# Patient Record
Sex: MALE | Race: WHITE | NOT HISPANIC OR LATINO | Employment: FULL TIME | URBAN - NONMETROPOLITAN AREA
[De-identification: names, ages, dates, MRNs, and addresses within clinical notes are randomized per-mention and may not be internally consistent; named-entity substitution may affect disease eponyms.]

---

## 2017-01-04 ENCOUNTER — OFFICE VISIT (OUTPATIENT)
Dept: SURGERY | Facility: CLINIC | Age: 52
End: 2017-01-04

## 2017-01-04 VITALS
BODY MASS INDEX: 39.84 KG/M2 | DIASTOLIC BLOOD PRESSURE: 74 MMHG | SYSTOLIC BLOOD PRESSURE: 128 MMHG | HEIGHT: 69 IN | WEIGHT: 269 LBS

## 2017-01-04 DIAGNOSIS — Z87.19 HISTORY OF VENTRAL HERNIA REPAIR: Primary | ICD-10-CM

## 2017-01-04 DIAGNOSIS — T14.8XXA HEMATOMA: ICD-10-CM

## 2017-01-04 DIAGNOSIS — Z98.890 HISTORY OF VENTRAL HERNIA REPAIR: Primary | ICD-10-CM

## 2017-01-04 PROCEDURE — 99024 POSTOP FOLLOW-UP VISIT: CPT | Performed by: SURGERY

## 2017-01-04 RX ORDER — HYDROCODONE BITARTRATE AND ACETAMINOPHEN 7.5; 325 MG/1; MG/1
1 TABLET ORAL EVERY 8 HOURS PRN
COMMUNITY
End: 2019-10-06 | Stop reason: HOSPADM

## 2017-01-04 NOTE — LETTER
January 8, 2017     Halle Baron MD  [address] [facility] [address]    Patient: Jon Gill   YOB: 1965   Date of Visit: 1/4/2017       Dear Dr. Tod MD:    Thank you for referring Jon Gill to me for evaluation. Below are the relevant portions of my assessment and plan of care.    If you have questions, please do not hesitate to call me. I look forward to following Jon along with you.         Sincerely,        Ermias Montana MD        CC: No Recipients  Ermias Montana MD  1/8/2017  5:02 PM  Signed  Chief Complaint   Patient presents with   • Post-op Follow-up     Recheck laparoscopic ventral hernia repair 11/25/16     HPI    DATE OF PROCEDURE: 11/25/2016      DIAGNOSES:  1. Umbilical hernia.  2. Epigastric hernia.      PROCEDURE: Laparoscopic ventral hernia repair of both areas with Ventralight mesh.      FINDINGS: Two hernias, 1 at the umbilicus and 1 supraumbilically.      SPECIMENS REMOVED: None.      INDICATION: This young man is 51 years old and has a history of Crohn disease and abdominal wall incisions for bowel resection. He had 2 hernias, 1 located at the umbilicus and 1 supraumbilically. He is here for repair of same.     COURSE: Post operative hematoma has now significantly resolved.    Physical Exam   Abdominal: Soft. Bowel sounds are normal. He exhibits no distension and no mass. There is no tenderness. There is no rebound and no guarding.         ASSESSMENT    Jon was seen today for post-op follow-up.    Diagnoses and all orders for this visit:    History of ventral hernia repair    Hematoma  Comments:  Postoperative-now resolved      PLAN  1.  May resume all normal activity  2.  Recheck with me as needed    Signed by Ermias Montana MD

## 2017-01-04 NOTE — Clinical Note
January 8, 2017     Halle Baron MD  [address] [facility] [address]    Patient: Jon Gill   YOB: 1965   Date of Visit: 1/4/2017       Dear Dr. Tod MD:    Thank you for referring Jon Gill to me for evaluation. Below are the relevant portions of my assessment and plan of care.                   If you have questions, please do not hesitate to call me. I look forward to following Jon along with you.         Sincerely,        Ermias Montana MD        CC: No Recipients

## 2017-01-08 PROBLEM — Z87.19 HISTORY OF VENTRAL HERNIA REPAIR: Status: RESOLVED | Noted: 2017-01-08 | Resolved: 2017-01-08

## 2017-01-08 PROBLEM — Z98.890 HISTORY OF VENTRAL HERNIA REPAIR: Status: ACTIVE | Noted: 2017-01-08

## 2017-01-08 PROBLEM — Z98.890 HISTORY OF VENTRAL HERNIA REPAIR: Status: RESOLVED | Noted: 2017-01-08 | Resolved: 2017-01-08

## 2017-01-08 PROBLEM — Z87.19 HISTORY OF VENTRAL HERNIA REPAIR: Status: ACTIVE | Noted: 2017-01-08

## 2017-01-08 PROBLEM — T14.8XXA HEMATOMA: Status: RESOLVED | Noted: 2017-01-08 | Resolved: 2017-01-08

## 2017-01-08 PROBLEM — T14.8XXA HEMATOMA: Status: ACTIVE | Noted: 2017-01-08

## 2017-01-08 NOTE — PROGRESS NOTES
Chief Complaint   Patient presents with   • Post-op Follow-up     Recheck laparoscopic ventral hernia repair 11/25/16     HPI    DATE OF PROCEDURE: 11/25/2016      DIAGNOSES:  1. Umbilical hernia.  2. Epigastric hernia.      PROCEDURE: Laparoscopic ventral hernia repair of both areas with Ventralight mesh.      FINDINGS: Two hernias, 1 at the umbilicus and 1 supraumbilically.      SPECIMENS REMOVED: None.      INDICATION: This young man is 51 years old and has a history of Crohn disease and abdominal wall incisions for bowel resection. He had 2 hernias, 1 located at the umbilicus and 1 supraumbilically. He is here for repair of same.     COURSE: Post operative hematoma has now significantly resolved.    Physical Exam   Abdominal: Soft. Bowel sounds are normal. He exhibits no distension and no mass. There is no tenderness. There is no rebound and no guarding.         WILLY Webb was seen today for post-op follow-up.    Diagnoses and all orders for this visit:    History of ventral hernia repair    Hematoma  Comments:  Postoperative-now resolved      PLAN  1.  May resume all normal activity  2.  Recheck with me as needed    Signed by Ermias Montana MD

## 2019-10-04 ENCOUNTER — HOSPITAL ENCOUNTER (INPATIENT)
Facility: HOSPITAL | Age: 54
LOS: 2 days | Discharge: HOME | End: 2019-10-06
Attending: FAMILY MEDICINE | Admitting: FAMILY MEDICINE

## 2019-10-04 PROBLEM — F41.9 ANXIETY: Chronic | Status: ACTIVE | Noted: 2019-10-04

## 2019-10-04 PROBLEM — I10 HYPERTENSION: Chronic | Status: ACTIVE | Noted: 2019-10-04

## 2019-10-04 PROBLEM — K50.90 CROHN'S DISEASE: Chronic | Status: ACTIVE | Noted: 2019-10-04

## 2019-10-04 PROBLEM — N17.9 AKI (ACUTE KIDNEY INJURY): Status: ACTIVE | Noted: 2019-10-04

## 2019-10-04 PROBLEM — F32.A DEPRESSION: Chronic | Status: ACTIVE | Noted: 2019-10-04

## 2019-10-04 LAB
ANION GAP SERPL CALCULATED.3IONS-SCNC: 14 MMOL/L (ref 5–15)
BUN BLD-MCNC: 57 MG/DL (ref 6–20)
BUN/CREAT SERPL: 8 (ref 7–25)
CALCIUM SPEC-SCNC: 7.7 MG/DL (ref 8.6–10.5)
CHLORIDE SERPL-SCNC: 102 MMOL/L (ref 98–107)
CO2 SERPL-SCNC: 21 MMOL/L (ref 22–29)
CREAT BLD-MCNC: 7.11 MG/DL (ref 0.76–1.27)
GFR SERPL CREATININE-BSD FRML MDRD: 8 ML/MIN/1.73
GFR SERPL CREATININE-BSD FRML MDRD: ABNORMAL ML/MIN/{1.73_M2}
GLUCOSE BLD-MCNC: 103 MG/DL (ref 65–99)
POTASSIUM BLD-SCNC: 4 MMOL/L (ref 3.5–5.2)
SODIUM BLD-SCNC: 137 MMOL/L (ref 136–145)
WHOLE BLOOD HOLD SPECIMEN: NORMAL
WHOLE BLOOD HOLD SPECIMEN: NORMAL

## 2019-10-04 PROCEDURE — 80048 BASIC METABOLIC PNL TOTAL CA: CPT | Performed by: FAMILY MEDICINE

## 2019-10-04 PROCEDURE — 25010000002 HEPARIN (PORCINE) PER 1000 UNITS: Performed by: FAMILY MEDICINE

## 2019-10-04 PROCEDURE — 0097U HC BIOFIRE FILMARRAY GI PANEL: CPT | Performed by: FAMILY MEDICINE

## 2019-10-04 RX ORDER — ONDANSETRON 2 MG/ML
4 INJECTION INTRAMUSCULAR; INTRAVENOUS EVERY 6 HOURS PRN
Status: DISCONTINUED | OUTPATIENT
Start: 2019-10-04 | End: 2019-10-06 | Stop reason: HOSPADM

## 2019-10-04 RX ORDER — HEPARIN SODIUM 5000 [USP'U]/ML
5000 INJECTION, SOLUTION INTRAVENOUS; SUBCUTANEOUS EVERY 12 HOURS SCHEDULED
Status: DISCONTINUED | OUTPATIENT
Start: 2019-10-04 | End: 2019-10-06 | Stop reason: HOSPADM

## 2019-10-04 RX ORDER — FAMOTIDINE 40 MG/1
40 TABLET, FILM COATED ORAL DAILY
Status: DISCONTINUED | OUTPATIENT
Start: 2019-10-04 | End: 2019-10-06 | Stop reason: HOSPADM

## 2019-10-04 RX ORDER — LISINOPRIL 40 MG/1
40 TABLET ORAL NIGHTLY
COMMUNITY
End: 2019-10-06 | Stop reason: HOSPADM

## 2019-10-04 RX ORDER — DICYCLOMINE HYDROCHLORIDE 10 MG/1
10 CAPSULE ORAL
COMMUNITY

## 2019-10-04 RX ORDER — SODIUM CHLORIDE 0.9 % (FLUSH) 0.9 %
10 SYRINGE (ML) INJECTION EVERY 12 HOURS SCHEDULED
Status: DISCONTINUED | OUTPATIENT
Start: 2019-10-04 | End: 2019-10-06 | Stop reason: HOSPADM

## 2019-10-04 RX ORDER — METOPROLOL SUCCINATE 50 MG/1
50 TABLET, EXTENDED RELEASE ORAL
COMMUNITY

## 2019-10-04 RX ORDER — SODIUM CHLORIDE 9 MG/ML
100 INJECTION, SOLUTION INTRAVENOUS CONTINUOUS
Status: DISCONTINUED | OUTPATIENT
Start: 2019-10-04 | End: 2019-10-06

## 2019-10-04 RX ORDER — GABAPENTIN 300 MG/1
300 CAPSULE ORAL 2 TIMES DAILY
COMMUNITY

## 2019-10-04 RX ORDER — SODIUM CHLORIDE 0.9 % (FLUSH) 0.9 %
10 SYRINGE (ML) INJECTION AS NEEDED
Status: DISCONTINUED | OUTPATIENT
Start: 2019-10-04 | End: 2019-10-06 | Stop reason: HOSPADM

## 2019-10-04 RX ORDER — OXYCODONE AND ACETAMINOPHEN 7.5; 325 MG/1; MG/1
1 TABLET ORAL EVERY 6 HOURS PRN
COMMUNITY

## 2019-10-04 RX ORDER — TIZANIDINE 4 MG/1
4 TABLET ORAL NIGHTLY PRN
COMMUNITY

## 2019-10-04 RX ADMIN — SODIUM CHLORIDE 100 ML/HR: 9 INJECTION, SOLUTION INTRAVENOUS at 21:16

## 2019-10-04 RX ADMIN — SODIUM CHLORIDE, PRESERVATIVE FREE 10 ML: 5 INJECTION INTRAVENOUS at 21:17

## 2019-10-04 RX ADMIN — FAMOTIDINE 40 MG: 40 TABLET ORAL at 21:14

## 2019-10-04 RX ADMIN — HEPARIN SODIUM 5000 UNITS: 5000 INJECTION INTRAVENOUS; SUBCUTANEOUS at 21:14

## 2019-10-05 ENCOUNTER — APPOINTMENT (OUTPATIENT)
Dept: ULTRASOUND IMAGING | Facility: HOSPITAL | Age: 54
End: 2019-10-05

## 2019-10-05 LAB
ANION GAP SERPL CALCULATED.3IONS-SCNC: 12 MMOL/L (ref 5–15)
BACTERIA UR QL AUTO: ABNORMAL /HPF
BASOPHILS # BLD MANUAL: 0.06 10*3/MM3 (ref 0–0.2)
BASOPHILS NFR BLD AUTO: 1 % (ref 0–1.5)
BILIRUB UR QL STRIP: NEGATIVE
BUN BLD-MCNC: 48 MG/DL (ref 6–20)
BUN/CREAT SERPL: 13.5 (ref 7–25)
CALCIUM SPEC-SCNC: 8.3 MG/DL (ref 8.6–10.5)
CHLORIDE SERPL-SCNC: 106 MMOL/L (ref 98–107)
CLARITY UR: CLEAR
CO2 SERPL-SCNC: 21 MMOL/L (ref 22–29)
COLOR UR: YELLOW
CREAT BLD-MCNC: 3.56 MG/DL (ref 0.76–1.27)
CREAT UR-MCNC: 137.9 MG/DL
DEPRECATED RDW RBC AUTO: 41 FL (ref 37–54)
EOSINOPHIL # BLD MANUAL: 0.12 10*3/MM3 (ref 0–0.4)
EOSINOPHIL NFR BLD MANUAL: 2 % (ref 0.3–6.2)
ERYTHROCYTE [DISTWIDTH] IN BLOOD BY AUTOMATED COUNT: 13.1 % (ref 12.3–15.4)
GFR SERPL CREATININE-BSD FRML MDRD: 18 ML/MIN/1.73
GLUCOSE BLD-MCNC: 100 MG/DL (ref 65–99)
GLUCOSE UR STRIP-MCNC: NEGATIVE MG/DL
HCT VFR BLD AUTO: 39.9 % (ref 37.5–51)
HGB BLD-MCNC: 13.4 G/DL (ref 13–17.7)
HGB UR QL STRIP.AUTO: ABNORMAL
HYALINE CASTS UR QL AUTO: ABNORMAL /LPF
KETONES UR QL STRIP: NEGATIVE
LEUKOCYTE ESTERASE UR QL STRIP.AUTO: NEGATIVE
LYMPHOCYTES # BLD MANUAL: 2.55 10*3/MM3 (ref 0.7–3.1)
LYMPHOCYTES NFR BLD MANUAL: 43 % (ref 19.6–45.3)
LYMPHOCYTES NFR BLD MANUAL: 7 % (ref 5–12)
MCH RBC QN AUTO: 29.3 PG (ref 26.6–33)
MCHC RBC AUTO-ENTMCNC: 33.6 G/DL (ref 31.5–35.7)
MCV RBC AUTO: 87.1 FL (ref 79–97)
MONOCYTES # BLD AUTO: 0.42 10*3/MM3 (ref 0.1–0.9)
NEUTROPHILS # BLD AUTO: 2.55 10*3/MM3 (ref 1.7–7)
NEUTROPHILS NFR BLD MANUAL: 38 % (ref 42.7–76)
NEUTS BAND NFR BLD MANUAL: 5 % (ref 0–5)
NITRITE UR QL STRIP: NEGATIVE
PH UR STRIP.AUTO: 5.5 [PH] (ref 5–9)
PLATELET # BLD AUTO: 145 10*3/MM3 (ref 140–450)
PMV BLD AUTO: 11.1 FL (ref 6–12)
POTASSIUM BLD-SCNC: 3.7 MMOL/L (ref 3.5–5.2)
PROT UR QL STRIP: ABNORMAL
PROT UR-MCNC: 53.4 MG/DL
RBC # BLD AUTO: 4.58 10*6/MM3 (ref 4.14–5.8)
RBC # UR: ABNORMAL /HPF
RBC MORPH BLD: NORMAL
REF LAB TEST METHOD: ABNORMAL
SMALL PLATELETS BLD QL SMEAR: ABNORMAL
SODIUM BLD-SCNC: 139 MMOL/L (ref 136–145)
SODIUM UR-SCNC: 55 MMOL/L
SP GR UR STRIP: 1.02 (ref 1–1.03)
SQUAMOUS #/AREA URNS HPF: ABNORMAL /HPF
UROBILINOGEN UR QL STRIP: ABNORMAL
VARIANT LYMPHS NFR BLD MANUAL: 4 % (ref 0–5)
WBC MORPH BLD: NORMAL
WBC NRBC COR # BLD: 5.93 10*3/MM3 (ref 3.4–10.8)
WBC UR QL AUTO: ABNORMAL /HPF

## 2019-10-05 PROCEDURE — 85007 BL SMEAR W/DIFF WBC COUNT: CPT | Performed by: FAMILY MEDICINE

## 2019-10-05 PROCEDURE — 81001 URINALYSIS AUTO W/SCOPE: CPT | Performed by: NURSE PRACTITIONER

## 2019-10-05 PROCEDURE — 76775 US EXAM ABDO BACK WALL LIM: CPT

## 2019-10-05 PROCEDURE — 25010000002 ONDANSETRON PER 1 MG: Performed by: FAMILY MEDICINE

## 2019-10-05 PROCEDURE — 80048 BASIC METABOLIC PNL TOTAL CA: CPT | Performed by: FAMILY MEDICINE

## 2019-10-05 PROCEDURE — 84156 ASSAY OF PROTEIN URINE: CPT | Performed by: INTERNAL MEDICINE

## 2019-10-05 PROCEDURE — 25010000002 HEPARIN (PORCINE) PER 1000 UNITS: Performed by: FAMILY MEDICINE

## 2019-10-05 PROCEDURE — 84300 ASSAY OF URINE SODIUM: CPT | Performed by: NURSE PRACTITIONER

## 2019-10-05 PROCEDURE — 87205 SMEAR GRAM STAIN: CPT | Performed by: FAMILY MEDICINE

## 2019-10-05 PROCEDURE — 85025 COMPLETE CBC W/AUTO DIFF WBC: CPT | Performed by: FAMILY MEDICINE

## 2019-10-05 PROCEDURE — 25010000002 CEFTRIAXONE PER 250 MG: Performed by: FAMILY MEDICINE

## 2019-10-05 PROCEDURE — 82570 ASSAY OF URINE CREATININE: CPT | Performed by: NURSE PRACTITIONER

## 2019-10-05 RX ORDER — OXYCODONE AND ACETAMINOPHEN 7.5; 325 MG/1; MG/1
1 TABLET ORAL EVERY 6 HOURS PRN
Status: DISCONTINUED | OUTPATIENT
Start: 2019-10-05 | End: 2019-10-06 | Stop reason: HOSPADM

## 2019-10-05 RX ORDER — GABAPENTIN 300 MG/1
300 CAPSULE ORAL 2 TIMES DAILY
Status: DISCONTINUED | OUTPATIENT
Start: 2019-10-05 | End: 2019-10-06 | Stop reason: HOSPADM

## 2019-10-05 RX ORDER — ALPRAZOLAM 1 MG/1
1 TABLET ORAL 2 TIMES DAILY PRN
Status: DISCONTINUED | OUTPATIENT
Start: 2019-10-05 | End: 2019-10-06 | Stop reason: HOSPADM

## 2019-10-05 RX ORDER — METOPROLOL SUCCINATE 50 MG/1
50 TABLET, EXTENDED RELEASE ORAL
Status: DISCONTINUED | OUTPATIENT
Start: 2019-10-05 | End: 2019-10-06 | Stop reason: HOSPADM

## 2019-10-05 RX ADMIN — ALPRAZOLAM 1 MG: 1 TABLET ORAL at 09:49

## 2019-10-05 RX ADMIN — SODIUM CHLORIDE 100 ML/HR: 9 INJECTION, SOLUTION INTRAVENOUS at 07:46

## 2019-10-05 RX ADMIN — ONDANSETRON 4 MG: 2 INJECTION INTRAMUSCULAR; INTRAVENOUS at 18:09

## 2019-10-05 RX ADMIN — CEFTRIAXONE 2 G: 2 INJECTION, POWDER, FOR SOLUTION INTRAMUSCULAR; INTRAVENOUS at 09:45

## 2019-10-05 RX ADMIN — METOPROLOL SUCCINATE 50 MG: 50 TABLET, EXTENDED RELEASE ORAL at 09:42

## 2019-10-05 RX ADMIN — SODIUM CHLORIDE 100 ML/HR: 9 INJECTION, SOLUTION INTRAVENOUS at 20:11

## 2019-10-05 RX ADMIN — GABAPENTIN 300 MG: 300 CAPSULE ORAL at 20:12

## 2019-10-05 RX ADMIN — HEPARIN SODIUM 5000 UNITS: 5000 INJECTION INTRAVENOUS; SUBCUTANEOUS at 09:43

## 2019-10-05 RX ADMIN — OXYCODONE HYDROCHLORIDE AND ACETAMINOPHEN 1 TABLET: 7.5; 325 TABLET ORAL at 21:40

## 2019-10-05 RX ADMIN — ALPRAZOLAM 1 MG: 1 TABLET ORAL at 21:37

## 2019-10-05 RX ADMIN — SODIUM CHLORIDE, PRESERVATIVE FREE 10 ML: 5 INJECTION INTRAVENOUS at 09:51

## 2019-10-05 RX ADMIN — HEPARIN SODIUM 5000 UNITS: 5000 INJECTION INTRAVENOUS; SUBCUTANEOUS at 20:11

## 2019-10-05 RX ADMIN — GABAPENTIN 300 MG: 300 CAPSULE ORAL at 09:42

## 2019-10-05 RX ADMIN — FAMOTIDINE 40 MG: 40 TABLET ORAL at 09:41

## 2019-10-05 RX ADMIN — SODIUM CHLORIDE, PRESERVATIVE FREE 10 ML: 5 INJECTION INTRAVENOUS at 20:12

## 2019-10-05 NOTE — PLAN OF CARE
Problem: Patient Care Overview  Goal: Plan of Care Review  Outcome: Ongoing (interventions implemented as appropriate)   10/05/19 0256   Coping/Psychosocial   Plan of Care Reviewed With patient   Plan of Care Review   Progress improving   OTHER   Outcome Summary pt has been afebrile throughout night. pt vital signs stable. 2 loose stools noted. Urine output adequate. NS infusing as order. pt denies any pain. will continue to monitor.     Goal: Individualization and Mutuality  Outcome: Ongoing (interventions implemented as appropriate)    Goal: Discharge Needs Assessment  Outcome: Ongoing (interventions implemented as appropriate)    Goal: Interprofessional Rounds/Family Conf  Outcome: Ongoing (interventions implemented as appropriate)      Problem: Skin Injury Risk (Adult)  Goal: Identify Related Risk Factors and Signs and Symptoms  Outcome: Ongoing (interventions implemented as appropriate)    Goal: Skin Health and Integrity  Outcome: Ongoing (interventions implemented as appropriate)      Problem: Pain, Chronic (Adult)  Goal: Identify Related Risk Factors and Signs and Symptoms  Outcome: Ongoing (interventions implemented as appropriate)    Goal: Acceptable Pain/Comfort Level and Functional Ability  Outcome: Ongoing (interventions implemented as appropriate)      Problem: Renal Failure/Kidney Injury, Acute (Adult)  Goal: Signs and Symptoms of Listed Potential Problems Will be Absent, Minimized or Managed (Renal Failure/Kidney Injury, Acute)  Outcome: Ongoing (interventions implemented as appropriate)

## 2019-10-05 NOTE — PLAN OF CARE
Problem: Patient Care Overview  Goal: Plan of Care Review  Outcome: Ongoing (interventions implemented as appropriate)   10/05/19 4315   Coping/Psychosocial   Plan of Care Reviewed With patient   Plan of Care Review   Progress no change   OTHER   Outcome Summary Pt was transferred from CCU this shift, v/s stable, no complaints, will continue to monitor      Goal: Individualization and Mutuality  Outcome: Ongoing (interventions implemented as appropriate)    Goal: Discharge Needs Assessment  Outcome: Ongoing (interventions implemented as appropriate)    Goal: Interprofessional Rounds/Family Conf  Outcome: Ongoing (interventions implemented as appropriate)      Problem: Skin Injury Risk (Adult)  Goal: Skin Health and Integrity  Outcome: Ongoing (interventions implemented as appropriate)      Problem: Pain, Chronic (Adult)  Goal: Acceptable Pain/Comfort Level and Functional Ability  Outcome: Ongoing (interventions implemented as appropriate)      Problem: Renal Failure/Kidney Injury, Acute (Adult)  Goal: Signs and Symptoms of Listed Potential Problems Will be Absent, Minimized or Managed (Renal Failure/Kidney Injury, Acute)  Outcome: Ongoing (interventions implemented as appropriate)

## 2019-10-05 NOTE — PROGRESS NOTES
MEDICINE DAILY PROGRESS NOTE  NAME: Jon Gill  : 1965  MRN: [id number]     LOS: 1 day     PROVIDER OF SERVICE: Tulio Perry MD    Chief Complaint: LAWRENCE (acute kidney injury) (CMS/[facility])    Subjective:   HPI: Jon Gill is a 54 y.o. male with medical history significant for history of Crohn's status post partial colectomy, depression, hypertension, chronic pain, and anxiety presents with diarrhea from transferring facility.      Patient is a transfer from [facility].     Patient states that he has had worsening diarrhea and fever over the last 2 days with up to 10 episodes a day.  Patient states he has some chronic frequent loose stools secondary to his partial colectomy but this is been much worse.  Patient believes that he has had adequate p.o. intake but does have dry mucous membranes on exam.  Patient does endorse some nausea, diarrhea, fever, and chills. Patient also has decreased urine output. Patient denies any chest pain, diaphoresis, dyspnea, or vomiting.       At transferring facility patient was found to have a creatinine of 8.0.  Patient was also found to have hypotension.  At time of exam patient is resting in the ICU bed in no acute distress.  Patient's blood pressure has normalized.  Per nursing report patient has gotten approximately 3.5 to 4 L of fluid.  Patient still states that he does not have much urine output at this time.    Interval History:  History taken from: patient chart family RN  10/05. Improving. Increased UOP overnight. Cr improving. Diarrhea the same.    F - Regular. HH.  A - Percocet  S - Ativan  T - Heparin  H - Yes  U - Pepcid  G - N/A      Intake/Output       10/04/19 0701 - 10/05/19 0700 10/05/19 0701 - 10/06/19 0700      4597-7136 5552-4857 Total 2163-1005 3197-4507 Total       Intake    P.O.  --  360 360  --  -- --    I.V.  --  1221 1221  224  -- 224    Total Intake -- 1581 1581 224 -- 224       Output    Urine  --  1775 1775  --  -- --    Total  Output -- 1775 1775 -- -- --        Intake/Output       10/04/19 0701 - 10/05/19 0700 10/05/19 0701 - 10/06/19 0700      3267-6771 9381-4010 Total 5230-3222 4501-0712 Total       Intake    P.O.  --  360 360  --  -- --    I.V.  --  1221 1221  224  -- 224    Total Intake -- 1581 1581 224 -- 224       Output    Urine  --  1775 1775  --  -- --    Total Output -- 1775 1775 -- -- --          Intake/Output       10/04/19 0701 - 10/05/19 0700 10/05/19 0701 - 10/06/19 0700      4119-9045 3358-2157 Total 9134-0557 3054-4827 Total       Intake    P.O.  --  360 360  --  -- --    I.V.  --  1221 1221  224  -- 224    Total Intake -- 1581 1581 224 -- 224       Output    Urine  --  1775 1775  --  -- --    Total Output -- 1775 1775 -- -- --          Review of Systems:   Review of Systems   Constitutional: Positive for activity change and fatigue. Negative for appetite change and fever.   Respiratory: Negative for cough and shortness of breath.    Cardiovascular: Negative for chest pain and palpitations.   Gastrointestinal: Positive for diarrhea and nausea. Negative for abdominal pain and vomiting.   Genitourinary: Positive for decreased urine volume (Improving).   Musculoskeletal: Positive for arthralgias and back pain. Negative for gait problem.   Skin: Negative for color change and rash.   Neurological: Negative for dizziness, weakness and headaches.   Psychiatric/Behavioral: Negative for agitation, confusion and sleep disturbance.       Objective:     Vital Signs  Vitals:    10/05/19 0702 10/05/19 0723 10/05/19 0802 10/05/19 0812   BP: 136/66  141/77    Pulse:  78  76   Resp:       Temp:    98.5 °F (36.9 °C)   TempSrc:    Oral   SpO2:  94%  97%   Weight:       Height:           Physical Exam  Physical Exam   Constitutional: He is oriented to person, place, and time. He appears well-developed and well-nourished. No distress.   HENT:   Head: Normocephalic and atraumatic.   Right Ear: External ear normal.   Left Ear: External ear  normal.   Nose: Nose normal.   Eyes: Conjunctivae and EOM are normal. Pupils are equal, round, and reactive to light.   Neck: Neck supple. No thyromegaly present.   Cardiovascular: Normal rate, regular rhythm and normal heart sounds.   Pulmonary/Chest: Effort normal and breath sounds normal. No respiratory distress. He has no wheezes. He has no rales. He exhibits no tenderness.   Abdominal: Soft. Bowel sounds are normal. He exhibits no distension and no mass. There is no tenderness. There is no rebound and no guarding.   Musculoskeletal: Normal range of motion. He exhibits no edema.   Neurological: He is alert and oriented to person, place, and time.   Skin: Skin is warm and dry. No rash noted. He is not diaphoretic. No erythema. No pallor.   Psychiatric: He has a normal mood and affect. His behavior is normal.   Nursing note and vitals reviewed.      Medication Review    Current Facility-Administered Medications:   •  ALPRAZolam (XANAX) tablet 1 mg, 1 mg, Oral, BID PRN, Tulio Perry MD  •  famotidine (PEPCID) tablet 40 mg, 40 mg, Oral, Daily, Tulio Perry MD, 40 mg at 10/04/19 2114  •  gabapentin (NEURONTIN) capsule 300 mg, 300 mg, Oral, BID, Tulio Perry MD  •  heparin (porcine) 5000 UNIT/ML injection 5,000 Units, 5,000 Units, Subcutaneous, Q12H, Tulio Perry MD, 5,000 Units at 10/04/19 2114  •  metoprolol succinate XL (TOPROL-XL) 24 hr tablet 50 mg, 50 mg, Oral, Q24H, Tulio Perry MD  •  ondansetron (ZOFRAN) injection 4 mg, 4 mg, Intravenous, Q6H PRN, Tulio Perry MD  •  oxyCODONE-acetaminophen (PERCOCET) 7.5-325 MG per tablet 1 tablet, 1 tablet, Oral, Q6H PRN, Tulio Perry MD  •  sodium chloride 0.9 % flush 10 mL, 10 mL, Intravenous, Q12H, Tulio Perry MD, 10 mL at 10/04/19 2117  •  sodium chloride 0.9 % flush 10 mL, 10 mL, Intravenous, PRN, Tulio Perry MD  •  sodium chloride 0.9 % infusion, 100 mL/hr, Intravenous, Continuous, Tulio Perry MD, Last Rate: 100 mL/hr at 10/05/19 0746, 100 mL/hr  at 10/05/19 0746     Diagnostic Data    Lab Results (last 24 hours)     Procedure Component Value Units Date/Time    Gastrointestinal Panel, PCR - Stool, Per Rectum [[id number]]  (Abnormal) Collected:  10/04/19 2001    Specimen:  Stool from Per Rectum Updated:  10/05/19 0555     Campylobacter Not Detected     Clostridium difficile (toxin A/B) Not Detected     Plesiomonas shigelloides Not Detected     Salmonella Detected     Comment:   Routine susceptibility testing is not indicated for non-typhoidal Salmonella species isolated from intestinal sources.        Vibrio Not Detected     Vibrio cholerae Not Detected     Yersinia enterocolitica Not Detected     Enteroaggregative E. coli (EAEC) Not Detected     Enteropathogenic E. coli (EPEC) Not Detected     Enterotoxigenic E. coli (ETEC) lt/st Not Detected     Shiga-like toxin-producing E. coli (STEC) stx1/stx2 Not Detected     E. coli O157 Not Detected     Shigella/Enteroinvasive E. coli (EIEC) Not Detected     Cryptosporidium Not Detected     Cyclospora cayetanensis Not Detected     Entamoeba histolytica Not Detected     Giardia lamblia Not Detected     Adenovirus F40/41 Not Detected     Astrovirus Not Detected     Norovirus GI/GII Not Detected     Rotavirus A Not Detected     Sapovirus (I, II, IV or V) Not Detected    Narrative:       Testing performed by multiplex PCR system.    Manual Differential [[id number]]  (Abnormal) Collected:  10/05/19 0424    Specimen:  Blood Updated:  10/05/19 0549     Neutrophil % 38.0 %      Lymphocyte % 43.0 %      Monocyte % 7.0 %      Eosinophil % 2.0 %      Basophil % 1.0 %      Bands %  5.0 %      Atypical Lymphocyte % 4.0 %      Neutrophils Absolute 2.55 10*3/mm3      Lymphocytes Absolute 2.55 10*3/mm3      Monocytes Absolute 0.42 10*3/mm3      Eosinophils Absolute 0.12 10*3/mm3      Basophils Absolute 0.06 10*3/mm3      RBC Morphology Normal     WBC Morphology Normal     Platelet Estimate Decreased    CBC & Differential [[id number]]  Collected:  10/05/19 0424    Specimen:  Blood Updated:  10/05/19 0519    Narrative:       The following orders were created for panel order CBC & Differential.  Procedure                               Abnormality         Status                     ---------                               -----------         ------                     CBC Auto Differential[[id number]]        Normal              Final result                 Please view results for these tests on the individual orders.    CBC Auto Differential [[id number]]  (Normal) Collected:  10/05/19 0424    Specimen:  Blood Updated:  10/05/19 0519     WBC 5.93 10*3/mm3      RBC 4.58 10*6/mm3      Hemoglobin 13.4 g/dL      Hematocrit 39.9 %      MCV 87.1 fL      MCH 29.3 pg      MCHC 33.6 g/dL      RDW 13.1 %      RDW-SD 41.0 fl      MPV 11.1 fL      Platelets 145 10*3/mm3     Basic Metabolic Panel [[id number]]  (Abnormal) Collected:  10/05/19 0424    Specimen:  Blood Updated:  10/05/19 0509     Glucose 100 mg/dL      BUN 48 mg/dL      Creatinine 3.56 mg/dL      Sodium 139 mmol/L      Potassium 3.7 mmol/L      Chloride 106 mmol/L      CO2 21.0 mmol/L      Calcium 8.3 mg/dL      eGFR Non African Amer 18 mL/min/1.73      BUN/Creatinine Ratio 13.5     Anion Gap 12.0 mmol/L     Narrative:       GFR Normal >60  Chronic Kidney Disease <60  Kidney Failure <15    Extra Tubes [[id number]] Collected:  10/04/19 1934    Specimen:  Blood, Venous Line Updated:  10/04/19 2127    Narrative:       The following orders were created for panel order Extra Tubes.  Procedure                               Abnormality         Status                     ---------                               -----------         ------                     Light Blue Top[[id number]]                                   Final result               Lavender Top[[id number]]                                     Final result                 Please view results for these tests on the individual orders.    Light Blue Top  [[id number]] Collected:  10/04/19 1934    Specimen:  Blood Updated:  10/04/19 2127     Extra Tube hold for add-on     Comment: Auto resulted       Lavender Top [[id number]] Collected:  10/04/19 1934    Specimen:  Blood Updated:  10/04/19 2127     Extra Tube hold for add-on     Comment: Auto resulted       Basic Metabolic Panel [[id number]]  (Abnormal) Collected:  10/04/19 1932    Specimen:  Blood Updated:  10/04/19 1959     Glucose 103 mg/dL      BUN 57 mg/dL      Creatinine 7.11 mg/dL      Sodium 137 mmol/L      Potassium 4.0 mmol/L      Chloride 102 mmol/L      CO2 21.0 mmol/L      Calcium 7.7 mg/dL      eGFR   Amer --     Comment: <15 Indicative of kidney failure.        eGFR Non African Amer 8 mL/min/1.73      Comment: <15 Indicative of kidney failure.        BUN/Creatinine Ratio 8.0     Anion Gap 14.0 mmol/L     Narrative:       GFR Normal >60  Chronic Kidney Disease <60  Kidney Failure <15          I reviewed the patient's new clinical results.    Assessment/Plan:     Active Hospital Problems    Diagnosis POA   • **LAWRENCE (acute kidney injury) (CMS/[facility]) [N17.9] Yes   • Crohn's disease (CMS/[facility]) [K50.90] Yes   • Depression [F32.9] Yes   • Hypertension [I10] Yes   • Anxiety [F41.9] Yes       #.  LAWRENCE. IVF's.   10/05. Improving. Urine studies pending. Suspect pre renal with Salmonella on GI PCR and diarrhea.  10/04. Repeat BMP pending. Nephrology consulted. Renal US in am.  Results from last 7 days   Lab Units 10/05/19  0424 10/04/19  1932   CREATININE mg/dL 3.56* 7.11*   #.  Chronic pain. eKASPER reviewed. Percocet.  #.  Hypotension.  Transient.    10/05. Resolved.   10/04. Suspect secondary to hypovolemia.  Much improved at time of exam.  Monitor.  Continue intravenous fluids.  Monitor urine output.  Strict I's and O's.  #. HTN. Toprol XL restarted. Hold ACEi secondary to LAWRENCE.  #.  Diarrhea.   10/05. Salmonella on GI PCR. Rocephin 2 g IV.  10/04. GI PCR.  #.  Depression.  Home meds.  #.  History of Crohn's.   Monitor.  Consult GI as needed.  CT abdomen as needed.    Will monitor patient's hospital course and adjust treatment as hospital course dictates.    DVT prophylaxis: Heparin  Code status is   Code Status and Medical Interventions:   Ordered at: 10/04/19 1919     Code Status:    CPR     Medical Interventions (Level of Support Prior to Arrest):    Full       Plan for disposition:Transfer to med/surg      Time:           This document has been electronically signed by Tulio Perry MD on October 5, 2019 8:15 AM

## 2019-10-05 NOTE — H&P
HISTORY AND PHYSICAL  NAME: Jon Gill  : 1965  MRN: [id number]    DATE OF ADMISSION: 10/04/19    DATE & TIME SEEN: 10/04/19 7:19 PM    PCP: Halle Baron MD    CODE STATUS:   Code Status and Medical Interventions:   Ordered at: 10/04/19 1919     Code Status:    CPR     Medical Interventions (Level of Support Prior to Arrest):    Full       CHIEF COMPLAINT Diarrhea    HPI:  Jon Gill is a 54 y.o. male with medical history significant for history of Crohn's status post partial colectomy, depression, hypertension, chronic pain, and anxiety presents with diarrhea from transferring facility.     Patient is a transfer from [facility].    Patient states that he has had worsening diarrhea and fever over the last 2 days with up to 10 episodes a day.  Patient states he has some chronic frequent loose stools secondary to his partial colectomy but this is been much worse.  Patient believes that he has had adequate p.o. intake but does have dry mucous membranes on exam.  Patient does endorse some nausea, diarrhea, fever, and chills. Patient also has decreased urine output. Patient denies any chest pain, diaphoresis, dyspnea, or vomiting.      At transferring facility patient was found to have a creatinine of 8.0.  Patient was also found to have hypotension.  At time of exam patient is resting in the ICU bed in no acute distress.  Patient's blood pressure has normalized.  Per nursing report patient has gotten approximately 3.5 to 4 L of fluid.  Patient still states that he does not have much urine output at this time.    CONCURRENT MEDICAL HISTORY:  Past Medical History:   Diagnosis Date   • Anxiety    • Arthritis    • Crohn's disease (CMS/HCC)    • Depression    • Hypertension        PAST SURGICAL HISTORY:  Past Surgical History:   Procedure Laterality Date   • ABDOMINAL HERNIA REPAIR     • COLON RESECTION     • COLONOSCOPY  10/26/2015    internal hemorrhoids,otherwise normal  postoperative colonoscopy with the evaluation of his ti       FAMILY HISTORY:  History reviewed. No pertinent family history.     SOCIAL HISTORY:  Social History     Socioeconomic History   • Marital status:      Spouse name: Not on file   • Number of children: Not on file   • Years of education: Not on file   • Highest education level: Not on file   Tobacco Use   • Smoking status: Former Smoker   • Smokeless tobacco: Never Used   Substance and Sexual Activity   • Alcohol use: Yes     Comment: very rarely   • Drug use: No       HOME MEDICATIONS:  Prior to Admission medications    Medication Sig Start Date End Date Taking? Authorizing Provider   ALPRAZolam (XANAX) 1 MG tablet TK 1 T PO BID PRF ANXIETY 9/19/16   Waldo Benjamin MD   HYDROcodone-acetaminophen (NORCO) 7.5-325 MG per tablet Take 1 tablet by mouth Every 8 (Eight) Hours As Needed.    Waldo Benjamin MD   lansoprazole (PREVACID) 15 MG capsule Take 15 mg by mouth Daily.    Waldo Benjamin MD   lisinopril (PRINIVIL,ZESTRIL) 10 MG tablet Take 10 mg by mouth Daily.    Waldo Benjamin MD   loperamide (IMODIUM) 2 MG capsule Take 2 mg by mouth 4 (Four) Times a Day As Needed for diarrhea.    Waldo Benjamin MD   sertraline (ZOLOFT) 100 MG tablet TK 1 T PO BID 9/19/16   Waldo Benjamin MD       ALLERGIES:  Ketamine hcl    REVIEW OF SYSTEMS  Review of Systems   Constitutional: Positive for activity change, appetite change, chills, fatigue and fever.   Respiratory: Negative for cough and shortness of breath.    Cardiovascular: Negative for chest pain and palpitations.   Gastrointestinal: Positive for diarrhea and nausea. Negative for abdominal pain.   Genitourinary: Positive for decreased urine volume.   Musculoskeletal: Positive for arthralgias and back pain. Negative for gait problem.   Skin: Negative for color change and rash.   Neurological: Negative for dizziness, weakness and headaches.   Psychiatric/Behavioral:  Negative for agitation, confusion and sleep disturbance.       PHYSICAL EXAM:  Temp:  [98.8 °F (37.1 °C)] 98.8 °F (37.1 °C)  Heart Rate:  [79-81] 79  Resp:  [20] 20  BP: (106-139)/(58-69) 139/62  Body mass index is 40.11 kg/m².     Physical Exam   Constitutional: He is oriented to person, place, and time. He appears well-developed and well-nourished. No distress.   HENT:   Head: Normocephalic and atraumatic.   Right Ear: External ear normal.   Left Ear: External ear normal.   Nose: Nose normal.   Dry mucous membranes.   Eyes: Conjunctivae and EOM are normal. Pupils are equal, round, and reactive to light.   Neck: Neck supple. No thyromegaly present.   Cardiovascular: Normal rate, regular rhythm and normal heart sounds.   Pulmonary/Chest: Effort normal. He has no wheezes. He has no rales. He exhibits no tenderness.   Abdominal: Soft. Bowel sounds are normal. He exhibits no distension and no mass. There is no tenderness. There is no rebound and no guarding.   Musculoskeletal: Normal range of motion. He exhibits no edema.   Neurological: He is alert and oriented to person, place, and time.   Skin: Skin is warm and dry. No rash noted. He is not diaphoretic. No erythema. No pallor.   Psychiatric: He has a normal mood and affect. His behavior is normal.   Nursing note and vitals reviewed.      DIAGNOSTIC DATA:   Lab Results (last 24 hours)     Procedure Component Value Units Date/Time    Extra Tubes [[id number]] Collected:  10/04/19 1934    Specimen:  Blood, Venous Line Updated:  10/04/19 2127    Narrative:       The following orders were created for panel order Extra Tubes.  Procedure                               Abnormality         Status                     ---------                               -----------         ------                     Light Blue Top[[id number]]                                   Final result               Lavender Top[[id number]]                                     Final result                  Please view results for these tests on the individual orders.    Light Blue Top [[id number]] Collected:  10/04/19 1934    Specimen:  Blood Updated:  10/04/19 2127     Extra Tube hold for add-on     Comment: Auto resulted       Lavender Top [[id number]] Collected:  10/04/19 1934    Specimen:  Blood Updated:  10/04/19 2127     Extra Tube hold for add-on     Comment: Auto resulted       Gastrointestinal Panel, PCR - Stool, Per Rectum [[id number]] Collected:  10/04/19 2001    Specimen:  Stool from Per Rectum Updated:  10/04/19 2004    Basic Metabolic Panel [[id number]]  (Abnormal) Collected:  10/04/19 1932    Specimen:  Blood Updated:  10/04/19 1959     Glucose 103 mg/dL      BUN 57 mg/dL      Creatinine 7.11 mg/dL      Sodium 137 mmol/L      Potassium 4.0 mmol/L      Chloride 102 mmol/L      CO2 21.0 mmol/L      Calcium 7.7 mg/dL      eGFR   Amer --     Comment: <15 Indicative of kidney failure.        eGFR Non African Amer 8 mL/min/1.73      Comment: <15 Indicative of kidney failure.        BUN/Creatinine Ratio 8.0     Anion Gap 14.0 mmol/L     Narrative:       GFR Normal >60  Chronic Kidney Disease <60  Kidney Failure <15        CrCl cannot be calculated (Patient's most recent lab result is older than the maximum 30 days allowed.).     Imaging Results (last 24 hours)     ** No results found for the last 24 hours. **          I reviewed the patient's new clinical results.    ASSESSMENT AND PLAN: This is a 54 y.o. male with:    Active Hospital Problems    Diagnosis POA   • **LAWRENCE (acute kidney injury) (CMS/HCC) [N17.9] Yes   • Crohn's disease (CMS/HCC) [K50.90] Yes   • Depression [F32.9] Yes   • Hypertension [I10] Yes   • Anxiety [F41.9] Yes       #.  LAWRENCE. IVF's. Repeat BMP pending. Nephrology consulted. Renal US in am.  #.  Chronic pain. eKASPER reviewed.   #.  Hypotension.  Transient.  Suspect secondary to hypovolemia.  Much improved at time of exam.  Monitor.  Continue intravenous fluids.  Monitor urine output.   Strict I's and O's.  #.  Diarrhea.  GI PCR.  #.  Depression.  Home meds.  #.  History of Crohn's.  Monitor.  Consult GI as needed.  CT abdomen as needed.      Will monitor patient's hospital course and adjust treatment as hospital course dictates.    DVT prophylaxis: Heparin SQ  Code status is   Code Status and Medical Interventions:   Ordered at: 10/04/19 1919     Code Status:    CPR     Medical Interventions (Level of Support Prior to Arrest):    Full      [facility] # [id number], reviewed and consistent with patient reported medications.      I discussed the patients findings and my recommendations with patient, nursing staff and consulting provider.           This document has been electronically signed by Tulio Perry MD on October 4, 2019 7:19 PM

## 2019-10-05 NOTE — CONSULTS
[facility]  [address] [place] [address]. [address]   - [phone]  F  [phone]     Consultation         PATIENT  DEMOGRAPHICS   PATIENT NAME: Jon Gill                      PHYSICIAN: Sathish Hood MD  : 1965  MRN: [id number]    Subjective   SUBJECTIVE   Referring Provider: Dr.Bryce Perry  Reason for Consultation: Help with management of elevated creatinine of 7.11  History of present illness: This is a 52-year-old  man with a past medical history of Crohn's disease who had a colectomy done many years ago in [place].  He has a history of short bowel syndrome she sees a gastroenterologist in [place], he has been on multiple medications including loperamide 6 to 10 pills a day.  He has been having fevers of 103 occasionally on and off for couple days and that is followed by diarrhea about 10-12 episodes large bowel movements.  He presented with this to the ED and had labs done which showed a creatinine of 8 and hence the reason for consultation.      Patient was seen in the ICU denies any chest pain tells me he still has some diarrhea but denies any abdominal pain nausea or vomiting.  Denies any over-the-counter medication like Advil Motrin Aleve and ibuprofen denies any urinary complaints of urgency frequency hesitancy at this time.  He has been afebrile since he came to the ICU.    Past Medical History:   Diagnosis Date   • Anxiety    • Arthritis    • Crohn's disease (CMS/HCC)    • Depression    • Hypertension      Past Surgical History:   Procedure Laterality Date   • ABDOMINAL HERNIA REPAIR     • COLON RESECTION     • COLONOSCOPY  10/26/2015    internal hemorrhoids,otherwise normal postoperative colonoscopy with the evaluation of his ti     History reviewed. No pertinent family history.  Social History     Tobacco Use   • Smoking status: Former Smoker   • Smokeless tobacco: Never Used   Substance Use Topics   • Alcohol use: Yes      "Comment: very rarely   • Drug use: No     Allergies:  Ketamine hcl     REVIEW OF SYSTEMS    Review of Systems   Constitutional: Positive for fever.   HENT: Negative.    Eyes: Negative.    Respiratory: Negative.    Cardiovascular: Negative.    Gastrointestinal: Positive for diarrhea and nausea.   Endocrine: Negative.    Genitourinary: Negative.    Musculoskeletal: Negative.    Skin: Negative.    Allergic/Immunologic: Negative.    Neurological: Negative.    Hematological: Negative.    Psychiatric/Behavioral: Negative.        Objective   OBJECTIVE   Vital Signs  Temp:  [98.8 °F (37.1 °C)-98.9 °F (37.2 °C)] 98.8 °F (37.1 °C)  Heart Rate:  [78-83] 78  Resp:  [20] 20  BP: (106-142)/(56-74) 136/66    Flowsheet Rows      First Filed Value   Admission Height  175.3 cm (69\") Documented at 10/04/2019 1947   Admission Weight  122 kg (268 lb) Documented at 10/04/2019 1600           I/O last 3 completed shifts:  In: 1581 [P.O.:360; I.V.:1221]  Out: 1775 [Urine:1775]    PHYSICAL EXAM    Physical Exam   Constitutional: He is oriented to person, place, and time.   HENT:   Head: Normocephalic and atraumatic.   Eyes: EOM are normal. Pupils are equal, round, and reactive to light.   Neck: Normal range of motion. Neck supple.   Cardiovascular: Normal rate, regular rhythm and normal heart sounds.   Pulmonary/Chest: Effort normal and breath sounds normal.   Abdominal: Soft. Bowel sounds are normal.   Musculoskeletal: Normal range of motion.   Neurological: He is alert and oriented to person, place, and time.   Skin: Skin is dry.   Psychiatric: He has a normal mood and affect. His behavior is normal.       RESULTS   Results Review:    Results from last 7 days   Lab Units 10/05/19  0424 10/04/19  1932   SODIUM mmol/L 139 137   POTASSIUM mmol/L 3.7 4.0   CHLORIDE mmol/L 106 102   CO2 mmol/L 21.0* 21.0*   BUN mg/dL 48* 57*   CREATININE mg/dL 3.56* 7.11*   CALCIUM mg/dL 8.3* 7.7*   GLUCOSE mg/dL 100* 103*       Estimated Creatinine Clearance: " 31.6 mL/min (A) (by C-G formula based on SCr of 3.56 mg/dL (H)).                Results from last 7 days   Lab Units 10/05/19  0424   WBC 10*3/mm3 5.93   HEMOGLOBIN g/dL 13.4   PLATELETS 10*3/mm3 145              MEDICATIONS      famotidine 40 mg Oral Daily   heparin (porcine) 5,000 Units Subcutaneous Q12H   sodium chloride 10 mL Intravenous Q12H       sodium chloride 100 mL/hr Last Rate: 100 mL/hr (10/05/19 0746)     Medications Prior to Admission   Medication Sig Dispense Refill Last Dose   • Adalimumab (HUMIRA) 40 MG/0.4ML Prefilled Syringe Kit injection Inject 40 mg under the skin into the appropriate area as directed See Admin Instructions.      • ALPRAZolam (XANAX) 1 MG tablet TK 1 T PO BID PRF ANXIETY  2 Taking   • dicyclomine (BENTYL) 10 MG capsule Take 10 mg by mouth 4 (Four) Times a Day Before Meals & at Bedtime.      • gabapentin (NEURONTIN) 300 MG capsule Take 300 mg by mouth 2 (Two) Times a Day.      • lisinopril (PRINIVIL,ZESTRIL) 40 MG tablet Take 40 mg by mouth Every Night.      • metoprolol succinate XL (TOPROL-XL) 50 MG 24 hr tablet Take 50 mg by mouth every night at bedtime.      • oxyCODONE-acetaminophen (PERCOCET) 7.5-325 MG per tablet Take 1 tablet by mouth Every 6 (Six) Hours As Needed.      • sertraline (ZOLOFT) 100 MG tablet TK 1 T PO BID  5 Taking   • tiZANidine (ZANAFLEX) 4 MG tablet Take 4 mg by mouth At Night As Needed for Muscle Spasms.      • HYDROcodone-acetaminophen (NORCO) 7.5-325 MG per tablet Take 1 tablet by mouth Every 8 (Eight) Hours As Needed.   Taking   • lansoprazole (PREVACID) 15 MG capsule Take 15 mg by mouth Daily.   Taking   • lisinopril (PRINIVIL,ZESTRIL) 10 MG tablet Take 10 mg by mouth Daily.   Taking   • loperamide (IMODIUM) 2 MG capsule Take 2 mg by mouth 4 (Four) Times a Day As Needed for diarrhea.   Taking     Assessment/Plan   ASSESSMENT / PLAN      LAWRENCE (acute kidney injury) (CMS/HCC)    Crohn's disease (CMS/HCC)    Depression    Hypertension    Anxiety    1)  Acute kidney injury likely appears to be prerenal at this time.  Now improving doubt other causes at this time will need work-up  2) diarrhea history of Crohn's disease has been on multiple medications in the past remains on adalimumab  3) history of hypertension was hypotensive yesterday no normal  4) fever at home not before plan at this time  5) gastroesophageal post disease  6) history of depression  7) mild metabolic acidosis now improving    Plan: Continue current medications this time doing well from the renal standpoint, continue with IV fluids at this time.  No need for bicarb drip.  Renal function slowly improving.  Calcium controlled, lipids controlled.  Will get urine lites urine sodium creatinine and urea at this time.  We will check an ultrasound of the kidney for now.  Strict I's and O's daily weights.  Renal function to improve at this time.  Blood pressure not better controlled no fever at this time.  Okay to transfer to floor.  Labs tomorrow morning discussed with the patient and family at length will follow for now           I discussed the patients findings and my recommendations with patient and family        This document has been electronically signed by Sathish Hood MD on October 5, 2019 8:10 AM

## 2019-10-06 VITALS
SYSTOLIC BLOOD PRESSURE: 168 MMHG | TEMPERATURE: 96.9 F | DIASTOLIC BLOOD PRESSURE: 92 MMHG | OXYGEN SATURATION: 96 % | WEIGHT: 276.3 LBS | BODY MASS INDEX: 40.92 KG/M2 | RESPIRATION RATE: 18 BRPM | HEART RATE: 62 BPM | HEIGHT: 69 IN

## 2019-10-06 LAB
ANION GAP SERPL CALCULATED.3IONS-SCNC: 8 MMOL/L (ref 5–15)
BASOPHILS # BLD AUTO: 0.02 10*3/MM3 (ref 0–0.2)
BASOPHILS NFR BLD AUTO: 0.4 % (ref 0–1.5)
BUN BLD-MCNC: 21 MG/DL (ref 6–20)
BUN/CREAT SERPL: 20.6 (ref 7–25)
CALCIUM SPEC-SCNC: 8.8 MG/DL (ref 8.6–10.5)
CHLORIDE SERPL-SCNC: 106 MMOL/L (ref 98–107)
CO2 SERPL-SCNC: 25 MMOL/L (ref 22–29)
CREAT BLD-MCNC: 1.02 MG/DL (ref 0.76–1.27)
DEPRECATED RDW RBC AUTO: 39.7 FL (ref 37–54)
EOSINOPHIL # BLD AUTO: 0.12 10*3/MM3 (ref 0–0.4)
EOSINOPHIL NFR BLD AUTO: 2.3 % (ref 0.3–6.2)
ERYTHROCYTE [DISTWIDTH] IN BLOOD BY AUTOMATED COUNT: 12.7 % (ref 12.3–15.4)
GFR SERPL CREATININE-BSD FRML MDRD: 77 ML/MIN/1.73
GLUCOSE BLD-MCNC: 105 MG/DL (ref 65–99)
HCT VFR BLD AUTO: 38.4 % (ref 37.5–51)
HGB BLD-MCNC: 13.1 G/DL (ref 13–17.7)
IMM GRANULOCYTES # BLD AUTO: 0.03 10*3/MM3 (ref 0–0.05)
IMM GRANULOCYTES NFR BLD AUTO: 0.6 % (ref 0–0.5)
LYMPHOCYTES # BLD AUTO: 1.55 10*3/MM3 (ref 0.7–3.1)
LYMPHOCYTES NFR BLD AUTO: 29.3 % (ref 19.6–45.3)
MAGNESIUM SERPL-MCNC: 1.8 MG/DL (ref 1.6–2.6)
MCH RBC QN AUTO: 29.3 PG (ref 26.6–33)
MCHC RBC AUTO-ENTMCNC: 34.1 G/DL (ref 31.5–35.7)
MCV RBC AUTO: 85.9 FL (ref 79–97)
MONOCYTES # BLD AUTO: 0.87 10*3/MM3 (ref 0.1–0.9)
MONOCYTES NFR BLD AUTO: 16.4 % (ref 5–12)
NEUTROPHILS # BLD AUTO: 2.7 10*3/MM3 (ref 1.7–7)
NEUTROPHILS NFR BLD AUTO: 51 % (ref 42.7–76)
NRBC BLD AUTO-RTO: 0 /100 WBC (ref 0–0.2)
PHOSPHATE SERPL-MCNC: 2 MG/DL (ref 2.5–4.5)
PLATELET # BLD AUTO: 146 10*3/MM3 (ref 140–450)
PMV BLD AUTO: 10.6 FL (ref 6–12)
POTASSIUM BLD-SCNC: 3.8 MMOL/L (ref 3.5–5.2)
RBC # BLD AUTO: 4.47 10*6/MM3 (ref 4.14–5.8)
SODIUM BLD-SCNC: 139 MMOL/L (ref 136–145)
URATE SERPL-MCNC: 4.2 MG/DL (ref 3.4–7)
WBC NRBC COR # BLD: 5.29 10*3/MM3 (ref 3.4–10.8)

## 2019-10-06 PROCEDURE — 84550 ASSAY OF BLOOD/URIC ACID: CPT | Performed by: INTERNAL MEDICINE

## 2019-10-06 PROCEDURE — 85025 COMPLETE CBC W/AUTO DIFF WBC: CPT | Performed by: FAMILY MEDICINE

## 2019-10-06 PROCEDURE — 83735 ASSAY OF MAGNESIUM: CPT | Performed by: INTERNAL MEDICINE

## 2019-10-06 PROCEDURE — 84100 ASSAY OF PHOSPHORUS: CPT | Performed by: INTERNAL MEDICINE

## 2019-10-06 PROCEDURE — 80048 BASIC METABOLIC PNL TOTAL CA: CPT | Performed by: FAMILY MEDICINE

## 2019-10-06 PROCEDURE — 25010000002 ONDANSETRON PER 1 MG: Performed by: FAMILY MEDICINE

## 2019-10-06 PROCEDURE — 25010000002 HEPARIN (PORCINE) PER 1000 UNITS: Performed by: FAMILY MEDICINE

## 2019-10-06 RX ORDER — LEVOFLOXACIN 500 MG/1
500 TABLET, FILM COATED ORAL EVERY 24 HOURS
Qty: 12 TABLET | Refills: 0 | Status: SHIPPED | OUTPATIENT
Start: 2019-10-07 | End: 2019-10-19

## 2019-10-06 RX ORDER — PROMETHAZINE HYDROCHLORIDE 12.5 MG/1
12.5 TABLET ORAL EVERY 6 HOURS PRN
Status: DISCONTINUED | OUTPATIENT
Start: 2019-10-06 | End: 2019-10-06 | Stop reason: HOSPADM

## 2019-10-06 RX ORDER — LEVOFLOXACIN 500 MG/1
500 TABLET, FILM COATED ORAL EVERY 24 HOURS
Status: DISCONTINUED | OUTPATIENT
Start: 2019-10-06 | End: 2019-10-06 | Stop reason: HOSPADM

## 2019-10-06 RX ORDER — ONDANSETRON HYDROCHLORIDE 8 MG/1
8 TABLET, FILM COATED ORAL EVERY 6 HOURS PRN
Qty: 30 TABLET | Refills: 1 | Status: SHIPPED | OUTPATIENT
Start: 2019-10-06

## 2019-10-06 RX ORDER — PROMETHAZINE HYDROCHLORIDE 12.5 MG/1
12.5 TABLET ORAL EVERY 6 HOURS PRN
Qty: 30 TABLET | Refills: 1 | Status: SHIPPED | OUTPATIENT
Start: 2019-10-06

## 2019-10-06 RX ORDER — ONDANSETRON 4 MG/1
8 TABLET, FILM COATED ORAL EVERY 6 HOURS PRN
Status: DISCONTINUED | OUTPATIENT
Start: 2019-10-06 | End: 2019-10-06 | Stop reason: HOSPADM

## 2019-10-06 RX ORDER — LISINOPRIL 10 MG/1
10 TABLET ORAL
Status: DISCONTINUED | OUTPATIENT
Start: 2019-10-06 | End: 2019-10-06 | Stop reason: HOSPADM

## 2019-10-06 RX ADMIN — SODIUM CHLORIDE 100 ML/HR: 9 INJECTION, SOLUTION INTRAVENOUS at 03:14

## 2019-10-06 RX ADMIN — GABAPENTIN 300 MG: 300 CAPSULE ORAL at 08:29

## 2019-10-06 RX ADMIN — ONDANSETRON 4 MG: 2 INJECTION INTRAMUSCULAR; INTRAVENOUS at 03:13

## 2019-10-06 RX ADMIN — FAMOTIDINE 40 MG: 40 TABLET ORAL at 08:30

## 2019-10-06 RX ADMIN — OXYCODONE HYDROCHLORIDE AND ACETAMINOPHEN 1 TABLET: 7.5; 325 TABLET ORAL at 08:30

## 2019-10-06 RX ADMIN — HEPARIN SODIUM 5000 UNITS: 5000 INJECTION INTRAVENOUS; SUBCUTANEOUS at 08:29

## 2019-10-06 RX ADMIN — ONDANSETRON HYDROCHLORIDE 8 MG: 4 TABLET, FILM COATED ORAL at 10:43

## 2019-10-06 RX ADMIN — METOPROLOL SUCCINATE 50 MG: 50 TABLET, EXTENDED RELEASE ORAL at 08:30

## 2019-10-06 RX ADMIN — SODIUM CHLORIDE, PRESERVATIVE FREE 10 ML: 5 INJECTION INTRAVENOUS at 08:30

## 2019-10-06 RX ADMIN — LEVOFLOXACIN 500 MG: 500 TABLET, FILM COATED ORAL at 10:43

## 2019-10-06 NOTE — PLAN OF CARE
Problem: Patient Care Overview  Goal: Plan of Care Review  Outcome: Ongoing (interventions implemented as appropriate)   10/05/19 8248   Coping/Psychosocial   Plan of Care Reviewed With patient   Plan of Care Review   Progress no change   OTHER   Outcome Summary Pt resting. VSS. Received prn pain and anxiety medications as ordered. No new issues noted. Will continue to montior.      Goal: Individualization and Mutuality  Outcome: Ongoing (interventions implemented as appropriate)    Goal: Discharge Needs Assessment  Outcome: Ongoing (interventions implemented as appropriate)    Goal: Interprofessional Rounds/Family Conf  Outcome: Ongoing (interventions implemented as appropriate)      Problem: Skin Injury Risk (Adult)  Goal: Identify Related Risk Factors and Signs and Symptoms  Outcome: Outcome(s) achieved Date Met: 10/05/19    Goal: Skin Health and Integrity  Outcome: Ongoing (interventions implemented as appropriate)      Problem: Pain, Chronic (Adult)  Goal: Identify Related Risk Factors and Signs and Symptoms  Outcome: Outcome(s) achieved Date Met: 10/05/19    Goal: Acceptable Pain/Comfort Level and Functional Ability  Outcome: Ongoing (interventions implemented as appropriate)      Problem: Renal Failure/Kidney Injury, Acute (Adult)  Goal: Signs and Symptoms of Listed Potential Problems Will be Absent, Minimized or Managed (Renal Failure/Kidney Injury, Acute)  Outcome: Ongoing (interventions implemented as appropriate)

## 2019-10-06 NOTE — PROGRESS NOTES
[facility] Medicine Services  INPATIENT PROGRESS NOTE    Length of Stay: 2  Date of Admission: 10/4/2019  Primary Care Physician: Halle Baron MD    Subjective   Chief Complaint/HPI: This 54-year-old  male with a history of Crohn's disease status post partial colectomy as well as immunosuppressive therapy was transferred to our facility secondary to multiple episodes of diarrhea and fever that led to severe renal failure.  Patient was found to have salmonella.  He was aggressively resuscitated and his creatinine has now returned to normal.  He is tolerating p.o. intake, however still having multiple cases of diarrhea.  Patient has been on ceftriaxone at this time.  Today he will be given a trial of Levaquin.  Patient states that antibiotics generally give him an upset stomach.  Was explained to the patient that he requires 10 to 14 days of antibiotic therapy secondary to his immunosuppressive state and Salmonella.  At this time have scheduled Zofran prior to delivery of antibiotic and have also scheduled PRN for delivery after antibiotic.    Review of Systems   Constitutional: Negative for chills and fever.   Respiratory: Negative for shortness of breath.    Cardiovascular: Negative for chest pain, palpitations and leg swelling.   Gastrointestinal: Positive for diarrhea. Negative for abdominal pain, blood in stool, constipation, nausea and vomiting.   Genitourinary: Negative for dysuria.   Neurological: Negative for dizziness and light-headedness.   Psychiatric/Behavioral: Negative for confusion.      All pertinent negatives and positives are as above. All other systems have been reviewed and are negative unless otherwise stated.     Objective    Temp:  [96.8 °F (36 °C)-99.3 °F (37.4 °C)] 96.9 °F (36.1 °C)  Heart Rate:  [62-80] 70  Resp:  [18-20] 18  BP: (135-168)/(71-92) 168/92    Physical Exam   Constitutional: He is oriented to person, place, and time. He  appears well-developed and well-nourished.   HENT:   Head: Normocephalic and atraumatic.   Eyes: Conjunctivae are normal.   Cardiovascular: Normal rate.   Pulmonary/Chest: Effort normal and breath sounds normal. No stridor. No respiratory distress.   Abdominal: Soft. Bowel sounds are normal. He exhibits no distension. There is no tenderness.   Musculoskeletal: He exhibits no edema.   Neurological: He is alert and oriented to person, place, and time.   Skin: Skin is warm and dry. Capillary refill takes less than 2 seconds.   Psychiatric: He has a normal mood and affect. His behavior is normal.     Results Review:  I have reviewed the labs, radiology results, and diagnostic studies.    Laboratory Data:   Results from last 7 days   Lab Units 10/06/19  0557 10/05/19  0424 10/04/19  1932   SODIUM mmol/L 139 139 137   POTASSIUM mmol/L 3.8 3.7 4.0   CHLORIDE mmol/L 106 106 102   CO2 mmol/L 25.0 21.0* 21.0*   BUN mg/dL 21* 48* 57*   CREATININE mg/dL 1.02 3.56* 7.11*   GLUCOSE mg/dL 105* 100* 103*   CALCIUM mg/dL 8.8 8.3* 7.7*   ANION GAP mmol/L 8.0 12.0 14.0     Estimated Creatinine Clearance: 110.7 mL/min (by C-G formula based on SCr of 1.02 mg/dL).  Results from last 7 days   Lab Units 10/06/19  0557   MAGNESIUM mg/dL 1.8   PHOSPHORUS mg/dL 2.0*     Results from last 7 days   Lab Units 10/06/19  0557   URIC ACID mg/dL 4.2     Results from last 7 days   Lab Units 10/06/19  0557 10/05/19  0424   WBC 10*3/mm3 5.29 5.93   HEMOGLOBIN g/dL 13.1 13.4   HEMATOCRIT % 38.4 39.9   PLATELETS 10*3/mm3 146 145           Culture Data:   No results found for: BLOODCX  No results found for: URINECX  No results found for: RESPCX  No results found for: WOUNDCX  No results found for: STOOLCX  No components found for: BODYFLD    Radiology Data:   Imaging Results (last 24 hours)     ** No results found for the last 24 hours. **          I have reviewed the patient's current medications.     Assessment/Plan     Active Hospital Problems     Diagnosis   • **LAWRENCE (acute kidney injury) (CMS/HCC)   • Crohn's disease (CMS/HCC)   • Depression   • Hypertension   • Anxiety   Salmonella infection    Plan: Trial of p.o. Levaquin with antiemetics scheduled and as needed, consider discharge today if patient tolerates p.o.                This document has been electronically signed by KIRTI Mayer on October 6, 2019 11:21 AM

## 2019-10-06 NOTE — DISCHARGE SUMMARY
[facility] Medicine Services  DISCHARGE SUMMARY       Date of Admission: 10/4/2019  Date of Discharge:  10/6/2019  Primary Care Physician: Halle Baron MD    Presenting Problem/History of Present Illness:  LAWRENCE (acute kidney injury) (CMS/[facility]) [N17.9]     Final Discharge Diagnoses:  Active Hospital Problems    Diagnosis   • **LAWRENCE (acute kidney injury) (CMS/[facility])   • Crohn's disease (CMS/[facility])   • Depression   • Hypertension   • Anxiety       Consults:   Consults     Date and Time Order Name Status Description    10/4/2019 1915 Inpatient Nephrology Consult Completed         Pertinent Test Results:   Lab Results (most recent)     Procedure Component Value Units Date/Time    Basic Metabolic Panel [[id number]]  (Abnormal) Collected:  10/06/19 0557    Specimen:  Blood Updated:  10/06/19 0727     Glucose 105 mg/dL      BUN 21 mg/dL      Creatinine 1.02 mg/dL      Sodium 139 mmol/L      Potassium 3.8 mmol/L      Chloride 106 mmol/L      CO2 25.0 mmol/L      Calcium 8.8 mg/dL      eGFR Non African Amer 77 mL/min/1.73      BUN/Creatinine Ratio 20.6     Anion Gap 8.0 mmol/L     Narrative:       GFR Normal >60  Chronic Kidney Disease <60  Kidney Failure <15    Phosphorus [[id number]]  (Abnormal) Collected:  10/06/19 0557    Specimen:  Blood Updated:  10/06/19 0727     Phosphorus 2.0 mg/dL     Uric Acid [[id number]]  (Normal) Collected:  10/06/19 0557    Specimen:  Blood Updated:  10/06/19 0725     Uric Acid 4.2 mg/dL     Magnesium [[id number]]  (Normal) Collected:  10/06/19 0557    Specimen:  Blood Updated:  10/06/19 0725     Magnesium 1.8 mg/dL     CBC & Differential [[id number]] Collected:  10/06/19 0557    Specimen:  Blood Updated:  10/06/19 0705    Narrative:       The following orders were created for panel order CBC & Differential.  Procedure                               Abnormality         Status                     ---------                               -----------         ------                      CBC Auto Differential[[id number]]        Abnormal            Final result                 Please view results for these tests on the individual orders.    CBC Auto Differential [[id number]]  (Abnormal) Collected:  10/06/19 0557    Specimen:  Blood Updated:  10/06/19 0705     WBC 5.29 10*3/mm3      RBC 4.47 10*6/mm3      Hemoglobin 13.1 g/dL      Hematocrit 38.4 %      MCV 85.9 fL      MCH 29.3 pg      MCHC 34.1 g/dL      RDW 12.7 %      RDW-SD 39.7 fl      MPV 10.6 fL      Platelets 146 10*3/mm3      Neutrophil % 51.0 %      Lymphocyte % 29.3 %      Monocyte % 16.4 %      Eosinophil % 2.3 %      Basophil % 0.4 %      Immature Grans % 0.6 %      Neutrophils, Absolute 2.70 10*3/mm3      Lymphocytes, Absolute 1.55 10*3/mm3      Monocytes, Absolute 0.87 10*3/mm3      Eosinophils, Absolute 0.12 10*3/mm3      Basophils, Absolute 0.02 10*3/mm3      Immature Grans, Absolute 0.03 10*3/mm3      nRBC 0.0 /100 WBC     Creatinine, Urine, Random - Urine, Clean Catch [[id number]] Collected:  10/05/19 0851    Specimen:  Urine, Clean Catch Updated:  10/05/19 1213     Creatinine, Urine 137.9 mg/dL     Narrative:       Reference intervals for random urine have not been established.  Clinical usage is dependent upon physician's interpretation in combination with other laboratory tests.     Urinalysis, Microscopic Only - Urine, Clean Catch [[id number]]  (Abnormal) Collected:  10/05/19 0851    Specimen:  Urine, Clean Catch Updated:  10/05/19 0915     RBC, UA 6-12 /HPF      WBC, UA 3-5 /HPF      Bacteria, UA None Seen /HPF      Squamous Epithelial Cells, UA None Seen /HPF      Hyaline Casts, UA 3-6 /LPF      Methodology Automated Microscopy    Urinalysis With Microscopic If Indicated (No Culture) - Urine, Clean Catch [[id number]]  (Abnormal) Collected:  10/05/19 0851    Specimen:  Urine, Clean Catch Updated:  10/05/19 0915     Color, UA Yellow     Appearance, UA Clear     pH, UA 5.5     Specific Gravity, UA 1.016     Glucose,  UA Negative     Ketones, UA Negative     Bilirubin, UA Negative     Blood, UA Large (3+)     Protein,  mg/dL (2+)     Leuk Esterase, UA Negative     Nitrite, UA Negative     Urobilinogen, UA 0.2 E.U./dL    Sodium, Urine, Random - Urine, Clean Catch [[id number]] Collected:  10/05/19 0851    Specimen:  Urine, Clean Catch Updated:  10/05/19 0914     Sodium, Urine 55 mmol/L     Narrative:       Reference intervals for random urine have not been established.  Clinical usage is dependent upon physician's interpretation in combination with other laboratory tests.     Protein, Urine, Random - Urine, Clean Catch [[id number]] Collected:  10/05/19 0851    Specimen:  Urine, Clean Catch Updated:  10/05/19 0914     Total Protein, Urine 53.4 mg/dL     Narrative:       Reference intervals for random urine have not been established.  Clinical usage is dependent upon physician's interpretation in combination with other laboratory tests.     Urine Eosinophils, Jeremiah's Stain - Urine, Clean Catch [[id number]] Collected:  10/05/19 0859    Specimen:  Urine, Clean Catch Updated:  10/05/19 0859    Gastrointestinal Panel, PCR - Stool, Per Rectum [[id number]]  (Abnormal) Collected:  10/04/19 2001    Specimen:  Stool from Per Rectum Updated:  10/05/19 0555     Campylobacter Not Detected     Clostridium difficile (toxin A/B) Not Detected     Plesiomonas shigelloides Not Detected     Salmonella Detected     Comment:   Routine susceptibility testing is not indicated for non-typhoidal Salmonella species isolated from intestinal sources.        Vibrio Not Detected     Vibrio cholerae Not Detected     Yersinia enterocolitica Not Detected     Enteroaggregative E. coli (EAEC) Not Detected     Enteropathogenic E. coli (EPEC) Not Detected     Enterotoxigenic E. coli (ETEC) lt/st Not Detected     Shiga-like toxin-producing E. coli (STEC) stx1/stx2 Not Detected     E. coli O157 Not Detected     Shigella/Enteroinvasive E. coli (EIEC) Not Detected      Cryptosporidium Not Detected     Cyclospora cayetanensis Not Detected     Entamoeba histolytica Not Detected     Giardia lamblia Not Detected     Adenovirus F40/41 Not Detected     Astrovirus Not Detected     Norovirus GI/GII Not Detected     Rotavirus A Not Detected     Sapovirus (I, II, IV or V) Not Detected    Narrative:       Testing performed by multiplex PCR system.    Manual Differential [[id number]]  (Abnormal) Collected:  10/05/19 0424    Specimen:  Blood Updated:  10/05/19 0549     Neutrophil % 38.0 %      Lymphocyte % 43.0 %      Monocyte % 7.0 %      Eosinophil % 2.0 %      Basophil % 1.0 %      Bands %  5.0 %      Atypical Lymphocyte % 4.0 %      Neutrophils Absolute 2.55 10*3/mm3      Lymphocytes Absolute 2.55 10*3/mm3      Monocytes Absolute 0.42 10*3/mm3      Eosinophils Absolute 0.12 10*3/mm3      Basophils Absolute 0.06 10*3/mm3      RBC Morphology Normal     WBC Morphology Normal     Platelet Estimate Decreased    CBC & Differential [[id number]] Collected:  10/05/19 0424    Specimen:  Blood Updated:  10/05/19 0519    Narrative:       The following orders were created for panel order CBC & Differential.  Procedure                               Abnormality         Status                     ---------                               -----------         ------                     CBC Auto Differential[[id number]]        Normal              Final result                 Please view results for these tests on the individual orders.    CBC Auto Differential [[id number]]  (Normal) Collected:  10/05/19 0424    Specimen:  Blood Updated:  10/05/19 0519     WBC 5.93 10*3/mm3      RBC 4.58 10*6/mm3      Hemoglobin 13.4 g/dL      Hematocrit 39.9 %      MCV 87.1 fL      MCH 29.3 pg      MCHC 33.6 g/dL      RDW 13.1 %      RDW-SD 41.0 fl      MPV 11.1 fL      Platelets 145 10*3/mm3     Basic Metabolic Panel [[id number]]  (Abnormal) Collected:  10/05/19 0424    Specimen:  Blood Updated:  10/05/19 0509     Glucose 100  mg/dL      BUN 48 mg/dL      Creatinine 3.56 mg/dL      Sodium 139 mmol/L      Potassium 3.7 mmol/L      Chloride 106 mmol/L      CO2 21.0 mmol/L      Calcium 8.3 mg/dL      eGFR Non African Amer 18 mL/min/1.73      BUN/Creatinine Ratio 13.5     Anion Gap 12.0 mmol/L     Narrative:       GFR Normal >60  Chronic Kidney Disease <60  Kidney Failure <15    Extra Tubes [[id number]] Collected:  10/04/19 1934    Specimen:  Blood, Venous Line Updated:  10/04/19 2127    Narrative:       The following orders were created for panel order Extra Tubes.  Procedure                               Abnormality         Status                     ---------                               -----------         ------                     Light Blue Top[[id number]]                                   Final result               Lavender Top[[id number]]                                     Final result                 Please view results for these tests on the individual orders.    Light Blue Top [[id number]] Collected:  10/04/19 1934    Specimen:  Blood Updated:  10/04/19 2127     Extra Tube hold for add-on     Comment: Auto resulted       Lavender Top [[id number]] Collected:  10/04/19 1934    Specimen:  Blood Updated:  10/04/19 2127     Extra Tube hold for add-on     Comment: Auto resulted           Imaging Results (most recent)     Procedure Component Value Units Date/Time    US Renal Bilateral [[id number]] Collected:  10/05/19 0850     Updated:  10/05/19 0942    Narrative:       EXAMINATION:  ultrasound, renal     CLINICAL INDICATION / HISTORY:  LAWRENCE    COMPARISON:  none    TECHNIQUE:  ultrasound, renal    FULL RESULTS / FINDINGS:    Kidney, right:      size:  normal, measuring 11.5 x 6 x 5.4 cm    echotexture:  normal    no nephrolithiasis, solid mass, or collecting system dilation    Kidney, left:      size:  normal, measuring 13.2 x 5.7 x 5.67 m    echotexture:  normal    no nephrolithiasis, solid mass, or collecting system dilation,  left kidney  "mid zone oval anechoic structure with good through  acoustic transmission consistent with simple renal cortical cyst  which measures 2.2 cm in greatest diameter.    Urinary bladder:  Normal      Absent      Impression:       CONCLUSION:    1.  Left kidney mid zone 2.2 cm simple renal cortical cyst.  2.  Otherwise unremarkable renal ultrasound.    Electronically signed by:  Willy Kramer MD  10/5/2019 9:41 AM CDT  Workstation: [id number]        Hospital Course:  The patient is a 54 y.o. male who presented to [facility] with multiple episodes of diarrhea and fever that led to severe renal failure.  Patient was found to have salmonella.  He was aggressively resuscitated with IV fluids and his creatinine has now returned to normal.  Patient is tolerating p.o. intake and taking copious amounts of fluid as he is still having diarrhea.  Patient was treated with 2 days of antibiotics here.  On the day of discharge he was transitioned to p.o. Levaquin.  Patient reports upset stomach with antibiotics, however here he was able to tolerate it with Zofran.  He also tolerated his lunch.  Patient will be prescribed 12 additional days of antibiotics to make a total of 14 days.  He will then follow-up with his primary care provider to discuss whether or not further treatment or follow-up is necessary.  Patient will also follow-up with his primary care provider within 7 days.  Patient was prescribed Levaquin, Zofran, and in the case of Zofran failure he was prescribed Phenergan.  Patient was instructed to return with any concerning worsening symptoms.  He stated he felt safe and well to go home and verbalized understanding of the plan of care and the need to return with any concerning symptoms.    Condition on Discharge: Stable, improved    Physical Exam on Discharge:  /92 (BP Location: Right arm, Patient Position: Lying)   Pulse 70   Temp 96.9 °F (36.1 °C) (Oral)   Resp 18   Ht 175.3 cm (69\")   Wt 125 kg (276 " lb 4.8 oz)   SpO2 96%   BMI 40.80 kg/m²   Physical Exam   Constitutional: He is oriented to person, place, and time. He appears well-developed and well-nourished. No distress.   HENT:   Head: Normocephalic and atraumatic.   Eyes: Conjunctivae are normal.   Cardiovascular: Normal rate and regular rhythm.   Pulmonary/Chest: Effort normal and breath sounds normal. No stridor. No respiratory distress.   Abdominal: Soft. Bowel sounds are normal. He exhibits no distension. There is no tenderness.   Musculoskeletal: He exhibits no edema.   Neurological: He is alert and oriented to person, place, and time.   Skin: Skin is warm and dry. Capillary refill takes less than 2 seconds. He is not diaphoretic.   Psychiatric: He has a normal mood and affect. His behavior is normal.     Discharge Disposition:  Home or Self Care    Discharge Medications:     Discharge Medications      New Medications      Instructions Start Date   levoFLOXacin 500 MG tablet  Commonly known as:  LEVAQUIN   500 mg, Oral, Every 24 Hours   Start Date:  10/7/2019     ondansetron 8 MG tablet  Commonly known as:  ZOFRAN   8 mg, Oral, Every 6 Hours PRN      promethazine 12.5 MG tablet  Commonly known as:  PHENERGAN   12.5 mg, Oral, Every 6 Hours PRN         Changes to Medications      Instructions Start Date   lisinopril 10 MG tablet  Commonly known as:  PRINIVIL,ZESTRIL  What changed:  Another medication with the same name was removed. Continue taking this medication, and follow the directions you see here.   10 mg, Oral, Daily         Continue These Medications      Instructions Start Date   ALPRAZolam 1 MG tablet  Commonly known as:  XANAX   TK 1 T PO BID PRF ANXIETY      dicyclomine 10 MG capsule  Commonly known as:  BENTYL   10 mg, Oral, 4 Times Daily Before Meals & Nightly      gabapentin 300 MG capsule  Commonly known as:  NEURONTIN   300 mg, Oral, 2 Times Daily      HUMIRA 40 MG/0.4ML Prefilled Syringe Kit injection  Generic drug:  Adalimumab   40  mg, Subcutaneous, See Admin Instructions      lansoprazole 15 MG capsule  Commonly known as:  PREVACID   15 mg, Oral, Daily      metoprolol succinate XL 50 MG 24 hr tablet  Commonly known as:  TOPROL-XL   50 mg, Oral, Every Night at Bedtime      oxyCODONE-acetaminophen 7.5-325 MG per tablet  Commonly known as:  PERCOCET   1 tablet, Oral, Every 6 Hours PRN      sertraline 100 MG tablet  Commonly known as:  ZOLOFT   TK 1 T PO BID      tiZANidine 4 MG tablet  Commonly known as:  ZANAFLEX   4 mg, Oral, Nightly PRN         Stop These Medications    HYDROcodone-acetaminophen 7.5-325 MG per tablet  Commonly known as:  NORCO     loperamide 2 MG capsule  Commonly known as:  IMODIUM            Discharge Diet:   Diet Instructions     Advance Diet As Tolerated            Activity at Discharge:   Activity Instructions     Activity as Tolerated            Discharge Care Plan/Instructions: P.o. Levaquin for 12 additional days for a total of 14 days; follow-up with PCP; copious amounts of fluid; advance diet as tolerated; return with any concerning worsening symptoms.    Test Results Pending at Discharge:    Order Current Status    Urine Eosinophils, Jeremiah's Stain - Urine, Clean Catch In process    Gastrointestinal Panel, PCR - Stool, Per Rectum Preliminary result              This document has been electronically signed by KIRTI Mayer on October 6, 2019 12:26 PM      Time: Please note that greater than 30 minutes was spent on the discharge planning and summary this patient

## 2019-10-06 NOTE — PROGRESS NOTES
"[facility]  [address] [place] [address]. [id number]  T - [phone]  F - [phone]     Progress Note          PATIENT  DEMOGRAPHICS   PATIENT NAME: Jon Gill                      PHYSICIAN: NANI Gomez  : 1965  MRN: [id number]   LOS: 2 days    Patient Care Team:  Halle Baron MD as PCP - General  Subjective   SUBJECTIVE   Patient transferred to the floor and feeling much better today.  Diarrhea is improving.         Objective   OBJECTIVE   Vital Signs  Temp:  [96.8 °F (36 °C)-99.3 °F (37.4 °C)] 96.9 °F (36.1 °C)  Heart Rate:  [62-80] 70  Resp:  [18-20] 18  BP: (135-168)/(71-92) 168/92    Flowsheet Rows      First Filed Value   Admission Height  175.3 cm (69\") Documented at 10/04/2019 1947   Admission Weight  122 kg (268 lb) Documented at 10/04/2019 1600           I/O last 3 completed shifts:  In: 2825 [P.O.:840; I.V.:1885; IV Piggyback:100]  Out: 2925 [Urine:2925]    PHYSICAL EXAM    Physical Exam   Constitutional: He is oriented to person, place, and time. He appears well-developed and well-nourished.   HENT:   Head: Normocephalic and atraumatic.   Eyes: Conjunctivae and EOM are normal. Pupils are equal, round, and reactive to light.   Cardiovascular: Normal rate and regular rhythm.   Pulmonary/Chest: Effort normal and breath sounds normal.   Abdominal: Soft.   Neurological: He is alert and oriented to person, place, and time.   Skin: Skin is warm and dry.       RESULTS   Results Review:    Results from last 7 days   Lab Units 10/06/19  0557 10/05/19  0424 10/04/19  193   SODIUM mmol/L 139 139 137   POTASSIUM mmol/L 3.8 3.7 4.0   CHLORIDE mmol/L 106 106 102   CO2 mmol/L 25.0 21.0* 21.0*   BUN mg/dL 21* 48* 57*   CREATININE mg/dL 1.02 3.56* 7.11*   CALCIUM mg/dL 8.8 8.3* 7.7*   GLUCOSE mg/dL 105* 100* 103*       Estimated Creatinine Clearance: 110.7 mL/min (by C-G formula based on SCr of 1.02 mg/dL).    Results from last 7 days   Lab Units 10/06/19  0557 "   MAGNESIUM mg/dL 1.8   PHOSPHORUS mg/dL 2.0*       Results from last 7 days   Lab Units 10/06/19  0557   URIC ACID mg/dL 4.2       Results from last 7 days   Lab Units 10/06/19  0557 10/05/19  0424   WBC 10*3/mm3 5.29 5.93   HEMOGLOBIN g/dL 13.1 13.4   PLATELETS 10*3/mm3 146 145               Imaging Results (last 24 hours)     ** No results found for the last 24 hours. **           MEDICATIONS      ceftriaxone 2 g Intravenous Q24H   famotidine 40 mg Oral Daily   gabapentin 300 mg Oral BID   heparin (porcine) 5,000 Units Subcutaneous Q12H   levoFLOXacin 500 mg Oral Q24H   metoprolol succinate XL 50 mg Oral Q24H   sodium chloride 10 mL Intravenous Q12H       sodium chloride 100 mL/hr Last Rate: 100 mL/hr (10/06/19 0314)       Assessment/Plan   ASSESSMENT / PLAN      LAWRENCE (acute kidney injury) (CMS/HCC)    Crohn's disease (CMS/HCC)    Depression    Hypertension    Anxiety    1.  LAWRENCE- etiology of acute kidney injury, certainly prerenal due to volume depletion and diarrhea related to Salmonella.  At this time his creatinine is returned to 1.0.  Patient has lost his IV access and we will discontinue his IV fluids as he is now tolerating p.o. intake well.    2.  Diarrhea- history of Crohn's disease and also tested positive for Salmonella.  Diarrhea improving per the patient.    3.  Hypertension- blood pressure mildly elevated, was low yesterday and we will resume his home lisinopril    4.  GERD    5.  History of depression           This document has been electronically signed by NANI Gomez on October 6, 2019 10:45 AM

## 2019-10-07 LAB — HANSEL STAIN: NEGATIVE

## 2019-10-10 NOTE — PAYOR COMM NOTE
"Charlotte Mott  [facility]  (P)[phone]  (F)[phone]          Berenice Gill (54 y.o. Male)     Date of Birth Social Security Number Address Home Phone MRN    1965  [address] [phone] [id number]    Rastafarian Marital Status          Sikhism        Admission Date Admission Type Admitting Provider Attending Provider Department, Room/Bed    10/4/19 Urgent Tulio Perry MD  [address], 418/1    Discharge Date Discharge Disposition Discharge Destination        10/6/2019 Home or Self Care              Attending Provider:  (none)   Allergies:  Ketamine Hcl    Isolation:  None   Infection:  Salmonella  (10/04/19)   Code Status:  Prior    Ht:  175.3 cm (69\")   Wt:  125 kg (276 lb 4.8 oz)    Admission Cmt:  None   Principal Problem:  LAWRENCE (acute kidney injury) (CMS/[facility]) [N17.9]                 Active Insurance as of 10/4/2019     Primary Coverage     Payor Plan Insurance Group Employer/Plan Group    [id number] PPO [id number]     Payor Plan Address Payor Plan Phone Number Payor Plan Fax Number Effective Dates    [phone]  3/1/2013 - None Entered    [place] [id number]       Subscriber Name Subscriber Birth Date Member ID       BERENICE GILL 1965 [id number]                 Emergency Contacts      (Rel.) Home Phone Work Phone Mobile Phone    VIJAY GILL (Spouse) [phone] -- [phone]               History & Physical      Tulio Perry MD at 10/04/19 1919              HISTORY AND PHYSICAL  NAME: Berenice Gill  : 1965  MRN: [id number]    DATE OF ADMISSION: 10/04/19    DATE & TIME SEEN: 10/04/19 7:19 PM    PCP: Halle Baron MD    CODE STATUS:   Code Status and Medical Interventions:   Ordered at: 10/04/19 1919     Code Status:    CPR     Medical Interventions (Level of Support Prior to Arrest):    Full       CHIEF " COMPLAINT Diarrhea    HPI:  Jon Gill is a 54 y.o. male with medical history significant for history of Crohn's status post partial colectomy, depression, hypertension, chronic pain, and anxiety presents with diarrhea from transferring facility.     Patient is a transfer from [facility].    Patient states that he has had worsening diarrhea and fever over the last 2 days with up to 10 episodes a day.  Patient states he has some chronic frequent loose stools secondary to his partial colectomy but this is been much worse.  Patient believes that he has had adequate p.o. intake but does have dry mucous membranes on exam.  Patient does endorse some nausea, diarrhea, fever, and chills. Patient also has decreased urine output. Patient denies any chest pain, diaphoresis, dyspnea, or vomiting.      At transferring facility patient was found to have a creatinine of 8.0.  Patient was also found to have hypotension.  At time of exam patient is resting in the ICU bed in no acute distress.  Patient's blood pressure has normalized.  Per nursing report patient has gotten approximately 3.5 to 4 L of fluid.  Patient still states that he does not have much urine output at this time.    CONCURRENT MEDICAL HISTORY:  Past Medical History:   Diagnosis Date   • Anxiety    • Arthritis    • Crohn's disease (CMS/HCC)    • Depression    • Hypertension        PAST SURGICAL HISTORY:  Past Surgical History:   Procedure Laterality Date   • ABDOMINAL HERNIA REPAIR     • COLON RESECTION  1988   • COLONOSCOPY  10/26/2015    internal hemorrhoids,otherwise normal postoperative colonoscopy with the evaluation of his ti       FAMILY HISTORY:  History reviewed. No pertinent family history.     SOCIAL HISTORY:  Social History     Socioeconomic History   • Marital status:      Spouse name: Not on file   • Number of children: Not on file   • Years of education: Not on file   • Highest education level: Not on file   Tobacco Use   •  Smoking status: Former Smoker   • Smokeless tobacco: Never Used   Substance and Sexual Activity   • Alcohol use: Yes     Comment: very rarely   • Drug use: No       HOME MEDICATIONS:  Prior to Admission medications    Medication Sig Start Date End Date Taking? Authorizing Provider   ALPRAZolam (XANAX) 1 MG tablet TK 1 T PO BID PRF ANXIETY 9/19/16   Waldo Benjamin MD   HYDROcodone-acetaminophen (NORCO) 7.5-325 MG per tablet Take 1 tablet by mouth Every 8 (Eight) Hours As Needed.    Waldo Benjamin MD   lansoprazole (PREVACID) 15 MG capsule Take 15 mg by mouth Daily.    Waldo Benjamin MD   lisinopril (PRINIVIL,ZESTRIL) 10 MG tablet Take 10 mg by mouth Daily.    Waldo Benjamin MD   loperamide (IMODIUM) 2 MG capsule Take 2 mg by mouth 4 (Four) Times a Day As Needed for diarrhea.    Waldo Benjamin MD   sertraline (ZOLOFT) 100 MG tablet TK 1 T PO BID 9/19/16   Waldo Benjamin MD       ALLERGIES:  Ketamine hcl    REVIEW OF SYSTEMS  Review of Systems   Constitutional: Positive for activity change, appetite change, chills, fatigue and fever.   Respiratory: Negative for cough and shortness of breath.    Cardiovascular: Negative for chest pain and palpitations.   Gastrointestinal: Positive for diarrhea and nausea. Negative for abdominal pain.   Genitourinary: Positive for decreased urine volume.   Musculoskeletal: Positive for arthralgias and back pain. Negative for gait problem.   Skin: Negative for color change and rash.   Neurological: Negative for dizziness, weakness and headaches.   Psychiatric/Behavioral: Negative for agitation, confusion and sleep disturbance.       PHYSICAL EXAM:  Temp:  [98.8 °F (37.1 °C)] 98.8 °F (37.1 °C)  Heart Rate:  [79-81] 79  Resp:  [20] 20  BP: (106-139)/(58-69) 139/62  Body mass index is 40.11 kg/m².     Physical Exam   Constitutional: He is oriented to person, place, and time. He appears well-developed and well-nourished. No distress.   HENT:   Head:  Normocephalic and atraumatic.   Right Ear: External ear normal.   Left Ear: External ear normal.   Nose: Nose normal.   Dry mucous membranes.   Eyes: Conjunctivae and EOM are normal. Pupils are equal, round, and reactive to light.   Neck: Neck supple. No thyromegaly present.   Cardiovascular: Normal rate, regular rhythm and normal heart sounds.   Pulmonary/Chest: Effort normal. He has no wheezes. He has no rales. He exhibits no tenderness.   Abdominal: Soft. Bowel sounds are normal. He exhibits no distension and no mass. There is no tenderness. There is no rebound and no guarding.   Musculoskeletal: Normal range of motion. He exhibits no edema.   Neurological: He is alert and oriented to person, place, and time.   Skin: Skin is warm and dry. No rash noted. He is not diaphoretic. No erythema. No pallor.   Psychiatric: He has a normal mood and affect. His behavior is normal.   Nursing note and vitals reviewed.      DIAGNOSTIC DATA:   Lab Results (last 24 hours)     Procedure Component Value Units Date/Time    Extra Tubes [[id number]] Collected:  10/04/19 1934    Specimen:  Blood, Venous Line Updated:  10/04/19 2127    Narrative:       The following orders were created for panel order Extra Tubes.  Procedure                               Abnormality         Status                     ---------                               -----------         ------                     Light Blue Top[[id number]]                                   Final result               Lavender Top[[id number]]                                     Final result                 Please view results for these tests on the individual orders.    Light Blue Top [[id number]] Collected:  10/04/19 1934    Specimen:  Blood Updated:  10/04/19 2127     Extra Tube hold for add-on     Comment: Auto resulted       Lavender Top [[id number]] Collected:  10/04/19 1934    Specimen:  Blood Updated:  10/04/19 2127     Extra Tube hold for add-on     Comment: Auto resulted        Gastrointestinal Panel, PCR - Stool, Per Rectum [[id number]] Collected:  10/04/19 2001    Specimen:  Stool from Per Rectum Updated:  10/04/19 2004    Basic Metabolic Panel [[id number]]  (Abnormal) Collected:  10/04/19 1932    Specimen:  Blood Updated:  10/04/19 1959     Glucose 103 mg/dL      BUN 57 mg/dL      Creatinine 7.11 mg/dL      Sodium 137 mmol/L      Potassium 4.0 mmol/L      Chloride 102 mmol/L      CO2 21.0 mmol/L      Calcium 7.7 mg/dL      eGFR   Amer --     Comment: <15 Indicative of kidney failure.        eGFR Non African Amer 8 mL/min/1.73      Comment: <15 Indicative of kidney failure.        BUN/Creatinine Ratio 8.0     Anion Gap 14.0 mmol/L     Narrative:       GFR Normal >60  Chronic Kidney Disease <60  Kidney Failure <15        CrCl cannot be calculated (Patient's most recent lab result is older than the maximum 30 days allowed.).     Imaging Results (last 24 hours)     ** No results found for the last 24 hours. **          I reviewed the patient's new clinical results.    ASSESSMENT AND PLAN: This is a 54 y.o. male with:    Active Hospital Problems    Diagnosis POA   • **LAWRENCE (acute kidney injury) (CMS/[facility]) [N17.9] Yes   • Crohn's disease (CMS/[facility]) [K50.90] Yes   • Depression [F32.9] Yes   • Hypertension [I10] Yes   • Anxiety [F41.9] Yes       #.  LAWRENCE. IVF's. Repeat BMP pending. Nephrology consulted. Renal US in am.  #.  Chronic pain. eKASPER reviewed.   #.  Hypotension.  Transient.  Suspect secondary to hypovolemia.  Much improved at time of exam.  Monitor.  Continue intravenous fluids.  Monitor urine output.  Strict I's and O's.  #.  Diarrhea.  GI PCR.  #.  Depression.  Home meds.  #.  History of Crohn's.  Monitor.  Consult GI as needed.  CT abdomen as needed.      Will monitor patient's hospital course and adjust treatment as hospital course dictates.    DVT prophylaxis: Heparin SQ  Code status is   Code Status and Medical Interventions:   Ordered at: 10/04/19 1919     Code Status:    CPR      Medical Interventions (Level of Support Prior to Arrest):    Full      [facility] # [id number], reviewed and consistent with patient reported medications.      I discussed the patients findings and my recommendations with patient, nursing staff and consulting provider.           This document has been electronically signed by Tulio Perry MD on October 4, 2019 7:19 PM            Electronically signed by Tulio Perry MD at 10/04/19 2206       Emergency Department Notes     No notes of this type exist for this encounter.        Hospital Medications (all)       Dose Frequency Start End    ALPRAZolam (XANAX) tablet 1 mg (Discontinued) 1 mg 2 Times Daily PRN 10/5/2019 10/6/2019    Sig - Route: Take 1 tablet by mouth 2 (Two) Times a Day As Needed for Anxiety. - Oral    Reason for Discontinue: Patient Discharge    cefTRIAXone (ROCEPHIN) 2 g/100 mL 0.9% NS VTB (LATONYA) (Discontinued) 2 g Every 24 Hours 10/5/2019 10/6/2019    Sig - Route: Infuse 100 mL into a venous catheter Daily. - Intravenous    famotidine (PEPCID) tablet 40 mg (Discontinued) 40 mg Daily 10/4/2019 10/6/2019    Sig - Route: Take 1 tablet by mouth Daily. - Oral    Reason for Discontinue: Patient Discharge    gabapentin (NEURONTIN) capsule 300 mg (Discontinued) 300 mg 2 Times Daily 10/5/2019 10/6/2019    Sig - Route: Take 1 capsule by mouth 2 (Two) Times a Day. - Oral    Reason for Discontinue: Patient Discharge    heparin (porcine) 5000 UNIT/ML injection 5,000 Units (Discontinued) 5,000 Units Every 12 Hours Scheduled 10/4/2019 10/6/2019    Sig - Route: Inject 1 mL under the skin into the appropriate area as directed Every 12 (Twelve) Hours. - Subcutaneous    Reason for Discontinue: Patient Discharge    levoFLOXacin (LEVAQUIN) tablet 500 mg (Discontinued) 500 mg Every 24 Hours 10/6/2019 10/6/2019    Sig - Route: Take 1 tablet by mouth Daily. - Oral    Reason for Discontinue: Patient Discharge    Cosign for Ordering: Accepted by Soraya Harrington MD on  10/6/2019  3:20 PM    lisinopril (PRINIVIL,ZESTRIL) tablet 10 mg (Discontinued) 10 mg Every 24 Hours Scheduled 10/6/2019 10/6/2019    Sig - Route: Take 1 tablet by mouth Daily. - Oral    Reason for Discontinue: Patient Discharge    metoprolol succinate XL (TOPROL-XL) 24 hr tablet 50 mg (Discontinued) 50 mg Every 24 Hours Scheduled 10/5/2019 10/6/2019    Sig - Route: Take 1 tablet by mouth Daily. - Oral    Reason for Discontinue: Patient Discharge    ondansetron (ZOFRAN) injection 4 mg (Discontinued) 4 mg Every 6 Hours PRN 10/4/2019 10/6/2019    Sig - Route: Infuse 2 mL into a venous catheter Every 6 (Six) Hours As Needed for Nausea or Vomiting. - Intravenous    Reason for Discontinue: Patient Discharge    ondansetron (ZOFRAN) tablet 8 mg (Discontinued) 8 mg Every 6 Hours PRN 10/6/2019 10/6/2019    Sig - Route: Take 2 tablets by mouth Every 6 (Six) Hours As Needed for Nausea or Vomiting (give 30 minutes prior to antibiotic administration). - Oral    Reason for Discontinue: Patient Discharge    Cosign for Ordering: Accepted by Soraya Harrington MD on 10/6/2019  3:20 PM    oxyCODONE-acetaminophen (PERCOCET) 7.5-325 MG per tablet 1 tablet (Discontinued) 1 tablet Every 6 Hours PRN 10/5/2019 10/6/2019    Sig - Route: Take 1 tablet by mouth Every 6 (Six) Hours As Needed for Moderate Pain  or Severe Pain . - Oral    Reason for Discontinue: Patient Discharge    promethazine (PHENERGAN) tablet 12.5 mg (Discontinued) 12.5 mg Every 6 Hours PRN 10/6/2019 10/6/2019    Sig - Route: Take 1 tablet by mouth Every 6 (Six) Hours As Needed for Nausea or Vomiting (Give after levaquin administration if zofran ineffective before administration). - Oral    Reason for Discontinue: Patient Discharge    Cosign for Ordering: Accepted by Soraya Harrington MD on 10/6/2019  3:20 PM    sodium chloride 0.9 % flush 10 mL (Discontinued) 10 mL Every 12 Hours Scheduled 10/4/2019 10/6/2019    Sig - Route: Infuse 10 mL into a venous catheter Every 12  (Twelve) Hours. - Intravenous    Reason for Discontinue: Patient Discharge    sodium chloride 0.9 % flush 10 mL (Discontinued) 10 mL As Needed 10/4/2019 10/6/2019    Sig - Route: Infuse 10 mL into a venous catheter As Needed for Line Care. - Intravenous    Reason for Discontinue: Patient Discharge    sodium chloride 0.9 % infusion (Discontinued) 100 mL/hr Continuous 10/4/2019 10/6/2019    Sig - Route: Infuse 100 mL/hr into a venous catheter Continuous. - Intravenous          Lab Results (all)     Procedure Component Value Units Date/Time    Gastrointestinal Panel, PCR - Stool, Per Rectum [[id number]]  (Abnormal) Collected:  10/04/19 2001    Specimen:  Stool from Per Rectum Updated:  10/07/19 1120     Campylobacter Not Detected     Clostridium difficile (toxin A/B) Not Detected     Plesiomonas shigelloides Not Detected     Salmonella Detected     Comment:   Routine susceptibility testing is not indicated for non-typhoidal Salmonella species isolated from intestinal sources.        Vibrio Not Detected     Vibrio cholerae Not Detected     Yersinia enterocolitica Not Detected     Enteroaggregative E. coli (EAEC) Not Detected     Enteropathogenic E. coli (EPEC) Not Detected     Enterotoxigenic E. coli (ETEC) lt/st Not Detected     Shiga-like toxin-producing E. coli (STEC) stx1/stx2 Not Detected     E. coli O157 Not Detected     Shigella/Enteroinvasive E. coli (EIEC) Not Detected     Cryptosporidium Detected     Comment: Modified report. Previous result was Not Detected on 10/5/2019 at 0555 CDT.        Cyclospora cayetanensis Not Detected     Entamoeba histolytica Not Detected     Giardia lamblia Not Detected     Adenovirus F40/41 Not Detected     Astrovirus Not Detected     Norovirus GI/GII Not Detected     Rotavirus A Not Detected     Sapovirus (I, II, IV or V) Not Detected    Narrative:       Testing performed by multiplex PCR system.    Urine Eosinophils, Jeremiah's Stain - Urine, Clean Catch [[id number]]  (Normal)  Collected:  10/05/19 0859    Specimen:  Urine, Clean Catch Updated:  10/07/19 0646     Jeremiah Stain Negative    Basic Metabolic Panel [[id number]]  (Abnormal) Collected:  10/06/19 0557    Specimen:  Blood Updated:  10/06/19 0727     Glucose 105 mg/dL      BUN 21 mg/dL      Creatinine 1.02 mg/dL      Sodium 139 mmol/L      Potassium 3.8 mmol/L      Chloride 106 mmol/L      CO2 25.0 mmol/L      Calcium 8.8 mg/dL      eGFR Non African Amer 77 mL/min/1.73      BUN/Creatinine Ratio 20.6     Anion Gap 8.0 mmol/L     Narrative:       GFR Normal >60  Chronic Kidney Disease <60  Kidney Failure <15    Phosphorus [[id number]]  (Abnormal) Collected:  10/06/19 0557    Specimen:  Blood Updated:  10/06/19 0727     Phosphorus 2.0 mg/dL     Uric Acid [[id number]]  (Normal) Collected:  10/06/19 0557    Specimen:  Blood Updated:  10/06/19 0725     Uric Acid 4.2 mg/dL     Magnesium [[id number]]  (Normal) Collected:  10/06/19 0557    Specimen:  Blood Updated:  10/06/19 0725     Magnesium 1.8 mg/dL     CBC & Differential [[id number]] Collected:  10/06/19 0557    Specimen:  Blood Updated:  10/06/19 0705    Narrative:       The following orders were created for panel order CBC & Differential.  Procedure                               Abnormality         Status                     ---------                               -----------         ------                     CBC Auto Differential[[id number]]        Abnormal            Final result                 Please view results for these tests on the individual orders.    CBC Auto Differential [[id number]]  (Abnormal) Collected:  10/06/19 0557    Specimen:  Blood Updated:  10/06/19 0705     WBC 5.29 10*3/mm3      RBC 4.47 10*6/mm3      Hemoglobin 13.1 g/dL      Hematocrit 38.4 %      MCV 85.9 fL      MCH 29.3 pg      MCHC 34.1 g/dL      RDW 12.7 %      RDW-SD 39.7 fl      MPV 10.6 fL      Platelets 146 10*3/mm3      Neutrophil % 51.0 %      Lymphocyte % 29.3 %      Monocyte % 16.4 %      Eosinophil %  2.3 %      Basophil % 0.4 %      Immature Grans % 0.6 %      Neutrophils, Absolute 2.70 10*3/mm3      Lymphocytes, Absolute 1.55 10*3/mm3      Monocytes, Absolute 0.87 10*3/mm3      Eosinophils, Absolute 0.12 10*3/mm3      Basophils, Absolute 0.02 10*3/mm3      Immature Grans, Absolute 0.03 10*3/mm3      nRBC 0.0 /100 WBC     Creatinine, Urine, Random - Urine, Clean Catch [[id number]] Collected:  10/05/19 0851    Specimen:  Urine, Clean Catch Updated:  10/05/19 1213     Creatinine, Urine 137.9 mg/dL     Narrative:       Reference intervals for random urine have not been established.  Clinical usage is dependent upon physician's interpretation in combination with other laboratory tests.     Urinalysis, Microscopic Only - Urine, Clean Catch [[id number]]  (Abnormal) Collected:  10/05/19 0851    Specimen:  Urine, Clean Catch Updated:  10/05/19 0915     RBC, UA 6-12 /HPF      WBC, UA 3-5 /HPF      Bacteria, UA None Seen /HPF      Squamous Epithelial Cells, UA None Seen /HPF      Hyaline Casts, UA 3-6 /LPF      Methodology Automated Microscopy    Urinalysis With Microscopic If Indicated (No Culture) - Urine, Clean Catch [[id number]]  (Abnormal) Collected:  10/05/19 0851    Specimen:  Urine, Clean Catch Updated:  10/05/19 0915     Color, UA Yellow     Appearance, UA Clear     pH, UA 5.5     Specific Gravity, UA 1.016     Glucose, UA Negative     Ketones, UA Negative     Bilirubin, UA Negative     Blood, UA Large (3+)     Protein,  mg/dL (2+)     Leuk Esterase, UA Negative     Nitrite, UA Negative     Urobilinogen, UA 0.2 E.U./dL    Sodium, Urine, Random - Urine, Clean Catch [[id number]] Collected:  10/05/19 0851    Specimen:  Urine, Clean Catch Updated:  10/05/19 0914     Sodium, Urine 55 mmol/L     Narrative:       Reference intervals for random urine have not been established.  Clinical usage is dependent upon physician's interpretation in combination with other laboratory tests.     Protein, Urine, Random - Urine,  Clean Catch [[id number]] Collected:  10/05/19 0851    Specimen:  Urine, Clean Catch Updated:  10/05/19 0914     Total Protein, Urine 53.4 mg/dL     Narrative:       Reference intervals for random urine have not been established.  Clinical usage is dependent upon physician's interpretation in combination with other laboratory tests.     Manual Differential [[id number]]  (Abnormal) Collected:  10/05/19 0424    Specimen:  Blood Updated:  10/05/19 0549     Neutrophil % 38.0 %      Lymphocyte % 43.0 %      Monocyte % 7.0 %      Eosinophil % 2.0 %      Basophil % 1.0 %      Bands %  5.0 %      Atypical Lymphocyte % 4.0 %      Neutrophils Absolute 2.55 10*3/mm3      Lymphocytes Absolute 2.55 10*3/mm3      Monocytes Absolute 0.42 10*3/mm3      Eosinophils Absolute 0.12 10*3/mm3      Basophils Absolute 0.06 10*3/mm3      RBC Morphology Normal     WBC Morphology Normal     Platelet Estimate Decreased    CBC & Differential [[id number]] Collected:  10/05/19 0424    Specimen:  Blood Updated:  10/05/19 0519    Narrative:       The following orders were created for panel order CBC & Differential.  Procedure                               Abnormality         Status                     ---------                               -----------         ------                     CBC Auto Differential[[id number]]        Normal              Final result                 Please view results for these tests on the individual orders.    CBC Auto Differential [[id number]]  (Normal) Collected:  10/05/19 0424    Specimen:  Blood Updated:  10/05/19 0519     WBC 5.93 10*3/mm3      RBC 4.58 10*6/mm3      Hemoglobin 13.4 g/dL      Hematocrit 39.9 %      MCV 87.1 fL      MCH 29.3 pg      MCHC 33.6 g/dL      RDW 13.1 %      RDW-SD 41.0 fl      MPV 11.1 fL      Platelets 145 10*3/mm3     Basic Metabolic Panel [[id number]]  (Abnormal) Collected:  10/05/19 0424    Specimen:  Blood Updated:  10/05/19 0509     Glucose 100 mg/dL      BUN 48 mg/dL      Creatinine 3.56  mg/dL      Sodium 139 mmol/L      Potassium 3.7 mmol/L      Chloride 106 mmol/L      CO2 21.0 mmol/L      Calcium 8.3 mg/dL      eGFR Non African Amer 18 mL/min/1.73      BUN/Creatinine Ratio 13.5     Anion Gap 12.0 mmol/L     Narrative:       GFR Normal >60  Chronic Kidney Disease <60  Kidney Failure <15    Extra Tubes [[id number]] Collected:  10/04/19 1934    Specimen:  Blood, Venous Line Updated:  10/04/19 2127    Narrative:       The following orders were created for panel order Extra Tubes.  Procedure                               Abnormality         Status                     ---------                               -----------         ------                     Light Blue Top[[id number]]                                   Final result               Lavender Top[[id number]]                                     Final result                 Please view results for these tests on the individual orders.    Light Blue Top [[id number]] Collected:  10/04/19 1934    Specimen:  Blood Updated:  10/04/19 2127     Extra Tube hold for add-on     Comment: Auto resulted       Lavender Top [[id number]] Collected:  10/04/19 1934    Specimen:  Blood Updated:  10/04/19 2127     Extra Tube hold for add-on     Comment: Auto resulted       Basic Metabolic Panel [[id number]]  (Abnormal) Collected:  10/04/19 1932    Specimen:  Blood Updated:  10/04/19 1959     Glucose 103 mg/dL      BUN 57 mg/dL      Creatinine 7.11 mg/dL      Sodium 137 mmol/L      Potassium 4.0 mmol/L      Chloride 102 mmol/L      CO2 21.0 mmol/L      Calcium 7.7 mg/dL      eGFR   Amer --     Comment: <15 Indicative of kidney failure.        eGFR Non African Amer 8 mL/min/1.73      Comment: <15 Indicative of kidney failure.        BUN/Creatinine Ratio 8.0     Anion Gap 14.0 mmol/L     Narrative:       GFR Normal >60  Chronic Kidney Disease <60  Kidney Failure <15          Imaging Results (all)     Procedure Component Value Units Date/Time    US Renal Bilateral  [[id number]] Collected:  10/05/19 0850     Updated:  10/05/19 0942    Narrative:       EXAMINATION:  ultrasound, renal     CLINICAL INDICATION / HISTORY:  LAWRENCE    COMPARISON:  none    TECHNIQUE:  ultrasound, renal    FULL RESULTS / FINDINGS:    Kidney, right:      size:  normal, measuring 11.5 x 6 x 5.4 cm    echotexture:  normal    no nephrolithiasis, solid mass, or collecting system dilation    Kidney, left:      size:  normal, measuring 13.2 x 5.7 x 5.67 m    echotexture:  normal    no nephrolithiasis, solid mass, or collecting system dilation,  left kidney mid zone oval anechoic structure with good through  acoustic transmission consistent with simple renal cortical cyst  which measures 2.2 cm in greatest diameter.    Urinary bladder:  Normal      Absent      Impression:       CONCLUSION:    1.  Left kidney mid zone 2.2 cm simple renal cortical cyst.  2.  Otherwise unremarkable renal ultrasound.    Electronically signed by:  Willy Kramer MD  10/5/2019 9:41 AM CDT  Workstation: [id number]          ECG/EMG Results (all)     Procedure Component Value Units Date/Time    SCANNED EKG [[id number]] Resulted:  10/04/19      Updated:  10/08/19 1351    SCANNED EKG [[id number]] Resulted:  10/04/19      Updated:  10/08/19 1519             Physician Progress Notes (all)      Agustin Sandra PA at 10/06/19 1118     Attestation signed by Soraya Harrington MD at 10/06/19 1909    I have reviewed the documentation and agree with the plan as outlined.                      [facility] Medicine Services  INPATIENT PROGRESS NOTE    Length of Stay: 2  Date of Admission: 10/4/2019  Primary Care Physician: Halle Baron MD    Subjective   Chief Complaint/HPI: This 54-year-old  male with a history of Crohn's disease status post partial colectomy as well as immunosuppressive therapy was transferred to our facility secondary to multiple episodes of diarrhea and fever that led to severe renal  failure.  Patient was found to have salmonella.  He was aggressively resuscitated and his creatinine has now returned to normal.  He is tolerating p.o. intake, however still having multiple cases of diarrhea.  Patient has been on ceftriaxone at this time.  Today he will be given a trial of Levaquin.  Patient states that antibiotics generally give him an upset stomach.  Was explained to the patient that he requires 10 to 14 days of antibiotic therapy secondary to his immunosuppressive state and Salmonella.  At this time have scheduled Zofran prior to delivery of antibiotic and have also scheduled PRN for delivery after antibiotic.    Review of Systems   Constitutional: Negative for chills and fever.   Respiratory: Negative for shortness of breath.    Cardiovascular: Negative for chest pain, palpitations and leg swelling.   Gastrointestinal: Positive for diarrhea. Negative for abdominal pain, blood in stool, constipation, nausea and vomiting.   Genitourinary: Negative for dysuria.   Neurological: Negative for dizziness and light-headedness.   Psychiatric/Behavioral: Negative for confusion.      All pertinent negatives and positives are as above. All other systems have been reviewed and are negative unless otherwise stated.     Objective    Temp:  [96.8 °F (36 °C)-99.3 °F (37.4 °C)] 96.9 °F (36.1 °C)  Heart Rate:  [62-80] 70  Resp:  [18-20] 18  BP: (135-168)/(71-92) 168/92    Physical Exam   Constitutional: He is oriented to person, place, and time. He appears well-developed and well-nourished.   HENT:   Head: Normocephalic and atraumatic.   Eyes: Conjunctivae are normal.   Cardiovascular: Normal rate.   Pulmonary/Chest: Effort normal and breath sounds normal. No stridor. No respiratory distress.   Abdominal: Soft. Bowel sounds are normal. He exhibits no distension. There is no tenderness.   Musculoskeletal: He exhibits no edema.   Neurological: He is alert and oriented to person, place, and time.   Skin: Skin is warm  and dry. Capillary refill takes less than 2 seconds.   Psychiatric: He has a normal mood and affect. His behavior is normal.     Results Review:  I have reviewed the labs, radiology results, and diagnostic studies.    Laboratory Data:   Results from last 7 days   Lab Units 10/06/19  0557 10/05/19  0424 10/04/19  1932   SODIUM mmol/L 139 139 137   POTASSIUM mmol/L 3.8 3.7 4.0   CHLORIDE mmol/L 106 106 102   CO2 mmol/L 25.0 21.0* 21.0*   BUN mg/dL 21* 48* 57*   CREATININE mg/dL 1.02 3.56* 7.11*   GLUCOSE mg/dL 105* 100* 103*   CALCIUM mg/dL 8.8 8.3* 7.7*   ANION GAP mmol/L 8.0 12.0 14.0     Estimated Creatinine Clearance: 110.7 mL/min (by C-G formula based on SCr of 1.02 mg/dL).  Results from last 7 days   Lab Units 10/06/19  0557   MAGNESIUM mg/dL 1.8   PHOSPHORUS mg/dL 2.0*     Results from last 7 days   Lab Units 10/06/19  0557   URIC ACID mg/dL 4.2     Results from last 7 days   Lab Units 10/06/19  0557 10/05/19  0424   WBC 10*3/mm3 5.29 5.93   HEMOGLOBIN g/dL 13.1 13.4   HEMATOCRIT % 38.4 39.9   PLATELETS 10*3/mm3 146 145           Culture Data:   No results found for: BLOODCX  No results found for: URINECX  No results found for: RESPCX  No results found for: WOUNDCX  No results found for: STOOLCX  No components found for: BODYFLD    Radiology Data:   Imaging Results (last 24 hours)     ** No results found for the last 24 hours. **          I have reviewed the patient's current medications.     Assessment/Plan     Active Hospital Problems    Diagnosis   • **LAWRENCE (acute kidney injury) (CMS/HCC)   • Crohn's disease (CMS/HCC)   • Depression   • Hypertension   • Anxiety   Salmonella infection    Plan: Trial of p.o. Levaquin with antiemetics scheduled and as needed, consider discharge today if patient tolerates p.o.                This document has been electronically signed by KIRTI Mayer on October 6, 2019 11:21 AM        Electronically signed by Soraya Harrington MD at 10/06/19 1909     Subhash Lisa,  "APRN at 10/06/19 1045     Attestation signed by Sathish Hood MD at 10/06/19 1325    I have reviewed the documentation above and agree.  Patient seen and examined feeling better diarrhea is improving at this time he was diagnosed with Salmonella is going to go home on Levaquin at this time.  He denies any chest pain or shortness of breath he is going be discharged home today.  Vitals are noted pulse is 68/min blood pressure is 120/70 mmHg lungs are present no crackles or nerves insert    Lab data reviewed creatinine is improved to 1.  Electrolytes stable    Plan: Okay to discharge home follow back with Dr. Jones in about 3 weeks continue with Levaquin for now discussed with patient at length all questions were answered will follow                  [facility]  [address] [place] [address]. [address]  T - [phone]  F - [phone]     Progress Note          PATIENT  DEMOGRAPHICS   PATIENT NAME: Jon Gill                      PHYSICIAN: NANI Gomez  : 1965  MRN: [id number]   LOS: 2 days    Patient Care Team:  Halle Baron MD as PCP - General  Subjective   SUBJECTIVE   Patient transferred to the floor and feeling much better today.  Diarrhea is improving.         Objective   OBJECTIVE   Vital Signs  Temp:  [96.8 °F (36 °C)-99.3 °F (37.4 °C)] 96.9 °F (36.1 °C)  Heart Rate:  [62-80] 70  Resp:  [18-20] 18  BP: (135-168)/(71-92) 168/92    Flowsheet Rows      First Filed Value   Admission Height  175.3 cm (69\") Documented at 10/04/2019 1947   Admission Weight  122 kg (268 lb) Documented at 10/04/2019 1600           I/O last 3 completed shifts:  In: 2825 [P.O.:840; I.V.:1885; IV Piggyback:100]  Out: 2925 [Urine:2925]    PHYSICAL EXAM    Physical Exam   Constitutional: He is oriented to person, place, and time. He appears well-developed and well-nourished.   HENT:   Head: Normocephalic and atraumatic.   Eyes: Conjunctivae and EOM are normal. Pupils are equal, " round, and reactive to light.   Cardiovascular: Normal rate and regular rhythm.   Pulmonary/Chest: Effort normal and breath sounds normal.   Abdominal: Soft.   Neurological: He is alert and oriented to person, place, and time.   Skin: Skin is warm and dry.       RESULTS   Results Review:    Results from last 7 days   Lab Units 10/06/19  0557 10/05/19  0424 10/04/19  1932   SODIUM mmol/L 139 139 137   POTASSIUM mmol/L 3.8 3.7 4.0   CHLORIDE mmol/L 106 106 102   CO2 mmol/L 25.0 21.0* 21.0*   BUN mg/dL 21* 48* 57*   CREATININE mg/dL 1.02 3.56* 7.11*   CALCIUM mg/dL 8.8 8.3* 7.7*   GLUCOSE mg/dL 105* 100* 103*       Estimated Creatinine Clearance: 110.7 mL/min (by C-G formula based on SCr of 1.02 mg/dL).    Results from last 7 days   Lab Units 10/06/19  0557   MAGNESIUM mg/dL 1.8   PHOSPHORUS mg/dL 2.0*       Results from last 7 days   Lab Units 10/06/19  0557   URIC ACID mg/dL 4.2       Results from last 7 days   Lab Units 10/06/19  0557 10/05/19  0424   WBC 10*3/mm3 5.29 5.93   HEMOGLOBIN g/dL 13.1 13.4   PLATELETS 10*3/mm3 146 145               Imaging Results (last 24 hours)     ** No results found for the last 24 hours. **           MEDICATIONS      ceftriaxone 2 g Intravenous Q24H   famotidine 40 mg Oral Daily   gabapentin 300 mg Oral BID   heparin (porcine) 5,000 Units Subcutaneous Q12H   levoFLOXacin 500 mg Oral Q24H   metoprolol succinate XL 50 mg Oral Q24H   sodium chloride 10 mL Intravenous Q12H       sodium chloride 100 mL/hr Last Rate: 100 mL/hr (10/06/19 0314)       Assessment/Plan   ASSESSMENT / PLAN      LAWRENCE (acute kidney injury) (CMS/[facility])    Crohn's disease (CMS/[facility])    Depression    Hypertension    Anxiety    1.  LAWRENCE- etiology of acute kidney injury, certainly prerenal due to volume depletion and diarrhea related to Salmonella.  At this time his creatinine is returned to 1.0.  Patient has lost his IV access and we will discontinue his IV fluids as he is now tolerating p.o. intake well.    2.  Diarrhea-  history of Crohn's disease and also tested positive for Salmonella.  Diarrhea improving per the patient.    3.  Hypertension- blood pressure mildly elevated, was low yesterday and we will resume his home lisinopril    4.  GERD    5.  History of depression           This document has been electronically signed by NANI Gomez on 2019 10:45 AM           Electronically signed by Sathish Hood MD at 10/06/19 1328     Tulio Perry MD at 10/05/19 0815          MEDICINE DAILY PROGRESS NOTE  NAME: Jon Gill  : 1965  MRN: [id number]     LOS: 1 day     PROVIDER OF SERVICE: Tulio Perry MD    Chief Complaint: LAWRENCE (acute kidney injury) (CMS/[facility])    Subjective:   HPI: Jon Gill is a 54 y.o. male with medical history significant for history of Crohn's status post partial colectomy, depression, hypertension, chronic pain, and anxiety presents with diarrhea from transferring facility.      Patient is a transfer from [facility].     Patient states that he has had worsening diarrhea and fever over the last 2 days with up to 10 episodes a day.  Patient states he has some chronic frequent loose stools secondary to his partial colectomy but this is been much worse.  Patient believes that he has had adequate p.o. intake but does have dry mucous membranes on exam.  Patient does endorse some nausea, diarrhea, fever, and chills. Patient also has decreased urine output. Patient denies any chest pain, diaphoresis, dyspnea, or vomiting.       At transferring facility patient was found to have a creatinine of 8.0.  Patient was also found to have hypotension.  At time of exam patient is resting in the ICU bed in no acute distress.  Patient's blood pressure has normalized.  Per nursing report patient has gotten approximately 3.5 to 4 L of fluid.  Patient still states that he does not have much urine output at this time.    Interval History:  History taken from: patient chart family  RN  10/05. Improving. Increased UOP overnight. Cr improving. Diarrhea the same.    F - Regular. HH.  A - Percocet  S - Ativan  T - Heparin  H - Yes  U - Pepcid  G - N/A      Intake/Output       10/04/19 0701 - 10/05/19 0700 10/05/19 0701 - 10/06/19 0700      9647-8936 8155-4238 Total 2171-3450 6611-3589 Total       Intake    P.O.  --  360 360  --  -- --    I.V.  --  1221 1221  224  -- 224    Total Intake -- 1581 1581 224 -- 224       Output    Urine  --  1775 1775  --  -- --    Total Output -- 1775 1775 -- -- --        Intake/Output       10/04/19 0701 - 10/05/19 0700 10/05/19 0701 - 10/06/19 0700      4326-4121 2439-0152 Total 4128-9870 1461-9372 Total       Intake    P.O.  --  360 360  --  -- --    I.V.  --  1221 1221  224  -- 224    Total Intake -- 1581 1581 224 -- 224       Output    Urine  --  1775 1775  --  -- --    Total Output -- 1775 1775 -- -- --          Intake/Output       10/04/19 0701 - 10/05/19 0700 10/05/19 0701 - 10/06/19 0700      4042-3771 8627-8233 Total 8306-2854 8789-5559 Total       Intake    P.O.  --  360 360  --  -- --    I.V.  --  1221 1221  224  -- 224    Total Intake -- 1581 1581 224 -- 224       Output    Urine  --  1775 1775  --  -- --    Total Output -- 1775 1775 -- -- --          Review of Systems:   Review of Systems   Constitutional: Positive for activity change and fatigue. Negative for appetite change and fever.   Respiratory: Negative for cough and shortness of breath.    Cardiovascular: Negative for chest pain and palpitations.   Gastrointestinal: Positive for diarrhea and nausea. Negative for abdominal pain and vomiting.   Genitourinary: Positive for decreased urine volume (Improving).   Musculoskeletal: Positive for arthralgias and back pain. Negative for gait problem.   Skin: Negative for color change and rash.   Neurological: Negative for dizziness, weakness and headaches.   Psychiatric/Behavioral: Negative for agitation, confusion and sleep disturbance.       Objective:      Vital Signs  Vitals:    10/05/19 0702 10/05/19 0723 10/05/19 0802 10/05/19 0812   BP: 136/66  141/77    Pulse:  78  76   Resp:       Temp:    98.5 °F (36.9 °C)   TempSrc:    Oral   SpO2:  94%  97%   Weight:       Height:           Physical Exam  Physical Exam   Constitutional: He is oriented to person, place, and time. He appears well-developed and well-nourished. No distress.   HENT:   Head: Normocephalic and atraumatic.   Right Ear: External ear normal.   Left Ear: External ear normal.   Nose: Nose normal.   Eyes: Conjunctivae and EOM are normal. Pupils are equal, round, and reactive to light.   Neck: Neck supple. No thyromegaly present.   Cardiovascular: Normal rate, regular rhythm and normal heart sounds.   Pulmonary/Chest: Effort normal and breath sounds normal. No respiratory distress. He has no wheezes. He has no rales. He exhibits no tenderness.   Abdominal: Soft. Bowel sounds are normal. He exhibits no distension and no mass. There is no tenderness. There is no rebound and no guarding.   Musculoskeletal: Normal range of motion. He exhibits no edema.   Neurological: He is alert and oriented to person, place, and time.   Skin: Skin is warm and dry. No rash noted. He is not diaphoretic. No erythema. No pallor.   Psychiatric: He has a normal mood and affect. His behavior is normal.   Nursing note and vitals reviewed.      Medication Review    Current Facility-Administered Medications:   •  ALPRAZolam (XANAX) tablet 1 mg, 1 mg, Oral, BID PRN, Tulio Perry MD  •  famotidine (PEPCID) tablet 40 mg, 40 mg, Oral, Daily, Tulio Perry MD, 40 mg at 10/04/19 2114  •  gabapentin (NEURONTIN) capsule 300 mg, 300 mg, Oral, BID, Tulio Perry MD  •  heparin (porcine) 5000 UNIT/ML injection 5,000 Units, 5,000 Units, Subcutaneous, Q12H, Tulio Perry MD, 5,000 Units at 10/04/19 2114  •  metoprolol succinate XL (TOPROL-XL) 24 hr tablet 50 mg, 50 mg, Oral, Q24H, Tulio Perry MD  •  ondansetron (ZOFRAN) injection 4 mg,  4 mg, Intravenous, Q6H PRN, Tulio Perry MD  •  oxyCODONE-acetaminophen (PERCOCET) 7.5-325 MG per tablet 1 tablet, 1 tablet, Oral, Q6H PRN, Tulio Perry MD  •  sodium chloride 0.9 % flush 10 mL, 10 mL, Intravenous, Q12H, Tulio Perry MD, 10 mL at 10/04/19 2117  •  sodium chloride 0.9 % flush 10 mL, 10 mL, Intravenous, PRN, Tulio Perry MD  •  sodium chloride 0.9 % infusion, 100 mL/hr, Intravenous, Continuous, Tulio Perry MD, Last Rate: 100 mL/hr at 10/05/19 0746, 100 mL/hr at 10/05/19 0746     Diagnostic Data    Lab Results (last 24 hours)     Procedure Component Value Units Date/Time    Gastrointestinal Panel, PCR - Stool, Per Rectum [[id number]]  (Abnormal) Collected:  10/04/19 2001    Specimen:  Stool from Per Rectum Updated:  10/05/19 0555     Campylobacter Not Detected     Clostridium difficile (toxin A/B) Not Detected     Plesiomonas shigelloides Not Detected     Salmonella Detected     Comment:   Routine susceptibility testing is not indicated for non-typhoidal Salmonella species isolated from intestinal sources.        Vibrio Not Detected     Vibrio cholerae Not Detected     Yersinia enterocolitica Not Detected     Enteroaggregative E. coli (EAEC) Not Detected     Enteropathogenic E. coli (EPEC) Not Detected     Enterotoxigenic E. coli (ETEC) lt/st Not Detected     Shiga-like toxin-producing E. coli (STEC) stx1/stx2 Not Detected     E. coli O157 Not Detected     Shigella/Enteroinvasive E. coli (EIEC) Not Detected     Cryptosporidium Not Detected     Cyclospora cayetanensis Not Detected     Entamoeba histolytica Not Detected     Giardia lamblia Not Detected     Adenovirus F40/41 Not Detected     Astrovirus Not Detected     Norovirus GI/GII Not Detected     Rotavirus A Not Detected     Sapovirus (I, II, IV or V) Not Detected    Narrative:       Testing performed by multiplex PCR system.    Manual Differential [[id number]]  (Abnormal) Collected:  10/05/19 0424    Specimen:  Blood Updated:  10/05/19  0549     Neutrophil % 38.0 %      Lymphocyte % 43.0 %      Monocyte % 7.0 %      Eosinophil % 2.0 %      Basophil % 1.0 %      Bands %  5.0 %      Atypical Lymphocyte % 4.0 %      Neutrophils Absolute 2.55 10*3/mm3      Lymphocytes Absolute 2.55 10*3/mm3      Monocytes Absolute 0.42 10*3/mm3      Eosinophils Absolute 0.12 10*3/mm3      Basophils Absolute 0.06 10*3/mm3      RBC Morphology Normal     WBC Morphology Normal     Platelet Estimate Decreased    CBC & Differential [[id number]] Collected:  10/05/19 0424    Specimen:  Blood Updated:  10/05/19 0519    Narrative:       The following orders were created for panel order CBC & Differential.  Procedure                               Abnormality         Status                     ---------                               -----------         ------                     CBC Auto Differential[[id number]]        Normal              Final result                 Please view results for these tests on the individual orders.    CBC Auto Differential [[id number]]  (Normal) Collected:  10/05/19 0424    Specimen:  Blood Updated:  10/05/19 0519     WBC 5.93 10*3/mm3      RBC 4.58 10*6/mm3      Hemoglobin 13.4 g/dL      Hematocrit 39.9 %      MCV 87.1 fL      MCH 29.3 pg      MCHC 33.6 g/dL      RDW 13.1 %      RDW-SD 41.0 fl      MPV 11.1 fL      Platelets 145 10*3/mm3     Basic Metabolic Panel [[id number]]  (Abnormal) Collected:  10/05/19 0424    Specimen:  Blood Updated:  10/05/19 0509     Glucose 100 mg/dL      BUN 48 mg/dL      Creatinine 3.56 mg/dL      Sodium 139 mmol/L      Potassium 3.7 mmol/L      Chloride 106 mmol/L      CO2 21.0 mmol/L      Calcium 8.3 mg/dL      eGFR Non African Amer 18 mL/min/1.73      BUN/Creatinine Ratio 13.5     Anion Gap 12.0 mmol/L     Narrative:       GFR Normal >60  Chronic Kidney Disease <60  Kidney Failure <15    Extra Tubes [[id number]] Collected:  10/04/19 1934    Specimen:  Blood, Venous Line Updated:  10/04/19 2127    Narrative:       The  following orders were created for panel order Extra Tubes.  Procedure                               Abnormality         Status                     ---------                               -----------         ------                     Light Blue Top[[id number]]                                   Final result               Lavender Top[[id number]]                                     Final result                 Please view results for these tests on the individual orders.    Light Blue Top [[id number]] Collected:  10/04/19 1934    Specimen:  Blood Updated:  10/04/19 2127     Extra Tube hold for add-on     Comment: Auto resulted       Lavender Top [[id number]] Collected:  10/04/19 1934    Specimen:  Blood Updated:  10/04/19 2127     Extra Tube hold for add-on     Comment: Auto resulted       Basic Metabolic Panel [[id number]]  (Abnormal) Collected:  10/04/19 1932    Specimen:  Blood Updated:  10/04/19 1959     Glucose 103 mg/dL      BUN 57 mg/dL      Creatinine 7.11 mg/dL      Sodium 137 mmol/L      Potassium 4.0 mmol/L      Chloride 102 mmol/L      CO2 21.0 mmol/L      Calcium 7.7 mg/dL      eGFR   Amer --     Comment: <15 Indicative of kidney failure.        eGFR Non African Amer 8 mL/min/1.73      Comment: <15 Indicative of kidney failure.        BUN/Creatinine Ratio 8.0     Anion Gap 14.0 mmol/L     Narrative:       GFR Normal >60  Chronic Kidney Disease <60  Kidney Failure <15          I reviewed the patient's new clinical results.    Assessment/Plan:     Active Hospital Problems    Diagnosis POA   • **LAWRENCE (acute kidney injury) (CMS/[facility]) [N17.9] Yes   • Crohn's disease (CMS/[facility]) [K50.90] Yes   • Depression [F32.9] Yes   • Hypertension [I10] Yes   • Anxiety [F41.9] Yes       #.  LAWRENCE. IVF's.   10/05. Improving. Urine studies pending. Suspect pre renal with Salmonella on GI PCR and diarrhea.  10/04. Repeat BMP pending. Nephrology consulted. Renal US in am.  Results from last 7 days   Lab Units 10/05/19  6236  10/04/19  1932   CREATININE mg/dL 3.56* 7.11*   #.  Chronic pain. eKASPER reviewed.  Percocet.  #.  Hypotension.  Transient.     10/05. Resolved.   10/04. Suspect secondary to hypovolemia.  Much improved at time of exam.  Monitor.  Continue intravenous fluids.  Monitor urine output.  Strict I's and O's.  #. HTN. Toprol XL restarted. Hold ACEi secondary to LAWRENCE.  #.  Diarrhea.    10/05. Salmonella on GI PCR. Rocephin 2 g IV.  10/04. GI PCR.  #.  Depression.  Home meds.  #.  History of Crohn's.  Monitor.  Consult GI as needed.  CT abdomen as needed.    Will monitor patient's hospital course and adjust treatment as hospital course dictates.    DVT prophylaxis: Heparin  Code status is   Code Status and Medical Interventions:   Ordered at: 10/04/19 1919     Code Status:    CPR     Medical Interventions (Level of Support Prior to Arrest):    Full       Plan for disposition:Transfer to med/surg      Time:           This document has been electronically signed by Tulio Perry MD on 2019 8:15 AM      Electronically signed by Tulio Perry MD at 10/05/19 0851          Consult Notes (all)      Sathish Hood MD at 10/05/19 0809      Consult Orders    1. Inpatient Nephrology Consult [[id number]] ordered by Tulio Perry MD at 10/04/19 191                [facility]  [address] [place] [address]. [address]  T - [phone]  F - [phone]     Consultation         PATIENT  DEMOGRAPHICS   PATIENT NAME: Jon Moore Jairo                      PHYSICIAN: Sathish Hood MD  : 1965  MRN: [id number]    Subjective   SUBJECTIVE   Referring Provider: Dr.Bryce Perry  Reason for Consultation: Help with management of elevated creatinine of 7.11  History of present illness: This is a 52-year-old  man with a past medical history of Crohn's disease who had a colectomy done many years ago in May.  He has a history of short bowel syndrome she sees a gastroenterologist in  [place], he has been on multiple medications including loperamide 6 to 10 pills a day.  He has been having fevers of 103 occasionally on and off for couple days and that is followed by diarrhea about 10-12 episodes large bowel movements.  He presented with this to the ED and had labs done which showed a creatinine of 8 and hence the reason for consultation.      Patient was seen in the ICU denies any chest pain tells me he still has some diarrhea but denies any abdominal pain nausea or vomiting.  Denies any over-the-counter medication like Advil Motrin Aleve and ibuprofen denies any urinary complaints of urgency frequency hesitancy at this time.  He has been afebrile since he came to the ICU.    Past Medical History:   Diagnosis Date   • Anxiety    • Arthritis    • Crohn's disease (CMS/HCC)    • Depression    • Hypertension      Past Surgical History:   Procedure Laterality Date   • ABDOMINAL HERNIA REPAIR     • COLON RESECTION  1988   • COLONOSCOPY  10/26/2015    internal hemorrhoids,otherwise normal postoperative colonoscopy with the evaluation of his ti     History reviewed. No pertinent family history.  Social History     Tobacco Use   • Smoking status: Former Smoker   • Smokeless tobacco: Never Used   Substance Use Topics   • Alcohol use: Yes     Comment: very rarely   • Drug use: No     Allergies:  Ketamine hcl     REVIEW OF SYSTEMS    Review of Systems   Constitutional: Positive for fever.   HENT: Negative.    Eyes: Negative.    Respiratory: Negative.    Cardiovascular: Negative.    Gastrointestinal: Positive for diarrhea and nausea.   Endocrine: Negative.    Genitourinary: Negative.    Musculoskeletal: Negative.    Skin: Negative.    Allergic/Immunologic: Negative.    Neurological: Negative.    Hematological: Negative.    Psychiatric/Behavioral: Negative.        Objective   OBJECTIVE   Vital Signs  Temp:  [98.8 °F (37.1 °C)-98.9 °F (37.2 °C)] 98.8 °F (37.1 °C)  Heart Rate:  [78-83] 78  Resp:  [20]  "20  BP: (106-142)/(56-74) 136/66    Flowsheet Rows      First Filed Value   Admission Height  175.3 cm (69\") Documented at 10/04/2019 1947   Admission Weight  122 kg (268 lb) Documented at 10/04/2019 1600         I/O last 3 completed shifts:  In: 1581 [P.O.:360; I.V.:1221]  Out: 1775 [Urine:1775]    PHYSICAL EXAM    Physical Exam   Constitutional: He is oriented to person, place, and time.   HENT:   Head: Normocephalic and atraumatic.   Eyes: EOM are normal. Pupils are equal, round, and reactive to light.   Neck: Normal range of motion. Neck supple.   Cardiovascular: Normal rate, regular rhythm and normal heart sounds.   Pulmonary/Chest: Effort normal and breath sounds normal.   Abdominal: Soft. Bowel sounds are normal.   Musculoskeletal: Normal range of motion.   Neurological: He is alert and oriented to person, place, and time.   Skin: Skin is dry.   Psychiatric: He has a normal mood and affect. His behavior is normal.       RESULTS   Results Review:    Results from last 7 days   Lab Units 10/05/19  0424 10/04/19  1932   SODIUM mmol/L 139 137   POTASSIUM mmol/L 3.7 4.0   CHLORIDE mmol/L 106 102   CO2 mmol/L 21.0* 21.0*   BUN mg/dL 48* 57*   CREATININE mg/dL 3.56* 7.11*   CALCIUM mg/dL 8.3* 7.7*   GLUCOSE mg/dL 100* 103*       Estimated Creatinine Clearance: 31.6 mL/min (A) (by C-G formula based on SCr of 3.56 mg/dL (H)).                Results from last 7 days   Lab Units 10/05/19  0424   WBC 10*3/mm3 5.93   HEMOGLOBIN g/dL 13.4   PLATELETS 10*3/mm3 145              MEDICATIONS      famotidine 40 mg Oral Daily   heparin (porcine) 5,000 Units Subcutaneous Q12H   sodium chloride 10 mL Intravenous Q12H       sodium chloride 100 mL/hr Last Rate: 100 mL/hr (10/05/19 0746)     Medications Prior to Admission   Medication Sig Dispense Refill Last Dose   • Adalimumab (HUMIRA) 40 MG/0.4ML Prefilled Syringe Kit injection Inject 40 mg under the skin into the appropriate area as directed See Admin Instructions.      • " ALPRAZolam (XANAX) 1 MG tablet TK 1 T PO BID PRF ANXIETY  2 Taking   • dicyclomine (BENTYL) 10 MG capsule Take 10 mg by mouth 4 (Four) Times a Day Before Meals & at Bedtime.      • gabapentin (NEURONTIN) 300 MG capsule Take 300 mg by mouth 2 (Two) Times a Day.      • lisinopril (PRINIVIL,ZESTRIL) 40 MG tablet Take 40 mg by mouth Every Night.      • metoprolol succinate XL (TOPROL-XL) 50 MG 24 hr tablet Take 50 mg by mouth every night at bedtime.      • oxyCODONE-acetaminophen (PERCOCET) 7.5-325 MG per tablet Take 1 tablet by mouth Every 6 (Six) Hours As Needed.      • sertraline (ZOLOFT) 100 MG tablet TK 1 T PO BID  5 Taking   • tiZANidine (ZANAFLEX) 4 MG tablet Take 4 mg by mouth At Night As Needed for Muscle Spasms.      • HYDROcodone-acetaminophen (NORCO) 7.5-325 MG per tablet Take 1 tablet by mouth Every 8 (Eight) Hours As Needed.   Taking   • lansoprazole (PREVACID) 15 MG capsule Take 15 mg by mouth Daily.   Taking   • lisinopril (PRINIVIL,ZESTRIL) 10 MG tablet Take 10 mg by mouth Daily.   Taking   • loperamide (IMODIUM) 2 MG capsule Take 2 mg by mouth 4 (Four) Times a Day As Needed for diarrhea.   Taking     Assessment/Plan   ASSESSMENT / PLAN      LAWRENCE (acute kidney injury) (CMS/HCC)    Crohn's disease (CMS/HCC)    Depression    Hypertension    Anxiety    1) Acute kidney injury likely appears to be prerenal at this time.  Now improving doubt other causes at this time will need work-up  2) diarrhea history of Crohn's disease has been on multiple medications in the past remains on adalimumab  3) history of hypertension was hypotensive yesterday no normal  4) fever at home not before plan at this time  5) gastroesophageal post disease  6) history of depression  7) mild metabolic acidosis now improving    Plan: Continue current medications this time doing well from the renal standpoint, continue with IV fluids at this time.  No need for bicarb drip.  Renal function slowly improving.  Calcium controlled, lipids  controlled.  Will get urine lites urine sodium creatinine and urea at this time.  We will check an ultrasound of the kidney for now.  Strict I's and O's daily weights.  Renal function to improve at this time.  Blood pressure not better controlled no fever at this time.  Okay to transfer to floor.  Labs tomorrow morning discussed with the patient and family at length will follow for now           I discussed the patients findings and my recommendations with patient and family        This document has been electronically signed by Sathish Hood MD on October 5, 2019 8:10 AM             Electronically signed by Sathish Hood MD at 10/05/19 0818          Discharge Summary      Agustin Sandra PA at 10/06/19 1222     Attestation signed by Soraya Harrington MD at 10/06/19 1909    I have reviewed the documentation and agree with the plan as outlined.                      [facility] Medicine Services  DISCHARGE SUMMARY       Date of Admission: 10/4/2019  Date of Discharge:  10/6/2019  Primary Care Physician: Halle Baron MD    Presenting Problem/History of Present Illness:  LAWRENCE (acute kidney injury) (CMS/HCC) [N17.9]     Final Discharge Diagnoses:  Active Hospital Problems    Diagnosis   • **LAWRENCE (acute kidney injury) (CMS/HCC)   • Crohn's disease (CMS/[facility])   • Depression   • Hypertension   • Anxiety       Consults:   Consults     Date and Time Order Name Status Description    10/4/2019 1915 Inpatient Nephrology Consult Completed         Pertinent Test Results:   Lab Results (most recent)     Procedure Component Value Units Date/Time    Basic Metabolic Panel [[id number]]  (Abnormal) Collected:  10/06/19 0557    Specimen:  Blood Updated:  10/06/19 0727     Glucose 105 mg/dL      BUN 21 mg/dL      Creatinine 1.02 mg/dL      Sodium 139 mmol/L      Potassium 3.8 mmol/L      Chloride 106 mmol/L      CO2 25.0 mmol/L      Calcium 8.8 mg/dL      eGFR Non African Amer 77 mL/min/1.73       BUN/Creatinine Ratio 20.6     Anion Gap 8.0 mmol/L     Narrative:       GFR Normal >60  Chronic Kidney Disease <60  Kidney Failure <15    Phosphorus [[id number]]  (Abnormal) Collected:  10/06/19 0557    Specimen:  Blood Updated:  10/06/19 0727     Phosphorus 2.0 mg/dL     Uric Acid [[id number]]  (Normal) Collected:  10/06/19 0557    Specimen:  Blood Updated:  10/06/19 0725     Uric Acid 4.2 mg/dL     Magnesium [[id number]]  (Normal) Collected:  10/06/19 0557    Specimen:  Blood Updated:  10/06/19 0725     Magnesium 1.8 mg/dL     CBC & Differential [[id number]] Collected:  10/06/19 0557    Specimen:  Blood Updated:  10/06/19 0705    Narrative:       The following orders were created for panel order CBC & Differential.  Procedure                               Abnormality         Status                     ---------                               -----------         ------                     CBC Auto Differential[[id number]]        Abnormal            Final result                 Please view results for these tests on the individual orders.    CBC Auto Differential [[id number]]  (Abnormal) Collected:  10/06/19 0557    Specimen:  Blood Updated:  10/06/19 0705     WBC 5.29 10*3/mm3      RBC 4.47 10*6/mm3      Hemoglobin 13.1 g/dL      Hematocrit 38.4 %      MCV 85.9 fL      MCH 29.3 pg      MCHC 34.1 g/dL      RDW 12.7 %      RDW-SD 39.7 fl      MPV 10.6 fL      Platelets 146 10*3/mm3      Neutrophil % 51.0 %      Lymphocyte % 29.3 %      Monocyte % 16.4 %      Eosinophil % 2.3 %      Basophil % 0.4 %      Immature Grans % 0.6 %      Neutrophils, Absolute 2.70 10*3/mm3      Lymphocytes, Absolute 1.55 10*3/mm3      Monocytes, Absolute 0.87 10*3/mm3      Eosinophils, Absolute 0.12 10*3/mm3      Basophils, Absolute 0.02 10*3/mm3      Immature Grans, Absolute 0.03 10*3/mm3      nRBC 0.0 /100 WBC     Creatinine, Urine, Random - Urine, Clean Catch [[id number]] Collected:  10/05/19 0851    Specimen:  Urine, Clean Catch Updated:   10/05/19 1213     Creatinine, Urine 137.9 mg/dL     Narrative:       Reference intervals for random urine have not been established.  Clinical usage is dependent upon physician's interpretation in combination with other laboratory tests.     Urinalysis, Microscopic Only - Urine, Clean Catch [[id number]]  (Abnormal) Collected:  10/05/19 0851    Specimen:  Urine, Clean Catch Updated:  10/05/19 0915     RBC, UA 6-12 /HPF      WBC, UA 3-5 /HPF      Bacteria, UA None Seen /HPF      Squamous Epithelial Cells, UA None Seen /HPF      Hyaline Casts, UA 3-6 /LPF      Methodology Automated Microscopy    Urinalysis With Microscopic If Indicated (No Culture) - Urine, Clean Catch [[id number]]  (Abnormal) Collected:  10/05/19 0851    Specimen:  Urine, Clean Catch Updated:  10/05/19 0915     Color, UA Yellow     Appearance, UA Clear     pH, UA 5.5     Specific Gravity, UA 1.016     Glucose, UA Negative     Ketones, UA Negative     Bilirubin, UA Negative     Blood, UA Large (3+)     Protein,  mg/dL (2+)     Leuk Esterase, UA Negative     Nitrite, UA Negative     Urobilinogen, UA 0.2 E.U./dL    Sodium, Urine, Random - Urine, Clean Catch [[id number]] Collected:  10/05/19 0851    Specimen:  Urine, Clean Catch Updated:  10/05/19 0914     Sodium, Urine 55 mmol/L     Narrative:       Reference intervals for random urine have not been established.  Clinical usage is dependent upon physician's interpretation in combination with other laboratory tests.     Protein, Urine, Random - Urine, Clean Catch [[id number]] Collected:  10/05/19 0851    Specimen:  Urine, Clean Catch Updated:  10/05/19 0914     Total Protein, Urine 53.4 mg/dL     Narrative:       Reference intervals for random urine have not been established.  Clinical usage is dependent upon physician's interpretation in combination with other laboratory tests.     Urine Eosinophils, Jeremiah's Stain - Urine, Clean Catch [[id number]] Collected:  10/05/19 0859    Specimen:  Urine, Clean  Catch Updated:  10/05/19 0859    Gastrointestinal Panel, PCR - Stool, Per Rectum [[id number]]  (Abnormal) Collected:  10/04/19 2001    Specimen:  Stool from Per Rectum Updated:  10/05/19 0555     Campylobacter Not Detected     Clostridium difficile (toxin A/B) Not Detected     Plesiomonas shigelloides Not Detected     Salmonella Detected     Comment:   Routine susceptibility testing is not indicated for non-typhoidal Salmonella species isolated from intestinal sources.        Vibrio Not Detected     Vibrio cholerae Not Detected     Yersinia enterocolitica Not Detected     Enteroaggregative E. coli (EAEC) Not Detected     Enteropathogenic E. coli (EPEC) Not Detected     Enterotoxigenic E. coli (ETEC) lt/st Not Detected     Shiga-like toxin-producing E. coli (STEC) stx1/stx2 Not Detected     E. coli O157 Not Detected     Shigella/Enteroinvasive E. coli (EIEC) Not Detected     Cryptosporidium Not Detected     Cyclospora cayetanensis Not Detected     Entamoeba histolytica Not Detected     Giardia lamblia Not Detected     Adenovirus F40/41 Not Detected     Astrovirus Not Detected     Norovirus GI/GII Not Detected     Rotavirus A Not Detected     Sapovirus (I, II, IV or V) Not Detected    Narrative:       Testing performed by multiplex PCR system.    Manual Differential [[id number]]  (Abnormal) Collected:  10/05/19 0424    Specimen:  Blood Updated:  10/05/19 0549     Neutrophil % 38.0 %      Lymphocyte % 43.0 %      Monocyte % 7.0 %      Eosinophil % 2.0 %      Basophil % 1.0 %      Bands %  5.0 %      Atypical Lymphocyte % 4.0 %      Neutrophils Absolute 2.55 10*3/mm3      Lymphocytes Absolute 2.55 10*3/mm3      Monocytes Absolute 0.42 10*3/mm3      Eosinophils Absolute 0.12 10*3/mm3      Basophils Absolute 0.06 10*3/mm3      RBC Morphology Normal     WBC Morphology Normal     Platelet Estimate Decreased    CBC & Differential [[id number]] Collected:  10/05/19 0424    Specimen:  Blood Updated:  10/05/19 0519    Narrative:        The following orders were created for panel order CBC & Differential.  Procedure                               Abnormality         Status                     ---------                               -----------         ------                     CBC Auto Differential[[id number]]        Normal              Final result                 Please view results for these tests on the individual orders.    CBC Auto Differential [[id number]]  (Normal) Collected:  10/05/19 0424    Specimen:  Blood Updated:  10/05/19 0519     WBC 5.93 10*3/mm3      RBC 4.58 10*6/mm3      Hemoglobin 13.4 g/dL      Hematocrit 39.9 %      MCV 87.1 fL      MCH 29.3 pg      MCHC 33.6 g/dL      RDW 13.1 %      RDW-SD 41.0 fl      MPV 11.1 fL      Platelets 145 10*3/mm3     Basic Metabolic Panel [[id number]]  (Abnormal) Collected:  10/05/19 0424    Specimen:  Blood Updated:  10/05/19 0509     Glucose 100 mg/dL      BUN 48 mg/dL      Creatinine 3.56 mg/dL      Sodium 139 mmol/L      Potassium 3.7 mmol/L      Chloride 106 mmol/L      CO2 21.0 mmol/L      Calcium 8.3 mg/dL      eGFR Non African Amer 18 mL/min/1.73      BUN/Creatinine Ratio 13.5     Anion Gap 12.0 mmol/L     Narrative:       GFR Normal >60  Chronic Kidney Disease <60  Kidney Failure <15    Extra Tubes [[id number]] Collected:  10/04/19 1934    Specimen:  Blood, Venous Line Updated:  10/04/19 2127    Narrative:       The following orders were created for panel order Extra Tubes.  Procedure                               Abnormality         Status                     ---------                               -----------         ------                     Light Blue Top[[id number]]                                   Final result               Lavender Top[[id number]]                                     Final result                 Please view results for these tests on the individual orders.    Light Blue Top [[id number]] Collected:  10/04/19 1934    Specimen:  Blood Updated:  10/04/19 2127     Extra  Tube hold for add-on     Comment: Auto resulted       Lavender Top [[id number]] Collected:  10/04/19 1934    Specimen:  Blood Updated:  10/04/19 2127     Extra Tube hold for add-on     Comment: Auto resulted           Imaging Results (most recent)     Procedure Component Value Units Date/Time    US Renal Bilateral [[id number]] Collected:  10/05/19 0850     Updated:  10/05/19 0942    Narrative:       EXAMINATION:  ultrasound, renal     CLINICAL INDICATION / HISTORY:  LAWRENCE    COMPARISON:  none    TECHNIQUE:  ultrasound, renal    FULL RESULTS / FINDINGS:    Kidney, right:      size:  normal, measuring 11.5 x 6 x 5.4 cm    echotexture:  normal    no nephrolithiasis, solid mass, or collecting system dilation    Kidney, left:      size:  normal, measuring 13.2 x 5.7 x 5.67 m    echotexture:  normal    no nephrolithiasis, solid mass, or collecting system dilation,  left kidney mid zone oval anechoic structure with good through  acoustic transmission consistent with simple renal cortical cyst  which measures 2.2 cm in greatest diameter.    Urinary bladder:  Normal      Absent      Impression:       CONCLUSION:    1.  Left kidney mid zone 2.2 cm simple renal cortical cyst.  2.  Otherwise unremarkable renal ultrasound.    Electronically signed by:  Willy Kramer MD  10/5/2019 9:41 AM CDT  Workstation: [id number]        Hospital Course:  The patient is a 54 y.o. male who presented to [facility] with multiple episodes of diarrhea and fever that led to severe renal failure.  Patient was found to have salmonella.  He was aggressively resuscitated with IV fluids and his creatinine has now returned to normal.  Patient is tolerating p.o. intake and taking copious amounts of fluid as he is still having diarrhea.  Patient was treated with 2 days of antibiotics here.  On the day of discharge he was transitioned to p.o. Levaquin.  Patient reports upset stomach with antibiotics, however here he was able to tolerate it with  "Zofran.  He also tolerated his lunch.  Patient will be prescribed 12 additional days of antibiotics to make a total of 14 days.  He will then follow-up with his primary care provider to discuss whether or not further treatment or follow-up is necessary.  Patient will also follow-up with his primary care provider within 7 days.  Patient was prescribed Levaquin, Zofran, and in the case of Zofran failure he was prescribed Phenergan.  Patient was instructed to return with any concerning worsening symptoms.  He stated he felt safe and well to go home and verbalized understanding of the plan of care and the need to return with any concerning symptoms.    Condition on Discharge: Stable, improved    Physical Exam on Discharge:  /92 (BP Location: Right arm, Patient Position: Lying)   Pulse 70   Temp 96.9 °F (36.1 °C) (Oral)   Resp 18   Ht 175.3 cm (69\")   Wt 125 kg (276 lb 4.8 oz)   SpO2 96%   BMI 40.80 kg/m²    Physical Exam   Constitutional: He is oriented to person, place, and time. He appears well-developed and well-nourished. No distress.   HENT:   Head: Normocephalic and atraumatic.   Eyes: Conjunctivae are normal.   Cardiovascular: Normal rate and regular rhythm.   Pulmonary/Chest: Effort normal and breath sounds normal. No stridor. No respiratory distress.   Abdominal: Soft. Bowel sounds are normal. He exhibits no distension. There is no tenderness.   Musculoskeletal: He exhibits no edema.   Neurological: He is alert and oriented to person, place, and time.   Skin: Skin is warm and dry. Capillary refill takes less than 2 seconds. He is not diaphoretic.   Psychiatric: He has a normal mood and affect. His behavior is normal.     Discharge Disposition:  Home or Self Care    Discharge Medications:     Discharge Medications      New Medications      Instructions Start Date   levoFLOXacin 500 MG tablet  Commonly known as:  LEVAQUIN   500 mg, Oral, Every 24 Hours   Start Date:  10/7/2019     ondansetron 8 MG " tablet  Commonly known as:  ZOFRAN   8 mg, Oral, Every 6 Hours PRN      promethazine 12.5 MG tablet  Commonly known as:  PHENERGAN   12.5 mg, Oral, Every 6 Hours PRN         Changes to Medications      Instructions Start Date   lisinopril 10 MG tablet  Commonly known as:  PRINIVILZESTRIL  What changed:  Another medication with the same name was removed. Continue taking this medication, and follow the directions you see here.   10 mg, Oral, Daily         Continue These Medications      Instructions Start Date   ALPRAZolam 1 MG tablet  Commonly known as:  XANAX   TK 1 T PO BID PRF ANXIETY      dicyclomine 10 MG capsule  Commonly known as:  BENTYL   10 mg, Oral, 4 Times Daily Before Meals & Nightly      gabapentin 300 MG capsule  Commonly known as:  NEURONTIN   300 mg, Oral, 2 Times Daily      HUMIRA 40 MG/0.4ML Prefilled Syringe Kit injection  Generic drug:  Adalimumab   40 mg, Subcutaneous, See Admin Instructions      lansoprazole 15 MG capsule  Commonly known as:  PREVACID   15 mg, Oral, Daily      metoprolol succinate XL 50 MG 24 hr tablet  Commonly known as:  TOPROL-XL   50 mg, Oral, Every Night at Bedtime      oxyCODONE-acetaminophen 7.5-325 MG per tablet  Commonly known as:  PERCOCET   1 tablet, Oral, Every 6 Hours PRN      sertraline 100 MG tablet  Commonly known as:  ZOLOFT   TK 1 T PO BID      tiZANidine 4 MG tablet  Commonly known as:  ZANAFLEX   4 mg, Oral, Nightly PRN         Stop These Medications    HYDROcodone-acetaminophen 7.5-325 MG per tablet  Commonly known as:  NORCO     loperamide 2 MG capsule  Commonly known as:  IMODIUM            Discharge Diet:   Diet Instructions     Advance Diet As Tolerated            Activity at Discharge:   Activity Instructions     Activity as Tolerated            Discharge Care Plan/Instructions: P.o. Levaquin for 12 additional days for a total of 14 days; follow-up with PCP; copious amounts of fluid; advance diet as tolerated; return with any concerning worsening  symptoms.    Test Results Pending at Discharge:    Order Current Status    Urine Eosinophils, Jeremiah's Stain - Urine, Clean Catch In process    Gastrointestinal Panel, PCR - Stool, Per Rectum Preliminary result              This document has been electronically signed by KIRTI Mayer on October 6, 2019 12:26 PM      Time: Please note that greater than 30 minutes was spent on the discharge planning and summary this patient              Electronically signed by Soraya Harrington MD at 10/06/19 0550

## 2019-10-14 LAB
ADV 40+41 DNA STL QL NAA+NON-PROBE: NOT DETECTED
ASTRO TYP 1-8 RNA STL QL NAA+NON-PROBE: NOT DETECTED
C CAYETANENSIS DNA STL QL NAA+NON-PROBE: NOT DETECTED
C DIFF TOX GENS STL QL NAA+PROBE: NOT DETECTED
CAMPY SP DNA.DIARRHEA STL QL NAA+PROBE: NOT DETECTED
CRYPTOSP STL CULT: DETECTED
E COLI DNA SPEC QL NAA+PROBE: NOT DETECTED
E HISTOLYT AG STL-ACNC: NOT DETECTED
EAEC PAA PLAS AGGR+AATA ST NAA+NON-PRB: NOT DETECTED
EC STX1 + STX2 GENES STL NAA+PROBE: NOT DETECTED
EPEC EAE GENE STL QL NAA+NON-PROBE: NOT DETECTED
ETEC LTA+ST1A+ST1B TOX ST NAA+NON-PROBE: NOT DETECTED
G LAMBLIA DNA SPEC QL NAA+PROBE: NOT DETECTED
NOROVIRUS GI+II RNA STL QL NAA+NON-PROBE: NOT DETECTED
P SHIGELLOIDES DNA STL QL NAA+PROBE: NOT DETECTED
RV RNA STL NAA+PROBE: NOT DETECTED
SALMONELLA DNA SPEC QL NAA+PROBE: DETECTED
SAPO I+II+IV+V RNA STL QL NAA+NON-PROBE: NOT DETECTED
SHIGELLA SP+EIEC IPAH STL QL NAA+PROBE: NOT DETECTED
V CHOLERAE DNA SPEC QL NAA+PROBE: NOT DETECTED
VIBRIO DNA SPEC NAA+PROBE: NOT DETECTED
YERSINIA STL CULT: NOT DETECTED

## 2021-10-27 ENCOUNTER — HOSPITAL ENCOUNTER (INPATIENT)
Facility: HOSPITAL | Age: 56
LOS: 10 days | Discharge: HOME | End: 2021-11-06
Attending: EMERGENCY MEDICINE | Admitting: FAMILY MEDICINE

## 2021-10-27 ENCOUNTER — APPOINTMENT (OUTPATIENT)
Dept: GENERAL RADIOLOGY | Facility: HOSPITAL | Age: 56
End: 2021-10-27

## 2021-10-27 DIAGNOSIS — R13.19 ESOPHAGEAL DYSPHAGIA: ICD-10-CM

## 2021-10-27 DIAGNOSIS — F41.9 ANXIETY: Chronic | ICD-10-CM

## 2021-10-27 DIAGNOSIS — R79.89 INCREASED LACTIC ACID LEVEL: ICD-10-CM

## 2021-10-27 DIAGNOSIS — J18.9 PNEUMONIA OF BOTH LUNGS DUE TO INFECTIOUS ORGANISM, UNSPECIFIED PART OF LUNG: ICD-10-CM

## 2021-10-27 DIAGNOSIS — N17.9 AKI (ACUTE KIDNEY INJURY) (HCC): ICD-10-CM

## 2021-10-27 DIAGNOSIS — J96.01 ACUTE RESPIRATORY FAILURE WITH HYPOXIA AND HYPERCAPNIA (HCC): ICD-10-CM

## 2021-10-27 DIAGNOSIS — I50.9 ACUTE CONGESTIVE HEART FAILURE, UNSPECIFIED HEART FAILURE TYPE (HCC): Primary | ICD-10-CM

## 2021-10-27 DIAGNOSIS — J96.02 ACUTE RESPIRATORY FAILURE WITH HYPOXIA AND HYPERCAPNIA (HCC): ICD-10-CM

## 2021-10-27 LAB
ALBUMIN SERPL-MCNC: 4.1 G/DL (ref 3.5–5.2)
ALBUMIN/GLOB SERPL: 1.3 G/DL
ALP SERPL-CCNC: 72 U/L (ref 39–117)
ALT SERPL W P-5'-P-CCNC: 27 U/L (ref 1–41)
ANION GAP SERPL CALCULATED.3IONS-SCNC: 8 MMOL/L (ref 5–15)
ARTERIAL PATENCY WRIST A: ABNORMAL
ARTERIAL PATENCY WRIST A: ABNORMAL
ARTERIAL PATENCY WRIST A: POSITIVE
ARTERIAL PATENCY WRIST A: POSITIVE
AST SERPL-CCNC: 38 U/L (ref 1–40)
ATMOSPHERIC PRESS: 741 MMHG
ATMOSPHERIC PRESS: 742 MMHG
ATMOSPHERIC PRESS: 744 MMHG
ATMOSPHERIC PRESS: 746 MMHG
BASE EXCESS BLDA CALC-SCNC: 2.6 MMOL/L (ref 0–2)
BASE EXCESS BLDA CALC-SCNC: 3 MMOL/L (ref 0–2)
BASE EXCESS BLDA CALC-SCNC: 3.2 MMOL/L (ref 0–2)
BASE EXCESS BLDA CALC-SCNC: 4.4 MMOL/L (ref 0–2)
BASOPHILS # BLD AUTO: 0.08 10*3/MM3 (ref 0–0.2)
BASOPHILS NFR BLD AUTO: 0.5 % (ref 0–1.5)
BDY SITE: ABNORMAL
BILIRUB SERPL-MCNC: 2.4 MG/DL (ref 0–1.2)
BILIRUB UR QL STRIP: ABNORMAL
BUN SERPL-MCNC: 36 MG/DL (ref 6–20)
BUN/CREAT SERPL: 15.9 (ref 7–25)
CALCIUM SPEC-SCNC: 9.1 MG/DL (ref 8.6–10.5)
CHLORIDE SERPL-SCNC: 95 MMOL/L (ref 98–107)
CLARITY UR: CLEAR
CO2 SERPL-SCNC: 30 MMOL/L (ref 22–29)
COLOR UR: ABNORMAL
CREAT SERPL-MCNC: 2.27 MG/DL (ref 0.76–1.27)
D-DIMER, QUANTITATIVE (MAD,POW, STR): 480 NG/ML (FEU) (ref 0–470)
D-LACTATE SERPL-SCNC: 1.2 MMOL/L (ref 0.5–2)
D-LACTATE SERPL-SCNC: 2.6 MMOL/L (ref 0.5–2)
DEPRECATED RDW RBC AUTO: 45 FL (ref 37–54)
EOSINOPHIL # BLD AUTO: 0.16 10*3/MM3 (ref 0–0.4)
EOSINOPHIL NFR BLD AUTO: 1.1 % (ref 0.3–6.2)
EPAP: 6
EPAP: 8
ERYTHROCYTE [DISTWIDTH] IN BLOOD BY AUTOMATED COUNT: 13.2 % (ref 12.3–15.4)
FLUAV SUBTYP SPEC NAA+PROBE: NOT DETECTED
FLUBV RNA ISLT QL NAA+PROBE: NOT DETECTED
GAS FLOW AIRWAY: 15 LPM
GFR SERPL CREATININE-BSD FRML MDRD: 30 ML/MIN/1.73
GLOBULIN UR ELPH-MCNC: 3.2 GM/DL
GLUCOSE SERPL-MCNC: 126 MG/DL (ref 65–99)
GLUCOSE UR STRIP-MCNC: NEGATIVE MG/DL
HCO3 BLDA-SCNC: 30.4 MMOL/L (ref 20–26)
HCO3 BLDA-SCNC: 30.7 MMOL/L (ref 20–26)
HCO3 BLDA-SCNC: 30.9 MMOL/L (ref 20–26)
HCO3 BLDA-SCNC: 31.6 MMOL/L (ref 20–26)
HCT VFR BLD AUTO: 39.5 % (ref 37.5–51)
HGB BLD-MCNC: 13 G/DL (ref 13–17.7)
HGB UR QL STRIP.AUTO: NEGATIVE
HOLD SPECIMEN: NORMAL
HOLD SPECIMEN: NORMAL
IMM GRANULOCYTES # BLD AUTO: 0.1 10*3/MM3 (ref 0–0.05)
IMM GRANULOCYTES NFR BLD AUTO: 0.7 % (ref 0–0.5)
INHALED O2 CONCENTRATION: 100 %
INHALED O2 CONCENTRATION: 100 %
INHALED O2 CONCENTRATION: 45 %
INHALED O2 CONCENTRATION: 50 %
KETONES UR QL STRIP: ABNORMAL
L PNEUMO1 AG UR QL IA: NEGATIVE
LEUKOCYTE ESTERASE UR QL STRIP.AUTO: NEGATIVE
LYMPHOCYTES # BLD AUTO: 1.38 10*3/MM3 (ref 0.7–3.1)
LYMPHOCYTES NFR BLD AUTO: 9.2 % (ref 19.6–45.3)
Lab: ABNORMAL
MCH RBC QN AUTO: 30.2 PG (ref 26.6–33)
MCHC RBC AUTO-ENTMCNC: 32.9 G/DL (ref 31.5–35.7)
MCV RBC AUTO: 91.9 FL (ref 79–97)
MODALITY: ABNORMAL
MONOCYTES # BLD AUTO: 0.88 10*3/MM3 (ref 0.1–0.9)
MONOCYTES NFR BLD AUTO: 5.8 % (ref 5–12)
NEUTROPHILS NFR BLD AUTO: 12.46 10*3/MM3 (ref 1.7–7)
NEUTROPHILS NFR BLD AUTO: 82.7 % (ref 42.7–76)
NITRITE UR QL STRIP: NEGATIVE
NRBC BLD AUTO-RTO: 0 /100 WBC (ref 0–0.2)
NT-PROBNP SERPL-MCNC: 1300 PG/ML (ref 0–900)
PCO2 BLDA: 59.3 MM HG (ref 35–45)
PCO2 BLDA: 59.6 MM HG (ref 35–45)
PCO2 BLDA: 61.5 MM HG (ref 35–45)
PCO2 BLDA: 62.5 MM HG (ref 35–45)
PEEP RESPIRATORY: 6 CM[H2O]
PH BLDA: 7.3 PH UNITS (ref 7.35–7.45)
PH BLDA: 7.3 PH UNITS (ref 7.35–7.45)
PH BLDA: 7.32 PH UNITS (ref 7.35–7.45)
PH BLDA: 7.33 PH UNITS (ref 7.35–7.45)
PH UR STRIP.AUTO: <=5 [PH] (ref 5–9)
PLATELET # BLD AUTO: 177 10*3/MM3 (ref 140–450)
PMV BLD AUTO: 10.9 FL (ref 6–12)
PO2 BLDA: 161 MM HG (ref 83–108)
PO2 BLDA: 359 MM HG (ref 83–108)
PO2 BLDA: 88.2 MM HG (ref 83–108)
PO2 BLDA: 91.6 MM HG (ref 83–108)
POTASSIUM SERPL-SCNC: 4.3 MMOL/L (ref 3.5–5.2)
PROCALCITONIN SERPL-MCNC: 5.06 NG/ML (ref 0–0.25)
PROT SERPL-MCNC: 7.3 G/DL (ref 6–8.5)
PROT UR QL STRIP: ABNORMAL
QT INTERVAL: 432 MS
QTC INTERVAL: 466 MS
RBC # BLD AUTO: 4.3 10*6/MM3 (ref 4.14–5.8)
S PNEUM AG SPEC QL LA: NEGATIVE
SAO2 % BLDCOA: 95.1 % (ref 94–99)
SAO2 % BLDCOA: 95.4 % (ref 94–99)
SAO2 % BLDCOA: 97.4 % (ref 94–99)
SAO2 % BLDCOA: 98.2 % (ref 94–99)
SARS-COV-2 RNA PNL SPEC NAA+PROBE: NOT DETECTED
SET MECH RESP RATE: 22
SODIUM SERPL-SCNC: 133 MMOL/L (ref 136–145)
SP GR UR STRIP: 1.02 (ref 1–1.03)
TROPONIN T SERPL-MCNC: <0.01 NG/ML (ref 0–0.03)
UROBILINOGEN UR QL STRIP: ABNORMAL
VENTILATOR MODE: ABNORMAL
VT ON VENT VENT: 450 ML
VT ON VENT VENT: 500 ML
WBC # BLD AUTO: 15.06 10*3/MM3 (ref 3.4–10.8)
WHOLE BLOOD HOLD SPECIMEN: NORMAL

## 2021-10-27 PROCEDURE — 85025 COMPLETE CBC W/AUTO DIFF WBC: CPT | Performed by: EMERGENCY MEDICINE

## 2021-10-27 PROCEDURE — 87636 SARSCOV2 & INF A&B AMP PRB: CPT | Performed by: EMERGENCY MEDICINE

## 2021-10-27 PROCEDURE — 82803 BLOOD GASES ANY COMBINATION: CPT

## 2021-10-27 PROCEDURE — 99284 EMERGENCY DEPT VISIT MOD MDM: CPT

## 2021-10-27 PROCEDURE — 83605 ASSAY OF LACTIC ACID: CPT | Performed by: EMERGENCY MEDICINE

## 2021-10-27 PROCEDURE — 25010000002 CEFTRIAXONE PER 250 MG: Performed by: EMERGENCY MEDICINE

## 2021-10-27 PROCEDURE — 25010000002 ALBUMIN HUMAN 25% PER 50 ML: Performed by: FAMILY MEDICINE

## 2021-10-27 PROCEDURE — 93005 ELECTROCARDIOGRAM TRACING: CPT | Performed by: EMERGENCY MEDICINE

## 2021-10-27 PROCEDURE — 25010000002 FUROSEMIDE PER 20 MG: Performed by: EMERGENCY MEDICINE

## 2021-10-27 PROCEDURE — 71045 X-RAY EXAM CHEST 1 VIEW: CPT

## 2021-10-27 PROCEDURE — 94799 UNLISTED PULMONARY SVC/PX: CPT

## 2021-10-27 PROCEDURE — 25010000002 HEPARIN (PORCINE) PER 1000 UNITS: Performed by: FAMILY MEDICINE

## 2021-10-27 PROCEDURE — 36600 WITHDRAWAL OF ARTERIAL BLOOD: CPT

## 2021-10-27 PROCEDURE — 36415 COLL VENOUS BLD VENIPUNCTURE: CPT

## 2021-10-27 PROCEDURE — 80053 COMPREHEN METABOLIC PANEL: CPT | Performed by: EMERGENCY MEDICINE

## 2021-10-27 PROCEDURE — 84145 PROCALCITONIN (PCT): CPT | Performed by: FAMILY MEDICINE

## 2021-10-27 PROCEDURE — 93010 ELECTROCARDIOGRAM REPORT: CPT | Performed by: INTERNAL MEDICINE

## 2021-10-27 PROCEDURE — 94660 CPAP INITIATION&MGMT: CPT

## 2021-10-27 PROCEDURE — 87899 AGENT NOS ASSAY W/OPTIC: CPT | Performed by: FAMILY MEDICINE

## 2021-10-27 PROCEDURE — 81003 URINALYSIS AUTO W/O SCOPE: CPT | Performed by: EMERGENCY MEDICINE

## 2021-10-27 PROCEDURE — 94760 N-INVAS EAR/PLS OXIMETRY 1: CPT

## 2021-10-27 PROCEDURE — 83880 ASSAY OF NATRIURETIC PEPTIDE: CPT | Performed by: EMERGENCY MEDICINE

## 2021-10-27 PROCEDURE — 84484 ASSAY OF TROPONIN QUANT: CPT | Performed by: EMERGENCY MEDICINE

## 2021-10-27 PROCEDURE — 85379 FIBRIN DEGRADATION QUANT: CPT | Performed by: EMERGENCY MEDICINE

## 2021-10-27 PROCEDURE — 25010000002 AZITHROMYCIN PER 500 MG: Performed by: EMERGENCY MEDICINE

## 2021-10-27 PROCEDURE — 87040 BLOOD CULTURE FOR BACTERIA: CPT | Performed by: EMERGENCY MEDICINE

## 2021-10-27 PROCEDURE — 36415 COLL VENOUS BLD VENIPUNCTURE: CPT | Performed by: EMERGENCY MEDICINE

## 2021-10-27 PROCEDURE — P9047 ALBUMIN (HUMAN), 25%, 50ML: HCPCS | Performed by: FAMILY MEDICINE

## 2021-10-27 RX ORDER — CALCIUM CARBONATE 200(500)MG
2 TABLET,CHEWABLE ORAL 2 TIMES DAILY PRN
Status: DISCONTINUED | OUTPATIENT
Start: 2021-10-27 | End: 2021-11-06 | Stop reason: HOSPADM

## 2021-10-27 RX ORDER — SODIUM CHLORIDE 0.9 % (FLUSH) 0.9 %
10 SYRINGE (ML) INJECTION AS NEEDED
Status: DISCONTINUED | OUTPATIENT
Start: 2021-10-27 | End: 2021-11-06 | Stop reason: HOSPADM

## 2021-10-27 RX ORDER — ALPRAZOLAM 1 MG/1
1 TABLET ORAL 3 TIMES DAILY PRN
Status: DISCONTINUED | OUTPATIENT
Start: 2021-10-27 | End: 2021-11-06 | Stop reason: HOSPADM

## 2021-10-27 RX ORDER — ACETAMINOPHEN 160 MG/5ML
650 SOLUTION ORAL EVERY 4 HOURS PRN
Status: DISCONTINUED | OUTPATIENT
Start: 2021-10-27 | End: 2021-11-06 | Stop reason: HOSPADM

## 2021-10-27 RX ORDER — OXYCODONE AND ACETAMINOPHEN 7.5; 325 MG/1; MG/1
1 TABLET ORAL EVERY 6 HOURS PRN
Status: DISCONTINUED | OUTPATIENT
Start: 2021-10-27 | End: 2021-11-06 | Stop reason: HOSPADM

## 2021-10-27 RX ORDER — ALBUMIN (HUMAN) 12.5 G/50ML
25 SOLUTION INTRAVENOUS 2 TIMES DAILY
Status: DISCONTINUED | OUTPATIENT
Start: 2021-10-27 | End: 2021-10-28

## 2021-10-27 RX ORDER — IPRATROPIUM BROMIDE AND ALBUTEROL SULFATE 2.5; .5 MG/3ML; MG/3ML
3 SOLUTION RESPIRATORY (INHALATION) EVERY 6 HOURS PRN
Status: DISCONTINUED | OUTPATIENT
Start: 2021-10-27 | End: 2021-10-30

## 2021-10-27 RX ORDER — MORPHINE SULFATE 2 MG/ML
1 INJECTION, SOLUTION INTRAMUSCULAR; INTRAVENOUS EVERY 4 HOURS PRN
Status: ACTIVE | OUTPATIENT
Start: 2021-10-27 | End: 2021-11-03

## 2021-10-27 RX ORDER — ALBUMIN (HUMAN) 12.5 G/50ML
25 SOLUTION INTRAVENOUS ONCE
Status: COMPLETED | OUTPATIENT
Start: 2021-10-27 | End: 2021-10-27

## 2021-10-27 RX ORDER — SODIUM CHLORIDE 0.9 % (FLUSH) 0.9 %
10 SYRINGE (ML) INJECTION EVERY 12 HOURS SCHEDULED
Status: DISCONTINUED | OUTPATIENT
Start: 2021-10-27 | End: 2021-11-06 | Stop reason: HOSPADM

## 2021-10-27 RX ORDER — METOPROLOL SUCCINATE 50 MG/1
50 TABLET, EXTENDED RELEASE ORAL
Status: DISCONTINUED | OUTPATIENT
Start: 2021-10-27 | End: 2021-11-06 | Stop reason: HOSPADM

## 2021-10-27 RX ORDER — NALOXONE HCL 0.4 MG/ML
0.4 VIAL (ML) INJECTION
Status: DISCONTINUED | OUTPATIENT
Start: 2021-10-27 | End: 2021-11-06 | Stop reason: HOSPADM

## 2021-10-27 RX ORDER — HEPARIN SODIUM 5000 [USP'U]/ML
5000 INJECTION, SOLUTION INTRAVENOUS; SUBCUTANEOUS EVERY 8 HOURS SCHEDULED
Status: DISCONTINUED | OUTPATIENT
Start: 2021-10-27 | End: 2021-11-06 | Stop reason: HOSPADM

## 2021-10-27 RX ORDER — TIZANIDINE 4 MG/1
4 TABLET ORAL NIGHTLY PRN
Status: DISCONTINUED | OUTPATIENT
Start: 2021-10-27 | End: 2021-11-06 | Stop reason: HOSPADM

## 2021-10-27 RX ORDER — LANOLIN ALCOHOL/MO/W.PET/CERES
6 CREAM (GRAM) TOPICAL NIGHTLY PRN
Status: DISCONTINUED | OUTPATIENT
Start: 2021-10-27 | End: 2021-11-04

## 2021-10-27 RX ORDER — ACETAMINOPHEN 325 MG/1
650 TABLET ORAL EVERY 4 HOURS PRN
Status: DISCONTINUED | OUTPATIENT
Start: 2021-10-27 | End: 2021-11-06 | Stop reason: HOSPADM

## 2021-10-27 RX ORDER — ONDANSETRON 4 MG/1
4 TABLET, FILM COATED ORAL EVERY 6 HOURS PRN
Status: DISCONTINUED | OUTPATIENT
Start: 2021-10-27 | End: 2021-11-06 | Stop reason: HOSPADM

## 2021-10-27 RX ORDER — FUROSEMIDE 10 MG/ML
40 INJECTION INTRAMUSCULAR; INTRAVENOUS EVERY 12 HOURS
Status: DISCONTINUED | OUTPATIENT
Start: 2021-10-27 | End: 2021-10-29

## 2021-10-27 RX ORDER — DICYCLOMINE HYDROCHLORIDE 10 MG/1
10 CAPSULE ORAL
Status: DISCONTINUED | OUTPATIENT
Start: 2021-10-27 | End: 2021-11-06 | Stop reason: HOSPADM

## 2021-10-27 RX ORDER — PANTOPRAZOLE SODIUM 40 MG/1
40 TABLET, DELAYED RELEASE ORAL EVERY MORNING
Status: DISCONTINUED | OUTPATIENT
Start: 2021-10-28 | End: 2021-11-06 | Stop reason: HOSPADM

## 2021-10-27 RX ORDER — ACETAMINOPHEN 650 MG/1
650 SUPPOSITORY RECTAL EVERY 4 HOURS PRN
Status: DISCONTINUED | OUTPATIENT
Start: 2021-10-27 | End: 2021-11-06 | Stop reason: HOSPADM

## 2021-10-27 RX ORDER — ONDANSETRON 2 MG/ML
4 INJECTION INTRAMUSCULAR; INTRAVENOUS EVERY 6 HOURS PRN
Status: DISCONTINUED | OUTPATIENT
Start: 2021-10-27 | End: 2021-11-06 | Stop reason: HOSPADM

## 2021-10-27 RX ORDER — GABAPENTIN 300 MG/1
300 CAPSULE ORAL 2 TIMES DAILY
Status: DISCONTINUED | OUTPATIENT
Start: 2021-10-27 | End: 2021-11-06 | Stop reason: HOSPADM

## 2021-10-27 RX ORDER — LISINOPRIL 10 MG/1
10 TABLET ORAL DAILY
Status: DISCONTINUED | OUTPATIENT
Start: 2021-10-27 | End: 2021-10-28

## 2021-10-27 RX ORDER — FUROSEMIDE 10 MG/ML
40 INJECTION INTRAMUSCULAR; INTRAVENOUS ONCE
Status: COMPLETED | OUTPATIENT
Start: 2021-10-27 | End: 2021-10-27

## 2021-10-27 RX ADMIN — ACETAMINOPHEN 650 MG: 325 TABLET, FILM COATED ORAL at 23:52

## 2021-10-27 RX ADMIN — FUROSEMIDE 40 MG: 10 INJECTION INTRAMUSCULAR; INTRAVENOUS at 11:42

## 2021-10-27 RX ADMIN — GABAPENTIN 300 MG: 300 CAPSULE ORAL at 21:15

## 2021-10-27 RX ADMIN — CEFTRIAXONE SODIUM 1 G: 1 INJECTION, POWDER, FOR SOLUTION INTRAMUSCULAR; INTRAVENOUS at 11:51

## 2021-10-27 RX ADMIN — HEPARIN SODIUM 5000 UNITS: 5000 INJECTION INTRAVENOUS; SUBCUTANEOUS at 16:50

## 2021-10-27 RX ADMIN — HEPARIN SODIUM 5000 UNITS: 5000 INJECTION INTRAVENOUS; SUBCUTANEOUS at 21:52

## 2021-10-27 RX ADMIN — ALPRAZOLAM 1 MG: 1 TABLET ORAL at 21:19

## 2021-10-27 RX ADMIN — SODIUM CHLORIDE, PRESERVATIVE FREE 10 ML: 5 INJECTION INTRAVENOUS at 21:15

## 2021-10-27 RX ADMIN — ALBUMIN HUMAN 25 G: 0.25 SOLUTION INTRAVENOUS at 16:50

## 2021-10-27 RX ADMIN — ALBUMIN HUMAN 25 G: 0.25 SOLUTION INTRAVENOUS at 21:15

## 2021-10-27 RX ADMIN — NITROGLYCERIN 1 INCH: 20 OINTMENT TOPICAL at 11:43

## 2021-10-27 RX ADMIN — AZITHROMYCIN 500 MG: 500 INJECTION, POWDER, LYOPHILIZED, FOR SOLUTION INTRAVENOUS at 12:28

## 2021-10-27 RX ADMIN — DICYCLOMINE HYDROCHLORIDE 10 MG: 10 CAPSULE ORAL at 21:15

## 2021-10-28 ENCOUNTER — APPOINTMENT (OUTPATIENT)
Dept: CARDIOLOGY | Facility: HOSPITAL | Age: 56
End: 2021-10-28

## 2021-10-28 LAB
ALBUMIN SERPL-MCNC: 4.6 G/DL (ref 3.5–5.2)
ALBUMIN/GLOB SERPL: 1.6 G/DL
ALP SERPL-CCNC: 71 U/L (ref 39–117)
ALT SERPL W P-5'-P-CCNC: 25 U/L (ref 1–41)
ANION GAP SERPL CALCULATED.3IONS-SCNC: 13 MMOL/L (ref 5–15)
ARTERIAL PATENCY WRIST A: ABNORMAL
AST SERPL-CCNC: 44 U/L (ref 1–40)
ATMOSPHERIC PRESS: 734 MMHG
BASE EXCESS BLDA CALC-SCNC: 4.6 MMOL/L (ref 0–2)
BASOPHILS # BLD AUTO: 0.05 10*3/MM3 (ref 0–0.2)
BASOPHILS NFR BLD AUTO: 0.5 % (ref 0–1.5)
BDY SITE: ABNORMAL
BH CV ECHO MEAS - AO MAX PG (FULL): 7 MMHG
BH CV ECHO MEAS - AO MAX PG: 15.8 MMHG
BH CV ECHO MEAS - AO MEAN PG (FULL): 4 MMHG
BH CV ECHO MEAS - AO MEAN PG: 9 MMHG
BH CV ECHO MEAS - AO V2 MAX: 199 CM/SEC
BH CV ECHO MEAS - AO V2 MEAN: 142 CM/SEC
BH CV ECHO MEAS - AO V2 VTI: 41.9 CM
BH CV ECHO MEAS - ASC AORTA: 4.1 CM
BH CV ECHO MEAS - AVA(I,A): 3.5 CM^2
BH CV ECHO MEAS - AVA(I,D): 3.5 CM^2
BH CV ECHO MEAS - AVA(V,A): 4 CM^2
BH CV ECHO MEAS - AVA(V,D): 4 CM^2
BH CV ECHO MEAS - BSA(HAYCOCK): 2.7 M^2
BH CV ECHO MEAS - BSA: 2.5 M^2
BH CV ECHO MEAS - BZI_BMI: 41.4 KILOGRAMS/M^2
BH CV ECHO MEAS - BZI_METRIC_HEIGHT: 182.9 CM
BH CV ECHO MEAS - BZI_METRIC_WEIGHT: 138.3 KG
BH CV ECHO MEAS - EDV(CUBED): 163.7 ML
BH CV ECHO MEAS - EDV(MOD-SP2): 80 ML
BH CV ECHO MEAS - EDV(MOD-SP4): 119 ML
BH CV ECHO MEAS - EDV(TEICH): 145.6 ML
BH CV ECHO MEAS - EF(CUBED): 78 %
BH CV ECHO MEAS - EF(MOD-SP2): 55.1 %
BH CV ECHO MEAS - EF(MOD-SP4): 64.1 %
BH CV ECHO MEAS - EF(TEICH): 69.7 %
BH CV ECHO MEAS - ESV(CUBED): 35.9 ML
BH CV ECHO MEAS - ESV(MOD-SP2): 35.9 ML
BH CV ECHO MEAS - ESV(MOD-SP4): 42.7 ML
BH CV ECHO MEAS - ESV(TEICH): 44.1 ML
BH CV ECHO MEAS - FS: 39.7 %
BH CV ECHO MEAS - IVS/LVPW: 0.95
BH CV ECHO MEAS - IVSD: 1.2 CM
BH CV ECHO MEAS - LA DIMENSION: 3.7 CM
BH CV ECHO MEAS - LV DIASTOLIC VOL/BSA (35-75): 46.7 ML/M^2
BH CV ECHO MEAS - LV MASS(C)D: 273.1 GRAMS
BH CV ECHO MEAS - LV MASS(C)DI: 107.1 GRAMS/M^2
BH CV ECHO MEAS - LV MAX PG: 8.9 MMHG
BH CV ECHO MEAS - LV MEAN PG: 5 MMHG
BH CV ECHO MEAS - LV SYSTOLIC VOL/BSA (12-30): 16.8 ML/M^2
BH CV ECHO MEAS - LV V1 MAX: 149 CM/SEC
BH CV ECHO MEAS - LV V1 MEAN: 97.8 CM/SEC
BH CV ECHO MEAS - LV V1 VTI: 27.5 CM
BH CV ECHO MEAS - LVIDD: 5.5 CM
BH CV ECHO MEAS - LVIDS: 3.3 CM
BH CV ECHO MEAS - LVLD AP2: 7.6 CM
BH CV ECHO MEAS - LVLD AP4: 8.5 CM
BH CV ECHO MEAS - LVLS AP2: 6.3 CM
BH CV ECHO MEAS - LVLS AP4: 7 CM
BH CV ECHO MEAS - LVOT AREA (M): 5.3 CM^2
BH CV ECHO MEAS - LVOT AREA: 5.3 CM^2
BH CV ECHO MEAS - LVOT DIAM: 2.6 CM
BH CV ECHO MEAS - LVPWD: 1.2 CM
BH CV ECHO MEAS - MV A MAX VEL: 98.1 CM/SEC
BH CV ECHO MEAS - MV DEC SLOPE: 427 CM/SEC^2
BH CV ECHO MEAS - MV E MAX VEL: 111 CM/SEC
BH CV ECHO MEAS - MV E/A: 1.1
BH CV ECHO MEAS - MV P1/2T MAX VEL: 135 CM/SEC
BH CV ECHO MEAS - MV P1/2T: 92.6 MSEC
BH CV ECHO MEAS - MVA P1/2T LCG: 1.6 CM^2
BH CV ECHO MEAS - MVA(P1/2T): 2.4 CM^2
BH CV ECHO MEAS - PA MAX PG (FULL): 3 MMHG
BH CV ECHO MEAS - PA MAX PG: 5.6 MMHG
BH CV ECHO MEAS - PA V2 MAX: 118 CM/SEC
BH CV ECHO MEAS - RAP SYSTOLE: 5 MMHG
BH CV ECHO MEAS - RV MAX PG: 2.5 MMHG
BH CV ECHO MEAS - RV MEAN PG: 1 MMHG
BH CV ECHO MEAS - RV V1 MAX: 79.6 CM/SEC
BH CV ECHO MEAS - RV V1 MEAN: 48.1 CM/SEC
BH CV ECHO MEAS - RV V1 VTI: 17 CM
BH CV ECHO MEAS - RVDD: 3.1 CM
BH CV ECHO MEAS - RVSP: 19.6 MMHG
BH CV ECHO MEAS - SI(CUBED): 50.1 ML/M^2
BH CV ECHO MEAS - SI(LVOT): 57.3 ML/M^2
BH CV ECHO MEAS - SI(MOD-SP2): 17.3 ML/M^2
BH CV ECHO MEAS - SI(MOD-SP4): 29.9 ML/M^2
BH CV ECHO MEAS - SI(TEICH): 39.8 ML/M^2
BH CV ECHO MEAS - SV(CUBED): 127.7 ML
BH CV ECHO MEAS - SV(LVOT): 146 ML
BH CV ECHO MEAS - SV(MOD-SP2): 44.1 ML
BH CV ECHO MEAS - SV(MOD-SP4): 76.3 ML
BH CV ECHO MEAS - SV(TEICH): 101.4 ML
BH CV ECHO MEAS - TR MAX VEL: 191 CM/SEC
BILIRUB SERPL-MCNC: 2.2 MG/DL (ref 0–1.2)
BUN SERPL-MCNC: 31 MG/DL (ref 6–20)
BUN/CREAT SERPL: 25 (ref 7–25)
CALCIUM SPEC-SCNC: 9.7 MG/DL (ref 8.6–10.5)
CHLORIDE SERPL-SCNC: 96 MMOL/L (ref 98–107)
CO2 SERPL-SCNC: 30 MMOL/L (ref 22–29)
CREAT SERPL-MCNC: 1.24 MG/DL (ref 0.76–1.27)
DEPRECATED RDW RBC AUTO: 44.2 FL (ref 37–54)
EOSINOPHIL # BLD AUTO: 0.27 10*3/MM3 (ref 0–0.4)
EOSINOPHIL NFR BLD AUTO: 2.8 % (ref 0.3–6.2)
ERYTHROCYTE [DISTWIDTH] IN BLOOD BY AUTOMATED COUNT: 13 % (ref 12.3–15.4)
GAS FLOW AIRWAY: 4 LPM
GFR SERPL CREATININE-BSD FRML MDRD: 61 ML/MIN/1.73
GLOBULIN UR ELPH-MCNC: 2.8 GM/DL
GLUCOSE SERPL-MCNC: 101 MG/DL (ref 65–99)
HCO3 BLDA-SCNC: 31.2 MMOL/L (ref 20–26)
HCT VFR BLD AUTO: 37.4 % (ref 37.5–51)
HGB BLD-MCNC: 12.3 G/DL (ref 13–17.7)
IMM GRANULOCYTES # BLD AUTO: 0.1 10*3/MM3 (ref 0–0.05)
IMM GRANULOCYTES NFR BLD AUTO: 1 % (ref 0–0.5)
LV EF 2D ECHO EST: 56 %
LYMPHOCYTES # BLD AUTO: 1.91 10*3/MM3 (ref 0.7–3.1)
LYMPHOCYTES NFR BLD AUTO: 20 % (ref 19.6–45.3)
Lab: ABNORMAL
MAXIMAL PREDICTED HEART RATE: 166 BPM
MCH RBC QN AUTO: 30.7 PG (ref 26.6–33)
MCHC RBC AUTO-ENTMCNC: 32.9 G/DL (ref 31.5–35.7)
MCV RBC AUTO: 93.3 FL (ref 79–97)
MODALITY: ABNORMAL
MONOCYTES # BLD AUTO: 0.72 10*3/MM3 (ref 0.1–0.9)
MONOCYTES NFR BLD AUTO: 7.5 % (ref 5–12)
NEUTROPHILS NFR BLD AUTO: 6.52 10*3/MM3 (ref 1.7–7)
NEUTROPHILS NFR BLD AUTO: 68.2 % (ref 42.7–76)
NRBC BLD AUTO-RTO: 0 /100 WBC (ref 0–0.2)
PCO2 BLDA: 54.7 MM HG (ref 35–45)
PH BLDA: 7.37 PH UNITS (ref 7.35–7.45)
PLATELET # BLD AUTO: 128 10*3/MM3 (ref 140–450)
PMV BLD AUTO: 11 FL (ref 6–12)
PO2 BLDA: 74.3 MM HG (ref 83–108)
POTASSIUM SERPL-SCNC: 3.9 MMOL/L (ref 3.5–5.2)
PROT SERPL-MCNC: 7.4 G/DL (ref 6–8.5)
RBC # BLD AUTO: 4.01 10*6/MM3 (ref 4.14–5.8)
SAO2 % BLDCOA: 93.5 % (ref 94–99)
SODIUM SERPL-SCNC: 139 MMOL/L (ref 136–145)
STRESS TARGET HR: 141 BPM
VENTILATOR MODE: ABNORMAL
WBC # BLD AUTO: 9.57 10*3/MM3 (ref 3.4–10.8)

## 2021-10-28 PROCEDURE — 80053 COMPREHEN METABOLIC PANEL: CPT | Performed by: FAMILY MEDICINE

## 2021-10-28 PROCEDURE — 36600 WITHDRAWAL OF ARTERIAL BLOOD: CPT

## 2021-10-28 PROCEDURE — 82803 BLOOD GASES ANY COMBINATION: CPT

## 2021-10-28 PROCEDURE — 25010000002 AZITHROMYCIN PER 500 MG: Performed by: FAMILY MEDICINE

## 2021-10-28 PROCEDURE — 25010000002 HEPARIN (PORCINE) PER 1000 UNITS: Performed by: FAMILY MEDICINE

## 2021-10-28 PROCEDURE — 94799 UNLISTED PULMONARY SVC/PX: CPT

## 2021-10-28 PROCEDURE — P9047 ALBUMIN (HUMAN), 25%, 50ML: HCPCS | Performed by: FAMILY MEDICINE

## 2021-10-28 PROCEDURE — 93306 TTE W/DOPPLER COMPLETE: CPT | Performed by: INTERNAL MEDICINE

## 2021-10-28 PROCEDURE — 93306 TTE W/DOPPLER COMPLETE: CPT

## 2021-10-28 PROCEDURE — 63710000001 ONDANSETRON PER 8 MG: Performed by: FAMILY MEDICINE

## 2021-10-28 PROCEDURE — 25010000002 HYDRALAZINE PER 20 MG: Performed by: FAMILY MEDICINE

## 2021-10-28 PROCEDURE — 25010000002 FUROSEMIDE PER 20 MG: Performed by: FAMILY MEDICINE

## 2021-10-28 PROCEDURE — 25010000002 ALBUMIN HUMAN 25% PER 50 ML: Performed by: FAMILY MEDICINE

## 2021-10-28 PROCEDURE — 85025 COMPLETE CBC W/AUTO DIFF WBC: CPT | Performed by: FAMILY MEDICINE

## 2021-10-28 PROCEDURE — 25010000002 CEFTRIAXONE PER 250 MG: Performed by: FAMILY MEDICINE

## 2021-10-28 RX ORDER — HYDRALAZINE HYDROCHLORIDE 20 MG/ML
20 INJECTION INTRAMUSCULAR; INTRAVENOUS EVERY 6 HOURS PRN
Status: DISCONTINUED | OUTPATIENT
Start: 2021-10-28 | End: 2021-11-06 | Stop reason: HOSPADM

## 2021-10-28 RX ORDER — LISINOPRIL 20 MG/1
20 TABLET ORAL DAILY
Status: DISCONTINUED | OUTPATIENT
Start: 2021-10-29 | End: 2021-11-06 | Stop reason: HOSPADM

## 2021-10-28 RX ADMIN — HYDRALAZINE HYDROCHLORIDE 20 MG: 20 INJECTION INTRAMUSCULAR; INTRAVENOUS at 12:35

## 2021-10-28 RX ADMIN — HEPARIN SODIUM 5000 UNITS: 5000 INJECTION INTRAVENOUS; SUBCUTANEOUS at 14:29

## 2021-10-28 RX ADMIN — AZITHROMYCIN MONOHYDRATE 500 MG: 500 INJECTION, POWDER, LYOPHILIZED, FOR SOLUTION INTRAVENOUS at 12:12

## 2021-10-28 RX ADMIN — HYDRALAZINE HYDROCHLORIDE 20 MG: 20 INJECTION INTRAMUSCULAR; INTRAVENOUS at 21:11

## 2021-10-28 RX ADMIN — OXYCODONE HYDROCHLORIDE AND ACETAMINOPHEN 1 TABLET: 7.5; 325 TABLET ORAL at 21:12

## 2021-10-28 RX ADMIN — DICYCLOMINE HYDROCHLORIDE 10 MG: 10 CAPSULE ORAL at 06:40

## 2021-10-28 RX ADMIN — OXYCODONE HYDROCHLORIDE AND ACETAMINOPHEN 1 TABLET: 7.5; 325 TABLET ORAL at 14:06

## 2021-10-28 RX ADMIN — HEPARIN SODIUM 5000 UNITS: 5000 INJECTION INTRAVENOUS; SUBCUTANEOUS at 05:00

## 2021-10-28 RX ADMIN — CEFTRIAXONE SODIUM 1 G: 1 INJECTION, POWDER, FOR SOLUTION INTRAMUSCULAR; INTRAVENOUS at 11:39

## 2021-10-28 RX ADMIN — LISINOPRIL 10 MG: 10 TABLET ORAL at 08:03

## 2021-10-28 RX ADMIN — ONDANSETRON HYDROCHLORIDE 4 MG: 4 TABLET, FILM COATED ORAL at 16:26

## 2021-10-28 RX ADMIN — HEPARIN SODIUM 5000 UNITS: 5000 INJECTION INTRAVENOUS; SUBCUTANEOUS at 21:11

## 2021-10-28 RX ADMIN — SODIUM CHLORIDE, PRESERVATIVE FREE 10 ML: 5 INJECTION INTRAVENOUS at 08:05

## 2021-10-28 RX ADMIN — ALBUMIN HUMAN 25 G: 0.25 SOLUTION INTRAVENOUS at 08:00

## 2021-10-28 RX ADMIN — FUROSEMIDE 40 MG: 10 INJECTION INTRAMUSCULAR; INTRAVENOUS at 16:26

## 2021-10-28 RX ADMIN — SERTRALINE 100 MG: 50 TABLET, FILM COATED ORAL at 21:12

## 2021-10-28 RX ADMIN — DICYCLOMINE HYDROCHLORIDE 10 MG: 10 CAPSULE ORAL at 16:31

## 2021-10-28 RX ADMIN — DICYCLOMINE HYDROCHLORIDE 10 MG: 10 CAPSULE ORAL at 11:23

## 2021-10-28 RX ADMIN — FUROSEMIDE 40 MG: 10 INJECTION INTRAMUSCULAR; INTRAVENOUS at 04:58

## 2021-10-28 RX ADMIN — ALPRAZOLAM 1 MG: 1 TABLET ORAL at 08:03

## 2021-10-28 RX ADMIN — GABAPENTIN 300 MG: 300 CAPSULE ORAL at 21:12

## 2021-10-28 RX ADMIN — ALPRAZOLAM 1 MG: 1 TABLET ORAL at 21:12

## 2021-10-28 RX ADMIN — SODIUM CHLORIDE, PRESERVATIVE FREE 10 ML: 5 INJECTION INTRAVENOUS at 21:12

## 2021-10-28 RX ADMIN — METOPROLOL SUCCINATE 50 MG: 50 TABLET, EXTENDED RELEASE ORAL at 08:03

## 2021-10-28 RX ADMIN — ACETAMINOPHEN 650 MG: 325 TABLET, FILM COATED ORAL at 08:08

## 2021-10-28 RX ADMIN — PANTOPRAZOLE SODIUM 40 MG: 40 TABLET, DELAYED RELEASE ORAL at 06:40

## 2021-10-28 RX ADMIN — DICYCLOMINE HYDROCHLORIDE 10 MG: 10 CAPSULE ORAL at 21:12

## 2021-10-28 RX ADMIN — TIZANIDINE 4 MG: 4 TABLET ORAL at 21:12

## 2021-10-28 RX ADMIN — GABAPENTIN 300 MG: 300 CAPSULE ORAL at 08:08

## 2021-10-28 RX ADMIN — SERTRALINE 100 MG: 50 TABLET, FILM COATED ORAL at 11:23

## 2021-10-29 PROBLEM — R13.19 ESOPHAGEAL DYSPHAGIA: Status: ACTIVE | Noted: 2021-10-27

## 2021-10-29 LAB
ALBUMIN SERPL-MCNC: 4.3 G/DL (ref 3.5–5.2)
ALBUMIN/GLOB SERPL: 1.4 G/DL
ALP SERPL-CCNC: 63 U/L (ref 39–117)
ALT SERPL W P-5'-P-CCNC: 20 U/L (ref 1–41)
ANION GAP SERPL CALCULATED.3IONS-SCNC: 10 MMOL/L (ref 5–15)
AST SERPL-CCNC: 28 U/L (ref 1–40)
BASOPHILS # BLD AUTO: 0.04 10*3/MM3 (ref 0–0.2)
BASOPHILS NFR BLD AUTO: 0.5 % (ref 0–1.5)
BILIRUB SERPL-MCNC: 1.1 MG/DL (ref 0–1.2)
BUN SERPL-MCNC: 31 MG/DL (ref 6–20)
BUN/CREAT SERPL: 22 (ref 7–25)
CALCIUM SPEC-SCNC: 9.7 MG/DL (ref 8.6–10.5)
CHLORIDE SERPL-SCNC: 98 MMOL/L (ref 98–107)
CO2 SERPL-SCNC: 32 MMOL/L (ref 22–29)
CREAT SERPL-MCNC: 1.41 MG/DL (ref 0.76–1.27)
DEPRECATED RDW RBC AUTO: 42.6 FL (ref 37–54)
EOSINOPHIL # BLD AUTO: 0.3 10*3/MM3 (ref 0–0.4)
EOSINOPHIL NFR BLD AUTO: 3.8 % (ref 0.3–6.2)
ERYTHROCYTE [DISTWIDTH] IN BLOOD BY AUTOMATED COUNT: 13 % (ref 12.3–15.4)
GFR SERPL CREATININE-BSD FRML MDRD: 52 ML/MIN/1.73
GLOBULIN UR ELPH-MCNC: 3 GM/DL
GLUCOSE SERPL-MCNC: 118 MG/DL (ref 65–99)
HCT VFR BLD AUTO: 38.1 % (ref 37.5–51)
HGB BLD-MCNC: 12.8 G/DL (ref 13–17.7)
IMM GRANULOCYTES # BLD AUTO: 0.08 10*3/MM3 (ref 0–0.05)
IMM GRANULOCYTES NFR BLD AUTO: 1 % (ref 0–0.5)
LYMPHOCYTES # BLD AUTO: 1.54 10*3/MM3 (ref 0.7–3.1)
LYMPHOCYTES NFR BLD AUTO: 19.3 % (ref 19.6–45.3)
MCH RBC QN AUTO: 30.3 PG (ref 26.6–33)
MCHC RBC AUTO-ENTMCNC: 33.6 G/DL (ref 31.5–35.7)
MCV RBC AUTO: 90.3 FL (ref 79–97)
MONOCYTES # BLD AUTO: 0.9 10*3/MM3 (ref 0.1–0.9)
MONOCYTES NFR BLD AUTO: 11.3 % (ref 5–12)
NEUTROPHILS NFR BLD AUTO: 5.14 10*3/MM3 (ref 1.7–7)
NEUTROPHILS NFR BLD AUTO: 64.1 % (ref 42.7–76)
NRBC BLD AUTO-RTO: 0 /100 WBC (ref 0–0.2)
PLATELET # BLD AUTO: 179 10*3/MM3 (ref 140–450)
PMV BLD AUTO: 10.1 FL (ref 6–12)
POTASSIUM SERPL-SCNC: 3.8 MMOL/L (ref 3.5–5.2)
PROT SERPL-MCNC: 7.3 G/DL (ref 6–8.5)
RBC # BLD AUTO: 4.22 10*6/MM3 (ref 4.14–5.8)
SODIUM SERPL-SCNC: 140 MMOL/L (ref 136–145)
WBC # BLD AUTO: 8 10*3/MM3 (ref 3.4–10.8)

## 2021-10-29 PROCEDURE — 25010000002 FUROSEMIDE PER 20 MG: Performed by: FAMILY MEDICINE

## 2021-10-29 PROCEDURE — 94799 UNLISTED PULMONARY SVC/PX: CPT

## 2021-10-29 PROCEDURE — 25010000002 CEFTRIAXONE PER 250 MG: Performed by: FAMILY MEDICINE

## 2021-10-29 PROCEDURE — 63710000001 ONDANSETRON PER 8 MG: Performed by: FAMILY MEDICINE

## 2021-10-29 PROCEDURE — 25010000002 METHYLPREDNISOLONE PER 40 MG: Performed by: FAMILY MEDICINE

## 2021-10-29 PROCEDURE — 94760 N-INVAS EAR/PLS OXIMETRY 1: CPT

## 2021-10-29 PROCEDURE — 25010000002 AZITHROMYCIN PER 500 MG: Performed by: FAMILY MEDICINE

## 2021-10-29 PROCEDURE — 94660 CPAP INITIATION&MGMT: CPT

## 2021-10-29 PROCEDURE — 99222 1ST HOSP IP/OBS MODERATE 55: CPT | Performed by: INTERNAL MEDICINE

## 2021-10-29 PROCEDURE — 85025 COMPLETE CBC W/AUTO DIFF WBC: CPT | Performed by: FAMILY MEDICINE

## 2021-10-29 PROCEDURE — 25010000002 HEPARIN (PORCINE) PER 1000 UNITS: Performed by: FAMILY MEDICINE

## 2021-10-29 PROCEDURE — 80053 COMPREHEN METABOLIC PANEL: CPT | Performed by: FAMILY MEDICINE

## 2021-10-29 RX ORDER — METHYLPREDNISOLONE SODIUM SUCCINATE 40 MG/ML
40 INJECTION, POWDER, LYOPHILIZED, FOR SOLUTION INTRAMUSCULAR; INTRAVENOUS EVERY 12 HOURS
Status: DISCONTINUED | OUTPATIENT
Start: 2021-10-29 | End: 2021-10-29

## 2021-10-29 RX ORDER — DEXTROSE AND SODIUM CHLORIDE 5; .45 G/100ML; G/100ML
30 INJECTION, SOLUTION INTRAVENOUS CONTINUOUS PRN
Status: CANCELLED | OUTPATIENT
Start: 2021-11-01

## 2021-10-29 RX ORDER — METHYLPREDNISOLONE SODIUM SUCCINATE 40 MG/ML
40 INJECTION, POWDER, LYOPHILIZED, FOR SOLUTION INTRAMUSCULAR; INTRAVENOUS EVERY 12 HOURS
Status: DISCONTINUED | OUTPATIENT
Start: 2021-10-29 | End: 2021-11-03

## 2021-10-29 RX ORDER — FUROSEMIDE 10 MG/ML
40 INJECTION INTRAMUSCULAR; INTRAVENOUS DAILY
Status: DISCONTINUED | OUTPATIENT
Start: 2021-10-30 | End: 2021-11-06 | Stop reason: HOSPADM

## 2021-10-29 RX ADMIN — FUROSEMIDE 40 MG: 10 INJECTION INTRAMUSCULAR; INTRAVENOUS at 05:17

## 2021-10-29 RX ADMIN — GABAPENTIN 300 MG: 300 CAPSULE ORAL at 22:52

## 2021-10-29 RX ADMIN — DICYCLOMINE HYDROCHLORIDE 10 MG: 10 CAPSULE ORAL at 18:15

## 2021-10-29 RX ADMIN — METHYLPREDNISOLONE SODIUM SUCCINATE 40 MG: 40 INJECTION, POWDER, FOR SOLUTION INTRAMUSCULAR; INTRAVENOUS at 11:18

## 2021-10-29 RX ADMIN — DICYCLOMINE HYDROCHLORIDE 10 MG: 10 CAPSULE ORAL at 11:19

## 2021-10-29 RX ADMIN — AZITHROMYCIN MONOHYDRATE 500 MG: 500 INJECTION, POWDER, LYOPHILIZED, FOR SOLUTION INTRAVENOUS at 12:45

## 2021-10-29 RX ADMIN — OXYCODONE HYDROCHLORIDE AND ACETAMINOPHEN 1 TABLET: 7.5; 325 TABLET ORAL at 23:07

## 2021-10-29 RX ADMIN — ALPRAZOLAM 1 MG: 1 TABLET ORAL at 21:46

## 2021-10-29 RX ADMIN — METHYLPREDNISOLONE SODIUM SUCCINATE 40 MG: 40 INJECTION, POWDER, FOR SOLUTION INTRAMUSCULAR; INTRAVENOUS at 22:53

## 2021-10-29 RX ADMIN — METOPROLOL SUCCINATE 50 MG: 50 TABLET, EXTENDED RELEASE ORAL at 08:09

## 2021-10-29 RX ADMIN — ONDANSETRON HYDROCHLORIDE 4 MG: 4 TABLET, FILM COATED ORAL at 21:46

## 2021-10-29 RX ADMIN — CEFTRIAXONE SODIUM 1 G: 1 INJECTION, POWDER, FOR SOLUTION INTRAMUSCULAR; INTRAVENOUS at 14:31

## 2021-10-29 RX ADMIN — DICYCLOMINE HYDROCHLORIDE 10 MG: 10 CAPSULE ORAL at 23:08

## 2021-10-29 RX ADMIN — HEPARIN SODIUM 5000 UNITS: 5000 INJECTION INTRAVENOUS; SUBCUTANEOUS at 05:17

## 2021-10-29 RX ADMIN — SODIUM CHLORIDE, PRESERVATIVE FREE 10 ML: 5 INJECTION INTRAVENOUS at 08:09

## 2021-10-29 RX ADMIN — SODIUM CHLORIDE, PRESERVATIVE FREE 10 ML: 5 INJECTION INTRAVENOUS at 22:54

## 2021-10-29 RX ADMIN — HEPARIN SODIUM 5000 UNITS: 5000 INJECTION INTRAVENOUS; SUBCUTANEOUS at 22:53

## 2021-10-29 RX ADMIN — PANTOPRAZOLE SODIUM 40 MG: 40 TABLET, DELAYED RELEASE ORAL at 06:54

## 2021-10-29 RX ADMIN — HEPARIN SODIUM 5000 UNITS: 5000 INJECTION INTRAVENOUS; SUBCUTANEOUS at 14:31

## 2021-10-29 RX ADMIN — SERTRALINE 100 MG: 50 TABLET, FILM COATED ORAL at 22:53

## 2021-10-29 RX ADMIN — LISINOPRIL 20 MG: 20 TABLET ORAL at 08:09

## 2021-10-29 RX ADMIN — DICYCLOMINE HYDROCHLORIDE 10 MG: 10 CAPSULE ORAL at 06:54

## 2021-10-29 RX ADMIN — SERTRALINE 100 MG: 50 TABLET, FILM COATED ORAL at 08:11

## 2021-10-29 RX ADMIN — GABAPENTIN 300 MG: 300 CAPSULE ORAL at 08:09

## 2021-10-30 PROCEDURE — 94799 UNLISTED PULMONARY SVC/PX: CPT

## 2021-10-30 PROCEDURE — 25010000002 HEPARIN (PORCINE) PER 1000 UNITS: Performed by: FAMILY MEDICINE

## 2021-10-30 PROCEDURE — 25010000002 METHYLPREDNISOLONE PER 40 MG: Performed by: FAMILY MEDICINE

## 2021-10-30 PROCEDURE — 25010000002 AZITHROMYCIN PER 500 MG: Performed by: FAMILY MEDICINE

## 2021-10-30 PROCEDURE — 25010000002 FUROSEMIDE PER 20 MG: Performed by: FAMILY MEDICINE

## 2021-10-30 PROCEDURE — 94640 AIRWAY INHALATION TREATMENT: CPT

## 2021-10-30 PROCEDURE — 25010000002 CEFTRIAXONE PER 250 MG: Performed by: FAMILY MEDICINE

## 2021-10-30 PROCEDURE — 94760 N-INVAS EAR/PLS OXIMETRY 1: CPT

## 2021-10-30 PROCEDURE — 94660 CPAP INITIATION&MGMT: CPT

## 2021-10-30 RX ORDER — IPRATROPIUM BROMIDE AND ALBUTEROL SULFATE 2.5; .5 MG/3ML; MG/3ML
3 SOLUTION RESPIRATORY (INHALATION)
Status: DISCONTINUED | OUTPATIENT
Start: 2021-10-30 | End: 2021-10-31

## 2021-10-30 RX ORDER — GUAIFENESIN 600 MG/1
1200 TABLET, EXTENDED RELEASE ORAL EVERY 12 HOURS SCHEDULED
Status: DISCONTINUED | OUTPATIENT
Start: 2021-10-30 | End: 2021-11-06 | Stop reason: HOSPADM

## 2021-10-30 RX ADMIN — DICYCLOMINE HYDROCHLORIDE 10 MG: 10 CAPSULE ORAL at 17:25

## 2021-10-30 RX ADMIN — GUAIFENESIN 1200 MG: 600 TABLET, EXTENDED RELEASE ORAL at 11:47

## 2021-10-30 RX ADMIN — SODIUM CHLORIDE, PRESERVATIVE FREE 10 ML: 5 INJECTION INTRAVENOUS at 08:36

## 2021-10-30 RX ADMIN — OXYCODONE HYDROCHLORIDE AND ACETAMINOPHEN 1 TABLET: 7.5; 325 TABLET ORAL at 20:46

## 2021-10-30 RX ADMIN — SERTRALINE 100 MG: 50 TABLET, FILM COATED ORAL at 08:36

## 2021-10-30 RX ADMIN — HEPARIN SODIUM 5000 UNITS: 5000 INJECTION INTRAVENOUS; SUBCUTANEOUS at 06:21

## 2021-10-30 RX ADMIN — PANTOPRAZOLE SODIUM 40 MG: 40 TABLET, DELAYED RELEASE ORAL at 06:21

## 2021-10-30 RX ADMIN — AZITHROMYCIN MONOHYDRATE 500 MG: 500 INJECTION, POWDER, LYOPHILIZED, FOR SOLUTION INTRAVENOUS at 11:48

## 2021-10-30 RX ADMIN — GABAPENTIN 300 MG: 300 CAPSULE ORAL at 08:36

## 2021-10-30 RX ADMIN — GUAIFENESIN 1200 MG: 600 TABLET, EXTENDED RELEASE ORAL at 20:47

## 2021-10-30 RX ADMIN — SODIUM CHLORIDE, PRESERVATIVE FREE 10 ML: 5 INJECTION INTRAVENOUS at 20:47

## 2021-10-30 RX ADMIN — METHYLPREDNISOLONE SODIUM SUCCINATE 40 MG: 40 INJECTION, POWDER, FOR SOLUTION INTRAMUSCULAR; INTRAVENOUS at 21:46

## 2021-10-30 RX ADMIN — GABAPENTIN 300 MG: 300 CAPSULE ORAL at 20:47

## 2021-10-30 RX ADMIN — ALPRAZOLAM 1 MG: 1 TABLET ORAL at 08:41

## 2021-10-30 RX ADMIN — HEPARIN SODIUM 5000 UNITS: 5000 INJECTION INTRAVENOUS; SUBCUTANEOUS at 21:47

## 2021-10-30 RX ADMIN — DICYCLOMINE HYDROCHLORIDE 10 MG: 10 CAPSULE ORAL at 20:47

## 2021-10-30 RX ADMIN — CEFTRIAXONE SODIUM 1 G: 1 INJECTION, POWDER, FOR SOLUTION INTRAMUSCULAR; INTRAVENOUS at 11:08

## 2021-10-30 RX ADMIN — SERTRALINE 100 MG: 50 TABLET, FILM COATED ORAL at 20:46

## 2021-10-30 RX ADMIN — METHYLPREDNISOLONE SODIUM SUCCINATE 40 MG: 40 INJECTION, POWDER, FOR SOLUTION INTRAMUSCULAR; INTRAVENOUS at 10:39

## 2021-10-30 RX ADMIN — HEPARIN SODIUM 5000 UNITS: 5000 INJECTION INTRAVENOUS; SUBCUTANEOUS at 14:27

## 2021-10-30 RX ADMIN — LISINOPRIL 20 MG: 20 TABLET ORAL at 08:36

## 2021-10-30 RX ADMIN — IPRATROPIUM BROMIDE AND ALBUTEROL SULFATE 3 ML: 2.5; .5 SOLUTION RESPIRATORY (INHALATION) at 16:36

## 2021-10-30 RX ADMIN — DICYCLOMINE HYDROCHLORIDE 10 MG: 10 CAPSULE ORAL at 08:36

## 2021-10-30 RX ADMIN — DICYCLOMINE HYDROCHLORIDE 10 MG: 10 CAPSULE ORAL at 10:39

## 2021-10-30 RX ADMIN — ALPRAZOLAM 1 MG: 1 TABLET ORAL at 17:26

## 2021-10-30 RX ADMIN — FUROSEMIDE 40 MG: 10 INJECTION INTRAMUSCULAR; INTRAVENOUS at 08:36

## 2021-10-30 RX ADMIN — IPRATROPIUM BROMIDE AND ALBUTEROL SULFATE 3 ML: 2.5; .5 SOLUTION RESPIRATORY (INHALATION) at 12:59

## 2021-10-30 RX ADMIN — IPRATROPIUM BROMIDE AND ALBUTEROL SULFATE 3 ML: 2.5; .5 SOLUTION RESPIRATORY (INHALATION) at 19:37

## 2021-10-30 RX ADMIN — METOPROLOL SUCCINATE 50 MG: 50 TABLET, EXTENDED RELEASE ORAL at 08:36

## 2021-10-31 LAB
ALBUMIN SERPL-MCNC: 4.3 G/DL (ref 3.5–5.2)
ALBUMIN/GLOB SERPL: 1.5 G/DL
ALP SERPL-CCNC: 66 U/L (ref 39–117)
ALT SERPL W P-5'-P-CCNC: 24 U/L (ref 1–41)
ANION GAP SERPL CALCULATED.3IONS-SCNC: 10 MMOL/L (ref 5–15)
AST SERPL-CCNC: 28 U/L (ref 1–40)
BASOPHILS # BLD AUTO: 0.04 10*3/MM3 (ref 0–0.2)
BASOPHILS NFR BLD AUTO: 0.3 % (ref 0–1.5)
BILIRUB SERPL-MCNC: 0.6 MG/DL (ref 0–1.2)
BUN SERPL-MCNC: 32 MG/DL (ref 6–20)
BUN/CREAT SERPL: 39.5 (ref 7–25)
CALCIUM SPEC-SCNC: 9.8 MG/DL (ref 8.6–10.5)
CHLORIDE SERPL-SCNC: 97 MMOL/L (ref 98–107)
CO2 SERPL-SCNC: 29 MMOL/L (ref 22–29)
CREAT SERPL-MCNC: 0.81 MG/DL (ref 0.76–1.27)
DEPRECATED RDW RBC AUTO: 42.9 FL (ref 37–54)
EOSINOPHIL # BLD AUTO: 0.01 10*3/MM3 (ref 0–0.4)
EOSINOPHIL NFR BLD AUTO: 0.1 % (ref 0.3–6.2)
ERYTHROCYTE [DISTWIDTH] IN BLOOD BY AUTOMATED COUNT: 13.3 % (ref 12.3–15.4)
GFR SERPL CREATININE-BSD FRML MDRD: 99 ML/MIN/1.73
GLOBULIN UR ELPH-MCNC: 2.9 GM/DL
GLUCOSE SERPL-MCNC: 145 MG/DL (ref 65–99)
HCT VFR BLD AUTO: 41.1 % (ref 37.5–51)
HGB BLD-MCNC: 13.7 G/DL (ref 13–17.7)
IMM GRANULOCYTES # BLD AUTO: 0.25 10*3/MM3 (ref 0–0.05)
IMM GRANULOCYTES NFR BLD AUTO: 1.6 % (ref 0–0.5)
LYMPHOCYTES # BLD AUTO: 1.38 10*3/MM3 (ref 0.7–3.1)
LYMPHOCYTES NFR BLD AUTO: 8.8 % (ref 19.6–45.3)
MCH RBC QN AUTO: 30 PG (ref 26.6–33)
MCHC RBC AUTO-ENTMCNC: 33.3 G/DL (ref 31.5–35.7)
MCV RBC AUTO: 89.9 FL (ref 79–97)
MONOCYTES # BLD AUTO: 0.83 10*3/MM3 (ref 0.1–0.9)
MONOCYTES NFR BLD AUTO: 5.3 % (ref 5–12)
NEUTROPHILS NFR BLD AUTO: 13.2 10*3/MM3 (ref 1.7–7)
NEUTROPHILS NFR BLD AUTO: 83.9 % (ref 42.7–76)
NRBC BLD AUTO-RTO: 0 /100 WBC (ref 0–0.2)
PLATELET # BLD AUTO: 242 10*3/MM3 (ref 140–450)
PMV BLD AUTO: 9.5 FL (ref 6–12)
POTASSIUM SERPL-SCNC: 4.3 MMOL/L (ref 3.5–5.2)
PROCALCITONIN SERPL-MCNC: 0.54 NG/ML (ref 0–0.25)
PROT SERPL-MCNC: 7.2 G/DL (ref 6–8.5)
RBC # BLD AUTO: 4.57 10*6/MM3 (ref 4.14–5.8)
SODIUM SERPL-SCNC: 136 MMOL/L (ref 136–145)
WBC # BLD AUTO: 15.71 10*3/MM3 (ref 3.4–10.8)

## 2021-10-31 PROCEDURE — 94660 CPAP INITIATION&MGMT: CPT

## 2021-10-31 PROCEDURE — 94799 UNLISTED PULMONARY SVC/PX: CPT

## 2021-10-31 PROCEDURE — 25010000002 HEPARIN (PORCINE) PER 1000 UNITS: Performed by: FAMILY MEDICINE

## 2021-10-31 PROCEDURE — 94664 DEMO&/EVAL PT USE INHALER: CPT

## 2021-10-31 PROCEDURE — 80053 COMPREHEN METABOLIC PANEL: CPT | Performed by: FAMILY MEDICINE

## 2021-10-31 PROCEDURE — 85025 COMPLETE CBC W/AUTO DIFF WBC: CPT | Performed by: FAMILY MEDICINE

## 2021-10-31 PROCEDURE — 25010000002 CEFTRIAXONE PER 250 MG: Performed by: FAMILY MEDICINE

## 2021-10-31 PROCEDURE — 25010000002 AZITHROMYCIN PER 500 MG: Performed by: FAMILY MEDICINE

## 2021-10-31 PROCEDURE — 84145 PROCALCITONIN (PCT): CPT | Performed by: FAMILY MEDICINE

## 2021-10-31 PROCEDURE — 25010000002 FUROSEMIDE PER 20 MG: Performed by: FAMILY MEDICINE

## 2021-10-31 PROCEDURE — 94760 N-INVAS EAR/PLS OXIMETRY 1: CPT

## 2021-10-31 PROCEDURE — 25010000002 METHYLPREDNISOLONE PER 40 MG: Performed by: FAMILY MEDICINE

## 2021-10-31 RX ADMIN — HEPARIN SODIUM 5000 UNITS: 5000 INJECTION INTRAVENOUS; SUBCUTANEOUS at 13:54

## 2021-10-31 RX ADMIN — GUAIFENESIN 1200 MG: 600 TABLET, EXTENDED RELEASE ORAL at 20:34

## 2021-10-31 RX ADMIN — METHYLPREDNISOLONE SODIUM SUCCINATE 40 MG: 40 INJECTION, POWDER, FOR SOLUTION INTRAMUSCULAR; INTRAVENOUS at 09:11

## 2021-10-31 RX ADMIN — METHYLPREDNISOLONE SODIUM SUCCINATE 40 MG: 40 INJECTION, POWDER, FOR SOLUTION INTRAMUSCULAR; INTRAVENOUS at 22:31

## 2021-10-31 RX ADMIN — HEPARIN SODIUM 5000 UNITS: 5000 INJECTION INTRAVENOUS; SUBCUTANEOUS at 22:32

## 2021-10-31 RX ADMIN — IPRATROPIUM BROMIDE 0.5 MG: 0.5 SOLUTION RESPIRATORY (INHALATION) at 19:56

## 2021-10-31 RX ADMIN — DICYCLOMINE HYDROCHLORIDE 10 MG: 10 CAPSULE ORAL at 11:42

## 2021-10-31 RX ADMIN — SERTRALINE 100 MG: 50 TABLET, FILM COATED ORAL at 20:35

## 2021-10-31 RX ADMIN — ALPRAZOLAM 1 MG: 1 TABLET ORAL at 17:15

## 2021-10-31 RX ADMIN — HEPARIN SODIUM 5000 UNITS: 5000 INJECTION INTRAVENOUS; SUBCUTANEOUS at 06:07

## 2021-10-31 RX ADMIN — IPRATROPIUM BROMIDE AND ALBUTEROL SULFATE 3 ML: 2.5; .5 SOLUTION RESPIRATORY (INHALATION) at 11:33

## 2021-10-31 RX ADMIN — METOPROLOL SUCCINATE 50 MG: 50 TABLET, EXTENDED RELEASE ORAL at 09:10

## 2021-10-31 RX ADMIN — DICYCLOMINE HYDROCHLORIDE 10 MG: 10 CAPSULE ORAL at 17:13

## 2021-10-31 RX ADMIN — SERTRALINE 100 MG: 50 TABLET, FILM COATED ORAL at 09:10

## 2021-10-31 RX ADMIN — DICYCLOMINE HYDROCHLORIDE 10 MG: 10 CAPSULE ORAL at 22:31

## 2021-10-31 RX ADMIN — AZITHROMYCIN MONOHYDRATE 500 MG: 500 INJECTION, POWDER, LYOPHILIZED, FOR SOLUTION INTRAVENOUS at 11:42

## 2021-10-31 RX ADMIN — IPRATROPIUM BROMIDE 0.5 MG: 0.5 SOLUTION RESPIRATORY (INHALATION) at 15:54

## 2021-10-31 RX ADMIN — CEFTRIAXONE SODIUM 1 G: 1 INJECTION, POWDER, FOR SOLUTION INTRAMUSCULAR; INTRAVENOUS at 11:00

## 2021-10-31 RX ADMIN — SODIUM CHLORIDE, PRESERVATIVE FREE 10 ML: 5 INJECTION INTRAVENOUS at 20:35

## 2021-10-31 RX ADMIN — PANTOPRAZOLE SODIUM 40 MG: 40 TABLET, DELAYED RELEASE ORAL at 06:07

## 2021-10-31 RX ADMIN — ALPRAZOLAM 1 MG: 1 TABLET ORAL at 09:15

## 2021-10-31 RX ADMIN — LISINOPRIL 20 MG: 20 TABLET ORAL at 09:10

## 2021-10-31 RX ADMIN — SODIUM CHLORIDE, PRESERVATIVE FREE 10 ML: 5 INJECTION INTRAVENOUS at 09:10

## 2021-10-31 RX ADMIN — FUROSEMIDE 40 MG: 10 INJECTION INTRAMUSCULAR; INTRAVENOUS at 09:11

## 2021-10-31 RX ADMIN — GABAPENTIN 300 MG: 300 CAPSULE ORAL at 20:34

## 2021-10-31 RX ADMIN — DICYCLOMINE HYDROCHLORIDE 10 MG: 10 CAPSULE ORAL at 09:10

## 2021-10-31 RX ADMIN — GUAIFENESIN 1200 MG: 600 TABLET, EXTENDED RELEASE ORAL at 09:10

## 2021-10-31 RX ADMIN — GABAPENTIN 300 MG: 300 CAPSULE ORAL at 09:10

## 2021-10-31 RX ADMIN — OXYCODONE HYDROCHLORIDE AND ACETAMINOPHEN 1 TABLET: 7.5; 325 TABLET ORAL at 19:12

## 2021-10-31 RX ADMIN — IPRATROPIUM BROMIDE AND ALBUTEROL SULFATE 3 ML: 2.5; .5 SOLUTION RESPIRATORY (INHALATION) at 07:49

## 2021-11-01 ENCOUNTER — ANESTHESIA EVENT (OUTPATIENT)
Dept: GASTROENTEROLOGY | Facility: HOSPITAL | Age: 56
End: 2021-11-01

## 2021-11-01 ENCOUNTER — ANESTHESIA (OUTPATIENT)
Dept: GASTROENTEROLOGY | Facility: HOSPITAL | Age: 56
End: 2021-11-01

## 2021-11-01 LAB
ALBUMIN SERPL-MCNC: 4.2 G/DL (ref 3.5–5.2)
ALBUMIN/GLOB SERPL: 1.4 G/DL
ALP SERPL-CCNC: 68 U/L (ref 39–117)
ALT SERPL W P-5'-P-CCNC: 48 U/L (ref 1–41)
ANION GAP SERPL CALCULATED.3IONS-SCNC: 12 MMOL/L (ref 5–15)
AST SERPL-CCNC: 49 U/L (ref 1–40)
BACTERIA SPEC AEROBE CULT: NORMAL
BACTERIA SPEC AEROBE CULT: NORMAL
BASOPHILS # BLD AUTO: 0.06 10*3/MM3 (ref 0–0.2)
BASOPHILS NFR BLD AUTO: 0.5 % (ref 0–1.5)
BILIRUB SERPL-MCNC: 0.7 MG/DL (ref 0–1.2)
BUN SERPL-MCNC: 31 MG/DL (ref 6–20)
BUN/CREAT SERPL: 31.3 (ref 7–25)
CALCIUM SPEC-SCNC: 9.6 MG/DL (ref 8.6–10.5)
CHLORIDE SERPL-SCNC: 99 MMOL/L (ref 98–107)
CO2 SERPL-SCNC: 26 MMOL/L (ref 22–29)
CREAT SERPL-MCNC: 0.99 MG/DL (ref 0.76–1.27)
DEPRECATED RDW RBC AUTO: 42.8 FL (ref 37–54)
EOSINOPHIL # BLD AUTO: 0.01 10*3/MM3 (ref 0–0.4)
EOSINOPHIL NFR BLD AUTO: 0.1 % (ref 0.3–6.2)
ERYTHROCYTE [DISTWIDTH] IN BLOOD BY AUTOMATED COUNT: 13.3 % (ref 12.3–15.4)
GFR SERPL CREATININE-BSD FRML MDRD: 79 ML/MIN/1.73
GLOBULIN UR ELPH-MCNC: 3.1 GM/DL
GLUCOSE SERPL-MCNC: 152 MG/DL (ref 65–99)
HCT VFR BLD AUTO: 41.8 % (ref 37.5–51)
HGB BLD-MCNC: 14.4 G/DL (ref 13–17.7)
IMM GRANULOCYTES # BLD AUTO: 0.31 10*3/MM3 (ref 0–0.05)
IMM GRANULOCYTES NFR BLD AUTO: 2.7 % (ref 0–0.5)
LYMPHOCYTES # BLD AUTO: 1.34 10*3/MM3 (ref 0.7–3.1)
LYMPHOCYTES NFR BLD AUTO: 11.6 % (ref 19.6–45.3)
MCH RBC QN AUTO: 30.6 PG (ref 26.6–33)
MCHC RBC AUTO-ENTMCNC: 34.4 G/DL (ref 31.5–35.7)
MCV RBC AUTO: 88.7 FL (ref 79–97)
MONOCYTES # BLD AUTO: 0.47 10*3/MM3 (ref 0.1–0.9)
MONOCYTES NFR BLD AUTO: 4.1 % (ref 5–12)
NEUTROPHILS NFR BLD AUTO: 81 % (ref 42.7–76)
NEUTROPHILS NFR BLD AUTO: 9.4 10*3/MM3 (ref 1.7–7)
NRBC BLD AUTO-RTO: 0 /100 WBC (ref 0–0.2)
PLATELET # BLD AUTO: 222 10*3/MM3 (ref 140–450)
PMV BLD AUTO: 10.4 FL (ref 6–12)
POTASSIUM SERPL-SCNC: 4.3 MMOL/L (ref 3.5–5.2)
PROT SERPL-MCNC: 7.3 G/DL (ref 6–8.5)
RBC # BLD AUTO: 4.71 10*6/MM3 (ref 4.14–5.8)
SARS-COV-2 N GENE RESP QL NAA+PROBE: NOT DETECTED
SODIUM SERPL-SCNC: 137 MMOL/L (ref 136–145)
WBC # BLD AUTO: 11.59 10*3/MM3 (ref 3.4–10.8)

## 2021-11-01 PROCEDURE — 80053 COMPREHEN METABOLIC PANEL: CPT | Performed by: FAMILY MEDICINE

## 2021-11-01 PROCEDURE — 43239 EGD BIOPSY SINGLE/MULTIPLE: CPT | Performed by: INTERNAL MEDICINE

## 2021-11-01 PROCEDURE — 94799 UNLISTED PULMONARY SVC/PX: CPT

## 2021-11-01 PROCEDURE — 25010000002 HEPARIN (PORCINE) PER 1000 UNITS: Performed by: FAMILY MEDICINE

## 2021-11-01 PROCEDURE — 85025 COMPLETE CBC W/AUTO DIFF WBC: CPT | Performed by: FAMILY MEDICINE

## 2021-11-01 PROCEDURE — 87635 SARS-COV-2 COVID-19 AMP PRB: CPT | Performed by: INTERNAL MEDICINE

## 2021-11-01 PROCEDURE — 25010000002 METHYLPREDNISOLONE PER 40 MG: Performed by: FAMILY MEDICINE

## 2021-11-01 PROCEDURE — 25010000002 METHYLPREDNISOLONE PER 40 MG: Performed by: INTERNAL MEDICINE

## 2021-11-01 PROCEDURE — 0DB48ZX EXCISION OF ESOPHAGOGASTRIC JUNCTION, VIA NATURAL OR ARTIFICIAL OPENING ENDOSCOPIC, DIAGNOSTIC: ICD-10-PCS | Performed by: INTERNAL MEDICINE

## 2021-11-01 PROCEDURE — 25010000002 HEPARIN (PORCINE) PER 1000 UNITS: Performed by: INTERNAL MEDICINE

## 2021-11-01 PROCEDURE — 94660 CPAP INITIATION&MGMT: CPT

## 2021-11-01 PROCEDURE — 88305 TISSUE EXAM BY PATHOLOGIST: CPT

## 2021-11-01 PROCEDURE — 0DB68ZX EXCISION OF STOMACH, VIA NATURAL OR ARTIFICIAL OPENING ENDOSCOPIC, DIAGNOSTIC: ICD-10-PCS | Performed by: INTERNAL MEDICINE

## 2021-11-01 PROCEDURE — 25010000002 PROPOFOL 10 MG/ML EMULSION: Performed by: NURSE ANESTHETIST, CERTIFIED REGISTERED

## 2021-11-01 PROCEDURE — 25010000002 CEFTRIAXONE PER 250 MG: Performed by: FAMILY MEDICINE

## 2021-11-01 PROCEDURE — 25010000002 HYDRALAZINE PER 20 MG: Performed by: INTERNAL MEDICINE

## 2021-11-01 PROCEDURE — 25010000002 HYDRALAZINE PER 20 MG: Performed by: FAMILY MEDICINE

## 2021-11-01 PROCEDURE — 25010000002 FUROSEMIDE PER 20 MG: Performed by: FAMILY MEDICINE

## 2021-11-01 RX ORDER — MEPERIDINE HYDROCHLORIDE 25 MG/ML
12.5 INJECTION INTRAMUSCULAR; INTRAVENOUS; SUBCUTANEOUS
Status: DISCONTINUED | OUTPATIENT
Start: 2021-11-01 | End: 2021-11-01 | Stop reason: HOSPADM

## 2021-11-01 RX ORDER — LIDOCAINE HYDROCHLORIDE 20 MG/ML
INJECTION, SOLUTION INTRAVENOUS AS NEEDED
Status: DISCONTINUED | OUTPATIENT
Start: 2021-11-01 | End: 2021-11-01 | Stop reason: SURG

## 2021-11-01 RX ORDER — PROMETHAZINE HYDROCHLORIDE 25 MG/1
25 SUPPOSITORY RECTAL ONCE AS NEEDED
Status: DISCONTINUED | OUTPATIENT
Start: 2021-11-01 | End: 2021-11-01 | Stop reason: HOSPADM

## 2021-11-01 RX ORDER — ONDANSETRON 2 MG/ML
4 INJECTION INTRAMUSCULAR; INTRAVENOUS ONCE AS NEEDED
Status: DISCONTINUED | OUTPATIENT
Start: 2021-11-01 | End: 2021-11-01 | Stop reason: HOSPADM

## 2021-11-01 RX ORDER — PROMETHAZINE HYDROCHLORIDE 25 MG/1
25 TABLET ORAL ONCE AS NEEDED
Status: DISCONTINUED | OUTPATIENT
Start: 2021-11-01 | End: 2021-11-01 | Stop reason: HOSPADM

## 2021-11-01 RX ORDER — SODIUM CHLORIDE 9 MG/ML
30 INJECTION, SOLUTION INTRAVENOUS CONTINUOUS
Status: DISCONTINUED | OUTPATIENT
Start: 2021-11-01 | End: 2021-11-01

## 2021-11-01 RX ORDER — PROPOFOL 10 MG/ML
VIAL (ML) INTRAVENOUS AS NEEDED
Status: DISCONTINUED | OUTPATIENT
Start: 2021-11-01 | End: 2021-11-01 | Stop reason: SURG

## 2021-11-01 RX ADMIN — IPRATROPIUM BROMIDE 0.5 MG: 0.5 SOLUTION RESPIRATORY (INHALATION) at 10:21

## 2021-11-01 RX ADMIN — HEPARIN SODIUM 5000 UNITS: 5000 INJECTION INTRAVENOUS; SUBCUTANEOUS at 21:53

## 2021-11-01 RX ADMIN — DICYCLOMINE HYDROCHLORIDE 10 MG: 10 CAPSULE ORAL at 12:42

## 2021-11-01 RX ADMIN — SERTRALINE 100 MG: 50 TABLET, FILM COATED ORAL at 20:21

## 2021-11-01 RX ADMIN — IPRATROPIUM BROMIDE 0.5 MG: 0.5 SOLUTION RESPIRATORY (INHALATION) at 07:04

## 2021-11-01 RX ADMIN — HYDRALAZINE HYDROCHLORIDE 20 MG: 20 INJECTION INTRAMUSCULAR; INTRAVENOUS at 20:20

## 2021-11-01 RX ADMIN — HEPARIN SODIUM 5000 UNITS: 5000 INJECTION INTRAVENOUS; SUBCUTANEOUS at 13:32

## 2021-11-01 RX ADMIN — DICYCLOMINE HYDROCHLORIDE 10 MG: 10 CAPSULE ORAL at 17:43

## 2021-11-01 RX ADMIN — HEPARIN SODIUM 5000 UNITS: 5000 INJECTION INTRAVENOUS; SUBCUTANEOUS at 06:16

## 2021-11-01 RX ADMIN — GUAIFENESIN 1200 MG: 600 TABLET, EXTENDED RELEASE ORAL at 20:21

## 2021-11-01 RX ADMIN — DICYCLOMINE HYDROCHLORIDE 10 MG: 10 CAPSULE ORAL at 21:53

## 2021-11-01 RX ADMIN — IPRATROPIUM BROMIDE 0.5 MG: 0.5 SOLUTION RESPIRATORY (INHALATION) at 20:09

## 2021-11-01 RX ADMIN — PROPOFOL 100 MG: 10 INJECTION, EMULSION INTRAVENOUS at 11:28

## 2021-11-01 RX ADMIN — LIDOCAINE HYDROCHLORIDE 100 MG: 20 INJECTION, SOLUTION INTRAVENOUS at 11:28

## 2021-11-01 RX ADMIN — PROPOFOL 50 MG: 10 INJECTION, EMULSION INTRAVENOUS at 11:29

## 2021-11-01 RX ADMIN — ALPRAZOLAM 1 MG: 1 TABLET ORAL at 20:21

## 2021-11-01 RX ADMIN — HYDRALAZINE HYDROCHLORIDE 20 MG: 20 INJECTION INTRAMUSCULAR; INTRAVENOUS at 12:42

## 2021-11-01 RX ADMIN — ACETAMINOPHEN 650 MG: 325 TABLET, FILM COATED ORAL at 13:35

## 2021-11-01 RX ADMIN — METHYLPREDNISOLONE SODIUM SUCCINATE 40 MG: 40 INJECTION, POWDER, FOR SOLUTION INTRAMUSCULAR; INTRAVENOUS at 08:53

## 2021-11-01 RX ADMIN — IPRATROPIUM BROMIDE 0.5 MG: 0.5 SOLUTION RESPIRATORY (INHALATION) at 14:52

## 2021-11-01 RX ADMIN — ALPRAZOLAM 1 MG: 1 TABLET ORAL at 12:51

## 2021-11-01 RX ADMIN — OXYCODONE HYDROCHLORIDE AND ACETAMINOPHEN 1 TABLET: 7.5; 325 TABLET ORAL at 19:23

## 2021-11-01 RX ADMIN — METHYLPREDNISOLONE SODIUM SUCCINATE 40 MG: 40 INJECTION, POWDER, FOR SOLUTION INTRAMUSCULAR; INTRAVENOUS at 21:53

## 2021-11-01 RX ADMIN — FUROSEMIDE 40 MG: 10 INJECTION INTRAMUSCULAR; INTRAVENOUS at 08:56

## 2021-11-01 RX ADMIN — SODIUM CHLORIDE, PRESERVATIVE FREE 10 ML: 5 INJECTION INTRAVENOUS at 20:21

## 2021-11-01 RX ADMIN — SODIUM CHLORIDE 30 ML/HR: 9 INJECTION, SOLUTION INTRAVENOUS at 10:57

## 2021-11-01 RX ADMIN — GABAPENTIN 300 MG: 300 CAPSULE ORAL at 20:21

## 2021-11-01 RX ADMIN — CEFTRIAXONE SODIUM 1 G: 1 INJECTION, POWDER, FOR SOLUTION INTRAMUSCULAR; INTRAVENOUS at 12:42

## 2021-11-01 NOTE — PLAN OF CARE
Problem: Noninvasive Ventilation Acute  Goal: Effective Unassisted Ventilation and Oxygenation  Outcome: Ongoing, Progressing   Goal Outcome Evaluation:     Pt placed on 4L NC this am; pt has tolerated being off nppv throughout day; will continue to monitor need for nppv during daytime.

## 2021-11-01 NOTE — PROGRESS NOTES
"    [facility] Medicine Services  INPATIENT PROGRESS NOTE    Length of Stay: 5  Date of Admission: 10/27/2021  Primary Care Physician: Halle Baron MD    Subjective   Chief Complaint: Shortness of breath  HPI: Patient status post EGD, returned to room approx 2 hours ago. Remains very tired, drifting off to sleep mid-sentence. Denies new complaints. Says he just had a breathing treatment and feels a little \"jittery\" otherwise breathing is unchanged today.       Review of Systems   Constitutional: Negative for chills and fever.   HENT: Negative for congestion.    Respiratory: Positive for shortness of breath. Negative for cough and wheezing.    Cardiovascular: Negative for chest pain and palpitations.   Gastrointestinal: Negative for abdominal pain, constipation, diarrhea, nausea and vomiting.   Genitourinary: Negative.    Musculoskeletal: Negative.    Skin: Negative.    Neurological: Negative.    Psychiatric/Behavioral: Negative.       All pertinent negatives and positives are as above. All other systems have been reviewed and are negative unless otherwise stated.     Objective    Temp:  [97 °F (36.1 °C)-98.6 °F (37 °C)] 97.3 °F (36.3 °C)  Heart Rate:  [] 100  Resp:  [16-20] 16  BP: (148-174)/() 162/91    Physical Exam  Constitutional:       General: He is not in acute distress.     Appearance: He is not toxic-appearing.      Comments: Lethargic but arousable post procedure   HENT:      Head: Normocephalic and atraumatic.      Nose: Nose normal.      Mouth/Throat:      Mouth: Mucous membranes are moist.   Eyes:      Conjunctiva/sclera: Conjunctivae normal.   Cardiovascular:      Rate and Rhythm: Normal rate and regular rhythm.      Pulses: Normal pulses.      Heart sounds: Normal heart sounds.   Pulmonary:      Comments: Diminished breath sounds bilaterally  With shallow inspiratory effort  Abdominal:      General: Bowel sounds are normal.      Palpations: Abdomen " is soft.      Tenderness: There is no abdominal tenderness.   Musculoskeletal:         General: No swelling.      Cervical back: Neck supple.      Right lower leg: No edema.      Left lower leg: No edema.   Skin:     General: Skin is warm and dry.      Capillary Refill: Capillary refill takes less than 2 seconds.   Neurological:      General: No focal deficit present.      Mental Status: He is oriented to person, place, and time.      Coordination: Coordination normal.   Psychiatric:         Mood and Affect: Mood normal.         Behavior: Behavior normal.           Results Review:  I have reviewed the labs, radiology results, and diagnostic studies.    Laboratory Data:   Results from last 7 days   Lab Units 11/01/21  0537 10/31/21  0556 10/29/21  0439   SODIUM mmol/L 137 136 140   POTASSIUM mmol/L 4.3 4.3 3.8   CHLORIDE mmol/L 99 97* 98   CO2 mmol/L 26.0 29.0 32.0*   BUN mg/dL 31* 32* 31*   CREATININE mg/dL 0.99 0.81 1.41*   GLUCOSE mg/dL 152* 145* 118*   CALCIUM mg/dL 9.6 9.8 9.7   BILIRUBIN mg/dL 0.7 0.6 1.1   ALK PHOS U/L 68 66 63   ALT (SGPT) U/L 48* 24 20   AST (SGOT) U/L 49* 28 28   ANION GAP mmol/L 12.0 10.0 10.0     Estimated Creatinine Clearance: 119.4 mL/min (by C-G formula based on SCr of 0.99 mg/dL).          Results from last 7 days   Lab Units 11/01/21  0537 10/31/21  0556 10/29/21  0439 10/28/21  0425 10/27/21  1026   WBC 10*3/mm3 11.59* 15.71* 8.00 9.57 15.06*   HEMOGLOBIN g/dL 14.4 13.7 12.8* 12.3* 13.0   HEMATOCRIT % 41.8 41.1 38.1 37.4* 39.5   PLATELETS 10*3/mm3 222 242 179 128* 177           Culture Data:   No results found for: BLOODCX  No results found for: URINECX  No results found for: RESPCX  No results found for: WOUNDCX  No results found for: STOOLCX  No components found for: BODYFLD    Radiology Data:   Imaging Results (Last 24 Hours)     ** No results found for the last 24 hours. **          I have reviewed the patient's current medications.     Assessment/Plan     Principal Problem:     Esophageal dysphagia  Active Problems:    LAWRENCE (acute kidney injury) ([facility]ly [facility])    Acute congestive heart failure (HCC)    Acute respiratory failure with hypoxia and hypercapnia (HCC)    Pneumonia    Plan:  -Suspect that his respiratory failure is likely secondary to combination of pneumonia and heart failure as well as some untreated sleep apnea.  -Continue with antibiotics with Rocephin and azithromycin for now  -Continue with diuresis with Lasix 40 mg daily IV  -Echocardiogram showing preserved EF so likely some of his vascular congestion would be related to HFpEF. Repeat CXR in AM.   -Continue home medicines as appropriate  -LAWRENCE remains resolved.   -Continue bronchodilator therapy, mucinex, o2 support as needed.   -Appreciate GI assistance, s/p EGD today-esophagitis. Biopsies pending.   -DVT prophylaxis with heparin  -CODE STATUS: Full    I confirmed that the patient's Advance Care Plan is present, code status is documented, or surrogate decision maker is listed in the patient's medical record.     Discharge Planning: In process    Gemma Hernandez MD

## 2021-11-01 NOTE — PAYOR COMM NOTE
"  Paris Webb RN [facility]  [phone]       Phone  [phone]       Fax  Cont stay review      Berenice Gill (56 y.o. Male)             Date of Birth Social Security Number Address Home Phone MRN    1965  [address] [place] [address] [phone] [id number]    Spiritism Marital Status             Rastafari        Admission Date Admission Type Admitting Provider Attending Provider Department, Room/Bed    10/27/21 Emergency Andres Bell MD Craig, David A, MD [address], 321/1    Discharge Date Discharge Disposition Discharge Destination                         Attending Provider: Andres Bell MD    Allergies: Ketamine Hcl    Isolation: None   Infection: COVID Screen (preop/placement) (11/01/21)   Code Status: CPR   Advance Care Planning Activity    Ht: 182.9 cm (72.01\")   Wt: 131 kg (288 lb 6.4 oz)    Admission Cmt: None   Principal Problem: Esophageal dysphagia [R13.19] More...                 Active Insurance as of 10/27/2021     Primary Coverage     Payor Plan Insurance Group Employer/Plan Group    [employer] BLUE CROSS [employer]UE [employer]OSS BLUE [employer]IELD PPO [id number]     Payor Plan Address Payor Plan Phone Number Payor Plan Fax Number Effective Dates    [address] [phone]  3/1/2013 - None Entered    [place] [address]       Subscriber Name Subscriber Birth Date Member ID       BERENICE GILL 1965 [id number]                 Emergency Contacts      (Rel.) Home Phone Work Phone Mobile Phone    VIJAY GILL (Spouse) [phone] -- [phone]            Vital Signs (last day)     Date/Time Temp Temp src Pulse Resp BP Patient Position SpO2    11/01/21 0750 97.9 (36.6) Oral 84 20 148/72 Lying 90    11/01/21 0713 -- -- 67 -- -- -- --    11/01/21 0704 -- -- 69 20 -- -- 96    11/01/21 0700 -- -- 61 -- -- -- --    11/01/21 0251 97.9 (36.6) Temporal 73 20 174/90 Lying 97    " 11/01/21 0039 -- -- 69 -- -- -- --    10/31/21 2305 98.4 (36.9) Temporal 68 20 151/76 Lying 92    10/31/21 1959 -- -- 85 20 -- -- 92    10/31/21 1922 98.6 (37) Temporal 87 18 169/77 Sitting 93    10/31/21 1602 -- -- 88 -- -- -- --    10/31/21 1554 -- -- 88 18 -- -- 92    10/31/21 1552 -- -- 95 -- -- -- --    10/31/21 1509 98 (36.7) Oral 91 18 144/85 Lying 92    10/31/21 1143 98.5 (36.9) Oral 95 18 169/81 Lying 90    10/31/21 1140 -- -- 78 -- -- -- --    10/31/21 1133 -- -- 79 18 -- -- 92    10/31/21 0756 -- -- 72 -- -- -- --    10/31/21 0749 -- -- 74 20 -- -- 91    10/31/21 0747 -- -- 67 -- -- -- --    10/31/21 0723 97.6 (36.4) Temporal 67 21 160/84 Lying 95    10/31/21 0249 97.5 (36.4) Temporal 76 23 166/82 Lying 92    10/31/21 0118 -- -- 79 -- -- -- --          Oxygen Therapy (last day)     Date/Time SpO2 Device (Oxygen Therapy) Flow (L/min) Oxygen Concentration (%) ETCO2 (mmHg)    11/01/21 0750 90 nasal cannula; humidified -- -- --    11/01/21 0713 -- nasal cannula 4 -- --    11/01/21 0704 96 NPPV/NIV -- 45 --    11/01/21 0702 -- -- -- 45 --    11/01/21 0420 -- NPPV/NIV -- 45 --    11/01/21 0251 97 -- -- -- --    11/01/21 0200 -- NPPV/NIV -- 45 --    10/31/21 2305 92 -- -- -- --    10/31/21 2300 -- -- -- 45 --    10/31/21 1959 92 nasal cannula 4 -- --    10/31/21 1922 93 -- -- -- --    10/31/21 1602 -- nasal cannula 4 -- --    10/31/21 1554 92 nasal cannula 4 -- --    10/31/21 1509 92 nasal cannula 4 -- --    10/31/21 1143 90 nasal cannula 4 -- --    10/31/21 1140 -- nasal cannula 4 -- --    10/31/21 1133 92 nasal cannula 4 -- --    10/31/21 0756 -- nasal cannula 4 -- --    10/31/21 0749 91 nasal cannula 4 -- --    10/31/21 0723 95 NPPV/NIV -- 45 --    10/31/21 0554 -- -- -- 45 --    10/31/21 0249 92 -- -- 45 --    10/31/21 0200 -- NPPV/NIV -- 45 --          Current Facility-Administered Medications   Medication Dose Route Frequency Provider Last Rate Last Admin   • acetaminophen (TYLENOL) tablet 650 mg  650 mg  Oral Q4H PRN Andres Bell MD   650 mg at 10/28/21 0808    Or   • acetaminophen (TYLENOL) 160 MG/5ML solution 650 mg  650 mg Oral Q4H PRN Andres Bell MD        Or   • acetaminophen (TYLENOL) suppository 650 mg  650 mg Rectal Q4H PRN Andres Bell MD       • ALPRAZolam (XANAX) tablet 1 mg  1 mg Oral TID PRN Andres Bell MD   1 mg at 10/31/21 1715   • calcium carbonate (TUMS) chewable tablet 500 mg (200 mg elemental)  2 tablet Oral BID PRN Andres Bell MD       • cefTRIAXone (ROCEPHIN) 1 g/100 mL 0.9% NS (MBP)  1 g Intravenous Q24H Andres Bell MD   1 g at 10/31/21 1100   • dicyclomine (BENTYL) capsule 10 mg  10 mg Oral 4x Daily AC & at Bedtime Andres Bell MD   10 mg at 10/31/21 2231   • furosemide (LASIX) injection 40 mg  40 mg Intravenous Daily Andres Bell MD   40 mg at 10/31/21 0911   • gabapentin (NEURONTIN) capsule 300 mg  300 mg Oral BID Andres Bell MD   300 mg at 10/31/21 2034   • guaiFENesin (MUCINEX) 12 hr tablet 1,200 mg  1,200 mg Oral Q12H Andres Bell MD   1,200 mg at 10/31/21 2034   • heparin (porcine) 5000 UNIT/ML injection 5,000 Units  5,000 Units Subcutaneous Q8H Andres Bell MD   5,000 Units at 11/01/21 0616   • hydrALAZINE (APRESOLINE) injection 20 mg  20 mg Intravenous Q6H PRN Andres Bell MD   20 mg at 10/28/21 2111   • ipratropium (ATROVENT) nebulizer solution 0.5 mg  0.5 mg Nebulization 4x Daily - RT Andres Bell MD   0.5 mg at 11/01/21 0704   • lisinopril (PRINIVIL,ZESTRIL) tablet 20 mg  20 mg Oral Daily Andres Bell MD   20 mg at 10/31/21 0910   • melatonin tablet 6 mg  6 mg Oral Nightly PRN Andres Bell MD       • methylPREDNISolone sodium succinate (SOLU-Medrol) injection 40 mg  40 mg Intravenous Q12H Andres Bell MD   40 mg at 10/31/21 2231   • metoprolol succinate XL (TOPROL-XL) 24 hr tablet 50 mg  50 mg Oral Q24H Andres Bell MD   50 mg at 10/31/21 0910   • morphine injection 1 mg  1 mg Intravenous Q4H PRN Andres Bell MD        And    • naloxone (NARCAN) injection 0.4 mg  0.4 mg Intravenous Q5 Min PRN Andres Bell MD       • ondansetron (ZOFRAN) tablet 4 mg  4 mg Oral Q6H PRN Andres Bell MD   4 mg at 10/29/21 2146    Or   • ondansetron (ZOFRAN) injection 4 mg  4 mg Intravenous Q6H PRN Andres Bell MD       • oxyCODONE-acetaminophen (PERCOCET) 7.5-325 MG per tablet 1 tablet  1 tablet Oral Q6H PRN Andres Bell MD   1 tablet at 10/31/21 1912   • pantoprazole (PROTONIX) EC tablet 40 mg  40 mg Oral QAM Andres Bell MD   40 mg at 10/31/21 0607   • sertraline (ZOLOFT) tablet 100 mg  100 mg Oral BID Andres Bell MD   100 mg at 10/31/21 2035   • sodium chloride 0.9 % flush 10 mL  10 mL Intravenous PRN Jaylen Grissom MD       • sodium chloride 0.9 % flush 10 mL  10 mL Intravenous PRN Jaylen Grissom MD       • sodium chloride 0.9 % flush 10 mL  10 mL Intravenous Q12H Andres Bell MD   10 mL at 10/31/21 2035   • sodium chloride 0.9 % flush 10 mL  10 mL Intravenous PRN Andres Bell MD       • tiZANidine (ZANAFLEX) tablet 4 mg  4 mg Oral Nightly PRN Andres Bell MD   4 mg at 10/28/21 2112        Physician Progress Notes (last 48 hours)      Andres Bell MD at 10/31/21 1137              [facility] Medicine Services  INPATIENT PROGRESS NOTE    Length of Stay: 4  Date of Admission: 10/27/2021  Primary Care Physician: Halle Baron MD    Subjective   Chief Complaint: Shortness of breath  HPI: Having some improvement in his breathing today but feels like the duo nebs make him jittery.  No other current concerns.    Review of Systems   Constitutional: Negative for chills and fever.   HENT: Negative for congestion.    Respiratory: Positive for shortness of breath. Negative for cough and wheezing.    Cardiovascular: Negative for chest pain and palpitations.   Gastrointestinal: Negative for abdominal pain, constipation, diarrhea, nausea and vomiting.   Genitourinary: Negative.     Musculoskeletal: Negative.    Skin: Negative.    Neurological: Negative.    Psychiatric/Behavioral: Negative.       All pertinent negatives and positives are as above. All other systems have been reviewed and are negative unless otherwise stated.     Objective    Temp:  [97.1 °F (36.2 °C)-97.6 °F (36.4 °C)] 97.6 °F (36.4 °C)  Heart Rate:  [67-99] 79  Resp:  [18-23] 18  BP: (146-166)/(71-84) 160/84    Physical Exam  Constitutional:       General: He is not in acute distress.     Appearance: He is not toxic-appearing.   HENT:      Head: Normocephalic and atraumatic.      Right Ear: External ear normal.      Left Ear: External ear normal.      Nose: Nose normal.      Mouth/Throat:      Mouth: Mucous membranes are moist.      Pharynx: Oropharynx is clear.   Eyes:      Conjunctiva/sclera: Conjunctivae normal.   Cardiovascular:      Rate and Rhythm: Normal rate and regular rhythm.      Pulses: Normal pulses.      Heart sounds: Normal heart sounds.   Pulmonary:      Comments: Diminished breath sounds bilaterally but some improved aeration from prior exam noted.  Abdominal:      General: Bowel sounds are normal.      Palpations: Abdomen is soft.      Tenderness: There is no abdominal tenderness.   Musculoskeletal:         General: No swelling.      Cervical back: Neck supple.   Skin:     General: Skin is warm and dry.      Capillary Refill: Capillary refill takes less than 2 seconds.   Neurological:      General: No focal deficit present.      Mental Status: He is alert and oriented to person, place, and time. Mental status is at baseline.      Coordination: Coordination normal.   Psychiatric:         Mood and Affect: Mood normal.         Behavior: Behavior normal.           Results Review:  I have reviewed the labs, radiology results, and diagnostic studies.    Laboratory Data:   Results from last 7 days   Lab Units 10/31/21  0556 10/29/21  0439 10/28/21  0655   SODIUM mmol/L 136 140 139   POTASSIUM mmol/L 4.3 3.8 3.9    CHLORIDE mmol/L 97* 98 96*   CO2 mmol/L 29.0 32.0* 30.0*   BUN mg/dL 32* 31* 31*   CREATININE mg/dL 0.81 1.41* 1.24   GLUCOSE mg/dL 145* 118* 101*   CALCIUM mg/dL 9.8 9.7 9.7   BILIRUBIN mg/dL 0.6 1.1 2.2*   ALK PHOS U/L 66 63 71   ALT (SGPT) U/L 24 20 25   AST (SGOT) U/L 28 28 44*   ANION GAP mmol/L 10.0 10.0 13.0     Estimated Creatinine Clearance: 146 mL/min (by C-G formula based on SCr of 0.81 mg/dL).          Results from last 7 days   Lab Units 10/31/21  0556 10/29/21  0439 10/28/21  0425 10/27/21  1026   WBC 10*3/mm3 15.71* 8.00 9.57 15.06*   HEMOGLOBIN g/dL 13.7 12.8* 12.3* 13.0   HEMATOCRIT % 41.1 38.1 37.4* 39.5   PLATELETS 10*3/mm3 242 179 128* 177           Culture Data:   No results found for: BLOODCX  No results found for: URINECX  No results found for: RESPCX  No results found for: WOUNDCX  No results found for: STOOLCX  No components found for: BODYFLD    Radiology Data:   Imaging Results (Last 24 Hours)     ** No results found for the last 24 hours. **          I have reviewed the patient's current medications.     Assessment/Plan     Principal Problem:    Esophageal dysphagia  Active Problems:    LAWRENCE (acute kidney injury) (HCC)    Acute congestive heart failure (HCC)    Acute respiratory failure with hypoxia and hypercapnia (HCC)    Pneumonia    Plan:  -Suspect that his respiratory failure is likely secondary to combination of pneumonia and heart failure as well as some untreated sleep apnea.  -Continue with antibiotics with Rocephin and azithromycin for now  -Continue with diuresis with Lasix 40 mg daily IV  -Echocardiogram showing preserved EF so likely some of his vascular congestion would be related to HFpEF.   -Continue home medicines as appropriate  -Acute kidney injury appears to have resolved.  -Scheduled bronchodilators and start incentive spirometry as well as secretion clearance.  Change duo nebs to Atrovent nebs only.  -Mucinex twice daily  -Appreciate GI assistance, plan for EGD on  Monday.  We will make him n.p.o. at midnight pending EGD tomorrow.  -DVT prophylaxis with heparin  -CODE STATUS: Full    I confirmed that the patient's Advance Care Plan is present, code status is documented, or surrogate decision maker is listed in the patient's medical record.     Discharge Planning: In process    Andres Bell MD      Electronically signed by Andres Bell MD at 10/31/21 5275     Andres Bell MD at 10/30/21 1331              [facility] Medicine Services  INPATIENT PROGRESS NOTE    Length of Stay: 3  Date of Admission: 10/27/2021  Primary Care Physician: Halle Baron MD    Subjective   Chief Complaint: Shortness of breath  HPI: Breathing about the same.  Ongoing cough and shortness of breath.  No other new concerns.    Review of Systems   Constitutional: Negative for chills and fever.   HENT: Negative for congestion.    Respiratory: Positive for shortness of breath. Negative for cough and wheezing.    Cardiovascular: Negative for chest pain and palpitations.   Gastrointestinal: Negative for abdominal pain, constipation, diarrhea, nausea and vomiting.   Genitourinary: Negative.    Musculoskeletal: Negative.    Skin: Negative.    Neurological: Negative.    Psychiatric/Behavioral: Negative.       All pertinent negatives and positives are as above. All other systems have been reviewed and are negative unless otherwise stated.     Objective    Temp:  [96.9 °F (36.1 °C)-98.5 °F (36.9 °C)] 97.4 °F (36.3 °C)  Heart Rate:  [73-97] 88  Resp:  [18-23] 18  BP: (132-161)/(77-89) 161/77    Physical Exam  Constitutional:       General: He is not in acute distress.     Appearance: He is not toxic-appearing.   HENT:      Head: Normocephalic and atraumatic.      Right Ear: External ear normal.      Left Ear: External ear normal.      Nose: Nose normal.      Mouth/Throat:      Mouth: Mucous membranes are moist.      Pharynx: Oropharynx is clear.   Eyes:       Conjunctiva/sclera: Conjunctivae normal.   Cardiovascular:      Rate and Rhythm: Normal rate and regular rhythm.      Pulses: Normal pulses.      Heart sounds: Normal heart sounds.   Pulmonary:      Comments: Diminished breath sounds bilaterally but some improved aeration from prior exam noted.  Abdominal:      General: Bowel sounds are normal.      Palpations: Abdomen is soft.      Tenderness: There is no abdominal tenderness.   Musculoskeletal:         General: No swelling.      Cervical back: Neck supple.   Skin:     General: Skin is warm and dry.      Capillary Refill: Capillary refill takes less than 2 seconds.   Neurological:      General: No focal deficit present.      Mental Status: He is alert and oriented to person, place, and time. Mental status is at baseline.      Coordination: Coordination normal.   Psychiatric:         Mood and Affect: Mood normal.         Behavior: Behavior normal.           Results Review:  I have reviewed the labs, radiology results, and diagnostic studies.    Laboratory Data:   Results from last 7 days   Lab Units 10/29/21  0439 10/28/21  0655 10/27/21  1026   SODIUM mmol/L 140 139 133*   POTASSIUM mmol/L 3.8 3.9 4.3   CHLORIDE mmol/L 98 96* 95*   CO2 mmol/L 32.0* 30.0* 30.0*   BUN mg/dL 31* 31* 36*   CREATININE mg/dL 1.41* 1.24 2.27*   GLUCOSE mg/dL 118* 101* 126*   CALCIUM mg/dL 9.7 9.7 9.1   BILIRUBIN mg/dL 1.1 2.2* 2.4*   ALK PHOS U/L 63 71 72   ALT (SGPT) U/L 20 25 27   AST (SGOT) U/L 28 44* 38   ANION GAP mmol/L 10.0 13.0 8.0     Estimated Creatinine Clearance: 83.9 mL/min (A) (by C-G formula based on SCr of 1.41 mg/dL (H)).          Results from last 7 days   Lab Units 10/29/21  0439 10/28/21  0425 10/27/21  1026   WBC 10*3/mm3 8.00 9.57 15.06*   HEMOGLOBIN g/dL 12.8* 12.3* 13.0   HEMATOCRIT % 38.1 37.4* 39.5   PLATELETS 10*3/mm3 179 128* 177           Culture Data:   No results found for: BLOODCX  No results found for: URINECX  No results found for: RESPCX  No results  found for: WOUNDCX  No results found for: STOOLCX  No components found for: BODYFLD    Radiology Data:   Imaging Results (Last 24 Hours)     ** No results found for the last 24 hours. **          I have reviewed the patient's current medications.     Assessment/Plan     Principal Problem:    Esophageal dysphagia  Active Problems:    LAWRENCE (acute kidney injury) (HCC)    Acute congestive heart failure (HCC)    Acute respiratory failure with hypoxia and hypercapnia (HCC)    Pneumonia    Plan:  -Suspect that his respiratory failure is likely secondary to combination of pneumonia and heart failure as well as some untreated sleep apnea.  -Continue with antibiotics with Rocephin and azithromycin for now  -Continue with diuresis with Lasix 40 mg daily IV  -Echocardiogram showing preserved EF so likely some of his vascular congestion would be related to HFpEF.   -Continue home medicines as appropriate  -Acute kidney injury appears to have resolved.  -Scheduled bronchodilators and start incentive spirometry as well as secretion clearance.  -Mucinex twice daily  -Appreciate GI assistance, plan for EGD on Monday.  -DVT prophylaxis with heparin  -CODE STATUS: Full    I confirmed that the patient's Advance Care Plan is present, code status is documented, or surrogate decision maker is listed in the patient's medical record.     Discharge Planning: In process    Andres Bell MD      Electronically signed by Andres Bell MD at 10/30/21 1339       Medical Student Notes (last 24 hours)  Notes from 10/31/21 0755 through 11/01/21 0755   No notes of this type exist for this encounter.         Consult Notes (last 24 hours)  Notes from 10/31/21 0755 through 11/01/21 0755   No notes of this type exist for this encounter.

## 2021-11-01 NOTE — ANESTHESIA PREPROCEDURE EVALUATION
Anesthesia Evaluation     Patient summary reviewed and Nursing notes reviewed   NPO Solid Status: > 8 hours  NPO Liquid Status: > 8 hours           Airway   Mallampati: II  TM distance: >3 FB  Neck ROM: full  Possible difficult intubation  Dental    (+) poor dentition    Pulmonary - normal exam   Cardiovascular - normal exam    (+) hypertension, CHF ,       Neuro/Psych  (+) psychiatric history Anxiety and Depression,     GI/Hepatic/Renal/Endo    (+)   renal disease,     Musculoskeletal     Abdominal    Substance History      OB/GYN          Other   arthritis,                      Anesthesia Plan    ASA 3     MAC     intravenous induction     Anesthetic plan, all risks, benefits, and alternatives have been provided, discussed and informed consent has been obtained with: patient.

## 2021-11-01 NOTE — PLAN OF CARE
Goal Outcome Evaluation:              Outcome Summary: Pt scheduled for EGD this AM, NPO for procedure; B/P elevated, PRN for >180 SBP; monitoring pt

## 2021-11-02 ENCOUNTER — APPOINTMENT (OUTPATIENT)
Dept: GENERAL RADIOLOGY | Facility: HOSPITAL | Age: 56
End: 2021-11-02

## 2021-11-02 LAB
ALBUMIN SERPL-MCNC: 4.4 G/DL (ref 3.5–5.2)
ALBUMIN/GLOB SERPL: 1.4 G/DL
ALP SERPL-CCNC: 75 U/L (ref 39–117)
ALT SERPL W P-5'-P-CCNC: 55 U/L (ref 1–41)
ANION GAP SERPL CALCULATED.3IONS-SCNC: 16 MMOL/L (ref 5–15)
AST SERPL-CCNC: 41 U/L (ref 1–40)
BACTERIA UR QL AUTO: ABNORMAL /HPF
BASOPHILS # BLD AUTO: 0.07 10*3/MM3 (ref 0–0.2)
BASOPHILS NFR BLD AUTO: 0.4 % (ref 0–1.5)
BILIRUB SERPL-MCNC: 0.8 MG/DL (ref 0–1.2)
BILIRUB UR QL STRIP: NEGATIVE
BUN SERPL-MCNC: 31 MG/DL (ref 6–20)
BUN/CREAT SERPL: 27.9 (ref 7–25)
CALCIUM SPEC-SCNC: 9.5 MG/DL (ref 8.6–10.5)
CHLORIDE SERPL-SCNC: 96 MMOL/L (ref 98–107)
CLARITY UR: CLEAR
CO2 SERPL-SCNC: 23 MMOL/L (ref 22–29)
COLOR UR: YELLOW
CREAT SERPL-MCNC: 1.11 MG/DL (ref 0.76–1.27)
DEPRECATED RDW RBC AUTO: 43.3 FL (ref 37–54)
EOSINOPHIL # BLD AUTO: 0.01 10*3/MM3 (ref 0–0.4)
EOSINOPHIL NFR BLD AUTO: 0.1 % (ref 0.3–6.2)
ERYTHROCYTE [DISTWIDTH] IN BLOOD BY AUTOMATED COUNT: 13.6 % (ref 12.3–15.4)
GFR SERPL CREATININE-BSD FRML MDRD: 69 ML/MIN/1.73
GLOBULIN UR ELPH-MCNC: 3.1 GM/DL
GLUCOSE SERPL-MCNC: 152 MG/DL (ref 65–99)
GLUCOSE UR STRIP-MCNC: NEGATIVE MG/DL
HCT VFR BLD AUTO: 44.1 % (ref 37.5–51)
HGB BLD-MCNC: 15.1 G/DL (ref 13–17.7)
HGB UR QL STRIP.AUTO: NEGATIVE
HYALINE CASTS UR QL AUTO: ABNORMAL /LPF
IMM GRANULOCYTES # BLD AUTO: 0.46 10*3/MM3 (ref 0–0.05)
IMM GRANULOCYTES NFR BLD AUTO: 2.8 % (ref 0–0.5)
KETONES UR QL STRIP: NEGATIVE
LEUKOCYTE ESTERASE UR QL STRIP.AUTO: NEGATIVE
LYMPHOCYTES # BLD AUTO: 2.11 10*3/MM3 (ref 0.7–3.1)
LYMPHOCYTES NFR BLD AUTO: 12.9 % (ref 19.6–45.3)
MCH RBC QN AUTO: 30.4 PG (ref 26.6–33)
MCHC RBC AUTO-ENTMCNC: 34.2 G/DL (ref 31.5–35.7)
MCV RBC AUTO: 88.9 FL (ref 79–97)
MONOCYTES # BLD AUTO: 1.13 10*3/MM3 (ref 0.1–0.9)
MONOCYTES NFR BLD AUTO: 6.9 % (ref 5–12)
NEUTROPHILS NFR BLD AUTO: 12.54 10*3/MM3 (ref 1.7–7)
NEUTROPHILS NFR BLD AUTO: 76.9 % (ref 42.7–76)
NITRITE UR QL STRIP: NEGATIVE
NRBC BLD AUTO-RTO: 0 /100 WBC (ref 0–0.2)
NT-PROBNP SERPL-MCNC: 163.4 PG/ML (ref 0–900)
PH UR STRIP.AUTO: 6 [PH] (ref 5–9)
PLATELET # BLD AUTO: 326 10*3/MM3 (ref 140–450)
PMV BLD AUTO: 9.7 FL (ref 6–12)
POTASSIUM SERPL-SCNC: 3.4 MMOL/L (ref 3.5–5.2)
PROT SERPL-MCNC: 7.5 G/DL (ref 6–8.5)
PROT UR QL STRIP: NEGATIVE
RBC # BLD AUTO: 4.96 10*6/MM3 (ref 4.14–5.8)
RBC # UR: ABNORMAL /HPF
REF LAB TEST METHOD: ABNORMAL
SODIUM SERPL-SCNC: 135 MMOL/L (ref 136–145)
SP GR UR STRIP: 1.02 (ref 1–1.03)
SQUAMOUS #/AREA URNS HPF: ABNORMAL /HPF
UROBILINOGEN UR QL STRIP: NORMAL
WBC # BLD AUTO: 16.32 10*3/MM3 (ref 3.4–10.8)
WBC UR QL AUTO: ABNORMAL /HPF

## 2021-11-02 PROCEDURE — 99232 SBSQ HOSP IP/OBS MODERATE 35: CPT | Performed by: INTERNAL MEDICINE

## 2021-11-02 PROCEDURE — 71045 X-RAY EXAM CHEST 1 VIEW: CPT

## 2021-11-02 PROCEDURE — 85025 COMPLETE CBC W/AUTO DIFF WBC: CPT | Performed by: INTERNAL MEDICINE

## 2021-11-02 PROCEDURE — 94660 CPAP INITIATION&MGMT: CPT

## 2021-11-02 PROCEDURE — 25010000002 ONDANSETRON PER 1 MG: Performed by: INTERNAL MEDICINE

## 2021-11-02 PROCEDURE — 94760 N-INVAS EAR/PLS OXIMETRY 1: CPT

## 2021-11-02 PROCEDURE — 94799 UNLISTED PULMONARY SVC/PX: CPT

## 2021-11-02 PROCEDURE — 81001 URINALYSIS AUTO W/SCOPE: CPT | Performed by: FAMILY MEDICINE

## 2021-11-02 PROCEDURE — 25010000002 CEFTRIAXONE PER 250 MG: Performed by: INTERNAL MEDICINE

## 2021-11-02 PROCEDURE — 25010000002 METHYLPREDNISOLONE PER 40 MG: Performed by: INTERNAL MEDICINE

## 2021-11-02 PROCEDURE — 25010000002 HEPARIN (PORCINE) PER 1000 UNITS: Performed by: INTERNAL MEDICINE

## 2021-11-02 PROCEDURE — 83880 ASSAY OF NATRIURETIC PEPTIDE: CPT | Performed by: FAMILY MEDICINE

## 2021-11-02 PROCEDURE — 80053 COMPREHEN METABOLIC PANEL: CPT | Performed by: INTERNAL MEDICINE

## 2021-11-02 PROCEDURE — 25010000002 FUROSEMIDE PER 20 MG: Performed by: INTERNAL MEDICINE

## 2021-11-02 RX ADMIN — FUROSEMIDE 40 MG: 10 INJECTION INTRAMUSCULAR; INTRAVENOUS at 09:05

## 2021-11-02 RX ADMIN — OXYCODONE HYDROCHLORIDE AND ACETAMINOPHEN 1 TABLET: 7.5; 325 TABLET ORAL at 22:13

## 2021-11-02 RX ADMIN — DICYCLOMINE HYDROCHLORIDE 10 MG: 10 CAPSULE ORAL at 18:29

## 2021-11-02 RX ADMIN — HEPARIN SODIUM 5000 UNITS: 5000 INJECTION INTRAVENOUS; SUBCUTANEOUS at 14:07

## 2021-11-02 RX ADMIN — IPRATROPIUM BROMIDE 0.5 MG: 0.5 SOLUTION RESPIRATORY (INHALATION) at 10:59

## 2021-11-02 RX ADMIN — ONDANSETRON 4 MG: 2 INJECTION INTRAMUSCULAR; INTRAVENOUS at 05:40

## 2021-11-02 RX ADMIN — IPRATROPIUM BROMIDE 0.5 MG: 0.5 SOLUTION RESPIRATORY (INHALATION) at 07:18

## 2021-11-02 RX ADMIN — DICYCLOMINE HYDROCHLORIDE 10 MG: 10 CAPSULE ORAL at 21:46

## 2021-11-02 RX ADMIN — SODIUM CHLORIDE, PRESERVATIVE FREE 10 ML: 5 INJECTION INTRAVENOUS at 21:46

## 2021-11-02 RX ADMIN — CEFTRIAXONE SODIUM 1 G: 1 INJECTION, POWDER, FOR SOLUTION INTRAMUSCULAR; INTRAVENOUS at 14:07

## 2021-11-02 RX ADMIN — IPRATROPIUM BROMIDE 0.5 MG: 0.5 SOLUTION RESPIRATORY (INHALATION) at 19:50

## 2021-11-02 RX ADMIN — GUAIFENESIN 1200 MG: 600 TABLET, EXTENDED RELEASE ORAL at 09:05

## 2021-11-02 RX ADMIN — ALPRAZOLAM 1 MG: 1 TABLET ORAL at 09:11

## 2021-11-02 RX ADMIN — METHYLPREDNISOLONE SODIUM SUCCINATE 40 MG: 40 INJECTION, POWDER, FOR SOLUTION INTRAMUSCULAR; INTRAVENOUS at 21:46

## 2021-11-02 RX ADMIN — GABAPENTIN 300 MG: 300 CAPSULE ORAL at 09:05

## 2021-11-02 RX ADMIN — PANTOPRAZOLE SODIUM 40 MG: 40 TABLET, DELAYED RELEASE ORAL at 06:33

## 2021-11-02 RX ADMIN — DICYCLOMINE HYDROCHLORIDE 10 MG: 10 CAPSULE ORAL at 09:05

## 2021-11-02 RX ADMIN — GABAPENTIN 300 MG: 300 CAPSULE ORAL at 21:46

## 2021-11-02 RX ADMIN — SERTRALINE 100 MG: 50 TABLET, FILM COATED ORAL at 09:11

## 2021-11-02 RX ADMIN — ALPRAZOLAM 1 MG: 1 TABLET ORAL at 19:22

## 2021-11-02 RX ADMIN — LISINOPRIL 20 MG: 20 TABLET ORAL at 09:11

## 2021-11-02 RX ADMIN — IPRATROPIUM BROMIDE 0.5 MG: 0.5 SOLUTION RESPIRATORY (INHALATION) at 15:59

## 2021-11-02 RX ADMIN — METHYLPREDNISOLONE SODIUM SUCCINATE 40 MG: 40 INJECTION, POWDER, FOR SOLUTION INTRAMUSCULAR; INTRAVENOUS at 09:14

## 2021-11-02 RX ADMIN — GUAIFENESIN 1200 MG: 600 TABLET, EXTENDED RELEASE ORAL at 21:46

## 2021-11-02 RX ADMIN — HEPARIN SODIUM 5000 UNITS: 5000 INJECTION INTRAVENOUS; SUBCUTANEOUS at 21:46

## 2021-11-02 RX ADMIN — PHENOL 2 SPRAY: 1.5 LIQUID ORAL at 22:13

## 2021-11-02 RX ADMIN — HEPARIN SODIUM 5000 UNITS: 5000 INJECTION INTRAVENOUS; SUBCUTANEOUS at 06:33

## 2021-11-02 RX ADMIN — SODIUM CHLORIDE, PRESERVATIVE FREE 10 ML: 5 INJECTION INTRAVENOUS at 09:12

## 2021-11-02 RX ADMIN — SERTRALINE 100 MG: 50 TABLET, FILM COATED ORAL at 21:46

## 2021-11-02 RX ADMIN — DICYCLOMINE HYDROCHLORIDE 10 MG: 10 CAPSULE ORAL at 14:08

## 2021-11-02 RX ADMIN — METOPROLOL SUCCINATE 50 MG: 50 TABLET, EXTENDED RELEASE ORAL at 09:05

## 2021-11-02 NOTE — PLAN OF CARE
Goal Outcome Evaluation:           Progress: no change   Alert and oriented, denies pain, room air, no soa noted, vital signs wnl, ua neg. Will continue to monitor.

## 2021-11-02 NOTE — PROGRESS NOTES
[facility] Medicine Services  INPATIENT PROGRESS NOTE    Length of Stay: 6  Date of Admission: 10/27/2021  Primary Care Physician: Halle Baron MD    Subjective   Chief Complaint: Shortness of breath  HPI: Still very tired today. Not sleeping well at night. Continues with shortness of breath above his baseline. No previous home oxygen requirement but still requiring 4lt by NC today.   No chest pain.   No fever, chills, cough, congestion.       Review of Systems   Constitutional: Negative for chills and fever.   HENT: Negative for congestion.    Respiratory: Positive for shortness of breath. Negative for cough and wheezing.    Cardiovascular: Negative for chest pain and palpitations.   Gastrointestinal: Negative for abdominal pain, constipation, diarrhea, nausea and vomiting.   Genitourinary: Negative.    Musculoskeletal: Negative.    Skin: Negative.    Neurological: Negative.    Psychiatric/Behavioral: Negative.       All pertinent negatives and positives are as above. All other systems have been reviewed and are negative unless otherwise stated.     Objective    Temp:  [97.3 °F (36.3 °C)-98.4 °F (36.9 °C)] 98.1 °F (36.7 °C)  Heart Rate:  [] 81  Resp:  [16-23] 18  BP: (148-187)/() 169/85    Physical Exam  Constitutional:       General: He is not in acute distress.     Appearance: He is not toxic-appearing.   HENT:      Head: Normocephalic and atraumatic.      Nose: Nose normal.      Mouth/Throat:      Mouth: Mucous membranes are moist.   Eyes:      Conjunctiva/sclera: Conjunctivae normal.   Cardiovascular:      Rate and Rhythm: Normal rate and regular rhythm.      Pulses: Normal pulses.      Heart sounds: Normal heart sounds.   Pulmonary:      Effort: Pulmonary effort is normal.      Breath sounds: Normal breath sounds.      Comments: Diminished breath sounds bilaterally  With shallow inspiratory effort  Abdominal:      General: Bowel sounds are normal.       Palpations: Abdomen is soft.      Tenderness: There is no abdominal tenderness.   Musculoskeletal:         General: No swelling.      Cervical back: Neck supple.      Right lower leg: No edema.      Left lower leg: No edema.   Skin:     General: Skin is warm and dry.      Capillary Refill: Capillary refill takes less than 2 seconds.   Neurological:      General: No focal deficit present.      Mental Status: He is alert and oriented to person, place, and time.      Coordination: Coordination normal.   Psychiatric:         Mood and Affect: Mood normal.         Behavior: Behavior normal.           Results Review:  I have reviewed the labs, radiology results, and diagnostic studies.    Laboratory Data:   Results from last 7 days   Lab Units 11/02/21  0604 11/01/21  0537 10/31/21  0556   SODIUM mmol/L 135* 137 136   POTASSIUM mmol/L 3.4* 4.3 4.3   CHLORIDE mmol/L 96* 99 97*   CO2 mmol/L 23.0 26.0 29.0   BUN mg/dL 31* 31* 32*   CREATININE mg/dL 1.11 0.99 0.81   GLUCOSE mg/dL 152* 152* 145*   CALCIUM mg/dL 9.5 9.6 9.8   BILIRUBIN mg/dL 0.8 0.7 0.6   ALK PHOS U/L 75 68 66   ALT (SGPT) U/L 55* 48* 24   AST (SGOT) U/L 41* 49* 28   ANION GAP mmol/L 16.0* 12.0 10.0     Estimated Creatinine Clearance: 105.2 mL/min (by C-G formula based on SCr of 1.11 mg/dL).          Results from last 7 days   Lab Units 11/02/21  0604 11/01/21  0537 10/31/21  0556 10/29/21  0439 10/28/21  0425   WBC 10*3/mm3 16.32* 11.59* 15.71* 8.00 9.57   HEMOGLOBIN g/dL 15.1 14.4 13.7 12.8* 12.3*   HEMATOCRIT % 44.1 41.8 41.1 38.1 37.4*   PLATELETS 10*3/mm3 326 222 242 179 128*           Culture Data:   No results found for: BLOODCX  No results found for: URINECX  No results found for: RESPCX  No results found for: WOUNDCX  No results found for: STOOLCX  No components found for: BODYFLD    Radiology Data:   Imaging Results (Last 24 Hours)     Procedure Component Value Units Date/Time    XR Chest 1 View [[id number]] Collected: 11/02/21 0501     Updated:  11/02/21 0624    Narrative:      EXAMINATION:  XR CHEST 1 VW    CLINICAL HISTORY:  56 years Male,respiratory failure, I50.9 Heart  failure, unspecified J18.9 Pneumonia, unspecified organism J96.01  Acute respiratory failure with hypoxia J96.02 Acute respiratory  failure with hypercapnia R79.89 Other specified abnormal findings  of blood chemistry N17.9 Acute kidney failure, unspecified R13.19  Other dysphagia    COMPARISON:  Chest x-ray dated 10/27/2021    FINDINGS:  Mild cardiomegaly. Pulmonary vascular congestion has  improved relative to 10/27/2021. No pleural effusion or  pneumothorax.      Impression:      Cardiomegaly with improved pulmonary vascular  congestion relative to 10/27/2021.    Electronically signed by:  Reinaldo Guzman MD  11/2/2021 6:23 AM CDT  Workstation: [id number]          I have reviewed the patient's current medications.     Assessment/Plan     Principal Problem:    Esophageal dysphagia  Active Problems:    LAWRENCE (acute kidney injury) (HCC)    Acute congestive heart failure (HCC)    Acute respiratory failure with hypoxia and hypercapnia (HCC)    Pneumonia    Electrolyte Imbalance    Leukocytosis    Plan:  -Suspect that his respiratory failure is likely secondary to combination of pneumonia and heart failure as well as some untreated sleep apnea.  -Continue with antibiotics with Rocephin and azithromycin for now. Repeat XR shows improvement. Will add PCT level.   -Continue with diuresis with Lasix 40 mg daily IV  -Echocardiogram showing preserved EF so likely some of his vascular congestion would be related to HFpEF.   -LAWRENCE remains improved. Slight upward trend in creatinine today likely related to ongoing diuresis. Follow closely.   -Noted uptrend in WBC. No associated fever. CXR improved. Check PCT and UA.   -Continue bronchodilator therapy, mucinex, o2 support as needed.   -Appreciate GI assistance, s/p EGD today-esophagitis. Biopsies pending.   -DVT prophylaxis with heparin  -CODE STATUS:  Full    I confirmed that the patient's Advance Care Plan is present, code status is documented, or surrogate decision maker is listed in the patient's medical record.     Discharge Planning: In process    Gemma Hernandez MD

## 2021-11-02 NOTE — PROGRESS NOTES
SUBJECTIVE:   11/2/2021  Chief Complaint:     Subjective      Patient feels better today.  Dyspnea is improving.  Tolerating diet.  Dysphagia is improving as well.  EGD yesterday was consistent with esophagitis and gastritis.  Path is pending.    History:  Past Medical History:   Diagnosis Date   • Anxiety    • Arthritis    • Crohn's disease (HCC)    • Depression    • Hypertension      Past Surgical History:   Procedure Laterality Date   • ABDOMINAL HERNIA REPAIR     • COLON RESECTION  1988   • COLONOSCOPY  10/26/2015    internal hemorrhoids,otherwise normal postoperative colonoscopy with the evaluation of his ti   • ENDOSCOPY N/A 11/1/2021    Procedure: ESOPHAGOGASTRODUODENOSCOPY;  Surgeon: Kain Delgadillo MD;  Location: [facility] ENDOSCOPY;  Service: Gastroenterology;  Laterality: N/A;     History reviewed. No pertinent family history.  Social History     Tobacco Use   • Smoking status: Former Smoker   • Smokeless tobacco: Never Used   Substance Use Topics   • Alcohol use: Yes     Comment: very rarely   • Drug use: No     Medications Prior to Admission   Medication Sig Dispense Refill Last Dose   • Adalimumab (HUMIRA) 40 MG/0.4ML Prefilled Syringe Kit injection Inject 40 mg under the skin into the appropriate area as directed See Admin Instructions.   10/27/2021 at Unknown time   • ALPRAZolam (XANAX) 1 MG tablet TK 1 T PO BID PRF ANXIETY  2 10/27/2021 at Unknown time   • dicyclomine (BENTYL) 10 MG capsule Take 10 mg by mouth 4 (Four) Times a Day Before Meals & at Bedtime.   10/27/2021 at Unknown time   • gabapentin (NEURONTIN) 300 MG capsule Take 300 mg by mouth 2 (Two) Times a Day.   10/27/2021 at Unknown time   • lansoprazole (PREVACID) 15 MG capsule Take 15 mg by mouth Daily.   10/27/2021 at Unknown time   • metoprolol succinate XL (TOPROL-XL) 50 MG 24 hr tablet Take 50 mg by mouth every night at bedtime.   10/26/2021 at Unknown time   • oxyCODONE-acetaminophen (PERCOCET) 7.5-325 MG per tablet Take 1  tablet by mouth Every 6 (Six) Hours As Needed.   10/27/2021 at Unknown time   • promethazine (PHENERGAN) 12.5 MG tablet Take 1 tablet by mouth Every 6 (Six) Hours As Needed for Nausea or Vomiting (If still nauseated after zofran). 30 tablet 1 10/27/2021 at Unknown time   • sertraline (ZOLOFT) 100 MG tablet TK 1 T PO BID  5 10/27/2021 at Unknown time   • tiZANidine (ZANAFLEX) 4 MG tablet Take 4 mg by mouth At Night As Needed for Muscle Spasms.   10/26/2021 at Unknown time   • ondansetron (ZOFRAN) 8 MG tablet Take 1 tablet by mouth Every 6 (Six) Hours As Needed for Nausea or Vomiting (give 30 minutes prior to antibiotic administration). 30 tablet 1      Allergies:  Ketamine hcl     CURRENT MEDICATIONS/OBJECTIVE/VS/PE:     Current Medications:     Current Facility-Administered Medications   Medication Dose Route Frequency Provider Last Rate Last Admin   • acetaminophen (TYLENOL) tablet 650 mg  650 mg Oral Q4H PRN Kain Delgadillo MD   650 mg at 11/01/21 1335    Or   • acetaminophen (TYLENOL) 160 MG/5ML solution 650 mg  650 mg Oral Q4H PRN Kain Delgadillo MD        Or   • acetaminophen (TYLENOL) suppository 650 mg  650 mg Rectal Q4H PRN Kain Delgadillo MD       • ALPRAZolam (XANAX) tablet 1 mg  1 mg Oral TID PRN Kain Delgadillo MD   1 mg at 11/02/21 0911   • calcium carbonate (TUMS) chewable tablet 500 mg (200 mg elemental)  2 tablet Oral BID PRN Kain Delgadillo MD       • dicyclomine (BENTYL) capsule 10 mg  10 mg Oral 4x Daily AC & at Bedtime Kain Delgadillo MD   10 mg at 11/02/21 1408   • furosemide (LASIX) injection 40 mg  40 mg Intravenous Daily Kain Delgadillo MD   40 mg at 11/02/21 0905   • gabapentin (NEURONTIN) capsule 300 mg  300 mg Oral BID Kain Delgadillo MD   300 mg at 11/02/21 0905   • guaiFENesin (MUCINEX) 12 hr tablet 1,200 mg  1,200 mg Oral Q12H Satoor, Sumalatha, MD   1,200 mg at 11/02/21 0905   • heparin (porcine) 5000 UNIT/ML injection 5,000 Units  5,000 Units Subcutaneous Q8H  Kain Delgadillo MD   5,000 Units at 11/02/21 1407   • hydrALAZINE (APRESOLINE) injection 20 mg  20 mg Intravenous Q6H PRN Kain Delgadillo MD   20 mg at 11/01/21 2020   • ipratropium (ATROVENT) nebulizer solution 0.5 mg  0.5 mg Nebulization 4x Daily - RT Kain Delgadillo MD   0.5 mg at 11/02/21 1559   • lisinopril (PRINIVIL,ZESTRIL) tablet 20 mg  20 mg Oral Daily Kain Delgadillo MD   20 mg at 11/02/21 0911   • melatonin tablet 6 mg  6 mg Oral Nightly PRN Kain Delgadillo MD       • methylPREDNISolone sodium succinate (SOLU-Medrol) injection 40 mg  40 mg Intravenous Q12H Kain Delgadillo MD   40 mg at 11/02/21 0914   • metoprolol succinate XL (TOPROL-XL) 24 hr tablet 50 mg  50 mg Oral Q24H Kain Delgadillo MD   50 mg at 11/02/21 0905   • morphine injection 1 mg  1 mg Intravenous Q4H PRN Kain Delgadillo MD        And   • naloxone (NARCAN) injection 0.4 mg  0.4 mg Intravenous Q5 Min PRN Kain Delgadillo MD       • ondansetron (ZOFRAN) tablet 4 mg  4 mg Oral Q6H PRN Kain Delgadillo MD   4 mg at 10/29/21 2146    Or   • ondansetron (ZOFRAN) injection 4 mg  4 mg Intravenous Q6H PRN Kain Delgadillo MD   4 mg at 11/02/21 0540   • oxyCODONE-acetaminophen (PERCOCET) 7.5-325 MG per tablet 1 tablet  1 tablet Oral Q6H PRN Kain Delgadillo MD   1 tablet at 11/01/21 1923   • pantoprazole (PROTONIX) EC tablet 40 mg  40 mg Oral QAM Kain Delgadillo MD   40 mg at 11/02/21 0633   • sertraline (ZOLOFT) tablet 100 mg  100 mg Oral BID Kain Delgadillo MD   100 mg at 11/02/21 0911   • sodium chloride 0.9 % flush 10 mL  10 mL Intravenous PRN Kain Delgadillo MD       • sodium chloride 0.9 % flush 10 mL  10 mL Intravenous PRN Kain Delgadillo MD       • sodium chloride 0.9 % flush 10 mL  10 mL Intravenous Q12H Kain Delgadillo MD   10 mL at 11/02/21 0912   • sodium chloride 0.9 % flush 10 mL  10 mL Intravenous PRN Kain Delgadillo MD       • tiZANidine (ZANAFLEX) tablet 4 mg  4 mg Oral Nightly PRN  Kain Delgadillo MD   4 mg at 10/28/21 2112       Objective     Review of Systems:   Review of Systems   Constitutional: Negative for chills, fatigue, fever and unexpected weight change.   HENT: Negative for congestion, ear discharge, hearing loss, nosebleeds and sore throat.    Eyes: Negative for pain, discharge and redness.   Respiratory: Positive for shortness of breath. Negative for cough, chest tightness and wheezing.    Cardiovascular: Negative for chest pain and palpitations.   Gastrointestinal: Negative for abdominal distention, abdominal pain, blood in stool, constipation, diarrhea, nausea and vomiting.   Endocrine: Negative for cold intolerance, polydipsia, polyphagia and polyuria.   Genitourinary: Negative for dysuria, flank pain, frequency, hematuria and urgency.   Musculoskeletal: Negative for arthralgias, back pain, joint swelling and myalgias.   Skin: Negative for color change, pallor and rash.   Neurological: Negative for tremors, seizures, syncope, weakness and headaches.   Hematological: Negative for adenopathy. Does not bruise/bleed easily.   Psychiatric/Behavioral: Negative for behavioral problems, confusion, dysphoric mood, hallucinations and suicidal ideas. The patient is not nervous/anxious.        Physical Exam:   Temp:  [97.3 °F (36.3 °C)-98.9 °F (37.2 °C)] 98.9 °F (37.2 °C)  Heart Rate:  [] 89  Resp:  [16-23] 17  BP: (125-187)/(64-93) 125/64     Physical Exam:  General Appearance:    Alert, cooperative, in no acute distress   Head:    Normocephalic, without obvious abnormality, atraumatic   Eyes:            Lids and lashes normal, conjunctivae and sclerae normal, no   icterus, no pallor, corneas clear, PERRLA   Ears:    Ears appear intact with no abnormalities noted   Throat:   No oral lesions, no thrush, oral mucosa moist   Neck:   No adenopathy, supple, trachea midline, no thyromegaly, no     carotid bruit, no JVD   Back:     No kyphosis present, no scoliosis present, no skin  lesions,       erythema or scars, no tenderness to percussion or                   palpation,   range of motion normal   Lungs:     Clear to auscultation,respirations regular, even and                   unlabored    Heart:    Regular rhythm and normal rate, normal S1 and S2, no            murmur, no gallop, no rub, no click   Breast Exam:    Deferred   Abdomen:     Normal bowel sounds, no masses, no organomegaly, soft        nontender, nondistended, no guarding, no rebound                 tenderness   Genitalia:    Deferred   Extremities:   Moves all extremities well, no edema, no cyanosis, no              redness   Pulses:   Pulses palpable and equal bilaterally   Skin:   No bleeding, bruising or rash   Lymph nodes:   No palpable adenopathy   Neurologic:   Cranial nerves 2 - 12 grossly intact, sensation intact, DTR        present and equal bilaterally      Results Review:     Lab Results (last 24 hours)     Procedure Component Value Units Date/Time    Urinalysis With Microscopic - Urine, Clean Catch [[id number]]  (Abnormal) Collected: 11/02/21 1449    Specimen: Urine, Clean Catch Updated: 11/02/21 1506    Narrative:      The following orders were created for panel order Urinalysis With Microscopic - Urine, Clean Catch.  Procedure                               Abnormality         Status                     ---------                               -----------         ------                     Urinalysis without micro...[[id number]]  Normal              Final result               Urinalysis, Microscopic ...[[id number]]  Abnormal            Final result                 Please view results for these tests on the individual orders.    Urinalysis, Microscopic Only - Urine, Clean Catch [[id number]]  (Abnormal) Collected: 11/02/21 1449    Specimen: Urine, Clean Catch Updated: 11/02/21 1506     RBC, UA 0-2 /HPF      WBC, UA None Seen /HPF      Bacteria, UA None Seen /HPF      Squamous Epithelial Cells, UA None Seen /HPF       Hyaline Casts, UA None Seen /LPF      Methodology Automated Microscopy    Urinalysis without microscopic (no culture) - Urine, Clean Catch [[id number]]  (Normal) Collected: 11/02/21 1449    Specimen: Urine, Clean Catch Updated: 11/02/21 1506     Color, UA Yellow     Appearance, UA Clear     pH, UA 6.0     Specific Gravity, UA 1.020     Glucose, UA Negative     Ketones, UA Negative     Bilirubin, UA Negative     Blood, UA Negative     Protein, UA Negative     Leuk Esterase, UA Negative     Nitrite, UA Negative     Urobilinogen, UA 0.2 E.U./dL    Comprehensive Metabolic Panel [[id number]]  (Abnormal) Collected: 11/02/21 0604    Specimen: Blood Updated: 11/02/21 0743     Glucose 152 mg/dL      BUN 31 mg/dL      Creatinine 1.11 mg/dL      Sodium 135 mmol/L      Potassium 3.4 mmol/L      Chloride 96 mmol/L      CO2 23.0 mmol/L      Calcium 9.5 mg/dL      Total Protein 7.5 g/dL      Albumin 4.40 g/dL      ALT (SGPT) 55 U/L      AST (SGOT) 41 U/L      Alkaline Phosphatase 75 U/L      Total Bilirubin 0.8 mg/dL      eGFR Non African Amer 69 mL/min/1.73      Globulin 3.1 gm/dL      A/G Ratio 1.4 g/dL      BUN/Creatinine Ratio 27.9     Anion Gap 16.0 mmol/L     Narrative:      GFR Normal >60  Chronic Kidney Disease <60  Kidney Failure <15      BNP [[id number]]  (Normal) Collected: 11/02/21 0604    Specimen: Blood Updated: 11/02/21 0706     proBNP 163.4 pg/mL     Narrative:      Among patients with dyspnea, NT-proBNP is highly sensitive for the detection of acute congestive heart failure. In addition NT-proBNP of <300 pg/ml effectively rules out acute congestive heart failure with 99% negative predictive value.    Results may be falsely decreased if patient taking Biotin.      Tissue Pathology Exam [[id number]] Collected: 11/01/21 1131    Specimen: Tissue from Gastric, Antrum; Tissue from Esophagus, Distal Updated: 11/02/21 0655    CBC Auto Differential [[id number]]  (Abnormal) Collected: 11/02/21 0604    Specimen: Blood Updated:  11/02/21 0653     WBC 16.32 10*3/mm3      RBC 4.96 10*6/mm3      Hemoglobin 15.1 g/dL      Hematocrit 44.1 %      MCV 88.9 fL      MCH 30.4 pg      MCHC 34.2 g/dL      RDW 13.6 %      RDW-SD 43.3 fl      MPV 9.7 fL      Platelets 326 10*3/mm3      Neutrophil % 76.9 %      Lymphocyte % 12.9 %      Monocyte % 6.9 %      Eosinophil % 0.1 %      Basophil % 0.4 %      Immature Grans % 2.8 %      Neutrophils, Absolute 12.54 10*3/mm3      Lymphocytes, Absolute 2.11 10*3/mm3      Monocytes, Absolute 1.13 10*3/mm3      Eosinophils, Absolute 0.01 10*3/mm3      Basophils, Absolute 0.07 10*3/mm3      Immature Grans, Absolute 0.46 10*3/mm3      nRBC 0.0 /100 WBC            I reviewed the patient's new clinical results.  I reviewed the patient's new imaging results and agree with the interpretation.     ASSESSMENT/PLAN:   ASSESSMENT: 1.  Dysphagia, likely due to dysmotility.  2.  GERD, well controlled with PPI.  3.  Gastritis  4.  Congestive heart failure    PLAN: 1.  Continue PPI and Bentyl.  2.  Diet as tolerated.  3.  Okay to DC from GI standpoint.  The risks, benefits, and alternatives of this procedure have been discussed with the patient or the responsible party- the patient understands and agrees to proceed.         Kain Delgadillo MD  11/02/21  16:07 CDT

## 2021-11-03 ENCOUNTER — APPOINTMENT (OUTPATIENT)
Dept: GENERAL RADIOLOGY | Facility: HOSPITAL | Age: 56
End: 2021-11-03

## 2021-11-03 LAB
ALBUMIN SERPL-MCNC: 4 G/DL (ref 3.5–5.2)
ALBUMIN/GLOB SERPL: 1.3 G/DL
ALP SERPL-CCNC: 74 U/L (ref 39–117)
ALT SERPL W P-5'-P-CCNC: 56 U/L (ref 1–41)
ANION GAP SERPL CALCULATED.3IONS-SCNC: 9 MMOL/L (ref 5–15)
AST SERPL-CCNC: 42 U/L (ref 1–40)
BASOPHILS # BLD AUTO: 0.07 10*3/MM3 (ref 0–0.2)
BASOPHILS NFR BLD AUTO: 0.4 % (ref 0–1.5)
BILIRUB SERPL-MCNC: 0.8 MG/DL (ref 0–1.2)
BUN SERPL-MCNC: 31 MG/DL (ref 6–20)
BUN/CREAT SERPL: 35.2 (ref 7–25)
CALCIUM SPEC-SCNC: 9.5 MG/DL (ref 8.6–10.5)
CHLORIDE SERPL-SCNC: 97 MMOL/L (ref 98–107)
CO2 SERPL-SCNC: 26 MMOL/L (ref 22–29)
CREAT SERPL-MCNC: 0.88 MG/DL (ref 0.76–1.27)
DEPRECATED RDW RBC AUTO: 44.8 FL (ref 37–54)
EOSINOPHIL # BLD AUTO: 0.02 10*3/MM3 (ref 0–0.4)
EOSINOPHIL NFR BLD AUTO: 0.1 % (ref 0.3–6.2)
ERYTHROCYTE [DISTWIDTH] IN BLOOD BY AUTOMATED COUNT: 13.6 % (ref 12.3–15.4)
GFR SERPL CREATININE-BSD FRML MDRD: 90 ML/MIN/1.73
GLOBULIN UR ELPH-MCNC: 3.2 GM/DL
GLUCOSE SERPL-MCNC: 134 MG/DL (ref 65–99)
HCT VFR BLD AUTO: 44.8 % (ref 37.5–51)
HGB BLD-MCNC: 15.1 G/DL (ref 13–17.7)
IMM GRANULOCYTES # BLD AUTO: 0.5 10*3/MM3 (ref 0–0.05)
IMM GRANULOCYTES NFR BLD AUTO: 3 % (ref 0–0.5)
LAB AP CASE REPORT: NORMAL
LYMPHOCYTES # BLD AUTO: 2.05 10*3/MM3 (ref 0.7–3.1)
LYMPHOCYTES NFR BLD AUTO: 12.2 % (ref 19.6–45.3)
MCH RBC QN AUTO: 30.3 PG (ref 26.6–33)
MCHC RBC AUTO-ENTMCNC: 33.7 G/DL (ref 31.5–35.7)
MCV RBC AUTO: 90 FL (ref 79–97)
MONOCYTES # BLD AUTO: 0.86 10*3/MM3 (ref 0.1–0.9)
MONOCYTES NFR BLD AUTO: 5.1 % (ref 5–12)
NEUTROPHILS NFR BLD AUTO: 13.3 10*3/MM3 (ref 1.7–7)
NEUTROPHILS NFR BLD AUTO: 79.2 % (ref 42.7–76)
NRBC BLD AUTO-RTO: 0 /100 WBC (ref 0–0.2)
PATH REPORT.FINAL DX SPEC: NORMAL
PLATELET # BLD AUTO: 283 10*3/MM3 (ref 140–450)
PMV BLD AUTO: 9.5 FL (ref 6–12)
POTASSIUM SERPL-SCNC: 4.3 MMOL/L (ref 3.5–5.2)
PROCALCITONIN SERPL-MCNC: 0.15 NG/ML (ref 0–0.25)
PROT SERPL-MCNC: 7.2 G/DL (ref 6–8.5)
RBC # BLD AUTO: 4.98 10*6/MM3 (ref 4.14–5.8)
SODIUM SERPL-SCNC: 132 MMOL/L (ref 136–145)
WBC # BLD AUTO: 16.8 10*3/MM3 (ref 3.4–10.8)

## 2021-11-03 PROCEDURE — 25010000002 METHYLPREDNISOLONE PER 40 MG: Performed by: INTERNAL MEDICINE

## 2021-11-03 PROCEDURE — 94760 N-INVAS EAR/PLS OXIMETRY 1: CPT

## 2021-11-03 PROCEDURE — 25010000002 FUROSEMIDE PER 20 MG: Performed by: FAMILY MEDICINE

## 2021-11-03 PROCEDURE — 25010000002 FUROSEMIDE PER 20 MG: Performed by: INTERNAL MEDICINE

## 2021-11-03 PROCEDURE — 80053 COMPREHEN METABOLIC PANEL: CPT | Performed by: INTERNAL MEDICINE

## 2021-11-03 PROCEDURE — 99231 SBSQ HOSP IP/OBS SF/LOW 25: CPT | Performed by: INTERNAL MEDICINE

## 2021-11-03 PROCEDURE — 84145 PROCALCITONIN (PCT): CPT | Performed by: FAMILY MEDICINE

## 2021-11-03 PROCEDURE — 71045 X-RAY EXAM CHEST 1 VIEW: CPT

## 2021-11-03 PROCEDURE — 25010000002 HEPARIN (PORCINE) PER 1000 UNITS: Performed by: INTERNAL MEDICINE

## 2021-11-03 PROCEDURE — 85025 COMPLETE CBC W/AUTO DIFF WBC: CPT | Performed by: INTERNAL MEDICINE

## 2021-11-03 PROCEDURE — 94799 UNLISTED PULMONARY SVC/PX: CPT

## 2021-11-03 PROCEDURE — 94664 DEMO&/EVAL PT USE INHALER: CPT

## 2021-11-03 PROCEDURE — 94660 CPAP INITIATION&MGMT: CPT

## 2021-11-03 RX ORDER — NICOTINE 21 MG/24HR
1 PATCH, TRANSDERMAL 24 HOURS TRANSDERMAL
Status: DISCONTINUED | OUTPATIENT
Start: 2021-11-03 | End: 2021-11-06 | Stop reason: HOSPADM

## 2021-11-03 RX ORDER — FUROSEMIDE 10 MG/ML
40 INJECTION INTRAMUSCULAR; INTRAVENOUS ONCE
Status: COMPLETED | OUTPATIENT
Start: 2021-11-03 | End: 2021-11-03

## 2021-11-03 RX ORDER — PREDNISONE 20 MG/1
40 TABLET ORAL
Status: DISCONTINUED | OUTPATIENT
Start: 2021-11-04 | End: 2021-11-06 | Stop reason: HOSPADM

## 2021-11-03 RX ADMIN — IPRATROPIUM BROMIDE 0.5 MG: 0.5 SOLUTION RESPIRATORY (INHALATION) at 10:31

## 2021-11-03 RX ADMIN — HEPARIN SODIUM 5000 UNITS: 5000 INJECTION INTRAVENOUS; SUBCUTANEOUS at 13:53

## 2021-11-03 RX ADMIN — ALPRAZOLAM 1 MG: 1 TABLET ORAL at 20:49

## 2021-11-03 RX ADMIN — SERTRALINE 100 MG: 50 TABLET, FILM COATED ORAL at 08:16

## 2021-11-03 RX ADMIN — METOPROLOL SUCCINATE 50 MG: 50 TABLET, EXTENDED RELEASE ORAL at 08:16

## 2021-11-03 RX ADMIN — TIZANIDINE 4 MG: 4 TABLET ORAL at 22:58

## 2021-11-03 RX ADMIN — SERTRALINE 100 MG: 50 TABLET, FILM COATED ORAL at 20:29

## 2021-11-03 RX ADMIN — DICYCLOMINE HYDROCHLORIDE 10 MG: 10 CAPSULE ORAL at 17:02

## 2021-11-03 RX ADMIN — IPRATROPIUM BROMIDE 0.5 MG: 0.5 SOLUTION RESPIRATORY (INHALATION) at 07:11

## 2021-11-03 RX ADMIN — HEPARIN SODIUM 5000 UNITS: 5000 INJECTION INTRAVENOUS; SUBCUTANEOUS at 05:37

## 2021-11-03 RX ADMIN — ALPRAZOLAM 1 MG: 1 TABLET ORAL at 10:58

## 2021-11-03 RX ADMIN — HEPARIN SODIUM 5000 UNITS: 5000 INJECTION INTRAVENOUS; SUBCUTANEOUS at 22:58

## 2021-11-03 RX ADMIN — DICYCLOMINE HYDROCHLORIDE 10 MG: 10 CAPSULE ORAL at 08:16

## 2021-11-03 RX ADMIN — DICYCLOMINE HYDROCHLORIDE 10 MG: 10 CAPSULE ORAL at 10:58

## 2021-11-03 RX ADMIN — LISINOPRIL 20 MG: 20 TABLET ORAL at 08:16

## 2021-11-03 RX ADMIN — GABAPENTIN 300 MG: 300 CAPSULE ORAL at 08:16

## 2021-11-03 RX ADMIN — SODIUM CHLORIDE, PRESERVATIVE FREE 10 ML: 5 INJECTION INTRAVENOUS at 08:16

## 2021-11-03 RX ADMIN — PANTOPRAZOLE SODIUM 40 MG: 40 TABLET, DELAYED RELEASE ORAL at 05:37

## 2021-11-03 RX ADMIN — GABAPENTIN 300 MG: 300 CAPSULE ORAL at 20:29

## 2021-11-03 RX ADMIN — NICOTINE 1 PATCH: 21 PATCH, EXTENDED RELEASE TRANSDERMAL at 12:20

## 2021-11-03 RX ADMIN — GUAIFENESIN 1200 MG: 600 TABLET, EXTENDED RELEASE ORAL at 08:16

## 2021-11-03 RX ADMIN — FUROSEMIDE 40 MG: 10 INJECTION INTRAMUSCULAR; INTRAVENOUS at 12:20

## 2021-11-03 RX ADMIN — DICYCLOMINE HYDROCHLORIDE 10 MG: 10 CAPSULE ORAL at 20:29

## 2021-11-03 RX ADMIN — FUROSEMIDE 40 MG: 10 INJECTION INTRAMUSCULAR; INTRAVENOUS at 08:16

## 2021-11-03 RX ADMIN — PHENOL 2 SPRAY: 1.5 LIQUID ORAL at 05:37

## 2021-11-03 RX ADMIN — SODIUM CHLORIDE, PRESERVATIVE FREE 10 ML: 5 INJECTION INTRAVENOUS at 20:29

## 2021-11-03 RX ADMIN — METHYLPREDNISOLONE SODIUM SUCCINATE 40 MG: 40 INJECTION, POWDER, FOR SOLUTION INTRAMUSCULAR; INTRAVENOUS at 10:58

## 2021-11-03 RX ADMIN — IPRATROPIUM BROMIDE 0.5 MG: 0.5 SOLUTION RESPIRATORY (INHALATION) at 14:35

## 2021-11-03 RX ADMIN — GUAIFENESIN 1200 MG: 600 TABLET, EXTENDED RELEASE ORAL at 20:29

## 2021-11-03 RX ADMIN — IPRATROPIUM BROMIDE 0.5 MG: 0.5 SOLUTION RESPIRATORY (INHALATION) at 19:33

## 2021-11-03 NOTE — PROGRESS NOTES
[facility] Medicine Services  INPATIENT PROGRESS NOTE    Length of Stay: 7  Date of Admission: 10/27/2021  Primary Care Physician: Halle Baron MD    Subjective   Chief Complaint: Shortness of breath  HPI: Still very tired today. Not sleeping well at night. Continues with shortness of breath above his baseline but does feel this has improved some. O2 weaned this am from 4 ot 3lt NC.  No previous home oxygen requirement.  Tolerating diet. Still has some difficulty swallowing but no pain or choking.   No chest pain.   No fever, chills, cough, congestion.       Review of Systems   Constitutional: Negative for chills and fever.   HENT: Negative for congestion.    Respiratory: Positive for shortness of breath. Negative for cough and wheezing.    Cardiovascular: Negative for chest pain and palpitations.   Gastrointestinal: Negative for abdominal pain, constipation, diarrhea, nausea and vomiting.   Genitourinary: Negative.    Musculoskeletal: Negative.    Skin: Negative.    Neurological: Negative.    Psychiatric/Behavioral: Negative.       All pertinent negatives and positives are as above. All other systems have been reviewed and are negative unless otherwise stated.     Objective    Temp:  [96.8 °F (36 °C)-98.9 °F (37.2 °C)] 98.4 °F (36.9 °C)  Heart Rate:  [72-95] 83  Resp:  [16-23] 18  BP: (125-144)/(63-76) 132/76    Physical Exam  Constitutional:       General: He is not in acute distress.     Appearance: He is not toxic-appearing.   HENT:      Head: Normocephalic and atraumatic.      Nose: Nose normal.      Mouth/Throat:      Mouth: Mucous membranes are moist.   Eyes:      Conjunctiva/sclera: Conjunctivae normal.   Cardiovascular:      Rate and Rhythm: Normal rate and regular rhythm.      Pulses: Normal pulses.      Heart sounds: Normal heart sounds.   Pulmonary:      Effort: Pulmonary effort is normal.      Breath sounds: Normal breath sounds.      Comments: Diminished  breath sounds bilaterally  With shallow inspiratory effort  Abdominal:      General: Bowel sounds are normal.      Palpations: Abdomen is soft.      Tenderness: There is no abdominal tenderness.   Musculoskeletal:      Cervical back: Neck supple.      Right lower leg: No edema.      Left lower leg: No edema.   Skin:     General: Skin is warm and dry.      Capillary Refill: Capillary refill takes less than 2 seconds.   Neurological:      General: No focal deficit present.      Mental Status: He is alert and oriented to person, place, and time.   Psychiatric:         Mood and Affect: Mood normal.         Behavior: Behavior normal.           Results Review:  I have reviewed the labs, radiology results, and diagnostic studies.    Laboratory Data:   Results from last 7 days   Lab Units 11/03/21  0509 11/02/21  0604 11/01/21  0537   SODIUM mmol/L 132* 135* 137   POTASSIUM mmol/L 4.3 3.4* 4.3   CHLORIDE mmol/L 97* 96* 99   CO2 mmol/L 26.0 23.0 26.0   BUN mg/dL 31* 31* 31*   CREATININE mg/dL 0.88 1.11 0.99   GLUCOSE mg/dL 134* 152* 152*   CALCIUM mg/dL 9.5 9.5 9.6   BILIRUBIN mg/dL 0.8 0.8 0.7   ALK PHOS U/L 74 75 68   ALT (SGPT) U/L 56* 55* 48*   AST (SGOT) U/L 42* 41* 49*   ANION GAP mmol/L 9.0 16.0* 12.0     Estimated Creatinine Clearance: 132.7 mL/min (by C-G formula based on SCr of 0.88 mg/dL).          Results from last 7 days   Lab Units 11/03/21  0509 11/02/21  0604 11/01/21  0537 10/31/21  0556 10/29/21  0439   WBC 10*3/mm3 16.80* 16.32* 11.59* 15.71* 8.00   HEMOGLOBIN g/dL 15.1 15.1 14.4 13.7 12.8*   HEMATOCRIT % 44.8 44.1 41.8 41.1 38.1   PLATELETS 10*3/mm3 283 326 222 242 179           Culture Data:   No results found for: BLOODCX  No results found for: URINECX  No results found for: RESPCX  No results found for: WOUNDCX  No results found for: STOOLCX  No components found for: BODYFLD    Radiology Data:   Imaging Results (Last 24 Hours)     Procedure Component Value Units Date/Time    XR Chest 1 View [[id number]]  Collected: 11/03/21 0533     Updated: 11/03/21 0820    Narrative:      Chest x-ray single view.     CLINICAL INDICATION: Shortness of breath. Respiratory failure,  pneumonia.    COMPARISON: Chest November 2, 2021.     FINDINGS: Cardiac silhouette is enlarged in size. Pulmonary  vascularity is unremarkable.     Interval development of slight increased markings, patchy  infiltrative changes left lung base. This may represent early  changes of pneumonitis.      Impression:      As above.    Electronically signed by:  Star Ibrahim MD  11/3/2021 8:19 AM CDT  Workstation: [id number]          I have reviewed the patient's current medications.     Assessment/Plan     Principal Problem:    Esophageal dysphagia  Active Problems:    LAWRENCE (acute kidney injury) (HCC)    Acute congestive heart failure (HCC)    Acute respiratory failure with hypoxia and hypercapnia (HCC)    Pneumonia    Electrolyte Imbalance    Leukocytosis    Plan:  -Suspect that his respiratory failure is likely secondary to combination of pneumonia and heart failure as well as some untreated sleep apnea.  -Continue with antibiotics with Rocephin and azithromycin for now. Repeat XR with minimal change, PCT negative  -Continue with diuresis with Lasix 40 mg daily IV. One additional dose 40mg IV given now.   -Echocardiogram showing preserved EF so likely some of his vascular congestion would be related to HFpEF.   -LAWRENCE remains improved.  -Noted uptrend in WBC. No associated fever. CXR improved. PCT and UA negative. Suspect this is likely related to steroid dosing-tapered to PO prednisone today. Follow clinical course.   -Continue bronchodilator therapy, mucinex, o2 support as needed.   -Appreciate GI assistance, s/p EGD today-esophagitis, gastritis. Biopsies pending. Continue PPI.   -DVT prophylaxis with heparin  -CODE STATUS: Full    I confirmed that the patient's Advance Care Plan is present, code status is documented, or surrogate decision maker is listed in  the patient's medical record.     Discharge Planning: Anticipate DC in next 2-3 days as able to wean supplemental o2.     Gemma Hernandez MD

## 2021-11-03 NOTE — CONSULTS
Adult Nutrition  Assessment    Patient Name:  Jon Gill  YOB: 1965  MRN: [id number]  Admit Date:  10/27/2021    Assessment Date:  11/3/2021    Comments:  Pt seen 11/2/21.  Pt indicated his appetite was improving.  Intake 100% - 2x, 75% - 1x, 50% - 1x.  Pt  Reported having some nausea, having dry heaves, and having diarrhea.  Protonix, PRN Zofran prescribed.  Pt had no questions regarding diet ed  Provided previously.  Will maintain pt on prescribed diet and encourage intake.      Reason for Assessment     Row Name 11/03/21 1552 11/03/21 1248       Reason for Assessment    Reason For Assessment follow-up protocol follow-up protocol               Nutrition/Diet History     Row Name 11/03/21 1248          Nutrition/Diet History    Typical Food/Fluid Intake Pt reports that he is eating well.  NO difficutly swallowing at this time. It came on very fast and seems to have cleared.  No questions regarding Low sodium diet                  Labs/Tests/Procedures/Meds     Row Name 11/03/21 1553 11/03/21 1249       Labs/Procedures/Meds    Lab Results Reviewed reviewed, pertinent --    Lab Results Comments -- Na 132; Gluc 134; Bun 31       Diagnostic Tests/Procedures    Diagnostic Test/Procedures Comments -- Lasix; EGD       Medications    Pertinent Medications Reviewed reviewed, pertinent --    Pertinent Medications Comments -- Lasix; Bentyl; Prononis; Prednisone               Physical Findings     Row Name 11/03/21 1555 11/03/21 1252       Physical Findings    Overall Physical Appearance on oxygen therapy; overweight on oxygen therapy; overweight                 Nutrition Prescription Ordered     Row Name 11/03/21 1555 11/03/21 1252       Nutrition Prescription PO    Current PO Diet Regular Regular    Common Modifiers Cardiac; Consistent Carbohydrate Cardiac; Consistent Carbohydrate               Evaluation of Received Nutrient/Fluid Intake     Row Name 11/03/21 1556 11/03/21 1253       PO Evaluation     Number of Meals 4 8    % PO Intake 100% - 2x, 75% - 1x, 50% - 2x 84% average                    Electronically signed by:  Linn Leiva RD  11/03/21 15:57 CDT

## 2021-11-03 NOTE — PLAN OF CARE
Goal Outcome Evaluation:  Plan of Care Reviewed With: patient        Progress: improving    Patient improving. WBC still elevated. IV Solu medrol changed to PO prednisone today. Nicotine patch added at patient's request. Extra dose IV lasix given today with adequate output. O2 decreased to 3L NC with sats 92-93%. Patient does not wear home oxygen. No shortness of air noted this shift. Patient wears bipap at HS. Vitals stable. Will continue to monitor.

## 2021-11-03 NOTE — PAYOR COMM NOTE
"  Paris Siddiqui RN [facility]  [phone]      Phone  [phone]        Fax  Cont stay review      Berenice Gill (56 y.o. Male)             Date of Birth Social Security Number Address Home Phone MRN    1965  [address] [place] [address] [phone] [id number]    [employer] Status             Jainism        Admission Date Admission Type Admitting Provider Attending Provider Department, Room/Bed    10/27/21 Emergency Andres Bell MD Craig, David A, MD [address], 321/1    Discharge Date Discharge Disposition Discharge Destination                         Attending Provider: Andres Bell MD    Allergies: Ketamine Hcl    Isolation: None   Infection: None   Code Status: CPR   Advance Care Planning Activity    Ht: 182.9 cm (72.01\")   Wt: 128 kg (281 lb 12.8 oz)    Admission Cmt: None   Principal Problem: Esophageal dysphagia [R13.19] More...                 Active Insurance as of 10/27/2021     Primary Coverage     Payor Plan Insurance Group Employer/Plan Group    [employer]HE[employer] BLUE CROSS AN[employer] BLUE CROSS BLUE [employer] PPO [id number]     Payor Plan Address Payor Plan Phone Number Payor Plan Fax Number Effective Dates    [address] [phone]  3/1/2013 - None Entered    [facility] [id number]       Subscriber Name Subscriber Birth Date Member ID       BERENICE GILL 1965 [id number]                 Emergency Contacts      (Rel.) Home Phone Work Phone Mobile Phone    VIJAY GILL (Spouse) [phone] -- [phone]            Vital Signs (last day)     Date/Time Temp Temp src Pulse Resp BP Patient Position SpO2    11/03/21 1103 98.4 (36.9) Oral 83 18 132/76 Lying 92    11/03/21 1040 -- -- 80 16 -- -- --    11/03/21 1031 -- -- 79 18 -- -- 93    11/03/21 0739 97.4 (36.3) Oral 84 18 127/72 Lying 94    11/03/21 0733 -- -- 80 -- -- -- --    11/03/21 0720 -- -- 86 18 -- -- --    " 11/03/21 0711 -- -- 82 18 -- -- 91    11/03/21 0530 -- -- -- 18 -- -- --    11/03/21 0358 -- -- 87 22 -- -- 95    11/03/21 0301 96.8 (36) Infrared 84 23 141/73 Lying 95    11/03/21 0126 -- -- 72 -- -- -- --    11/03/21 0107 -- -- 74 23 -- -- 97    11/02/21 2254 97.3 (36.3) Infrared 85 18 139/65 Lying 95    11/02/21 1956 -- -- 95 -- -- -- --    11/02/21 1950 -- -- 90 18 -- -- 94    11/02/21 1941 97.9 (36.6) Infrared 92 17 144/63 Lying 94    11/02/21 1604 -- -- 89 17 -- -- --    11/02/21 1559 -- -- 93 18 -- -- 96    11/02/21 1535 -- -- 87 -- -- -- --    11/02/21 1441 98.9 (37.2) Temporal 88 18 125/64 Lying 93    11/02/21 1106 -- -- 81 -- -- -- --    11/02/21 1059 -- -- 108 18 -- -- 97    11/02/21 0741 98.1 (36.7) Oral 90 18 169/85 Sitting 92    11/02/21 0730 -- -- 90 -- -- -- --    11/02/21 0726 -- -- 95 -- -- -- --    11/02/21 0718 -- -- 96 18 -- -- 96    11/02/21 0623 -- -- 88 -- -- -- --    11/02/21 0555 -- -- 109 -- -- -- --    11/02/21 0546 -- -- 128 -- -- -- --    11/02/21 0538 -- -- 142 -- -- -- --    11/02/21 0530 -- -- 123 -- -- -- --    11/02/21 0523 -- -- 147 -- -- -- --    11/02/21 0417 98 (36.7) Oral 95 23 168/93 Lying 98    11/02/21 0344 -- -- 95 23 -- -- 98    11/02/21 0133 -- -- 102 22 -- -- 98    11/02/21 0044 -- -- 102 -- -- -- --          Oxygen Therapy (last day)     Date/Time SpO2 Device (Oxygen Therapy) Flow (L/min) Oxygen Concentration (%) ETCO2 (mmHg)    11/03/21 1103 92 humidified; nasal cannula 3 -- --    11/03/21 1031 93 humidified; nasal cannula 3 -- --    11/03/21 0913 -- humidified; nasal cannula 3 -- --    11/03/21 0821 -- -- 3 -- --    11/03/21 0739 94 humidified; nasal cannula 4 -- --    11/03/21 0711 91 humidified; nasal cannula 4 -- --    11/03/21 0530 -- humidified; nasal cannula 4 -- --    11/03/21 0358 95 NPPV/NIV -- 45 --    11/03/21 0301 95 -- -- -- --    11/03/21 0107 97 NPPV/NIV -- 45 --    11/02/21 2254 95 -- -- -- --    11/02/21 1956 -- humidified; nasal cannula 4 -- --     11/02/21 1950 94 humidified; nasal cannula 4 -- --    11/02/21 1941 94 -- -- -- --    11/02/21 1604 -- humidified; nasal cannula 4 -- --    11/02/21 1559 96 humidified; nasal cannula 4 -- --    11/02/21 1441 93 nasal cannula -- -- --    11/02/21 1106 -- nasal cannula 4 -- --    11/02/21 1059 97 nasal cannula 4 -- --    11/02/21 0741 92 nasal cannula -- -- --    11/02/21 0726 -- nasal cannula 4 -- --    11/02/21 0718 96 nasal cannula 4 -- --    11/02/21 0417 98 NPPV/NIV -- -- --    11/02/21 0344 98 NPPV/NIV -- 45 --    11/02/21 0133 98 NPPV/NIV -- 45 --          Current Facility-Administered Medications   Medication Dose Route Frequency Provider Last Rate Last Admin   • acetaminophen (TYLENOL) tablet 650 mg  650 mg Oral Q4H PRN Kain Delgadillo MD   650 mg at 11/01/21 1335    Or   • acetaminophen (TYLENOL) 160 MG/5ML solution 650 mg  650 mg Oral Q4H PRN Kain Delgadillo MD        Or   • acetaminophen (TYLENOL) suppository 650 mg  650 mg Rectal Q4H PRN Kain Delgadillo MD       • ALPRAZolam (XANAX) tablet 1 mg  1 mg Oral TID PRN Kain Delgadillo MD   1 mg at 11/03/21 1058   • calcium carbonate (TUMS) chewable tablet 500 mg (200 mg elemental)  2 tablet Oral BID PRN Kain Delgadillo MD       • dicyclomine (BENTYL) capsule 10 mg  10 mg Oral 4x Daily AC & at Bedtime Kain Delgadillo MD   10 mg at 11/03/21 1058   • furosemide (LASIX) injection 40 mg  40 mg Intravenous Daily Kain Delgadillo MD   40 mg at 11/03/21 0816   • gabapentin (NEURONTIN) capsule 300 mg  300 mg Oral BID Kain Delgadillo MD   300 mg at 11/03/21 0816   • guaiFENesin (MUCINEX) 12 hr tablet 1,200 mg  1,200 mg Oral Q12H Kain Delgadillo MD   1,200 mg at 11/03/21 0816   • heparin (porcine) 5000 UNIT/ML injection 5,000 Units  5,000 Units Subcutaneous Q8H Kain Delgadillo MD   5,000 Units at 11/03/21 0537   • hydrALAZINE (APRESOLINE) injection 20 mg  20 mg Intravenous Q6H PRN Kain Delgadillo MD   20 mg at 11/01/21 2020   • ipratropium  (ATROVENT) nebulizer solution 0.5 mg  0.5 mg Nebulization 4x Daily - RT Kain Delgadillo MD   0.5 mg at 11/03/21 1031   • lisinopril (PRINIVIL,ZESTRIL) tablet 20 mg  20 mg Oral Daily Kain Delgadillo MD   20 mg at 11/03/21 0816   • melatonin tablet 6 mg  6 mg Oral Nightly PRN Kain Delgadillo MD       • metoprolol succinate XL (TOPROL-XL) 24 hr tablet 50 mg  50 mg Oral Q24H Kain Delgadillo MD   50 mg at 11/03/21 0816   • morphine injection 1 mg  1 mg Intravenous Q4H PRN Kain Delgadillo MD        And   • naloxone (NARCAN) injection 0.4 mg  0.4 mg Intravenous Q5 Min PRN Kain Delgadillo MD       • nicotine (NICODERM CQ) 21 MG/24HR patch 1 patch  1 patch Transdermal Q24H Gemma Hernandez MD   1 patch at 11/03/21 1220   • ondansetron (ZOFRAN) tablet 4 mg  4 mg Oral Q6H PRN Kain Delgadillo MD   4 mg at 10/29/21 2146    Or   • ondansetron (ZOFRAN) injection 4 mg  4 mg Intravenous Q6H PRN Kain Delgadillo MD   4 mg at 11/02/21 0540   • oxyCODONE-acetaminophen (PERCOCET) 7.5-325 MG per tablet 1 tablet  1 tablet Oral Q6H PRN Kain Delgadillo MD   1 tablet at 11/02/21 2213   • pantoprazole (PROTONIX) EC tablet 40 mg  40 mg Oral QAM Kain Delgadillo MD   40 mg at 11/03/21 0537   • phenol (CHLORASEPTIC) 1.4 % liquid 2 spray  2 spray Mouth/Throat Q2H PRN Marilyn Gutiérrez MD   2 spray at 11/03/21 0537   • [START ON 11/4/2021] predniSONE (DELTASONE) tablet 40 mg  40 mg Oral Daily With Breakfast Gemma Hernandez MD       • sertraline (ZOLOFT) tablet 100 mg  100 mg Oral BID Kain Delgadillo MD   100 mg at 11/03/21 0816   • sodium chloride 0.9 % flush 10 mL  10 mL Intravenous PRN Kain Delgadillo MD       • sodium chloride 0.9 % flush 10 mL  10 mL Intravenous PRN Kain Delgadillo MD       • sodium chloride 0.9 % flush 10 mL  10 mL Intravenous Q12H Kain Delgadillo MD   10 mL at 11/03/21 0816   • sodium chloride 0.9 % flush 10 mL  10 mL Intravenous PRN Kain Delgadillo MD       • tiZANidine  (ZANAFLEX) tablet 4 mg  4 mg Oral Nightly PRN Kain Delgadillo MD   4 mg at 10/28/21 2112        Physician Progress Notes (last 48 hours)      Gemma Hernandez MD at 11/03/21 1136              [facility] Medicine Services  INPATIENT PROGRESS NOTE    Length of Stay: 7  Date of Admission: 10/27/2021  Primary Care Physician: Halle Baron MD    Subjective   Chief Complaint: Shortness of breath  HPI: Still very tired today. Not sleeping well at night. Continues with shortness of breath above his baseline but does feel this has improved some. O2 weaned this am from 4 ot 3lt NC.  No previous home oxygen requirement.  Tolerating diet. Still has some difficulty swallowing but no pain or choking.   No chest pain.   No fever, chills, cough, congestion.       Review of Systems   Constitutional: Negative for chills and fever.   HENT: Negative for congestion.    Respiratory: Positive for shortness of breath. Negative for cough and wheezing.    Cardiovascular: Negative for chest pain and palpitations.   Gastrointestinal: Negative for abdominal pain, constipation, diarrhea, nausea and vomiting.   Genitourinary: Negative.    Musculoskeletal: Negative.    Skin: Negative.    Neurological: Negative.    Psychiatric/Behavioral: Negative.       All pertinent negatives and positives are as above. All other systems have been reviewed and are negative unless otherwise stated.     Objective    Temp:  [96.8 °F (36 °C)-98.9 °F (37.2 °C)] 98.4 °F (36.9 °C)  Heart Rate:  [72-95] 83  Resp:  [16-23] 18  BP: (125-144)/(63-76) 132/76    Physical Exam  Constitutional:       General: He is not in acute distress.     Appearance: He is not toxic-appearing.   HENT:      Head: Normocephalic and atraumatic.      Nose: Nose normal.      Mouth/Throat:      Mouth: Mucous membranes are moist.   Eyes:      Conjunctiva/sclera: Conjunctivae normal.   Cardiovascular:      Rate and Rhythm: Normal rate and regular rhythm.       Pulses: Normal pulses.      Heart sounds: Normal heart sounds.   Pulmonary:      Effort: Pulmonary effort is normal.      Breath sounds: Normal breath sounds.      Comments: Diminished breath sounds bilaterally  With shallow inspiratory effort  Abdominal:      General: Bowel sounds are normal.      Palpations: Abdomen is soft.      Tenderness: There is no abdominal tenderness.   Musculoskeletal:      Cervical back: Neck supple.      Right lower leg: No edema.      Left lower leg: No edema.   Skin:     General: Skin is warm and dry.      Capillary Refill: Capillary refill takes less than 2 seconds.   Neurological:      General: No focal deficit present.      Mental Status: He is alert and oriented to person, place, and time.   Psychiatric:         Mood and Affect: Mood normal.         Behavior: Behavior normal.           Results Review:  I have reviewed the labs, radiology results, and diagnostic studies.    Laboratory Data:   Results from last 7 days   Lab Units 11/03/21  0509 11/02/21  0604 11/01/21  0537   SODIUM mmol/L 132* 135* 137   POTASSIUM mmol/L 4.3 3.4* 4.3   CHLORIDE mmol/L 97* 96* 99   CO2 mmol/L 26.0 23.0 26.0   BUN mg/dL 31* 31* 31*   CREATININE mg/dL 0.88 1.11 0.99   GLUCOSE mg/dL 134* 152* 152*   CALCIUM mg/dL 9.5 9.5 9.6   BILIRUBIN mg/dL 0.8 0.8 0.7   ALK PHOS U/L 74 75 68   ALT (SGPT) U/L 56* 55* 48*   AST (SGOT) U/L 42* 41* 49*   ANION GAP mmol/L 9.0 16.0* 12.0     Estimated Creatinine Clearance: 132.7 mL/min (by C-G formula based on SCr of 0.88 mg/dL).          Results from last 7 days   Lab Units 11/03/21  0509 11/02/21  0604 11/01/21  0537 10/31/21  0556 10/29/21  0439   WBC 10*3/mm3 16.80* 16.32* 11.59* 15.71* 8.00   HEMOGLOBIN g/dL 15.1 15.1 14.4 13.7 12.8*   HEMATOCRIT % 44.8 44.1 41.8 41.1 38.1   PLATELETS 10*3/mm3 283 326 222 242 179           Culture Data:   No results found for: BLOODCX  No results found for: URINECX  No results found for: RESPCX  No results found for: WOUNDCX  No  results found for: STOOLCX  No components found for: BODYFLD    Radiology Data:   Imaging Results (Last 24 Hours)     Procedure Component Value Units Date/Time    XR Chest 1 View [[id number]] Collected: 11/03/21 0533     Updated: 11/03/21 0820    Narrative:      Chest x-ray single view.     CLINICAL INDICATION: Shortness of breath. Respiratory failure,  pneumonia.    COMPARISON: Chest November 2, 2021.     FINDINGS: Cardiac silhouette is enlarged in size. Pulmonary  vascularity is unremarkable.     Interval development of slight increased markings, patchy  infiltrative changes left lung base. This may represent early  changes of pneumonitis.      Impression:      As above.    Electronically signed by:  Star Ibrahim MD  11/3/2021 8:19 AM CDT  Workstation: [id number]          I have reviewed the patient's current medications.     Assessment/Plan     Principal Problem:    Esophageal dysphagia  Active Problems:    LAWRENCE (acute kidney injury) (HCC)    Acute congestive heart failure (HCC)    Acute respiratory failure with hypoxia and hypercapnia (HCC)    Pneumonia    Electrolyte Imbalance    Leukocytosis    Plan:  -Suspect that his respiratory failure is likely secondary to combination of pneumonia and heart failure as well as some untreated sleep apnea.  -Continue with antibiotics with Rocephin and azithromycin for now. Repeat XR with minimal change, PCT negative  -Continue with diuresis with Lasix 40 mg daily IV. One additional dose 40mg IV given now.   -Echocardiogram showing preserved EF so likely some of his vascular congestion would be related to HFpEF.   -LAWRENCE remains improved.  -Noted uptrend in WBC. No associated fever. CXR improved. PCT and UA negative. Suspect this is likely related to steroid dosing-tapered to PO prednisone today. Follow clinical course.   -Continue bronchodilator therapy, mucinex, o2 support as needed.   -Appreciate GI assistance, s/p EGD today-esophagitis, gastritis. Biopsies pending. Continue  PPI.   -DVT prophylaxis with heparin  -CODE STATUS: Full    I confirmed that the patient's Advance Care Plan is present, code status is documented, or surrogate decision maker is listed in the patient's medical record.     Discharge Planning: Anticipate DC in next 2-3 days as able to wean supplemental o2.     Gemma Hernandez MD      Electronically signed by Gemma Hernandez MD at 11/03/21 1142     Kain Delgadillo MD at 11/02/21 1606                  SUBJECTIVE:   11/2/2021  Chief Complaint:     Subjective      Patient feels better today.  Dyspnea is improving.  Tolerating diet.  Dysphagia is improving as well.  EGD yesterday was consistent with esophagitis and gastritis.  Path is pending.    History:  Past Medical History:   Diagnosis Date   • Anxiety    • Arthritis    • Crohn's disease (HCC)    • Depression    • Hypertension      Past Surgical History:   Procedure Laterality Date   • ABDOMINAL HERNIA REPAIR     • COLON RESECTION  1988   • COLONOSCOPY  10/26/2015    internal hemorrhoids,otherwise normal postoperative colonoscopy with the evaluation of his ti   • ENDOSCOPY N/A 11/1/2021    Procedure: ESOPHAGOGASTRODUODENOSCOPY;  Surgeon: Kain Delgadillo MD;  Location: [facility] ENDOSCOPY;  Service: Gastroenterology;  Laterality: N/A;     History reviewed. No pertinent family history.  Social History     Tobacco Use   • Smoking status: Former Smoker   • Smokeless tobacco: Never Used   Substance Use Topics   • Alcohol use: Yes     Comment: very rarely   • Drug use: No     Medications Prior to Admission   Medication Sig Dispense Refill Last Dose   • Adalimumab (HUMIRA) 40 MG/0.4ML Prefilled Syringe Kit injection Inject 40 mg under the skin into the appropriate area as directed See Admin Instructions.   10/27/2021 at Unknown time   • ALPRAZolam (XANAX) 1 MG tablet TK 1 T PO BID PRF ANXIETY  2 10/27/2021 at Unknown time   • dicyclomine (BENTYL) 10 MG capsule Take 10 mg by mouth 4 (Four) Times a Day Before Meals & at  Bedtime.   10/27/2021 at Unknown time   • gabapentin (NEURONTIN) 300 MG capsule Take 300 mg by mouth 2 (Two) Times a Day.   10/27/2021 at Unknown time   • lansoprazole (PREVACID) 15 MG capsule Take 15 mg by mouth Daily.   10/27/2021 at Unknown time   • metoprolol succinate XL (TOPROL-XL) 50 MG 24 hr tablet Take 50 mg by mouth every night at bedtime.   10/26/2021 at Unknown time   • oxyCODONE-acetaminophen (PERCOCET) 7.5-325 MG per tablet Take 1 tablet by mouth Every 6 (Six) Hours As Needed.   10/27/2021 at Unknown time   • promethazine (PHENERGAN) 12.5 MG tablet Take 1 tablet by mouth Every 6 (Six) Hours As Needed for Nausea or Vomiting (If still nauseated after zofran). 30 tablet 1 10/27/2021 at Unknown time   • sertraline (ZOLOFT) 100 MG tablet TK 1 T PO BID  5 10/27/2021 at Unknown time   • tiZANidine (ZANAFLEX) 4 MG tablet Take 4 mg by mouth At Night As Needed for Muscle Spasms.   10/26/2021 at Unknown time   • ondansetron (ZOFRAN) 8 MG tablet Take 1 tablet by mouth Every 6 (Six) Hours As Needed for Nausea or Vomiting (give 30 minutes prior to antibiotic administration). 30 tablet 1      Allergies:  Ketamine hcl     CURRENT MEDICATIONS/OBJECTIVE/VS/PE:     Current Medications:     Current Facility-Administered Medications   Medication Dose Route Frequency Provider Last Rate Last Admin   • acetaminophen (TYLENOL) tablet 650 mg  650 mg Oral Q4H PRN Kain Delgadillo MD   650 mg at 11/01/21 1335    Or   • acetaminophen (TYLENOL) 160 MG/5ML solution 650 mg  650 mg Oral Q4H PRN Kain Delgadillo MD        Or   • acetaminophen (TYLENOL) suppository 650 mg  650 mg Rectal Q4H PRN Kain Delgadillo MD       • ALPRAZolam (XANAX) tablet 1 mg  1 mg Oral TID PRN Kain Delgadillo MD   1 mg at 11/02/21 0911   • calcium carbonate (TUMS) chewable tablet 500 mg (200 mg elemental)  2 tablet Oral BID PRN Kain Delgadillo MD       • dicyclomine (BENTYL) capsule 10 mg  10 mg Oral 4x Daily AC & at Bedtime Kain Delgadillo MD    10 mg at 11/02/21 1408   • furosemide (LASIX) injection 40 mg  40 mg Intravenous Daily Kain Delgadillo MD   40 mg at 11/02/21 0905   • gabapentin (NEURONTIN) capsule 300 mg  300 mg Oral BID Kain Delgadillo MD   300 mg at 11/02/21 0905   • guaiFENesin (MUCINEX) 12 hr tablet 1,200 mg  1,200 mg Oral Q12H Kain Delgadillo MD   1,200 mg at 11/02/21 0905   • heparin (porcine) 5000 UNIT/ML injection 5,000 Units  5,000 Units Subcutaneous Q8H Kain Delgadillo MD   5,000 Units at 11/02/21 1407   • hydrALAZINE (APRESOLINE) injection 20 mg  20 mg Intravenous Q6H PRN Kain Delgadillo MD   20 mg at 11/01/21 2020   • ipratropium (ATROVENT) nebulizer solution 0.5 mg  0.5 mg Nebulization 4x Daily - RT Kain Delgadillo MD   0.5 mg at 11/02/21 1559   • lisinopril (PRINIVIL,ZESTRIL) tablet 20 mg  20 mg Oral Daily Kain Delgadillo MD   20 mg at 11/02/21 0911   • melatonin tablet 6 mg  6 mg Oral Nightly PRN Kain Delgadillo MD       • methylPREDNISolone sodium succinate (SOLU-Medrol) injection 40 mg  40 mg Intravenous Q12H Kain Delgadillo MD   40 mg at 11/02/21 0914   • metoprolol succinate XL (TOPROL-XL) 24 hr tablet 50 mg  50 mg Oral Q24H Kain Delgadillo MD   50 mg at 11/02/21 0905   • morphine injection 1 mg  1 mg Intravenous Q4H PRN Kain Delgdaillo MD        And   • naloxone (NARCAN) injection 0.4 mg  0.4 mg Intravenous Q5 Min PRN Kain Delgadillo MD       • ondansetron (ZOFRAN) tablet 4 mg  4 mg Oral Q6H PRN Kain Delgadillo MD   4 mg at 10/29/21 2146    Or   • ondansetron (ZOFRAN) injection 4 mg  4 mg Intravenous Q6H PRN Kain Delgadillo MD   4 mg at 11/02/21 0540   • oxyCODONE-acetaminophen (PERCOCET) 7.5-325 MG per tablet 1 tablet  1 tablet Oral Q6H PRN Kain Delgadillo MD   1 tablet at 11/01/21 1923   • pantoprazole (PROTONIX) EC tablet 40 mg  40 mg Oral QAM Kain Delgadillo MD   40 mg at 11/02/21 0633   • sertraline (ZOLOFT) tablet 100 mg  100 mg Oral BID Kain Delgadillo MD   100 mg at  11/02/21 0911   • sodium chloride 0.9 % flush 10 mL  10 mL Intravenous PRN Kain Delgadillo MD       • sodium chloride 0.9 % flush 10 mL  10 mL Intravenous PRN Kain Delgadillo MD       • sodium chloride 0.9 % flush 10 mL  10 mL Intravenous Q12H Kain Delgadillo MD   10 mL at 11/02/21 0912   • sodium chloride 0.9 % flush 10 mL  10 mL Intravenous PRN Kain Delgadillo MD       • tiZANidine (ZANAFLEX) tablet 4 mg  4 mg Oral Nightly PRN Kain Delgadillo MD   4 mg at 10/28/21 2112       Objective     Review of Systems:   Review of Systems   Constitutional: Negative for chills, fatigue, fever and unexpected weight change.   HENT: Negative for congestion, ear discharge, hearing loss, nosebleeds and sore throat.    Eyes: Negative for pain, discharge and redness.   Respiratory: Positive for shortness of breath. Negative for cough, chest tightness and wheezing.    Cardiovascular: Negative for chest pain and palpitations.   Gastrointestinal: Negative for abdominal distention, abdominal pain, blood in stool, constipation, diarrhea, nausea and vomiting.   Endocrine: Negative for cold intolerance, polydipsia, polyphagia and polyuria.   Genitourinary: Negative for dysuria, flank pain, frequency, hematuria and urgency.   Musculoskeletal: Negative for arthralgias, back pain, joint swelling and myalgias.   Skin: Negative for color change, pallor and rash.   Neurological: Negative for tremors, seizures, syncope, weakness and headaches.   Hematological: Negative for adenopathy. Does not bruise/bleed easily.   Psychiatric/Behavioral: Negative for behavioral problems, confusion, dysphoric mood, hallucinations and suicidal ideas. The patient is not nervous/anxious.        Physical Exam:   Temp:  [97.3 °F (36.3 °C)-98.9 °F (37.2 °C)] 98.9 °F (37.2 °C)  Heart Rate:  [] 89  Resp:  [16-23] 17  BP: (125-187)/(64-93) 125/64     Physical Exam:  General Appearance:    Alert, cooperative, in no acute distress   Head:     Normocephalic, without obvious abnormality, atraumatic   Eyes:            Lids and lashes normal, conjunctivae and sclerae normal, no   icterus, no pallor, corneas clear, PERRLA   Ears:    Ears appear intact with no abnormalities noted   Throat:   No oral lesions, no thrush, oral mucosa moist   Neck:   No adenopathy, supple, trachea midline, no thyromegaly, no     carotid bruit, no JVD   Back:     No kyphosis present, no scoliosis present, no skin lesions,       erythema or scars, no tenderness to percussion or                   palpation,   range of motion normal   Lungs:     Clear to auscultation,respirations regular, even and                   unlabored    Heart:    Regular rhythm and normal rate, normal S1 and S2, no            murmur, no gallop, no rub, no click   Breast Exam:    Deferred   Abdomen:     Normal bowel sounds, no masses, no organomegaly, soft        nontender, nondistended, no guarding, no rebound                 tenderness   Genitalia:    Deferred   Extremities:   Moves all extremities well, no edema, no cyanosis, no              redness   Pulses:   Pulses palpable and equal bilaterally   Skin:   No bleeding, bruising or rash   Lymph nodes:   No palpable adenopathy   Neurologic:   Cranial nerves 2 - 12 grossly intact, sensation intact, DTR        present and equal bilaterally      Results Review:     Lab Results (last 24 hours)     Procedure Component Value Units Date/Time    Urinalysis With Microscopic - Urine, Clean Catch [[id number]]  (Abnormal) Collected: 11/02/21 1449    Specimen: Urine, Clean Catch Updated: 11/02/21 1506    Narrative:      The following orders were created for panel order Urinalysis With Microscopic - Urine, Clean Catch.  Procedure                               Abnormality         Status                     ---------                               -----------         ------                     Urinalysis without micro...[[id number]]  Normal              Final result                Urinalysis, Microscopic ...[[id number]]  Abnormal            Final result                 Please view results for these tests on the individual orders.    Urinalysis, Microscopic Only - Urine, Clean Catch [[id number]]  (Abnormal) Collected: 11/02/21 1449    Specimen: Urine, Clean Catch Updated: 11/02/21 1506     RBC, UA 0-2 /HPF      WBC, UA None Seen /HPF      Bacteria, UA None Seen /HPF      Squamous Epithelial Cells, UA None Seen /HPF      Hyaline Casts, UA None Seen /LPF      Methodology Automated Microscopy    Urinalysis without microscopic (no culture) - Urine, Clean Catch [[id number]]  (Normal) Collected: 11/02/21 1449    Specimen: Urine, Clean Catch Updated: 11/02/21 1506     Color, UA Yellow     Appearance, UA Clear     pH, UA 6.0     Specific Gravity, UA 1.020     Glucose, UA Negative     Ketones, UA Negative     Bilirubin, UA Negative     Blood, UA Negative     Protein, UA Negative     Leuk Esterase, UA Negative     Nitrite, UA Negative     Urobilinogen, UA 0.2 E.U./dL    Comprehensive Metabolic Panel [[id number]]  (Abnormal) Collected: 11/02/21 0604    Specimen: Blood Updated: 11/02/21 0743     Glucose 152 mg/dL      BUN 31 mg/dL      Creatinine 1.11 mg/dL      Sodium 135 mmol/L      Potassium 3.4 mmol/L      Chloride 96 mmol/L      CO2 23.0 mmol/L      Calcium 9.5 mg/dL      Total Protein 7.5 g/dL      Albumin 4.40 g/dL      ALT (SGPT) 55 U/L      AST (SGOT) 41 U/L      Alkaline Phosphatase 75 U/L      Total Bilirubin 0.8 mg/dL      eGFR Non African Amer 69 mL/min/1.73      Globulin 3.1 gm/dL      A/G Ratio 1.4 g/dL      BUN/Creatinine Ratio 27.9     Anion Gap 16.0 mmol/L     Narrative:      GFR Normal >60  Chronic Kidney Disease <60  Kidney Failure <15      BNP [[id number]]  (Normal) Collected: 11/02/21 0604    Specimen: Blood Updated: 11/02/21 0706     proBNP 163.4 pg/mL     Narrative:      Among patients with dyspnea, NT-proBNP is highly sensitive for the detection of acute congestive heart failure. In  addition NT-proBNP of <300 pg/ml effectively rules out acute congestive heart failure with 99% negative predictive value.    Results may be falsely decreased if patient taking Biotin.      Tissue Pathology Exam [[id number]] Collected: 11/01/21 1131    Specimen: Tissue from Gastric, Antrum; Tissue from Esophagus, Distal Updated: 11/02/21 0655    CBC Auto Differential [[id number]]  (Abnormal) Collected: 11/02/21 0604    Specimen: Blood Updated: 11/02/21 0653     WBC 16.32 10*3/mm3      RBC 4.96 10*6/mm3      Hemoglobin 15.1 g/dL      Hematocrit 44.1 %      MCV 88.9 fL      MCH 30.4 pg      MCHC 34.2 g/dL      RDW 13.6 %      RDW-SD 43.3 fl      MPV 9.7 fL      Platelets 326 10*3/mm3      Neutrophil % 76.9 %      Lymphocyte % 12.9 %      Monocyte % 6.9 %      Eosinophil % 0.1 %      Basophil % 0.4 %      Immature Grans % 2.8 %      Neutrophils, Absolute 12.54 10*3/mm3      Lymphocytes, Absolute 2.11 10*3/mm3      Monocytes, Absolute 1.13 10*3/mm3      Eosinophils, Absolute 0.01 10*3/mm3      Basophils, Absolute 0.07 10*3/mm3      Immature Grans, Absolute 0.46 10*3/mm3      nRBC 0.0 /100 WBC            I reviewed the patient's new clinical results.  I reviewed the patient's new imaging results and agree with the interpretation.     ASSESSMENT/PLAN:   ASSESSMENT: 1.  Dysphagia, likely due to dysmotility.  2.  GERD, well controlled with PPI.  3.  Gastritis  4.  Congestive heart failure    PLAN: 1.  Continue PPI and Bentyl.  2.  Diet as tolerated.  3.  Okay to DC from GI standpoint.  The risks, benefits, and alternatives of this procedure have been discussed with the patient or the responsible party- the patient understands and agrees to proceed.         Kain Delgadillo MD  11/02/21  16:07 CDT          Electronically signed by Kain Delgadillo MD at 11/03/21 0702     Gemma Hernandez MD at 11/02/21 1142              [facility] Medicine Services  INPATIENT PROGRESS NOTE    Length of  Stay: 6  Date of Admission: 10/27/2021  Primary Care Physician: Halle Baron MD    Subjective   Chief Complaint: Shortness of breath  HPI: Still very tired today. Not sleeping well at night. Continues with shortness of breath above his baseline. No previous home oxygen requirement but still requiring 4lt by NC today.   No chest pain.   No fever, chills, cough, congestion.       Review of Systems   Constitutional: Negative for chills and fever.   HENT: Negative for congestion.    Respiratory: Positive for shortness of breath. Negative for cough and wheezing.    Cardiovascular: Negative for chest pain and palpitations.   Gastrointestinal: Negative for abdominal pain, constipation, diarrhea, nausea and vomiting.   Genitourinary: Negative.    Musculoskeletal: Negative.    Skin: Negative.    Neurological: Negative.    Psychiatric/Behavioral: Negative.       All pertinent negatives and positives are as above. All other systems have been reviewed and are negative unless otherwise stated.     Objective    Temp:  [97.3 °F (36.3 °C)-98.4 °F (36.9 °C)] 98.1 °F (36.7 °C)  Heart Rate:  [] 81  Resp:  [16-23] 18  BP: (148-187)/() 169/85    Physical Exam  Constitutional:       General: He is not in acute distress.     Appearance: He is not toxic-appearing.   HENT:      Head: Normocephalic and atraumatic.      Nose: Nose normal.      Mouth/Throat:      Mouth: Mucous membranes are moist.   Eyes:      Conjunctiva/sclera: Conjunctivae normal.   Cardiovascular:      Rate and Rhythm: Normal rate and regular rhythm.      Pulses: Normal pulses.      Heart sounds: Normal heart sounds.   Pulmonary:      Effort: Pulmonary effort is normal.      Breath sounds: Normal breath sounds.      Comments: Diminished breath sounds bilaterally  With shallow inspiratory effort  Abdominal:      General: Bowel sounds are normal.      Palpations: Abdomen is soft.      Tenderness: There is no abdominal tenderness.   Musculoskeletal:          General: No swelling.      Cervical back: Neck supple.      Right lower leg: No edema.      Left lower leg: No edema.   Skin:     General: Skin is warm and dry.      Capillary Refill: Capillary refill takes less than 2 seconds.   Neurological:      General: No focal deficit present.      Mental Status: He is alert and oriented to person, place, and time.      Coordination: Coordination normal.   Psychiatric:         Mood and Affect: Mood normal.         Behavior: Behavior normal.           Results Review:  I have reviewed the labs, radiology results, and diagnostic studies.    Laboratory Data:   Results from last 7 days   Lab Units 11/02/21  0604 11/01/21  0537 10/31/21  0556   SODIUM mmol/L 135* 137 136   POTASSIUM mmol/L 3.4* 4.3 4.3   CHLORIDE mmol/L 96* 99 97*   CO2 mmol/L 23.0 26.0 29.0   BUN mg/dL 31* 31* 32*   CREATININE mg/dL 1.11 0.99 0.81   GLUCOSE mg/dL 152* 152* 145*   CALCIUM mg/dL 9.5 9.6 9.8   BILIRUBIN mg/dL 0.8 0.7 0.6   ALK PHOS U/L 75 68 66   ALT (SGPT) U/L 55* 48* 24   AST (SGOT) U/L 41* 49* 28   ANION GAP mmol/L 16.0* 12.0 10.0     Estimated Creatinine Clearance: 105.2 mL/min (by C-G formula based on SCr of 1.11 mg/dL).          Results from last 7 days   Lab Units 11/02/21  0604 11/01/21  0537 10/31/21  0556 10/29/21  0439 10/28/21  0425   WBC 10*3/mm3 16.32* 11.59* 15.71* 8.00 9.57   HEMOGLOBIN g/dL 15.1 14.4 13.7 12.8* 12.3*   HEMATOCRIT % 44.1 41.8 41.1 38.1 37.4*   PLATELETS 10*3/mm3 326 222 242 179 128*           Culture Data:   No results found for: BLOODCX  No results found for: URINECX  No results found for: RESPCX  No results found for: WOUNDCX  No results found for: STOOLCX  No components found for: BODYFLD    Radiology Data:   Imaging Results (Last 24 Hours)     Procedure Component Value Units Date/Time    XR Chest 1 View [[id number]] Collected: 11/02/21 0505     Updated: 11/02/21 0624    Narrative:      EXAMINATION:  XR CHEST 1 VW    CLINICAL HISTORY:  56 years Male,respiratory  failure, I50.9 Heart  failure, unspecified J18.9 Pneumonia, unspecified organism J96.01  Acute respiratory failure with hypoxia J96.02 Acute respiratory  failure with hypercapnia R79.89 Other specified abnormal findings  of blood chemistry N17.9 Acute kidney failure, unspecified R13.19  Other dysphagia    COMPARISON:  Chest x-ray dated 10/27/2021    FINDINGS:  Mild cardiomegaly. Pulmonary vascular congestion has  improved relative to 10/27/2021. No pleural effusion or  pneumothorax.      Impression:      Cardiomegaly with improved pulmonary vascular  congestion relative to 10/27/2021.    Electronically signed by:  Reinaldo Guzman MD  11/2/2021 6:23 AM CDT  Workstation: [id number]          I have reviewed the patient's current medications.     Assessment/Plan     Principal Problem:    Esophageal dysphagia  Active Problems:    LAWRENCE (acute kidney injury) (HCC)    Acute congestive heart failure (HCC)    Acute respiratory failure with hypoxia and hypercapnia (HCC)    Pneumonia    Electrolyte Imbalance    Leukocytosis    Plan:  -Suspect that his respiratory failure is likely secondary to combination of pneumonia and heart failure as well as some untreated sleep apnea.  -Continue with antibiotics with Rocephin and azithromycin for now. Repeat XR shows improvement. Will add PCT level.   -Continue with diuresis with Lasix 40 mg daily IV  -Echocardiogram showing preserved EF so likely some of his vascular congestion would be related to HFpEF.   -LAWRENCE remains improved. Slight upward trend in creatinine today likely related to ongoing diuresis. Follow closely.   -Noted uptrend in WBC. No associated fever. CXR improved. Check PCT and UA.   -Continue bronchodilator therapy, mucinex, o2 support as needed.   -Appreciate GI assistance, s/p EGD today-esophagitis. Biopsies pending.   -DVT prophylaxis with heparin  -CODE STATUS: Full    I confirmed that the patient's Advance Care Plan is present, code status is documented, or surrogate  "decision maker is listed in the patient's medical record.     Discharge Planning: In process    Gemma Hernandez MD      Electronically signed by Gemma Hernandez MD at 11/02/21 0657     Gemma Hernandez MD at 11/01/21 1508              [facility] Medicine Services  INPATIENT PROGRESS NOTE    Length of Stay: 5  Date of Admission: 10/27/2021  Primary Care Physician: Halle Baron MD    Subjective   Chief Complaint: Shortness of breath  HPI: Patient status post EGD, returned to room approx 2 hours ago. Remains very tired, drifting off to sleep mid-sentence. Denies new complaints. Says he just had a breathing treatment and feels a little \"jittery\" otherwise breathing is unchanged today.       Review of Systems   Constitutional: Negative for chills and fever.   HENT: Negative for congestion.    Respiratory: Positive for shortness of breath. Negative for cough and wheezing.    Cardiovascular: Negative for chest pain and palpitations.   Gastrointestinal: Negative for abdominal pain, constipation, diarrhea, nausea and vomiting.   Genitourinary: Negative.    Musculoskeletal: Negative.    Skin: Negative.    Neurological: Negative.    Psychiatric/Behavioral: Negative.       All pertinent negatives and positives are as above. All other systems have been reviewed and are negative unless otherwise stated.     Objective    Temp:  [97 °F (36.1 °C)-98.6 °F (37 °C)] 97.3 °F (36.3 °C)  Heart Rate:  [] 100  Resp:  [16-20] 16  BP: (148-174)/() 162/91    Physical Exam  Constitutional:       General: He is not in acute distress.     Appearance: He is not toxic-appearing.      Comments: Lethargic but arousable post procedure   HENT:      Head: Normocephalic and atraumatic.      Nose: Nose normal.      Mouth/Throat:      Mouth: Mucous membranes are moist.   Eyes:      Conjunctiva/sclera: Conjunctivae normal.   Cardiovascular:      Rate and Rhythm: Normal rate and regular rhythm.      " Pulses: Normal pulses.      Heart sounds: Normal heart sounds.   Pulmonary:      Comments: Diminished breath sounds bilaterally  With shallow inspiratory effort  Abdominal:      General: Bowel sounds are normal.      Palpations: Abdomen is soft.      Tenderness: There is no abdominal tenderness.   Musculoskeletal:         General: No swelling.      Cervical back: Neck supple.      Right lower leg: No edema.      Left lower leg: No edema.   Skin:     General: Skin is warm and dry.      Capillary Refill: Capillary refill takes less than 2 seconds.   Neurological:      General: No focal deficit present.      Mental Status: He is oriented to person, place, and time.      Coordination: Coordination normal.   Psychiatric:         Mood and Affect: Mood normal.         Behavior: Behavior normal.           Results Review:  I have reviewed the labs, radiology results, and diagnostic studies.    Laboratory Data:   Results from last 7 days   Lab Units 11/01/21  0537 10/31/21  0556 10/29/21  0439   SODIUM mmol/L 137 136 140   POTASSIUM mmol/L 4.3 4.3 3.8   CHLORIDE mmol/L 99 97* 98   CO2 mmol/L 26.0 29.0 32.0*   BUN mg/dL 31* 32* 31*   CREATININE mg/dL 0.99 0.81 1.41*   GLUCOSE mg/dL 152* 145* 118*   CALCIUM mg/dL 9.6 9.8 9.7   BILIRUBIN mg/dL 0.7 0.6 1.1   ALK PHOS U/L 68 66 63   ALT (SGPT) U/L 48* 24 20   AST (SGOT) U/L 49* 28 28   ANION GAP mmol/L 12.0 10.0 10.0     Estimated Creatinine Clearance: 119.4 mL/min (by C-G formula based on SCr of 0.99 mg/dL).          Results from last 7 days   Lab Units 11/01/21  0537 10/31/21  0556 10/29/21  0439 10/28/21  0425 10/27/21  1026   WBC 10*3/mm3 11.59* 15.71* 8.00 9.57 15.06*   HEMOGLOBIN g/dL 14.4 13.7 12.8* 12.3* 13.0   HEMATOCRIT % 41.8 41.1 38.1 37.4* 39.5   PLATELETS 10*3/mm3 222 242 179 128* 177           Culture Data:   No results found for: BLOODCX  No results found for: URINECX  No results found for: RESPCX  No results found for: WOUNDCX  No results found for: STOOLCX  No  components found for: BODYFLD    Radiology Data:   Imaging Results (Last 24 Hours)     ** No results found for the last 24 hours. **          I have reviewed the patient's current medications.     Assessment/Plan     Principal Problem:    Esophageal dysphagia  Active Problems:    LAWRENCE (acute kidney injury) (HCC)    Acute congestive heart failure (HCC)    Acute respiratory failure with hypoxia and hypercapnia (HCC)    Pneumonia    Plan:  -Suspect that his respiratory failure is likely secondary to combination of pneumonia and heart failure as well as some untreated sleep apnea.  -Continue with antibiotics with Rocephin and azithromycin for now  -Continue with diuresis with Lasix 40 mg daily IV  -Echocardiogram showing preserved EF so likely some of his vascular congestion would be related to HFpEF. Repeat CXR in AM.   -Continue home medicines as appropriate  -LAWRENCE remains resolved.   -Continue bronchodilator therapy, mucinex, o2 support as needed.   -Appreciate GI assistance, s/p EGD today-esophagitis. Biopsies pending.   -DVT prophylaxis with heparin  -CODE STATUS: Full    I confirmed that the patient's Advance Care Plan is present, code status is documented, or surrogate decision maker is listed in the patient's medical record.     Discharge Planning: In process    Gemma Hernandez MD      Electronically signed by Gemma Hernandez MD at 11/01/21 1602       Medical Student Notes (last 24 hours)  Notes from 11/02/21 1230 through 11/03/21 1230   No notes of this type exist for this encounter.         Consult Notes (last 24 hours)  Notes from 11/02/21 1230 through 11/03/21 1230   No notes of this type exist for this encounter.

## 2021-11-03 NOTE — PLAN OF CARE
Goal Outcome Evaluation:  Plan of Care Reviewed With: patient        Progress: no change  Outcome Summary: Intake 100% - 2x, 75% - 1x, 50% - 1x.  Will maintain pt on prescribed diet.  Encourge intake.

## 2021-11-03 NOTE — CONSULTS
Adult Nutrition  Assessment    Patient Name:  Jon Gill  YOB: 1965  MRN: [id number]  Admit Date:  10/27/2021    Assessment Date:  11/3/2021    Comments: Pt eating lunch.  He has been seen by GI and underwent an EGD since I last visited for dysphagia.  He denies any dysphagia at this time.  MD notes dysphagia most likely due to dysmotility--PPI and Bentyl prescribed.  Good intake averaging ~85%.  Still being managed for Resp Failure due to pneumonia and Heart Failure--on Abx and Diuresis.  LAWRENCE improved.  Pt denies any questions regarding Low sodium diet and voices good understanding.  Will monitor prn.     Reason for Assessment     Row Name 11/03/21 1248          Reason for Assessment    Reason For Assessment follow-up protocol                Nutrition/Diet History     Row Name 11/03/21 1248          Nutrition/Diet History    Typical Food/Fluid Intake Pt reports that he is eating well.  NO difficutly swallowing at this time. It came on very fast and seems to have cleared.  No questions regarding Low sodium diet                Anthropometrics     Row Name 11/03/21 0530          Anthropometrics    Weight 128 kg (281 lb 12.8 oz)                Labs/Tests/Procedures/Meds     Row Name 11/03/21 1249          Labs/Procedures/Meds    Lab Results Comments Na 132; Gluc 134; Bun 31            Diagnostic Tests/Procedures    Diagnostic Test/Procedures Comments Lasix; EGD            Medications    Pertinent Medications Comments Lasix; Bentyl; Prononis; Prednisone                Physical Findings     Row Name 11/03/21 1252          Physical Findings    Overall Physical Appearance on oxygen therapy; overweight                  Nutrition Prescription Ordered     Row Name 11/03/21 1252          Nutrition Prescription PO    Current PO Diet Regular     Common Modifiers Cardiac; Consistent Carbohydrate                Evaluation of Received Nutrient/Fluid Intake     Row Name 11/03/21 1253          PO Evaluation     Number of Meals 8     % PO Intake 84% average                     Electronically signed by:  Hue Leal, LENO  11/03/21 12:57 CDT

## 2021-11-03 NOTE — PLAN OF CARE
Goal Outcome Evaluation:  Plan of Care Reviewed With: caregiver, patient        Progress: no change  Outcome Summary: Pt eating well at this time.  Diuresis continues.  He denies any questions regarding his Low sodium diet.  Of note--s/p EGD for dysphagia.  Pt denies any dysphagia at this time.   0.23

## 2021-11-04 LAB
ALBUMIN SERPL-MCNC: 4 G/DL (ref 3.5–5.2)
ALBUMIN/GLOB SERPL: 1.2 G/DL
ALP SERPL-CCNC: 83 U/L (ref 39–117)
ALT SERPL W P-5'-P-CCNC: 74 U/L (ref 1–41)
ANION GAP SERPL CALCULATED.3IONS-SCNC: 9 MMOL/L (ref 5–15)
AST SERPL-CCNC: 53 U/L (ref 1–40)
BASOPHILS # BLD AUTO: 0.07 10*3/MM3 (ref 0–0.2)
BASOPHILS NFR BLD AUTO: 0.4 % (ref 0–1.5)
BILIRUB SERPL-MCNC: 0.8 MG/DL (ref 0–1.2)
BUN SERPL-MCNC: 32 MG/DL (ref 6–20)
BUN/CREAT SERPL: 30.8 (ref 7–25)
CALCIUM SPEC-SCNC: 9.3 MG/DL (ref 8.6–10.5)
CHLORIDE SERPL-SCNC: 96 MMOL/L (ref 98–107)
CO2 SERPL-SCNC: 27 MMOL/L (ref 22–29)
CREAT SERPL-MCNC: 1.04 MG/DL (ref 0.76–1.27)
DEPRECATED RDW RBC AUTO: 44 FL (ref 37–54)
EOSINOPHIL # BLD AUTO: 0.28 10*3/MM3 (ref 0–0.4)
EOSINOPHIL NFR BLD AUTO: 1.7 % (ref 0.3–6.2)
ERYTHROCYTE [DISTWIDTH] IN BLOOD BY AUTOMATED COUNT: 13.5 % (ref 12.3–15.4)
GFR SERPL CREATININE-BSD FRML MDRD: 74 ML/MIN/1.73
GLOBULIN UR ELPH-MCNC: 3.4 GM/DL
GLUCOSE SERPL-MCNC: 117 MG/DL (ref 65–99)
HCT VFR BLD AUTO: 46.4 % (ref 37.5–51)
HGB BLD-MCNC: 15.6 G/DL (ref 13–17.7)
IMM GRANULOCYTES # BLD AUTO: 0.34 10*3/MM3 (ref 0–0.05)
IMM GRANULOCYTES NFR BLD AUTO: 2 % (ref 0–0.5)
LYMPHOCYTES # BLD AUTO: 3.13 10*3/MM3 (ref 0.7–3.1)
LYMPHOCYTES NFR BLD AUTO: 18.7 % (ref 19.6–45.3)
MCH RBC QN AUTO: 30.3 PG (ref 26.6–33)
MCHC RBC AUTO-ENTMCNC: 33.6 G/DL (ref 31.5–35.7)
MCV RBC AUTO: 90.1 FL (ref 79–97)
MONOCYTES # BLD AUTO: 1.03 10*3/MM3 (ref 0.1–0.9)
MONOCYTES NFR BLD AUTO: 6.2 % (ref 5–12)
NEUTROPHILS NFR BLD AUTO: 11.89 10*3/MM3 (ref 1.7–7)
NEUTROPHILS NFR BLD AUTO: 71 % (ref 42.7–76)
NRBC BLD AUTO-RTO: 0 /100 WBC (ref 0–0.2)
PLATELET # BLD AUTO: 295 10*3/MM3 (ref 140–450)
PMV BLD AUTO: 9.6 FL (ref 6–12)
POTASSIUM SERPL-SCNC: 3.5 MMOL/L (ref 3.5–5.2)
PROT SERPL-MCNC: 7.4 G/DL (ref 6–8.5)
RBC # BLD AUTO: 5.15 10*6/MM3 (ref 4.14–5.8)
SODIUM SERPL-SCNC: 132 MMOL/L (ref 136–145)
WBC # BLD AUTO: 16.74 10*3/MM3 (ref 3.4–10.8)

## 2021-11-04 PROCEDURE — 94799 UNLISTED PULMONARY SVC/PX: CPT

## 2021-11-04 PROCEDURE — 94760 N-INVAS EAR/PLS OXIMETRY 1: CPT

## 2021-11-04 PROCEDURE — 80053 COMPREHEN METABOLIC PANEL: CPT | Performed by: INTERNAL MEDICINE

## 2021-11-04 PROCEDURE — 94660 CPAP INITIATION&MGMT: CPT

## 2021-11-04 PROCEDURE — 85025 COMPLETE CBC W/AUTO DIFF WBC: CPT | Performed by: INTERNAL MEDICINE

## 2021-11-04 PROCEDURE — 25010000002 FUROSEMIDE PER 20 MG: Performed by: INTERNAL MEDICINE

## 2021-11-04 PROCEDURE — 63710000001 PREDNISONE PER 1 MG: Performed by: FAMILY MEDICINE

## 2021-11-04 PROCEDURE — 99232 SBSQ HOSP IP/OBS MODERATE 35: CPT | Performed by: INTERNAL MEDICINE

## 2021-11-04 PROCEDURE — 25010000002 HEPARIN (PORCINE) PER 1000 UNITS: Performed by: INTERNAL MEDICINE

## 2021-11-04 RX ORDER — LANOLIN ALCOHOL/MO/W.PET/CERES
6 CREAM (GRAM) TOPICAL NIGHTLY
Status: DISCONTINUED | OUTPATIENT
Start: 2021-11-04 | End: 2021-11-06 | Stop reason: HOSPADM

## 2021-11-04 RX ADMIN — TIZANIDINE 4 MG: 4 TABLET ORAL at 22:01

## 2021-11-04 RX ADMIN — DICYCLOMINE HYDROCHLORIDE 10 MG: 10 CAPSULE ORAL at 20:02

## 2021-11-04 RX ADMIN — HEPARIN SODIUM 5000 UNITS: 5000 INJECTION INTRAVENOUS; SUBCUTANEOUS at 22:01

## 2021-11-04 RX ADMIN — PANTOPRAZOLE SODIUM 40 MG: 40 TABLET, DELAYED RELEASE ORAL at 06:30

## 2021-11-04 RX ADMIN — LISINOPRIL 20 MG: 20 TABLET ORAL at 08:33

## 2021-11-04 RX ADMIN — GUAIFENESIN 1200 MG: 600 TABLET, EXTENDED RELEASE ORAL at 08:33

## 2021-11-04 RX ADMIN — HEPARIN SODIUM 5000 UNITS: 5000 INJECTION INTRAVENOUS; SUBCUTANEOUS at 13:54

## 2021-11-04 RX ADMIN — HEPARIN SODIUM 5000 UNITS: 5000 INJECTION INTRAVENOUS; SUBCUTANEOUS at 06:29

## 2021-11-04 RX ADMIN — GUAIFENESIN 1200 MG: 600 TABLET, EXTENDED RELEASE ORAL at 20:04

## 2021-11-04 RX ADMIN — IPRATROPIUM BROMIDE 0.5 MG: 0.5 SOLUTION RESPIRATORY (INHALATION) at 11:01

## 2021-11-04 RX ADMIN — CALCIUM CARBONATE (ANTACID) CHEW TAB 500 MG 2 TABLET: 500 CHEW TAB at 10:48

## 2021-11-04 RX ADMIN — FUROSEMIDE 40 MG: 10 INJECTION INTRAMUSCULAR; INTRAVENOUS at 08:32

## 2021-11-04 RX ADMIN — MELATONIN 6 MG: 3 TAB ORAL at 20:04

## 2021-11-04 RX ADMIN — IPRATROPIUM BROMIDE 0.5 MG: 0.5 SOLUTION RESPIRATORY (INHALATION) at 19:47

## 2021-11-04 RX ADMIN — NICOTINE 1 PATCH: 21 PATCH, EXTENDED RELEASE TRANSDERMAL at 08:34

## 2021-11-04 RX ADMIN — ALPRAZOLAM 1 MG: 1 TABLET ORAL at 17:09

## 2021-11-04 RX ADMIN — METOPROLOL SUCCINATE 50 MG: 50 TABLET, EXTENDED RELEASE ORAL at 08:33

## 2021-11-04 RX ADMIN — ALPRAZOLAM 1 MG: 1 TABLET ORAL at 08:38

## 2021-11-04 RX ADMIN — SERTRALINE 100 MG: 50 TABLET, FILM COATED ORAL at 20:04

## 2021-11-04 RX ADMIN — GABAPENTIN 300 MG: 300 CAPSULE ORAL at 20:04

## 2021-11-04 RX ADMIN — OXYCODONE HYDROCHLORIDE AND ACETAMINOPHEN 1 TABLET: 7.5; 325 TABLET ORAL at 17:09

## 2021-11-04 RX ADMIN — SODIUM CHLORIDE, PRESERVATIVE FREE 10 ML: 5 INJECTION INTRAVENOUS at 08:33

## 2021-11-04 RX ADMIN — DICYCLOMINE HYDROCHLORIDE 10 MG: 10 CAPSULE ORAL at 17:07

## 2021-11-04 RX ADMIN — DICYCLOMINE HYDROCHLORIDE 10 MG: 10 CAPSULE ORAL at 10:48

## 2021-11-04 RX ADMIN — SODIUM CHLORIDE, PRESERVATIVE FREE 10 ML: 5 INJECTION INTRAVENOUS at 20:04

## 2021-11-04 RX ADMIN — DICYCLOMINE HYDROCHLORIDE 10 MG: 10 CAPSULE ORAL at 08:33

## 2021-11-04 RX ADMIN — IPRATROPIUM BROMIDE 0.5 MG: 0.5 SOLUTION RESPIRATORY (INHALATION) at 15:21

## 2021-11-04 RX ADMIN — GABAPENTIN 300 MG: 300 CAPSULE ORAL at 08:33

## 2021-11-04 RX ADMIN — IPRATROPIUM BROMIDE 0.5 MG: 0.5 SOLUTION RESPIRATORY (INHALATION) at 07:59

## 2021-11-04 RX ADMIN — SERTRALINE 100 MG: 50 TABLET, FILM COATED ORAL at 08:33

## 2021-11-04 RX ADMIN — PREDNISONE 40 MG: 20 TABLET ORAL at 08:33

## 2021-11-04 NOTE — PROGRESS NOTES
SUBJECTIVE:   11/3/2021  Chief Complaint:     Subjective      Patient feels better today.  Dyspnea is improving.  Tolerating diet.  Dysphagia is improving as well.  EGD was consistent with esophagitis and gastritis.  Path is pending.    History:  Past Medical History:   Diagnosis Date   • Anxiety    • Arthritis    • Crohn's disease (HCC)    • Depression    • Hypertension      Past Surgical History:   Procedure Laterality Date   • ABDOMINAL HERNIA REPAIR     • COLON RESECTION  1988   • COLONOSCOPY  10/26/2015    internal hemorrhoids,otherwise normal postoperative colonoscopy with the evaluation of his ti   • ENDOSCOPY N/A 11/1/2021    Procedure: ESOPHAGOGASTRODUODENOSCOPY;  Surgeon: Kain Delgadillo MD;  Location: [facility] ENDOSCOPY;  Service: Gastroenterology;  Laterality: N/A;     History reviewed. No pertinent family history.  Social History     Tobacco Use   • Smoking status: Former Smoker   • Smokeless tobacco: Never Used   Substance Use Topics   • Alcohol use: Yes     Comment: very rarely   • Drug use: No     Medications Prior to Admission   Medication Sig Dispense Refill Last Dose   • Adalimumab (HUMIRA) 40 MG/0.4ML Prefilled Syringe Kit injection Inject 40 mg under the skin into the appropriate area as directed See Admin Instructions.   10/27/2021 at Unknown time   • ALPRAZolam (XANAX) 1 MG tablet TK 1 T PO BID PRF ANXIETY  2 10/27/2021 at Unknown time   • dicyclomine (BENTYL) 10 MG capsule Take 10 mg by mouth 4 (Four) Times a Day Before Meals & at Bedtime.   10/27/2021 at Unknown time   • gabapentin (NEURONTIN) 300 MG capsule Take 300 mg by mouth 2 (Two) Times a Day.   10/27/2021 at Unknown time   • lansoprazole (PREVACID) 15 MG capsule Take 15 mg by mouth Daily.   10/27/2021 at Unknown time   • metoprolol succinate XL (TOPROL-XL) 50 MG 24 hr tablet Take 50 mg by mouth every night at bedtime.   10/26/2021 at Unknown time   • oxyCODONE-acetaminophen (PERCOCET) 7.5-325 MG per tablet Take 1 tablet by  mouth Every 6 (Six) Hours As Needed.   10/27/2021 at Unknown time   • promethazine (PHENERGAN) 12.5 MG tablet Take 1 tablet by mouth Every 6 (Six) Hours As Needed for Nausea or Vomiting (If still nauseated after zofran). 30 tablet 1 10/27/2021 at Unknown time   • sertraline (ZOLOFT) 100 MG tablet TK 1 T PO BID  5 10/27/2021 at Unknown time   • tiZANidine (ZANAFLEX) 4 MG tablet Take 4 mg by mouth At Night As Needed for Muscle Spasms.   10/26/2021 at Unknown time   • ondansetron (ZOFRAN) 8 MG tablet Take 1 tablet by mouth Every 6 (Six) Hours As Needed for Nausea or Vomiting (give 30 minutes prior to antibiotic administration). 30 tablet 1      Allergies:  Ketamine hcl     CURRENT MEDICATIONS/OBJECTIVE/VS/PE:     Current Medications:     Current Facility-Administered Medications   Medication Dose Route Frequency Provider Last Rate Last Admin   • acetaminophen (TYLENOL) tablet 650 mg  650 mg Oral Q4H PRN Kain Delgadillo MD   650 mg at 11/01/21 1335    Or   • acetaminophen (TYLENOL) 160 MG/5ML solution 650 mg  650 mg Oral Q4H PRN Kain Delgadillo MD        Or   • acetaminophen (TYLENOL) suppository 650 mg  650 mg Rectal Q4H PRN Kain Delgadillo MD       • ALPRAZolam (XANAX) tablet 1 mg  1 mg Oral TID PRN Kain Delgadillo MD   1 mg at 11/03/21 2049   • calcium carbonate (TUMS) chewable tablet 500 mg (200 mg elemental)  2 tablet Oral BID PRN Kain Delgadillo MD       • dicyclomine (BENTYL) capsule 10 mg  10 mg Oral 4x Daily AC & at Bedtime Kain Delgadillo MD   10 mg at 11/03/21 2029   • furosemide (LASIX) injection 40 mg  40 mg Intravenous Daily Kain Delgadillo MD   40 mg at 11/03/21 0816   • gabapentin (NEURONTIN) capsule 300 mg  300 mg Oral BID Kain Delgadillo MD   300 mg at 11/03/21 2029   • guaiFENesin (MUCINEX) 12 hr tablet 1,200 mg  1,200 mg Oral Q12H Kain Delgadillo MD   1,200 mg at 11/03/21 2029   • heparin (porcine) 5000 UNIT/ML injection 5,000 Units  5,000 Units Subcutaneous Q8H Elie  MD Kain   5,000 Units at 11/03/21 2258   • hydrALAZINE (APRESOLINE) injection 20 mg  20 mg Intravenous Q6H PRN Kain Delgadillo MD   20 mg at 11/01/21 2020   • ipratropium (ATROVENT) nebulizer solution 0.5 mg  0.5 mg Nebulization 4x Daily - RT Kain Delgadillo MD   0.5 mg at 11/03/21 1933   • lisinopril (PRINIVIL,ZESTRIL) tablet 20 mg  20 mg Oral Daily Kain Delgadillo MD   20 mg at 11/03/21 0816   • melatonin tablet 6 mg  6 mg Oral Nightly PRN Kain Delgadillo MD       • metoprolol succinate XL (TOPROL-XL) 24 hr tablet 50 mg  50 mg Oral Q24H Kain Delgadillo MD   50 mg at 11/03/21 0816   • naloxone (NARCAN) injection 0.4 mg  0.4 mg Intravenous Q5 Min PRN Kain Delgadillo MD       • nicotine (NICODERM CQ) 21 MG/24HR patch 1 patch  1 patch Transdermal Q24H Gemma Hernandez MD   1 patch at 11/03/21 1220   • ondansetron (ZOFRAN) tablet 4 mg  4 mg Oral Q6H PRN Kain Delgadillo MD   4 mg at 10/29/21 2146    Or   • ondansetron (ZOFRAN) injection 4 mg  4 mg Intravenous Q6H PRN Kain Delgadillo MD   4 mg at 11/02/21 0540   • oxyCODONE-acetaminophen (PERCOCET) 7.5-325 MG per tablet 1 tablet  1 tablet Oral Q6H PRN Kain Delgadillo MD   1 tablet at 11/02/21 2213   • pantoprazole (PROTONIX) EC tablet 40 mg  40 mg Oral QAM Kain Delgadillo MD   40 mg at 11/03/21 0537   • phenol (CHLORASEPTIC) 1.4 % liquid 2 spray  2 spray Mouth/Throat Q2H PRN Marilyn Gutiérrez MD   2 spray at 11/03/21 0537   • [START ON 11/4/2021] predniSONE (DELTASONE) tablet 40 mg  40 mg Oral Daily With Breakfast Gemma Hernandez MD       • sertraline (ZOLOFT) tablet 100 mg  100 mg Oral BID Kain Delgadillo MD   100 mg at 11/03/21 2029   • sodium chloride 0.9 % flush 10 mL  10 mL Intravenous PRN Kain Delgadillo MD       • sodium chloride 0.9 % flush 10 mL  10 mL Intravenous PRN Kain Delgadlilo MD       • sodium chloride 0.9 % flush 10 mL  10 mL Intravenous Q12H Kain Delgadillo MD   10 mL at 11/03/21 2029   • sodium  chloride 0.9 % flush 10 mL  10 mL Intravenous PRN Kain Delgadillo MD       • tiZANidine (ZANAFLEX) tablet 4 mg  4 mg Oral Nightly PRN Kain Delgadillo MD   4 mg at 11/03/21 9340       Objective     Review of Systems:   Review of Systems   Constitutional: Negative for chills, fatigue, fever and unexpected weight change.   HENT: Negative for congestion, ear discharge, hearing loss, nosebleeds and sore throat.    Eyes: Negative for pain, discharge and redness.   Respiratory: Positive for shortness of breath. Negative for cough, chest tightness and wheezing.    Cardiovascular: Negative for chest pain and palpitations.   Gastrointestinal: Negative for abdominal distention, abdominal pain, blood in stool, constipation, diarrhea, nausea and vomiting.   Endocrine: Negative for cold intolerance, polydipsia, polyphagia and polyuria.   Genitourinary: Negative for dysuria, flank pain, frequency, hematuria and urgency.   Musculoskeletal: Negative for arthralgias, back pain, joint swelling and myalgias.   Skin: Negative for color change, pallor and rash.   Neurological: Negative for tremors, seizures, syncope, weakness and headaches.   Hematological: Negative for adenopathy. Does not bruise/bleed easily.   Psychiatric/Behavioral: Negative for behavioral problems, confusion, dysphoric mood, hallucinations and suicidal ideas. The patient is not nervous/anxious.        Physical Exam:   Temp:  [96.8 °F (36 °C)-98.6 °F (37 °C)] 97.6 °F (36.4 °C)  Heart Rate:  [] 105  Resp:  [16-23] 18  BP: (125-141)/(72-80) 133/80     Physical Exam:  General Appearance:    Alert, cooperative, in no acute distress   Head:    Normocephalic, without obvious abnormality, atraumatic   Eyes:            Lids and lashes normal, conjunctivae and sclerae normal, no   icterus, no pallor, corneas clear, PERRLA   Ears:    Ears appear intact with no abnormalities noted   Throat:   No oral lesions, no thrush, oral mucosa moist   Neck:   No adenopathy,  supple, trachea midline, no thyromegaly, no     carotid bruit, no JVD   Back:     No kyphosis present, no scoliosis present, no skin lesions,       erythema or scars, no tenderness to percussion or                   palpation,   range of motion normal   Lungs:     Clear to auscultation,respirations regular, even and                   unlabored    Heart:    Regular rhythm and normal rate, normal S1 and S2, no            murmur, no gallop, no rub, no click   Breast Exam:    Deferred   Abdomen:     Normal bowel sounds, no masses, no organomegaly, soft        nontender, nondistended, no guarding, no rebound                 tenderness   Genitalia:    Deferred   Extremities:   Moves all extremities well, no edema, no cyanosis, no              redness   Pulses:   Pulses palpable and equal bilaterally   Skin:   No bleeding, bruising or rash   Lymph nodes:   No palpable adenopathy   Neurologic:   Cranial nerves 2 - 12 grossly intact, sensation intact, DTR        present and equal bilaterally      Results Review:     Lab Results (last 24 hours)     Procedure Component Value Units Date/Time    Tissue Pathology Exam [[id number]] Collected: 11/01/21 1131    Specimen: Tissue from Gastric, Antrum; Tissue from Esophagus, Distal Updated: 11/03/21 1427     Case Report --     Surgical Pathology Report                         Case: [id number]                                  Authorizing Provider:  Kain Delgadillo MD      Collected:           11/01/2021 11:31 AM          Ordering Location:     [facility]   Received:            11/02/2021 06:55 AM                                 [facility]                                                     Pathologist:           Willy Henao MD                                                       Specimens:   1) - Gastric, Antrum                                                                                2) - Esophagus, Distal                                       "                                Final Diagnosis --     SEE SCANNED REPORT        Procalcitonin [[id number]]  (Normal) Collected: 11/03/21 0509    Specimen: Blood Updated: 11/03/21 0606     Procalcitonin 0.15 ng/mL     Narrative:      As a Marker for Sepsis (Non-Neonates):     1. <0.5 ng/mL represents a low risk of severe sepsis and/or septic shock.  2. >2 ng/mL represents a high risk of severe sepsis and/or septic shock.    As a Marker for Lower Respiratory Tract Infections that require antibiotic therapy:  PCT on Admission     Antibiotic Therapy             6-12 Hrs later  >0.5                          Strongly Recommended            >0.25 - <0.5             Recommended  0.1 - 0.25                  Discouraged                       Remeasure/reassess PCT  <0.1                         Strongly Discouraged         Remeasure/reassess PCT      As 28 day mortality risk marker: \"Change in Procalcitonin Result\" (>80% or <=80%) if Day 0 (or Day 1) and Day 4 values are available. Refer to http://www.[employer] Internationalpct-calculator.com/    Change in PCT <=80 %   A decrease of PCT levels below or equal to 80% defines a positive change in PCT test result representing a higher risk for 28-day all-cause mortality of patients diagnosed with severe sepsis or septic shock.    Change in PCT >80 %   A decrease of PCT levels of more than 80% defines a negative change in PCT result representing a lower risk for 28-day all-cause mortality of patients diagnosed with severe sepsis or septic shock.                Comprehensive Metabolic Panel [[id number]]  (Abnormal) Collected: 11/03/21 0509    Specimen: Blood Updated: 11/03/21 0601     Glucose 134 mg/dL      BUN 31 mg/dL      Creatinine 0.88 mg/dL      Sodium 132 mmol/L      Potassium 4.3 mmol/L      Chloride 97 mmol/L      CO2 26.0 mmol/L      Calcium 9.5 mg/dL      Total Protein 7.2 g/dL      Albumin 4.00 g/dL      ALT (SGPT) 56 U/L      AST (SGOT) 42 U/L      Alkaline Phosphatase 74 U/L      Total " Bilirubin 0.8 mg/dL      eGFR Non African Amer 90 mL/min/1.73      Globulin 3.2 gm/dL      A/G Ratio 1.3 g/dL      BUN/Creatinine Ratio 35.2     Anion Gap 9.0 mmol/L     Narrative:      GFR Normal >60  Chronic Kidney Disease <60  Kidney Failure <15      CBC Auto Differential [[id number]]  (Abnormal) Collected: 11/03/21 0509    Specimen: Blood Updated: 11/03/21 0534     WBC 16.80 10*3/mm3      RBC 4.98 10*6/mm3      Hemoglobin 15.1 g/dL      Hematocrit 44.8 %      MCV 90.0 fL      MCH 30.3 pg      MCHC 33.7 g/dL      RDW 13.6 %      RDW-SD 44.8 fl      MPV 9.5 fL      Platelets 283 10*3/mm3      Neutrophil % 79.2 %      Lymphocyte % 12.2 %      Monocyte % 5.1 %      Eosinophil % 0.1 %      Basophil % 0.4 %      Immature Grans % 3.0 %      Neutrophils, Absolute 13.30 10*3/mm3      Lymphocytes, Absolute 2.05 10*3/mm3      Monocytes, Absolute 0.86 10*3/mm3      Eosinophils, Absolute 0.02 10*3/mm3      Basophils, Absolute 0.07 10*3/mm3      Immature Grans, Absolute 0.50 10*3/mm3      nRBC 0.0 /100 WBC            I reviewed the patient's new clinical results.  I reviewed the patient's new imaging results and agree with the interpretation.     ASSESSMENT/PLAN:   ASSESSMENT: 1.  Dysphagia, likely due to dysmotility.  2.  GERD, well controlled with PPI.  3.  Gastritis  4.  Congestive heart failure    PLAN: 1.  Continue PPI and Bentyl.  2.  Diet as tolerated.  3.  F/U on path  4. Okay to DC from GI standpoint.  The risks, benefits, and alternatives of this procedure have been discussed with the patient or the responsible party- the patient understands and agrees to proceed.         Kain Delgadillo MD  11/03/21  23:18 CDT

## 2021-11-04 NOTE — DISCHARGE PLACEMENT REQUEST
"Berenice Gill (56 y.o. Male)             Date of Birth Social Security Number Address Home Phone MRN    1965  [address] [place] [address] [phone] [id number]    Tenriism Marital Status             Yarsanism        Admission Date Admission Type Admitting Provider Attending Provider Department, Room/Bed    10/27/21 Emergency Andres Bell MD Craig, David A, MD [address], 321/1    Discharge Date Discharge Disposition Discharge Destination                         Attending Provider: Andres Bell MD    Allergies: Ketamine Hcl    Isolation: None   Infection: None   Code Status: CPR   Advance Care Planning Activity    Ht: 182.9 cm (72.01\")   Wt: 127 kg (279 lb 12.8 oz)    Admission Cmt: None   Principal Problem: Esophageal dysphagia [R13.19] More...                 Active Insurance as of 10/27/2021     Primary Coverage     Payor Plan Insurance Group Employer/Plan Group    [employer]HE[employer]OSS [employer] PPO [id number]     Payor Plan Address Payor Plan Phone Number Payor Plan Fax Number Effective Dates    [address] [phone]  3/1/2013 - None Entered    [place] [address]       Subscriber Name Subscriber Birth Date Member ID       BERENICE GILL 1965 [id number]                 Emergency Contacts      (Rel.) Home Phone Work Phone Mobile Phone    VIJAY GILL (Spouse) [phone] -- [phone]            Insurance Information                [employer]HE[employer] CROSS/[employer] BLUE [employer] PPO Phone: [phone]    Subscriber: Berenice Gill Subscriber#: [id number]    Group#: [id number] Precert#: --             History & Physical      Kain Delgadillo MD at 11/01/21 1000          H&P reviewed. The patient was examined and there are no changes to the H&P.          Electronically signed by Kain Delgadillo MD at 11/01/21 1006   Source Note          SUBJECTIVE: "   10/29/2021    Name: Jon Gill  DOD: 1965    REASON FOR CONSULT: Dysphagia    Chief Complaint:     Chief Complaint   Patient presents with   • Shortness of Breath       Subjective     Patient is 56 y.o. male with past medical history of anxiety, Crohn's disease, depression, hypertension, congestive heart failure, arthritis presents with worsening dyspnea and was noted to have congestive heart failure.  Patient is improving with diuresis.  He has intermittent dysphagia to solid food and had esophageal stricture dilatation by Dr. Pace last year.  Has history of Crohn's disease which is currently in remission with Humira.  Last colonoscopy last year was unremarkable per patient.  He takes Prevacid daily with good control of GERD symptoms.  Takes Bentyl daily for diarrhea.  Chest x-ray was consistent with cardiomegaly and pulmonary congestion.  Hemoglobin is 12.8.   ROS/HISTORY/ CURRENT MEDICATIONS/OBJECTIVE/VS/PE:   Review of Systems:   Review of Systems   Constitutional: Negative for chills, fatigue, fever and unexpected weight change.   HENT: Positive for trouble swallowing. Negative for congestion, ear discharge, hearing loss, nosebleeds and sore throat.    Eyes: Negative for pain, discharge and redness.   Respiratory: Positive for cough and shortness of breath. Negative for chest tightness and wheezing.    Cardiovascular: Negative for chest pain and palpitations.   Gastrointestinal: Negative for abdominal distention, abdominal pain, blood in stool, constipation, diarrhea, nausea and vomiting.   Endocrine: Negative for cold intolerance, polydipsia, polyphagia and polyuria.   Genitourinary: Negative for dysuria, flank pain, frequency, hematuria and urgency.   Musculoskeletal: Negative for arthralgias, back pain, joint swelling and myalgias.   Skin: Negative for color change, pallor and rash.   Neurological: Negative for tremors, seizures, syncope, weakness and headaches.   Hematological: Negative  for adenopathy. Does not bruise/bleed easily.   Psychiatric/Behavioral: Negative for behavioral problems, confusion, dysphoric mood, hallucinations and suicidal ideas. The patient is not nervous/anxious.        History:     Past Medical History:   Diagnosis Date   • Anxiety    • Arthritis    • Crohn's disease (HCC)    • Depression    • Hypertension      Past Surgical History:   Procedure Laterality Date   • ABDOMINAL HERNIA REPAIR     • COLON RESECTION  1988   • COLONOSCOPY  10/26/2015    internal hemorrhoids,otherwise normal postoperative colonoscopy with the evaluation of his ti     History reviewed. No pertinent family history.  Social History     Tobacco Use   • Smoking status: Former Smoker   • Smokeless tobacco: Never Used   Substance Use Topics   • Alcohol use: Yes     Comment: very rarely   • Drug use: No     Medications Prior to Admission   Medication Sig Dispense Refill Last Dose   • Adalimumab (HUMIRA) 40 MG/0.4ML Prefilled Syringe Kit injection Inject 40 mg under the skin into the appropriate area as directed See Admin Instructions.   10/27/2021 at Unknown time   • ALPRAZolam (XANAX) 1 MG tablet TK 1 T PO BID PRF ANXIETY  2 10/27/2021 at Unknown time   • dicyclomine (BENTYL) 10 MG capsule Take 10 mg by mouth 4 (Four) Times a Day Before Meals & at Bedtime.   10/27/2021 at Unknown time   • gabapentin (NEURONTIN) 300 MG capsule Take 300 mg by mouth 2 (Two) Times a Day.   10/27/2021 at Unknown time   • lansoprazole (PREVACID) 15 MG capsule Take 15 mg by mouth Daily.   10/27/2021 at Unknown time   • metoprolol succinate XL (TOPROL-XL) 50 MG 24 hr tablet Take 50 mg by mouth every night at bedtime.   10/26/2021 at Unknown time   • oxyCODONE-acetaminophen (PERCOCET) 7.5-325 MG per tablet Take 1 tablet by mouth Every 6 (Six) Hours As Needed.   10/27/2021 at Unknown time   • promethazine (PHENERGAN) 12.5 MG tablet Take 1 tablet by mouth Every 6 (Six) Hours As Needed for Nausea or Vomiting (If still nauseated after  zofran). 30 tablet 1 10/27/2021 at Unknown time   • sertraline (ZOLOFT) 100 MG tablet TK 1 T PO BID  5 10/27/2021 at Unknown time   • tiZANidine (ZANAFLEX) 4 MG tablet Take 4 mg by mouth At Night As Needed for Muscle Spasms.   10/26/2021 at Unknown time   • ondansetron (ZOFRAN) 8 MG tablet Take 1 tablet by mouth Every 6 (Six) Hours As Needed for Nausea or Vomiting (give 30 minutes prior to antibiotic administration). 30 tablet 1      Allergies:  Ketamine hcl    I have reviewed the patient's medical history, surgical history and family history in the available medical record system.     Current Medications:     Current Facility-Administered Medications   Medication Dose Route Frequency Provider Last Rate Last Admin   • acetaminophen (TYLENOL) tablet 650 mg  650 mg Oral Q4H PRN Andres Bell MD   650 mg at 10/28/21 0808    Or   • acetaminophen (TYLENOL) 160 MG/5ML solution 650 mg  650 mg Oral Q4H PRN Andres Bell MD        Or   • acetaminophen (TYLENOL) suppository 650 mg  650 mg Rectal Q4H PRN Andres Bell MD       • ALPRAZolam (XANAX) tablet 1 mg  1 mg Oral TID PRN Andres Bell MD   1 mg at 10/28/21 2112   • AZITHROMYCIN 500 MG/250 ML 0.9% NS IVPB (vial-mate)  500 mg Intravenous Q24H Andres Bell MD   500 mg at 10/28/21 1212   • calcium carbonate (TUMS) chewable tablet 500 mg (200 mg elemental)  2 tablet Oral BID PRN Andres Bell MD       • cefTRIAXone (ROCEPHIN) 1 g/100 mL 0.9% NS (MBP)  1 g Intravenous Q24H Andres Bell MD   1 g at 10/28/21 1139   • dicyclomine (BENTYL) capsule 10 mg  10 mg Oral 4x Daily AC & at Bedtime Andres Bell MD   10 mg at 10/29/21 1119   • [START ON 10/30/2021] furosemide (LASIX) injection 40 mg  40 mg Intravenous Daily Andres Bell MD       • gabapentin (NEURONTIN) capsule 300 mg  300 mg Oral BID Andres Bell MD   300 mg at 10/29/21 0809   • heparin (porcine) 5000 UNIT/ML injection 5,000 Units  5,000 Units Subcutaneous Q8H Andres Bell MD   5,000 Units at  10/29/21 0517   • hydrALAZINE (APRESOLINE) injection 20 mg  20 mg Intravenous Q6H PRN Andres Bell MD   20 mg at 10/28/21 2111   • ipratropium-albuterol (DUO-NEB) nebulizer solution 3 mL  3 mL Nebulization Q6H PRN Andres Bell MD       • lisinopril (PRINIVIL,ZESTRIL) tablet 20 mg  20 mg Oral Daily Andres Bell MD   20 mg at 10/29/21 0809   • melatonin tablet 6 mg  6 mg Oral Nightly PRN Andres Bell MD       • methylPREDNISolone sodium succinate (SOLU-Medrol) injection 40 mg  40 mg Intravenous Q12H Andres Bell MD   40 mg at 10/29/21 1118   • metoprolol succinate XL (TOPROL-XL) 24 hr tablet 50 mg  50 mg Oral Q24H Andres Bell MD   50 mg at 10/29/21 0809   • morphine injection 1 mg  1 mg Intravenous Q4H PRN Andres Bell MD        And   • naloxone (NARCAN) injection 0.4 mg  0.4 mg Intravenous Q5 Min PRN Andres Bell MD       • ondansetron (ZOFRAN) tablet 4 mg  4 mg Oral Q6H PRN Andres Bell MD   4 mg at 10/28/21 1626    Or   • ondansetron (ZOFRAN) injection 4 mg  4 mg Intravenous Q6H PRN Andres Bell MD       • oxyCODONE-acetaminophen (PERCOCET) 7.5-325 MG per tablet 1 tablet  1 tablet Oral Q6H PRN Andres Bell MD   1 tablet at 10/28/21 2112   • pantoprazole (PROTONIX) EC tablet 40 mg  40 mg Oral QAM Andres Bell MD   40 mg at 10/29/21 0654   • sertraline (ZOLOFT) tablet 100 mg  100 mg Oral BID Andres Bell MD   100 mg at 10/29/21 0811   • sodium chloride 0.9 % flush 10 mL  10 mL Intravenous PRN Jaylen Grissom MD       • sodium chloride 0.9 % flush 10 mL  10 mL Intravenous PRN Jaylen Grissom MD       • sodium chloride 0.9 % flush 10 mL  10 mL Intravenous Q12H Andres Bell MD   10 mL at 10/29/21 0809   • sodium chloride 0.9 % flush 10 mL  10 mL Intravenous PRN Andres Bell MD       • tiZANidine (ZANAFLEX) tablet 4 mg  4 mg Oral Nightly PRN Andres Bell MD   4 mg at 10/28/21 2112       Objective     Physical Exam:   Temp:  [98 °F (36.7 °C)-99.4 °F (37.4 °C)] 98 °F  (36.7 °C)  Heart Rate:  [] 74  Resp:  [16-23] 22  BP: (104-194)/() 130/75    Physical Exam:  General Appearance:    Alert, cooperative, in no acute distress   Head:    Normocephalic, without obvious abnormality, atraumatic   Eyes:            Lids and lashes normal, conjunctivae and sclerae normal, no   icterus, no pallor, corneas clear, PERRLA   Ears:    Ears appear intact with no abnormalities noted   Throat:   No oral lesions, no thrush, oral mucosa moist   Neck:   No adenopathy, supple, trachea midline, no thyromegaly, no     carotid bruit, no JVD   Back:     No kyphosis present, no scoliosis present, no skin lesions,       erythema or scars, no tenderness to percussion or                   palpation,   range of motion normal   Lungs:     Clear to auscultation,respirations regular, even and                   unlabored    Heart:    Regular rhythm and normal rate, normal S1 and S2, no            murmur, no gallop, no rub, no click   Breast Exam:    Deferred   Abdomen:     Normal bowel sounds, no masses, no organomegaly, soft        nontender, nondistended, no guarding, no rebound                 tenderness   Genitalia:    Deferred   Extremities:   Moves all extremities well, no edema, no cyanosis, no              redness   Pulses:   Pulses palpable and equal bilaterally   Skin:   No bleeding, bruising or rash   Lymph nodes:   No palpable adenopathy   Neurologic:   Cranial nerves 2 - 12 grossly intact, sensation intact, DTR        present and equal bilaterally      Results Review:     Lab Results   Component Value Date    WBC 8.00 10/29/2021    WBC 9.57 10/28/2021    WBC 15.06 (H) 10/27/2021    HGB 12.8 (L) 10/29/2021    HGB 12.3 (L) 10/28/2021    HGB 13.0 10/27/2021    HCT 38.1 10/29/2021    HCT 37.4 (L) 10/28/2021    HCT 39.5 10/27/2021     10/29/2021     (L) 10/28/2021     10/27/2021     Results from last 7 days   Lab Units 10/29/21  0439 10/28/21  0655 10/27/21  1026   ALK PHOS  U/L 63 71 72   ALT (SGPT) U/L 20 25 27   AST (SGOT) U/L 28 44* 38     Results from last 7 days   Lab Units 10/29/21  0439 10/28/21  0655 10/27/21  1026   BILIRUBIN mg/dL 1.1 2.2* 2.4*   ALK PHOS U/L 63 71 72     No results found for: LIPASE  No results found for: INR      Radiology Review:  Imaging Results (Last 72 Hours)     Procedure Component Value Units Date/Time    XR Chest 1 View [[id number]] Collected: 10/27/21 1039     Updated: 10/27/21 1101    Narrative:        PORTABLE CHEST    HISTORY: CHF/COPD protocol.    Portable AP upright film of the chest was obtained at 10:30 AM.  COMPARISON: None    FINDINGS:   EKG leads.  Cardiomegaly, pulmonary vascular congestion and interstitial  edema.  No pleural effusion.  No pneumothorax.  No acute osseous abnormality.  Degenerative changes are present in the thoracic spine.      Impression:      CONCLUSION:  Cardiomegaly, pulmonary vascular congestion and interstitial  edema.    [id number]    Electronically signed by:  Deng Shannon MD  10/27/2021 11:00 AM  CDT Workstation: [id number]          I reviewed the patient's new clinical results.    I reviewed the patient's new imaging results and agree with the interpretation.     ASSESSMENT/PLAN:   ASSESSMENT: 1.  Dysphagia, likely due to esophageal dysphagia need to rule out stricture, ring and malignancy.  2.  Crohn's disease, well controlled with Humira.  3.  Congestive heart failure, improving with diuresis.    PLAN: 1.  Continue current supportive care including diuresis and PPI.  2.  Proceed with EGD on Monday for further evaluation and possible dilatation are as an outpatient in the event patient gets discharged to home.  3.  Continue Humira and Bentyl.  The risks, benefits, and alternatives of this procedure have been discussed with the patient or the responsible party. The patient understands and agrees to proceed.         Kain Delgadillo MD  10/29/21  11:54 CDT           Electronically signed by Kain Delgadillo MD at  10/29/21 1652             Kain Delgadillo MD at 10/29/21 1153          SUBJECTIVE:   10/29/2021    Name: Jon Gill  DOD: 1965    REASON FOR CONSULT: Dysphagia    Chief Complaint:     Chief Complaint   Patient presents with   • Shortness of Breath       Subjective     Patient is 56 y.o. male with past medical history of anxiety, Crohn's disease, depression, hypertension, congestive heart failure, arthritis presents with worsening dyspnea and was noted to have congestive heart failure.  Patient is improving with diuresis.  He has intermittent dysphagia to solid food and had esophageal stricture dilatation by Dr. Pace last year.  Has history of Crohn's disease which is currently in remission with Humira.  Last colonoscopy last year was unremarkable per patient.  He takes Prevacid daily with good control of GERD symptoms.  Takes Bentyl daily for diarrhea.  Chest x-ray was consistent with cardiomegaly and pulmonary congestion.  Hemoglobin is 12.8.   ROS/HISTORY/ CURRENT MEDICATIONS/OBJECTIVE/VS/PE:   Review of Systems:   Review of Systems   Constitutional: Negative for chills, fatigue, fever and unexpected weight change.   HENT: Positive for trouble swallowing. Negative for congestion, ear discharge, hearing loss, nosebleeds and sore throat.    Eyes: Negative for pain, discharge and redness.   Respiratory: Positive for cough and shortness of breath. Negative for chest tightness and wheezing.    Cardiovascular: Negative for chest pain and palpitations.   Gastrointestinal: Negative for abdominal distention, abdominal pain, blood in stool, constipation, diarrhea, nausea and vomiting.   Endocrine: Negative for cold intolerance, polydipsia, polyphagia and polyuria.   Genitourinary: Negative for dysuria, flank pain, frequency, hematuria and urgency.   Musculoskeletal: Negative for arthralgias, back pain, joint swelling and myalgias.   Skin: Negative for color change, pallor and rash.   Neurological:  Negative for tremors, seizures, syncope, weakness and headaches.   Hematological: Negative for adenopathy. Does not bruise/bleed easily.   Psychiatric/Behavioral: Negative for behavioral problems, confusion, dysphoric mood, hallucinations and suicidal ideas. The patient is not nervous/anxious.        History:     Past Medical History:   Diagnosis Date   • Anxiety    • Arthritis    • Crohn's disease (HCC)    • Depression    • Hypertension      Past Surgical History:   Procedure Laterality Date   • ABDOMINAL HERNIA REPAIR     • COLON RESECTION  1988   • COLONOSCOPY  10/26/2015    internal hemorrhoids,otherwise normal postoperative colonoscopy with the evaluation of his ti     History reviewed. No pertinent family history.  Social History     Tobacco Use   • Smoking status: Former Smoker   • Smokeless tobacco: Never Used   Substance Use Topics   • Alcohol use: Yes     Comment: very rarely   • Drug use: No     Medications Prior to Admission   Medication Sig Dispense Refill Last Dose   • Adalimumab (HUMIRA) 40 MG/0.4ML Prefilled Syringe Kit injection Inject 40 mg under the skin into the appropriate area as directed See Admin Instructions.   10/27/2021 at Unknown time   • ALPRAZolam (XANAX) 1 MG tablet TK 1 T PO BID PRF ANXIETY  2 10/27/2021 at Unknown time   • dicyclomine (BENTYL) 10 MG capsule Take 10 mg by mouth 4 (Four) Times a Day Before Meals & at Bedtime.   10/27/2021 at Unknown time   • gabapentin (NEURONTIN) 300 MG capsule Take 300 mg by mouth 2 (Two) Times a Day.   10/27/2021 at Unknown time   • lansoprazole (PREVACID) 15 MG capsule Take 15 mg by mouth Daily.   10/27/2021 at Unknown time   • metoprolol succinate XL (TOPROL-XL) 50 MG 24 hr tablet Take 50 mg by mouth every night at bedtime.   10/26/2021 at Unknown time   • oxyCODONE-acetaminophen (PERCOCET) 7.5-325 MG per tablet Take 1 tablet by mouth Every 6 (Six) Hours As Needed.   10/27/2021 at Unknown time   • promethazine (PHENERGAN) 12.5 MG tablet Take 1  tablet by mouth Every 6 (Six) Hours As Needed for Nausea or Vomiting (If still nauseated after zofran). 30 tablet 1 10/27/2021 at Unknown time   • sertraline (ZOLOFT) 100 MG tablet TK 1 T PO BID  5 10/27/2021 at Unknown time   • tiZANidine (ZANAFLEX) 4 MG tablet Take 4 mg by mouth At Night As Needed for Muscle Spasms.   10/26/2021 at Unknown time   • ondansetron (ZOFRAN) 8 MG tablet Take 1 tablet by mouth Every 6 (Six) Hours As Needed for Nausea or Vomiting (give 30 minutes prior to antibiotic administration). 30 tablet 1      Allergies:  Ketamine hcl    I have reviewed the patient's medical history, surgical history and family history in the available medical record system.     Current Medications:     Current Facility-Administered Medications   Medication Dose Route Frequency Provider Last Rate Last Admin   • acetaminophen (TYLENOL) tablet 650 mg  650 mg Oral Q4H PRN Andres Bell MD   650 mg at 10/28/21 0808    Or   • acetaminophen (TYLENOL) 160 MG/5ML solution 650 mg  650 mg Oral Q4H PRN Andres Bell MD        Or   • acetaminophen (TYLENOL) suppository 650 mg  650 mg Rectal Q4H PRN Andres Bell MD       • ALPRAZolam (XANAX) tablet 1 mg  1 mg Oral TID PRN Andres Bell MD   1 mg at 10/28/21 2112   • AZITHROMYCIN 500 MG/250 ML 0.9% NS IVPB (vial-mate)  500 mg Intravenous Q24H Andres Bell MD   500 mg at 10/28/21 1212   • calcium carbonate (TUMS) chewable tablet 500 mg (200 mg elemental)  2 tablet Oral BID PRN Andres Bell MD       • cefTRIAXone (ROCEPHIN) 1 g/100 mL 0.9% NS (MBP)  1 g Intravenous Q24H Andres Bell MD   1 g at 10/28/21 1139   • dicyclomine (BENTYL) capsule 10 mg  10 mg Oral 4x Daily AC & at Bedtime Andres Bell MD   10 mg at 10/29/21 1119   • [START ON 10/30/2021] furosemide (LASIX) injection 40 mg  40 mg Intravenous Daily Andres Bell MD       • gabapentin (NEURONTIN) capsule 300 mg  300 mg Oral BID Andres Bell MD   300 mg at 10/29/21 0809   • heparin (porcine) 5000  UNIT/ML injection 5,000 Units  5,000 Units Subcutaneous Q8H Andres Bell MD   5,000 Units at 10/29/21 0517   • hydrALAZINE (APRESOLINE) injection 20 mg  20 mg Intravenous Q6H PRN Andres Bell MD   20 mg at 10/28/21 2111   • ipratropium-albuterol (DUO-NEB) nebulizer solution 3 mL  3 mL Nebulization Q6H PRN Andres Bell MD       • lisinopril (PRINIVIL,ZESTRIL) tablet 20 mg  20 mg Oral Daily Andres Bell MD   20 mg at 10/29/21 0809   • melatonin tablet 6 mg  6 mg Oral Nightly PRN Andres Bell MD       • methylPREDNISolone sodium succinate (SOLU-Medrol) injection 40 mg  40 mg Intravenous Q12H Andres Bell MD   40 mg at 10/29/21 1118   • metoprolol succinate XL (TOPROL-XL) 24 hr tablet 50 mg  50 mg Oral Q24H Andres Bell MD   50 mg at 10/29/21 0809   • morphine injection 1 mg  1 mg Intravenous Q4H PRN Andres Bell MD        And   • naloxone (NARCAN) injection 0.4 mg  0.4 mg Intravenous Q5 Min PRN Andres Bell MD       • ondansetron (ZOFRAN) tablet 4 mg  4 mg Oral Q6H PRN Andres Bell MD   4 mg at 10/28/21 1626    Or   • ondansetron (ZOFRAN) injection 4 mg  4 mg Intravenous Q6H PRN Andres Bell MD       • oxyCODONE-acetaminophen (PERCOCET) 7.5-325 MG per tablet 1 tablet  1 tablet Oral Q6H PRN Andres Bell MD   1 tablet at 10/28/21 2112   • pantoprazole (PROTONIX) EC tablet 40 mg  40 mg Oral QAM Andres Bell MD   40 mg at 10/29/21 0654   • sertraline (ZOLOFT) tablet 100 mg  100 mg Oral BID Andres Bell MD   100 mg at 10/29/21 0811   • sodium chloride 0.9 % flush 10 mL  10 mL Intravenous PRN Jaylen Grissom MD       • sodium chloride 0.9 % flush 10 mL  10 mL Intravenous PRN Jaylen Grissom MD       • sodium chloride 0.9 % flush 10 mL  10 mL Intravenous Q12H Andres Bell MD   10 mL at 10/29/21 0809   • sodium chloride 0.9 % flush 10 mL  10 mL Intravenous PRN Andres Bell MD       • tiZANidine (ZANAFLEX) tablet 4 mg  4 mg Oral Nightly PRN Andres Bell MD   4 mg at  10/28/21 2112       Objective     Physical Exam:   Temp:  [98 °F (36.7 °C)-99.4 °F (37.4 °C)] 98 °F (36.7 °C)  Heart Rate:  [] 74  Resp:  [16-23] 22  BP: (104-194)/() 130/75    Physical Exam:  General Appearance:    Alert, cooperative, in no acute distress   Head:    Normocephalic, without obvious abnormality, atraumatic   Eyes:            Lids and lashes normal, conjunctivae and sclerae normal, no   icterus, no pallor, corneas clear, PERRLA   Ears:    Ears appear intact with no abnormalities noted   Throat:   No oral lesions, no thrush, oral mucosa moist   Neck:   No adenopathy, supple, trachea midline, no thyromegaly, no     carotid bruit, no JVD   Back:     No kyphosis present, no scoliosis present, no skin lesions,       erythema or scars, no tenderness to percussion or                   palpation,   range of motion normal   Lungs:     Clear to auscultation,respirations regular, even and                   unlabored    Heart:    Regular rhythm and normal rate, normal S1 and S2, no            murmur, no gallop, no rub, no click   Breast Exam:    Deferred   Abdomen:     Normal bowel sounds, no masses, no organomegaly, soft        nontender, nondistended, no guarding, no rebound                 tenderness   Genitalia:    Deferred   Extremities:   Moves all extremities well, no edema, no cyanosis, no              redness   Pulses:   Pulses palpable and equal bilaterally   Skin:   No bleeding, bruising or rash   Lymph nodes:   No palpable adenopathy   Neurologic:   Cranial nerves 2 - 12 grossly intact, sensation intact, DTR        present and equal bilaterally      Results Review:     Lab Results   Component Value Date    WBC 8.00 10/29/2021    WBC 9.57 10/28/2021    WBC 15.06 (H) 10/27/2021    HGB 12.8 (L) 10/29/2021    HGB 12.3 (L) 10/28/2021    HGB 13.0 10/27/2021    HCT 38.1 10/29/2021    HCT 37.4 (L) 10/28/2021    HCT 39.5 10/27/2021     10/29/2021     (L) 10/28/2021      10/27/2021     Results from last 7 days   Lab Units 10/29/21  0439 10/28/21  0655 10/27/21  1026   ALK PHOS U/L 63 71 72   ALT (SGPT) U/L 20 25 27   AST (SGOT) U/L 28 44* 38     Results from last 7 days   Lab Units 10/29/21  0439 10/28/21  0655 10/27/21  1026   BILIRUBIN mg/dL 1.1 2.2* 2.4*   ALK PHOS U/L 63 71 72     No results found for: LIPASE  No results found for: INR      Radiology Review:  Imaging Results (Last 72 Hours)     Procedure Component Value Units Date/Time    XR Chest 1 View [[id number]] Collected: 10/27/21 1039     Updated: 10/27/21 1101    Narrative:        PORTABLE CHEST    HISTORY: CHF/COPD protocol.    Portable AP upright film of the chest was obtained at 10:30 AM.  COMPARISON: None    FINDINGS:   EKG leads.  Cardiomegaly, pulmonary vascular congestion and interstitial  edema.  No pleural effusion.  No pneumothorax.  No acute osseous abnormality.  Degenerative changes are present in the thoracic spine.      Impression:      CONCLUSION:  Cardiomegaly, pulmonary vascular congestion and interstitial  edema.    [id number]    Electronically signed by:  Deng Shannon MD  10/27/2021 11:00 AM  CDT Workstation: [id number]          I reviewed the patient's new clinical results.    I reviewed the patient's new imaging results and agree with the interpretation.     ASSESSMENT/PLAN:   ASSESSMENT: 1.  Dysphagia, likely due to esophageal dysphagia need to rule out stricture, ring and malignancy.  2.  Crohn's disease, well controlled with Humira.  3.  Congestive heart failure, improving with diuresis.    PLAN: 1.  Continue current supportive care including diuresis and PPI.  2.  Proceed with EGD on Monday for further evaluation and possible dilatation are as an outpatient in the event patient gets discharged to home.  3.  Continue Humira and Bentyl.  The risks, benefits, and alternatives of this procedure have been discussed with the patient or the responsible party. The patient understands and agrees to proceed.          Kain Delgadillo MD  10/29/21  11:54 CDT           Electronically signed by Kain Delgadillo MD at 10/29/21 5089     Andres Bell MD at 10/27/21 1324                [facility] Medicine Admission      Date of Admission: 10/27/2021      Primary Care Physician: Halle Baron MD      Chief Complaint: Shortness of breath    HPI: Patient is a 56-year-old male with medical history notable for anxiety, Crohn's disease, depression, and hypertension who presented to the emergency department due to worsening shortness of breath.  Patient is currently on the BiPAP and history is difficult to obtain as his wife helps.  She states that he has been having worsening shortness of breath for the past 2 to 3 days as well as some subjective fevers and chills.  Patient denies any nausea vomiting but has had nonproductive cough.  Patient's wife notes that he has sleep apnea but does not wear CPAP at home.    Evaluation emergency department was notable for CBC which showed a leukocytosis with a WBC of 15.06 but was otherwise unremarkable.  proBNP was elevated at 1300.  CMP showed an acute kidney injury with a creatinine of 2.27, most recent noted in the chart was around 1.  Lactic acid was initially elevated 2.6 but corrected to 1.2 on recheck.  Covid screening was negative.  Chest x-ray showed vascular congestion and edema as well as cardiomegaly.  Acetone was also checked and noted to be elevated at 5.  She was started on antibiotics and diuresed with Lasix in the ED as well as placed on BiPAP therapy.  Admission was requested for ongoing treatment of his respiratory failure.    Concurrent Medical History:  has a past medical history of Anxiety, Arthritis, Crohn's disease (HCC), Depression, and Hypertension.    Past Surgical History:  has a past surgical history that includes Colonoscopy (10/26/2015); Bowel resection (1988); and Abdominal hernia repair.    Family History: family history  is not on file. No changes    Social History:  reports that he has quit smoking. He has never used smokeless tobacco. He reports current alcohol use. He reports that he does not use drugs.    Allergies:   Allergies   Allergen Reactions   • Ketamine Hcl Anaphylaxis       Medications:   Prior to Admission medications    Medication Sig Start Date End Date Taking? Authorizing Provider   Adalimumab (HUMIRA) 40 MG/0.4ML Prefilled Syringe Kit injection Inject 40 mg under the skin into the appropriate area as directed See Admin Instructions.    Waldo Benjamin MD   ALPRAZolam (XANAX) 1 MG tablet TK 1 T PO BID PRF ANXIETY 9/19/16   Waldo Benjamin MD   dicyclomine (BENTYL) 10 MG capsule Take 10 mg by mouth 4 (Four) Times a Day Before Meals & at Bedtime.    Waldo Benjamin MD   gabapentin (NEURONTIN) 300 MG capsule Take 300 mg by mouth 2 (Two) Times a Day.    Waldo Benjamin MD   lansoprazole (PREVACID) 15 MG capsule Take 15 mg by mouth Daily.    Waldo Benjamin MD   lisinopril (PRINIVIL,ZESTRIL) 10 MG tablet Take 10 mg by mouth Daily.    Waldo Benjamin MD   metoprolol succinate XL (TOPROL-XL) 50 MG 24 hr tablet Take 50 mg by mouth every night at bedtime.    Waldo Benjamin MD   ondansetron (ZOFRAN) 8 MG tablet Take 1 tablet by mouth Every 6 (Six) Hours As Needed for Nausea or Vomiting (give 30 minutes prior to antibiotic administration). 10/6/19   Agustin Sandra PA   oxyCODONE-acetaminophen (PERCOCET) 7.5-325 MG per tablet Take 1 tablet by mouth Every 6 (Six) Hours As Needed.    Waldo Benjamin MD   promethazine (PHENERGAN) 12.5 MG tablet Take 1 tablet by mouth Every 6 (Six) Hours As Needed for Nausea or Vomiting (If still nauseated after zofran). 10/6/19   Agustin Sandra PA   sertraline (ZOLOFT) 100 MG tablet TK 1 T PO BID 9/19/16   Waldo Benjamin MD   tiZANidine (ZANAFLEX) 4 MG tablet Take 4 mg by mouth At Night As Needed for Muscle Spasms.    Cassidy  MD Waldo       Review of Systems:  Review of Systems   Unable to perform ROS: Acuity of condition      Otherwise complete ROS is negative except as mentioned above.    Physical Exam:   Temp:  [98.5 °F (36.9 °C)] 98.5 °F (36.9 °C)  Heart Rate:  [66-80] 74  Resp:  [24-26] 24  BP: ()/(44-68) 124/64  Physical Exam  Constitutional:       Comments: Laying on [facility] currently in no distress with BiPAP mask in place.   HENT:      Head: Normocephalic and atraumatic.      Right Ear: External ear normal.      Left Ear: External ear normal.      Nose: Nose normal.      Mouth/Throat:      Mouth: Mucous membranes are moist.      Pharynx: Oropharynx is clear.   Eyes:      Conjunctiva/sclera: Conjunctivae normal.   Cardiovascular:      Rate and Rhythm: Normal rate and regular rhythm.      Pulses: Normal pulses.      Heart sounds: Normal heart sounds.   Pulmonary:      Comments: Coarse diminished breath sounds bilaterally with some scattered rhonchi and rales noted.  Abdominal:      General: Bowel sounds are normal.      Palpations: Abdomen is soft.      Tenderness: There is no abdominal tenderness.   Musculoskeletal:         General: Swelling present.      Cervical back: Neck supple.   Skin:     General: Skin is warm and dry.      Capillary Refill: Capillary refill takes less than 2 seconds.   Neurological:      General: No focal deficit present.      Mental Status: He is oriented to person, place, and time. Mental status is at baseline.      Coordination: Coordination normal.   Psychiatric:         Mood and Affect: Mood normal.         Behavior: Behavior normal.           Results Reviewed:  I have personally reviewed current lab, radiology, and data and agree with results.  Lab Results (last 24 hours)     Procedure Component Value Units Date/Time    STAT Lactic Acid, Reflex [[id number]] Collected: 10/27/21 1322    Specimen: Blood Updated: 10/27/21 1328    Blood Gas, Arterial - [[id number]]  (Abnormal) Collected:  10/27/21 1313    Specimen: Arterial Blood Updated: 10/27/21 1322     Site Left Brachial     Jerome's Test N/A     pH, Arterial 7.302 pH units      Comment: 84 Value below reference range        pCO2, Arterial 61.5 mm Hg      Comment: 83 Value above reference range        pO2, Arterial 359.0 mm Hg      Comment: 83 Value above reference range        HCO3, Arterial 30.4 mmol/L      Comment: 83 Value above reference range        Base Excess, Arterial 2.6 mmol/L      Comment: 83 Value above reference range        O2 Saturation, Arterial 98.2 %      Barometric Pressure for Blood Gas 744 mmHg      Modality Ventilator     FIO2 100 %      Ventilator Mode AVAP     Set Tidal Volume 450     PEEP 6.0     Collected by CRISPIN     Comment: Meter: [id number]     :  [id number]       Blood Gas, Arterial - [[id number]]  (Abnormal) Collected: 10/27/21 1115    Specimen: Arterial Blood Updated: 10/27/21 1125     Site Left Radial     Jerome's Test Positive     pH, Arterial 7.322 pH units      Comment: 84 Value below reference range        pCO2, Arterial 59.3 mm Hg      Comment: 83 Value above reference range        pO2, Arterial 161.0 mm Hg      Comment: 83 Value above reference range        HCO3, Arterial 30.7 mmol/L      Comment: 83 Value above reference range        Base Excess, Arterial 3.2 mmol/L      Comment: 83 Value above reference range        O2 Saturation, Arterial 97.4 %      Barometric Pressure for Blood Gas 746 mmHg      Modality NRB     FIO2 100 %      Flow Rate 15.0 lpm      Ventilator Mode NA     Collected by KRISTOPHER     Comment: Meter: [id number]     :  [id number]       Blood Culture - Blood, Arm, Left [[id number]] Collected: 10/27/21 1103    Specimen: Blood from Arm, Left Updated: 10/27/21 1109    Comprehensive Metabolic Panel [[id number]]  (Abnormal) Collected: 10/27/21 1026    Specimen: Blood Updated: 10/27/21 1108     Glucose 126 mg/dL      BUN 36 mg/dL      Creatinine 2.27 mg/dL      Sodium 133 mmol/L       Potassium 4.3 mmol/L      Chloride 95 mmol/L      CO2 30.0 mmol/L      Calcium 9.1 mg/dL      Total Protein 7.3 g/dL      Albumin 4.10 g/dL      ALT (SGPT) 27 U/L      AST (SGOT) 38 U/L      Alkaline Phosphatase 72 U/L      Total Bilirubin 2.4 mg/dL      eGFR Non African Amer 30 mL/min/1.73      Globulin 3.2 gm/dL      A/G Ratio 1.3 g/dL      BUN/Creatinine Ratio 15.9     Anion Gap 8.0 mmol/L     Narrative:      GFR Normal >60  Chronic Kidney Disease <60  Kidney Failure <15      Lactic Acid, Plasma [[id number]]  (Abnormal) Collected: 10/27/21 1028    Specimen: Blood Updated: 10/27/21 1107     Lactate 2.6 mmol/L     Troponin [[id number]]  (Normal) Collected: 10/27/21 1026    Specimen: Blood Updated: 10/27/21 1105     Troponin T <0.010 ng/mL     Narrative:      Troponin T Reference Range:  <= 0.03 ng/mL-   Negative for AMI  >0.03 ng/mL-     Abnormal for myocardial necrosis.  Clinicians would have to utilize clinical acumen, EKG, Troponin and serial changes to determine if it is an Acute Myocardial Infarction or myocardial injury due to an underlying chronic condition.       Results may be falsely decreased if patient taking Biotin.      COVID-19 and FLU A/B PCR - Swab, Nasopharynx [[id number]]  (Normal) Collected: 10/27/21 1026    Specimen: Swab from Nasopharynx Updated: 10/27/21 1104     COVID19 Not Detected     Influenza A PCR Not Detected     Influenza B PCR Not Detected    Narrative:      Fact sheet for providers: https://www.fda.gov/media/473766/download    Fact sheet for patients: https://www.fda.gov/media/583950/download    Test performed by PCR.    BNP [[id number]]  (Abnormal) Collected: 10/27/21 1026    Specimen: Blood Updated: 10/27/21 1103     proBNP 1,300.0 pg/mL     Narrative:      Among patients with dyspnea, NT-proBNP is highly sensitive for the detection of acute congestive heart failure. In addition NT-proBNP of <300 pg/ml effectively rules out acute congestive heart failure with 99% negative predictive  value.    Results may be falsely decreased if patient taking Biotin.      CBC & Differential [[id number]]  (Abnormal) Collected: 10/27/21 1026    Specimen: Blood Updated: 10/27/21 1043    Narrative:      The following orders were created for panel order CBC & Differential.  Procedure                               Abnormality         Status                     ---------                               -----------         ------                     CBC Auto Differential[[id number]]        Abnormal            Final result                 Please view results for these tests on the individual orders.    CBC Auto Differential [[id number]]  (Abnormal) Collected: 10/27/21 1026    Specimen: Blood Updated: 10/27/21 1043     WBC 15.06 10*3/mm3      RBC 4.30 10*6/mm3      Hemoglobin 13.0 g/dL      Hematocrit 39.5 %      MCV 91.9 fL      MCH 30.2 pg      MCHC 32.9 g/dL      RDW 13.2 %      RDW-SD 45.0 fl      MPV 10.9 fL      Platelets 177 10*3/mm3      Neutrophil % 82.7 %      Lymphocyte % 9.2 %      Monocyte % 5.8 %      Eosinophil % 1.1 %      Basophil % 0.5 %      Immature Grans % 0.7 %      Neutrophils, Absolute 12.46 10*3/mm3      Lymphocytes, Absolute 1.38 10*3/mm3      Monocytes, Absolute 0.88 10*3/mm3      Eosinophils, Absolute 0.16 10*3/mm3      Basophils, Absolute 0.08 10*3/mm3      Immature Grans, Absolute 0.10 10*3/mm3      nRBC 0.0 /100 WBC     Blood Culture - Blood, Arm, Right [[id number]] Collected: 10/27/21 1028    Specimen: Blood from Arm, Right Updated: 10/27/21 1040        Imaging Results (Last 24 Hours)     Procedure Component Value Units Date/Time    XR Chest 1 View [[id number]] Collected: 10/27/21 1039     Updated: 10/27/21 1101    Narrative:        PORTABLE CHEST    HISTORY: CHF/COPD protocol.    Portable AP upright film of the chest was obtained at 10:30 AM.  COMPARISON: None    FINDINGS:   EKG leads.  Cardiomegaly, pulmonary vascular congestion and interstitial  edema.  No pleural effusion.  No  pneumothorax.  No acute osseous abnormality.  Degenerative changes are present in the thoracic spine.      Impression:      CONCLUSION:  Cardiomegaly, pulmonary vascular congestion and interstitial  edema.    [id number]    Electronically signed by:  Deng Shannon MD  10/27/2021 11:00 AM  CDT Workstation: [id number]            Assessment:    Active Hospital Problems    Diagnosis    • Acute congestive heart failure (HCC)    • Acute respiratory failure with hypoxia and hypercapnia (HCC)    • Pneumonia    • LAWRENCE (acute kidney injury) (HCC)              Plan:  -Patient will be admitted and continued on noninvasive ventilation with BiPAP, discussed with respiratory and adjustments were made off of his recent ABG.  -He will be monitored in stepdown unit  -Given his elevated proBNP we will also evaluate for heart failure and obtain an echocardiogram, he does appear to have some swelling and was likely some degree of volume overload.  -We will continue with Lasix 40 mg twice daily at this time  -His procalcitonin is elevated so there may also be an underlying pneumonia which this could be elevated secondary to his acute kidney injury as well.  For the time being we will continue with Rocephin and azithromycin  -We will check urine antigens  -Bronchodilators will be available if needed  -Home meds will be continued as appropriate  -Age-adjusted D-dimer places him at low risk for any signs of clot or PE at this time  -Renal function is worse than last available, suspect that this may be in some part due to cardiorenal so we will recheck in the morning.  -If renal function fails to improve with diuresis we will see about having nephrology see him.  -DVT prophylaxis with heparin  -CODE STATUS: Full    I confirmed that the patient's Advance Care Plan is present, code status is documented, or surrogate decision maker is listed in the patient's medical record.     I have utilized all available immediate resources to obtain, update, or  review the patient's current medications.     I discussed the patient's findings and my recommendations with: The patient and his wife at bedside    Andres Bell MD          Electronically signed by Andres Bell MD at 10/27/21 1541         Imaging Results (Last 48 Hours)     Procedure Component Value Units Date/Time    XR Chest 1 View [[id number]] Collected: 11/03/21 0533     Updated: 11/03/21 0820    Narrative:      Chest x-ray single view.     CLINICAL INDICATION: Shortness of breath. Respiratory failure,  pneumonia.    COMPARISON: Chest November 2, 2021.     FINDINGS: Cardiac silhouette is enlarged in size. Pulmonary  vascularity is unremarkable.     Interval development of slight increased markings, patchy  infiltrative changes left lung base. This may represent early  changes of pneumonitis.      Impression:      As above.    Electronically signed by:  Star Ibrahim MD  11/3/2021 8:19 AM CDT  Workstation: [id number]            pH, Arterial        7.322 7.302  7.303 7.332   7.365  pH, Arterial     pCO2, Arterial        59.3 61.5  62.5 59.6   54.7  pCO2, Arterial     pO2, Arterial        161.0 359.0  91.6 88.2   74.3  pO2, Arterial     HCO3, Arterial        30.7 30.4  30.9 31.6   31.2  HCO3, Arterial     Base Excess        3.2 2.6  3.0 4.4   4.6  Base Excess     O2 Saturation, Arterial        97.4 98.2  95.1 95.4   93.5  O2 Saturation, Arterial     RESPIRATORY PARAMETERS    Site        Left R... Left B...  Right ... Right ...   Right ...  Site     Jerome's Test        Positive N/A  N/A Positive   N/A  Jerome's Test     Modality        NRB Ventil...  BiPap Ventil...   Nasal ...  Modality     FIO2        100 100  50 45     FIO2     Flow Rate        15.0       4.0  Flow Rate     Ventilator Mode        NA AVAP  AVAP AVAP   NA  Ventilator Mode     Set Tidal Volume         450   500     Set Tidal Volume     Set Mech Resp Rate            22.0     Set Mech Resp Rate     PEEP         6.0        PEEP     EPAP            6 8     EPAP     Barometric Pressure for Blood Gas

## 2021-11-04 NOTE — PLAN OF CARE
Goal Outcome Evaluation:  Plan of Care Reviewed With: patient  Progress: no change   3L o2. Pt anxious . Prn medication administered. Effective per pt.

## 2021-11-04 NOTE — PROGRESS NOTES
[facility] Medicine Services  INPATIENT PROGRESS NOTE    Length of Stay: 8  Date of Admission: 10/27/2021  Primary Care Physician: Halle Baron MD    Subjective   Chief Complaint: Anxiety    HPI: This is a 56-year-old male with past medical history of anxiety, arthritis, Crohn's disease, depression, sleep apnea (noncompliant with CPAP) and hypertension that presented to [facility] on 10/27/2021 with complaints of worsening shortness of air, nonproductive cough, fever and chills.  Patient was admitted with acute respiratory failure with hypoxia and hypercapnia, acute congestive heart failure, pneumonia acute kidney injury.    Review of Systems   Constitutional: Negative for activity change and fatigue.   HENT: Negative for ear pain and sore throat.    Eyes: Negative for pain and discharge.   Respiratory: Positive for shortness of breath. Negative for cough.    Cardiovascular: Negative for chest pain and palpitations.   Gastrointestinal: Negative for abdominal pain and nausea.   Endocrine: Negative for cold intolerance and heat intolerance.   Genitourinary: Negative for difficulty urinating and dysuria.   Musculoskeletal: Negative for arthralgias and gait problem.   Skin: Negative for color change and rash.   Neurological: Negative for dizziness and weakness.   Psychiatric/Behavioral: Negative for agitation and confusion.        Objective    Temp:  [96.9 °F (36.1 °C)-98.6 °F (37 °C)] 96.9 °F (36.1 °C)  Heart Rate:  [] 91  Resp:  [16-20] 18  BP: (125-147)/(68-86) 126/68    Physical Exam  Constitutional:       Appearance: He is well-developed.   HENT:      Head: Normocephalic and atraumatic.   Eyes:      Pupils: Pupils are equal, round, and reactive to light.   Cardiovascular:      Rate and Rhythm: Normal rate and regular rhythm.   Pulmonary:      Effort: Pulmonary effort is normal.      Breath sounds: Normal breath sounds.   Abdominal:      General: Bowel sounds are normal.      Palpations:  Abdomen is soft.   Musculoskeletal:         General: Normal range of motion.      Cervical back: Normal range of motion and neck supple.   Skin:     General: Skin is warm and dry.   Neurological:      Mental Status: He is alert and oriented to person, place, and time.   Psychiatric:         Behavior: Behavior normal.       Results Review:  I have reviewed the labs, radiology results, and diagnostic studies.    Laboratory Data:   Results from last 7 days   Lab Units 11/04/21  0639 11/03/21  0509 11/02/21  0604   SODIUM mmol/L 132* 132* 135*   POTASSIUM mmol/L 3.5 4.3 3.4*   CHLORIDE mmol/L 96* 97* 96*   CO2 mmol/L 27.0 26.0 23.0   BUN mg/dL 32* 31* 31*   CREATININE mg/dL 1.04 0.88 1.11   GLUCOSE mg/dL 117* 134* 152*   CALCIUM mg/dL 9.3 9.5 9.5   BILIRUBIN mg/dL 0.8 0.8 0.8   ALK PHOS U/L 83 74 75   ALT (SGPT) U/L 74* 56* 55*   AST (SGOT) U/L 53* 42* 41*   ANION GAP mmol/L 9.0 9.0 16.0*     Estimated Creatinine Clearance: 111.9 mL/min (by C-G formula based on SCr of 1.04 mg/dL).          Results from last 7 days   Lab Units 11/04/21  0639 11/03/21  0509 11/02/21  0604 11/01/21  0537 10/31/21  0556   WBC 10*3/mm3 16.74* 16.80* 16.32* 11.59* 15.71*   HEMOGLOBIN g/dL 15.6 15.1 15.1 14.4 13.7   HEMATOCRIT % 46.4 44.8 44.1 41.8 41.1   PLATELETS 10*3/mm3 295 283 326 222 242           Culture Data:   No results found for: BLOODCX  No results found for: URINECX  No results found for: RESPCX  No results found for: WOUNDCX  No results found for: STOOLCX  No components found for: BODYFLD    Radiology Data:   Imaging Results (Last 24 Hours)     ** No results found for the last 24 hours. **          I have reviewed the patient's current medications.     Assessment/Plan     Active Hospital Problems    Diagnosis  POA   • **Esophageal dysphagia [R13.19]  Unknown     Added automatically from request for surgery [id number]     • Acute congestive heart failure (HCC) [I50.9]  Yes   • Acute respiratory failure with hypoxia and hypercapnia  ([facility]) [J96.01, J96.02]  Yes   • Pneumonia [J18.9]  Yes   • LAWRENCE (acute kidney injury) ([facility]) [N17.9]  Yes       Plan:    1.  Acute respiratory failure with hypoxia and hypercapnia: Patient has been tolerating BiPAP here.  We will try to arrange a BiPAP or trilogy for home.  Currently weaned down to 1 to 2 L.    2.  Heart failure with preserved ejection fraction: Continue Lasix 40 mg daily.  Patient has diuresed 14 pounds during admission.  3.  Pneumonia: Patient has completed Rocephin and azithromycin course.  Procalcitonin negative.  Continue steroid taper.  4.  Acute kidney injury, resolved:  5.  GERD: Continue Bentyl and Protonix.  6.  Anxiety: Continue home Zoloft and Xanax.  7.  Insomnia: Melatonin nightly.  8.  Hypertension: Continue home lisinopril and metoprolol.  9.  Chronic pain: Continue home gabapentin and Percocet.    Discharge Planning: I expect patient to be discharged to home in 1-2 days.    I confirmed that the patient's Advance Care Plan is present, code status is documented, or surrogate decision maker is listed in the patient's medical record.      I have utilized all available immediate resources to obtain, update, or review the patient's current medications.         This document has been electronically signed by NANI Castillo on November 4, 2021 13:52 CDT

## 2021-11-05 LAB
ALBUMIN SERPL-MCNC: 4 G/DL (ref 3.5–5.2)
ALBUMIN/GLOB SERPL: 1.3 G/DL
ALP SERPL-CCNC: 83 U/L (ref 39–117)
ALT SERPL W P-5'-P-CCNC: 68 U/L (ref 1–41)
ANION GAP SERPL CALCULATED.3IONS-SCNC: 10 MMOL/L (ref 5–15)
ARTERIAL PATENCY WRIST A: ABNORMAL
AST SERPL-CCNC: 37 U/L (ref 1–40)
ATMOSPHERIC PRESS: 756 MMHG
BASE EXCESS BLDA CALC-SCNC: 2.8 MMOL/L (ref 0–2)
BASOPHILS # BLD AUTO: 0.14 10*3/MM3 (ref 0–0.2)
BASOPHILS NFR BLD AUTO: 0.6 % (ref 0–1.5)
BDY SITE: ABNORMAL
BILIRUB SERPL-MCNC: 1.2 MG/DL (ref 0–1.2)
BUN SERPL-MCNC: 31 MG/DL (ref 6–20)
BUN/CREAT SERPL: 32.3 (ref 7–25)
CALCIUM SPEC-SCNC: 9.6 MG/DL (ref 8.6–10.5)
CHLORIDE SERPL-SCNC: 97 MMOL/L (ref 98–107)
CO2 SERPL-SCNC: 27 MMOL/L (ref 22–29)
CREAT SERPL-MCNC: 0.96 MG/DL (ref 0.76–1.27)
DEPRECATED RDW RBC AUTO: 43.5 FL (ref 37–54)
EOSINOPHIL # BLD AUTO: 0.36 10*3/MM3 (ref 0–0.4)
EOSINOPHIL NFR BLD AUTO: 1.7 % (ref 0.3–6.2)
ERYTHROCYTE [DISTWIDTH] IN BLOOD BY AUTOMATED COUNT: 13.4 % (ref 12.3–15.4)
GFR SERPL CREATININE-BSD FRML MDRD: 82 ML/MIN/1.73
GLOBULIN UR ELPH-MCNC: 3.2 GM/DL
GLUCOSE SERPL-MCNC: 105 MG/DL (ref 65–99)
HCO3 BLDA-SCNC: 26.5 MMOL/L (ref 20–26)
HCT VFR BLD AUTO: 47.5 % (ref 37.5–51)
HGB BLD-MCNC: 16.2 G/DL (ref 13–17.7)
IMM GRANULOCYTES # BLD AUTO: 0.44 10*3/MM3 (ref 0–0.05)
IMM GRANULOCYTES NFR BLD AUTO: 2 % (ref 0–0.5)
LYMPHOCYTES # BLD AUTO: 4.41 10*3/MM3 (ref 0.7–3.1)
LYMPHOCYTES NFR BLD AUTO: 20.3 % (ref 19.6–45.3)
MCH RBC QN AUTO: 30.3 PG (ref 26.6–33)
MCHC RBC AUTO-ENTMCNC: 34.1 G/DL (ref 31.5–35.7)
MCV RBC AUTO: 89 FL (ref 79–97)
MODALITY: ABNORMAL
MONOCYTES # BLD AUTO: 1.21 10*3/MM3 (ref 0.1–0.9)
MONOCYTES NFR BLD AUTO: 5.6 % (ref 5–12)
NEUTROPHILS NFR BLD AUTO: 15.16 10*3/MM3 (ref 1.7–7)
NEUTROPHILS NFR BLD AUTO: 69.8 % (ref 42.7–76)
NRBC BLD AUTO-RTO: 0 /100 WBC (ref 0–0.2)
PCO2 BLDA: 37.1 MM HG (ref 35–45)
PH BLDA: 7.46 PH UNITS (ref 7.35–7.45)
PLATELET # BLD AUTO: 243 10*3/MM3 (ref 140–450)
PMV BLD AUTO: 9.4 FL (ref 6–12)
PO2 BLDA: 53.9 MM HG (ref 83–108)
POTASSIUM SERPL-SCNC: 3.9 MMOL/L (ref 3.5–5.2)
PROT SERPL-MCNC: 7.2 G/DL (ref 6–8.5)
RBC # BLD AUTO: 5.34 10*6/MM3 (ref 4.14–5.8)
SAO2 % BLDCOA: 88.6 % (ref 94–99)
SODIUM SERPL-SCNC: 134 MMOL/L (ref 136–145)
VENTILATOR MODE: ABNORMAL
WBC # BLD AUTO: 21.72 10*3/MM3 (ref 3.4–10.8)

## 2021-11-05 PROCEDURE — 94799 UNLISTED PULMONARY SVC/PX: CPT

## 2021-11-05 PROCEDURE — 85025 COMPLETE CBC W/AUTO DIFF WBC: CPT | Performed by: INTERNAL MEDICINE

## 2021-11-05 PROCEDURE — 94760 N-INVAS EAR/PLS OXIMETRY 1: CPT

## 2021-11-05 PROCEDURE — 99231 SBSQ HOSP IP/OBS SF/LOW 25: CPT | Performed by: INTERNAL MEDICINE

## 2021-11-05 PROCEDURE — 80053 COMPREHEN METABOLIC PANEL: CPT | Performed by: INTERNAL MEDICINE

## 2021-11-05 PROCEDURE — 25010000002 HEPARIN (PORCINE) PER 1000 UNITS: Performed by: INTERNAL MEDICINE

## 2021-11-05 PROCEDURE — 36600 WITHDRAWAL OF ARTERIAL BLOOD: CPT

## 2021-11-05 PROCEDURE — 94660 CPAP INITIATION&MGMT: CPT

## 2021-11-05 PROCEDURE — 25010000002 FUROSEMIDE PER 20 MG: Performed by: INTERNAL MEDICINE

## 2021-11-05 PROCEDURE — 82803 BLOOD GASES ANY COMBINATION: CPT

## 2021-11-05 PROCEDURE — 63710000001 PREDNISONE PER 1 MG: Performed by: FAMILY MEDICINE

## 2021-11-05 RX ADMIN — GABAPENTIN 300 MG: 300 CAPSULE ORAL at 21:04

## 2021-11-05 RX ADMIN — SODIUM CHLORIDE, PRESERVATIVE FREE 10 ML: 5 INJECTION INTRAVENOUS at 08:29

## 2021-11-05 RX ADMIN — HEPARIN SODIUM 5000 UNITS: 5000 INJECTION INTRAVENOUS; SUBCUTANEOUS at 22:15

## 2021-11-05 RX ADMIN — IPRATROPIUM BROMIDE 0.5 MG: 0.5 SOLUTION RESPIRATORY (INHALATION) at 15:21

## 2021-11-05 RX ADMIN — ALPRAZOLAM 1 MG: 1 TABLET ORAL at 08:28

## 2021-11-05 RX ADMIN — CALCIUM CARBONATE (ANTACID) CHEW TAB 500 MG 2 TABLET: 500 CHEW TAB at 10:32

## 2021-11-05 RX ADMIN — OXYCODONE HYDROCHLORIDE AND ACETAMINOPHEN 1 TABLET: 7.5; 325 TABLET ORAL at 17:23

## 2021-11-05 RX ADMIN — IPRATROPIUM BROMIDE 0.5 MG: 0.5 SOLUTION RESPIRATORY (INHALATION) at 19:47

## 2021-11-05 RX ADMIN — GUAIFENESIN 1200 MG: 600 TABLET, EXTENDED RELEASE ORAL at 21:04

## 2021-11-05 RX ADMIN — IPRATROPIUM BROMIDE 0.5 MG: 0.5 SOLUTION RESPIRATORY (INHALATION) at 07:03

## 2021-11-05 RX ADMIN — TIZANIDINE 4 MG: 4 TABLET ORAL at 22:14

## 2021-11-05 RX ADMIN — SERTRALINE 100 MG: 50 TABLET, FILM COATED ORAL at 21:04

## 2021-11-05 RX ADMIN — SODIUM CHLORIDE, PRESERVATIVE FREE 10 ML: 5 INJECTION INTRAVENOUS at 21:05

## 2021-11-05 RX ADMIN — FUROSEMIDE 40 MG: 10 INJECTION INTRAMUSCULAR; INTRAVENOUS at 08:28

## 2021-11-05 RX ADMIN — CALCIUM CARBONATE (ANTACID) CHEW TAB 500 MG 2 TABLET: 500 CHEW TAB at 22:20

## 2021-11-05 RX ADMIN — PANTOPRAZOLE SODIUM 40 MG: 40 TABLET, DELAYED RELEASE ORAL at 05:46

## 2021-11-05 RX ADMIN — ALPRAZOLAM 1 MG: 1 TABLET ORAL at 17:22

## 2021-11-05 RX ADMIN — HEPARIN SODIUM 5000 UNITS: 5000 INJECTION INTRAVENOUS; SUBCUTANEOUS at 05:46

## 2021-11-05 RX ADMIN — SERTRALINE 100 MG: 50 TABLET, FILM COATED ORAL at 08:28

## 2021-11-05 RX ADMIN — DICYCLOMINE HYDROCHLORIDE 10 MG: 10 CAPSULE ORAL at 08:28

## 2021-11-05 RX ADMIN — PREDNISONE 40 MG: 20 TABLET ORAL at 08:28

## 2021-11-05 RX ADMIN — HEPARIN SODIUM 5000 UNITS: 5000 INJECTION INTRAVENOUS; SUBCUTANEOUS at 14:58

## 2021-11-05 RX ADMIN — METOPROLOL SUCCINATE 50 MG: 50 TABLET, EXTENDED RELEASE ORAL at 08:28

## 2021-11-05 RX ADMIN — IPRATROPIUM BROMIDE 0.5 MG: 0.5 SOLUTION RESPIRATORY (INHALATION) at 11:05

## 2021-11-05 RX ADMIN — LISINOPRIL 20 MG: 20 TABLET ORAL at 08:28

## 2021-11-05 RX ADMIN — MELATONIN 6 MG: 3 TAB ORAL at 21:04

## 2021-11-05 RX ADMIN — DICYCLOMINE HYDROCHLORIDE 10 MG: 10 CAPSULE ORAL at 10:32

## 2021-11-05 RX ADMIN — DICYCLOMINE HYDROCHLORIDE 10 MG: 10 CAPSULE ORAL at 21:20

## 2021-11-05 RX ADMIN — DICYCLOMINE HYDROCHLORIDE 10 MG: 10 CAPSULE ORAL at 17:22

## 2021-11-05 RX ADMIN — GABAPENTIN 300 MG: 300 CAPSULE ORAL at 08:28

## 2021-11-05 RX ADMIN — NICOTINE 1 PATCH: 21 PATCH, EXTENDED RELEASE TRANSDERMAL at 08:29

## 2021-11-05 RX ADMIN — GUAIFENESIN 1200 MG: 600 TABLET, EXTENDED RELEASE ORAL at 08:28

## 2021-11-05 NOTE — DISCHARGE PLACEMENT REQUEST
"Berenice Gill (56 y.o. Male)             Date of Birth Social Security Number Address Home Phone MRN    1965  [address] [place] [address] [phone] [id number]    Judaism Marital Status             Judaism        Admission Date Admission Type Admitting Provider Attending Provider Department, Room/Bed    10/27/21 Emergency Andres Bell MD Craig, David A, MD [address], 321/1    Discharge Date Discharge Disposition Discharge Destination                         Attending Provider: Andres Bell MD    Allergies: Ketamine Hcl    Isolation: None   Infection: None   Code Status: CPR   Advance Care Planning Activity    Ht: 182.9 cm (72.01\")   Wt: 127 kg (279 lb 3.2 oz)    Admission Cmt: None   Principal Problem: Esophageal dysphagia [R13.19] More...                 Active Insurance as of 10/27/2021     Primary Coverage     Payor Plan Insurance Group Employer/Plan Group    [employer] BLUE [employer]OSS BLUE [employer]IELD PPO [id number]     Payor Plan Address Payor Plan Phone Number Payor Plan Fax Number Effective Dates    [address] [phone]  3/1/2013 - None Entered    [place] [address]       Subscriber Name Subscriber Birth Date Member ID       BERENICE GILL 1965 [id number]                 Emergency Contacts      (Rel.) Home Phone Work Phone Mobile Phone    VIJAY GILL (Spouse) [phone] -- [phone]            Insurance Information                [employer]HE[employer] BLUE CROSS/[employer]HE[employer]UE [employer]OSS BLUE [employer] PPO Phone: [phone]    Subscriber: GillOsvaldoadycandie Moore Subscriber#: [id number]    Group#: [id number] Precert#: --             Electronically signed by Kain Delgadillo MD at 11/05/21 1407     Cassi Prince APRN at 11/05/21 1142              [facility] Medicine Services  INPATIENT PROGRESS NOTE    Length of Stay: 9  Date of Admission: 10/27/2021  Primary Care Physician: Tod" Halle VALENTINE MD    Subjective   Chief Complaint: Anxiety    HPI: This is a 56-year-old male with past medical history of anxiety, arthritis, Crohn's disease, depression, sleep apnea (noncompliant with CPAP) and hypertension that presented to [facility] on 10/27/2021 with complaints of worsening shortness of air, nonproductive cough, fever and chills.  Patient was admitted with acute respiratory failure with hypoxia and hypercapnia, acute congestive heart failure, pneumonia acute kidney injury.    11/5/2021:  Patient has been weaned to room air today.  Will try to have home Trilogy set up.     Review of Systems   Constitutional: Negative for activity change and fatigue.   HENT: Negative for ear pain and sore throat.    Eyes: Negative for pain and discharge.   Respiratory: Positive for shortness of breath. Negative for cough.    Cardiovascular: Negative for chest pain and palpitations.   Gastrointestinal: Negative for abdominal pain and nausea.   Endocrine: Negative for cold intolerance and heat intolerance.   Genitourinary: Negative for difficulty urinating and dysuria.   Musculoskeletal: Negative for arthralgias and gait problem.   Skin: Negative for color change and rash.   Neurological: Negative for dizziness and weakness.   Psychiatric/Behavioral: Negative for agitation and confusion.        Objective    Temp:  [96.6 °F (35.9 °C)-97.5 °F (36.4 °C)] 97.5 °F (36.4 °C)  Heart Rate:  [] 95  Resp:  [17-18] 18  BP: (124-158)/(56-96) 133/85    Physical Exam  Constitutional:       Appearance: He is well-developed.   HENT:      Head: Normocephalic and atraumatic.   Eyes:      Pupils: Pupils are equal, round, and reactive to light.   Cardiovascular:      Rate and Rhythm: Normal rate and regular rhythm.   Pulmonary:      Effort: Pulmonary effort is normal.      Breath sounds: Normal breath sounds.   Abdominal:      General: Bowel sounds are normal.      Palpations: Abdomen is soft.   Musculoskeletal:         General:  Normal range of motion.      Cervical back: Normal range of motion and neck supple.   Skin:     General: Skin is warm and dry.   Neurological:      Mental Status: He is alert and oriented to person, place, and time.   Psychiatric:         Behavior: Behavior normal.       Results Review:  I have reviewed the labs, radiology results, and diagnostic studies.    Laboratory Data:   Results from last 7 days   Lab Units 11/05/21  0538 11/04/21  0639 11/03/21  0509   SODIUM mmol/L 134* 132* 132*   POTASSIUM mmol/L 3.9 3.5 4.3   CHLORIDE mmol/L 97* 96* 97*   CO2 mmol/L 27.0 27.0 26.0   BUN mg/dL 31* 32* 31*   CREATININE mg/dL 0.96 1.04 0.88   GLUCOSE mg/dL 105* 117* 134*   CALCIUM mg/dL 9.6 9.3 9.5   BILIRUBIN mg/dL 1.2 0.8 0.8   ALK PHOS U/L 83 83 74   ALT (SGPT) U/L 68* 74* 56*   AST (SGOT) U/L 37 53* 42*   ANION GAP mmol/L 10.0 9.0 9.0     Estimated Creatinine Clearance: 121.2 mL/min (by C-G formula based on SCr of 0.96 mg/dL).          Results from last 7 days   Lab Units 11/05/21  0538 11/04/21  0639 11/03/21  0509 11/02/21  0604 11/01/21  0537   WBC 10*3/mm3 21.72* 16.74* 16.80* 16.32* 11.59*   HEMOGLOBIN g/dL 16.2 15.6 15.1 15.1 14.4   HEMATOCRIT % 47.5 46.4 44.8 44.1 41.8   PLATELETS 10*3/mm3 243 295 283 326 222           Culture Data:   No results found for: BLOODCX  No results found for: URINECX  No results found for: RESPCX  No results found for: WOUNDCX  No results found for: STOOLCX  No components found for: BODYFLD    Radiology Data:   Imaging Results (Last 24 Hours)     ** No results found for the last 24 hours. **          I have reviewed the patient's current medications.     Assessment/Plan     Active Hospital Problems    Diagnosis  POA   • **Esophageal dysphagia [R13.19]  Unknown     Added automatically from request for surgery [id number]     • Acute congestive heart failure (HCC) [I50.9]  Yes   • Acute respiratory failure with hypoxia and hypercapnia (HCC) [J96.01, J96.02]  Yes   • Pneumonia [J18.9]  Yes   •  LAWRENCE (acute kidney injury) ([facility]) [N17.9]  Yes       Plan:    1.  Acute respiratory failure with hypoxia and hypercapnia: We will try to arrange a trilogy for home.  Currently weaned to room air.     2.  Heart failure with preserved ejection fraction: Continue Lasix 40 mg daily.  Patient has diuresed 14 pounds during admission.  3.  Pneumonia: Patient has completed Rocephin and azithromycin course.  Procalcitonin negative.  Continue steroid taper.  4.  Acute kidney injury, resolved: Will continue to monitor.   5.  GERD: Continue Bentyl and Protonix.  6.  Anxiety: Continue home Zoloft and Xanax.  7.  Insomnia: Melatonin nightly.  8.  Hypertension: Continue home lisinopril and metoprolol.  9.  Chronic pain: Continue home gabapentin and Percocet.      Patient has chronic hypercapnic respiratory failure with obesity hypoventilation syndrome.  Patient has a home BiPAP.  Home BiPAP is insufficient due to severity of disease.  Ordering nocturnal and daytime volume ventilation.  Targeted volume ventilation needed to maintain adequate Co2 levels.  Without the use of the noninvasive ventilator, patient’s condition would lead to increased hypercapnic episodes, increased hospital readmission, and/ or early demise.  BiPAP is not meeting patient’s requirements due to pressure on therapy.  Patient requires noninvasive targeted volume, flow and pressure to treat hypercapnia.  Patient PCo2 level on ventilator 59.6-61.5.       Discharge Planning: I expect patient to be discharged to home in 1-2 days.    I confirmed that the patient's Advance Care Plan is present, code status is documented, or surrogate decision maker is listed in the patient's medical record.      I have utilized all available immediate resources to obtain, update, or review the patient's current medications.         This document has been electronically signed by NANI Castillo on November 5, 2021 11:42 CDT          Electronically signed by Cassi Prince APRN  at 11/05/21 1143

## 2021-11-05 NOTE — DISCHARGE PLACEMENT REQUEST
"Berenice Gill (56 y.o. Male)             Date of Birth Social Security Number Address Home Phone MRN    1965  [address] [place] [address] [phone] [id number]    [place] Marital Status             Latter-day        Admission Date Admission Type Admitting Provider Attending Provider Department, Room/Bed    10/27/21 Emergency Andres Bell MD Craig, David A, MD [address], 321/1    Discharge Date Discharge Disposition Discharge Destination                         Attending Provider: Andres Bell MD    Allergies: Ketamine Hcl    Isolation: None   Infection: None   Code Status: CPR   Advance Care Planning Activity    Ht: 182.9 cm (72.01\")   Wt: 127 kg (279 lb 3.2 oz)    Admission Cmt: None   Principal Problem: Esophageal dysphagia [R13.19] More...                 Active Insurance as of 10/27/2021     Primary Coverage     Payor Plan Insurance Group Employer/Plan Group    [employer] PPO [id number]     Payor Plan Address Payor Plan Phone Number Payor Plan Fax Number Effective Dates    [address] [phone]  3/1/2013 - None Entered    [place] [address]       Subscriber Name Subscriber Birth Date Member ID       BERENICE GILL 1965 [id number]                 Emergency Contacts      (Rel.) Home Phone Work Phone Mobile Phone    VIJAY GILL (Spouse) [phone] -- [phone]            Insurance Information                [employer] PPO Phone: [phone]    Subscriber: Berenice Gill Subscriber#: [id number]    Group#: [id number] Precert#: --          Miscellaneous DME [[id number]] (Order [id number])  Order  Date: 11/5/2021 Department: [address] Ordering/Authorizing: Cassi Prince APRN       Order History  Outpatient  Date/Time Action Taken User Additional Information   11/05/21 1053 " Sign Cassi Prince APRN      Order Details    Frequency Duration Priority Order Class   None None Routine External     Start Date/Time    Start Date   11/05/21     Order Information    Order Date Service Start Date Start Time   11/05/21 Medicine 11/05/21      Comments    Dx:  Chronic hypercapnic respiratory failure and obesity hypoventilation syndrome.            Reference Links    Associated Reports   View Encounter     Order Questions    Question Answer   Type of DME  Noninvasive ventilator   Length of Need (99 Months = Lifetime) 99 Months = Lifetime   Note: Custom values to enter length of need for DME            Source Order Set / Preference List    Preference List   AMB SUPPLIES [1416]     Clinical Indications     ICD-10-CM ICD-9-CM   Acute congestive heart failure, unspecified heart failure type ([facility])  - Primary     I50.9 428.0   Pneumonia of both lungs due to infectious organism, unspecified part of lung     J18.9 483.8   Acute respiratory failure with hypoxia and hypercapnia ([facility])     J96.01  J96.02 518.81   Increased lactic acid level     R79.89 276.2   LAWRENCE (acute kidney injury) ([facility])     N17.9 584.9   Esophageal dysphagia     R13.19 787.29   Anxiety     F41.9 300.00     Reprint Order Requisition    Miscellaneous DME (Order #[id number]) on 11/5/21     Encounter    View Encounter            Order Provider Info        Office phone Pager E-mail   Ordering User Cassi Prince APRN [phone] -- --   Authorizing Provider Cassi Prince APRN [phone] -- --   Attending Provider Andres Bell [phone] -- --     Tracking Reports    Cosign Tracking Order Transmittal Tracking   Authorized by:  NANI Castillo  (NPI: [id number])           Lab Component SmartPhrase Guide    Miscellaneous DME (Order #[id number]) on 11/5/21

## 2021-11-05 NOTE — PROGRESS NOTES
[facility] Medicine Services  INPATIENT PROGRESS NOTE    Length of Stay: 9  Date of Admission: 10/27/2021  Primary Care Physician: Halle Baron MD    Subjective   Chief Complaint: Anxiety    HPI: This is a 56-year-old male with past medical history of anxiety, arthritis, Crohn's disease, depression, sleep apnea (noncompliant with CPAP) and hypertension that presented to [facility] on 10/27/2021 with complaints of worsening shortness of air, nonproductive cough, fever and chills.  Patient was admitted with acute respiratory failure with hypoxia and hypercapnia, acute congestive heart failure, pneumonia acute kidney injury.    11/5/2021:  Patient has been weaned to room air today.  Will try to have home Trilogy set up.     Review of Systems   Constitutional: Negative for activity change and fatigue.   HENT: Negative for ear pain and sore throat.    Eyes: Negative for pain and discharge.   Respiratory: Positive for shortness of breath. Negative for cough.    Cardiovascular: Negative for chest pain and palpitations.   Gastrointestinal: Negative for abdominal pain and nausea.   Endocrine: Negative for cold intolerance and heat intolerance.   Genitourinary: Negative for difficulty urinating and dysuria.   Musculoskeletal: Negative for arthralgias and gait problem.   Skin: Negative for color change and rash.   Neurological: Negative for dizziness and weakness.   Psychiatric/Behavioral: Negative for agitation and confusion.        Objective    Temp:  [96.6 °F (35.9 °C)-97.5 °F (36.4 °C)] 97.5 °F (36.4 °C)  Heart Rate:  [] 95  Resp:  [17-18] 18  BP: (124-158)/(56-96) 133/85    Physical Exam  Constitutional:       Appearance: He is well-developed.   HENT:      Head: Normocephalic and atraumatic.   Eyes:      Pupils: Pupils are equal, round, and reactive to light.   Cardiovascular:      Rate and Rhythm: Normal rate and regular rhythm.   Pulmonary:      Effort: Pulmonary effort is normal.      Breath  sounds: Normal breath sounds.   Abdominal:      General: Bowel sounds are normal.      Palpations: Abdomen is soft.   Musculoskeletal:         General: Normal range of motion.      Cervical back: Normal range of motion and neck supple.   Skin:     General: Skin is warm and dry.   Neurological:      Mental Status: He is alert and oriented to person, place, and time.   Psychiatric:         Behavior: Behavior normal.       Results Review:  I have reviewed the labs, radiology results, and diagnostic studies.    Laboratory Data:   Results from last 7 days   Lab Units 11/05/21  0538 11/04/21  0639 11/03/21  0509   SODIUM mmol/L 134* 132* 132*   POTASSIUM mmol/L 3.9 3.5 4.3   CHLORIDE mmol/L 97* 96* 97*   CO2 mmol/L 27.0 27.0 26.0   BUN mg/dL 31* 32* 31*   CREATININE mg/dL 0.96 1.04 0.88   GLUCOSE mg/dL 105* 117* 134*   CALCIUM mg/dL 9.6 9.3 9.5   BILIRUBIN mg/dL 1.2 0.8 0.8   ALK PHOS U/L 83 83 74   ALT (SGPT) U/L 68* 74* 56*   AST (SGOT) U/L 37 53* 42*   ANION GAP mmol/L 10.0 9.0 9.0     Estimated Creatinine Clearance: 121.2 mL/min (by C-G formula based on SCr of 0.96 mg/dL).          Results from last 7 days   Lab Units 11/05/21  0538 11/04/21  0639 11/03/21  0509 11/02/21  0604 11/01/21  0537   WBC 10*3/mm3 21.72* 16.74* 16.80* 16.32* 11.59*   HEMOGLOBIN g/dL 16.2 15.6 15.1 15.1 14.4   HEMATOCRIT % 47.5 46.4 44.8 44.1 41.8   PLATELETS 10*3/mm3 243 295 283 326 222           Culture Data:   No results found for: BLOODCX  No results found for: URINECX  No results found for: RESPCX  No results found for: WOUNDCX  No results found for: STOOLCX  No components found for: BODYFLD    Radiology Data:   Imaging Results (Last 24 Hours)     ** No results found for the last 24 hours. **          I have reviewed the patient's current medications.     Assessment/Plan     Active Hospital Problems    Diagnosis  POA   • **Esophageal dysphagia [R13.19]  Unknown     Added automatically from request for surgery [id number]     • Acute  congestive heart failure (HCC) [I50.9]  Yes   • Acute respiratory failure with hypoxia and hypercapnia ([facility]) [J96.01, J96.02]  Yes   • Pneumonia [J18.9]  Yes   • LAWRENCE (acute kidney injury) ([facility]) [N17.9]  Yes       Plan:    1.  Acute respiratory failure with hypoxia and hypercapnia: We will try to arrange a trilogy for home.  Currently weaned to room air.     2.  Heart failure with preserved ejection fraction: Continue Lasix 40 mg daily.  Patient has diuresed 14 pounds during admission.  3.  Pneumonia: Patient has completed Rocephin and azithromycin course.  Procalcitonin negative.  Continue steroid taper.  4.  Acute kidney injury, resolved: Will continue to monitor.   5.  GERD: Continue Bentyl and Protonix.  6.  Anxiety: Continue home Zoloft and Xanax.  7.  Insomnia: Melatonin nightly.  8.  Hypertension: Continue home lisinopril and metoprolol.  9.  Chronic pain: Continue home gabapentin and Percocet.      Patient has chronic hypercapnic respiratory failure with obesity hypoventilation syndrome.  Patient has a home BiPAP.  Home BiPAP is insufficient due to severity of disease.  Ordering nocturnal and daytime volume ventilation.  Targeted volume ventilation needed to maintain adequate Co2 levels.  Without the use of the noninvasive ventilator, patient’s condition would lead to increased hypercapnic episodes, increased hospital readmission, and/ or early demise.  BiPAP is not meeting patient’s requirements due to pressure on therapy.  Patient requires noninvasive targeted volume, flow and pressure to treat hypercapnia.  Patient PCo2 level on ventilator 59.6-61.5.       Discharge Planning: I expect patient to be discharged to home in 1-2 days.    I confirmed that the patient's Advance Care Plan is present, code status is documented, or surrogate decision maker is listed in the patient's medical record.      I have utilized all available immediate resources to obtain, update, or review the patient's current medications.          This document has been electronically signed by NANI Castillo on November 5, 2021 11:42 CDT

## 2021-11-05 NOTE — PROGRESS NOTES
SUBJECTIVE:   11/5/2021  Chief Complaint:     Subjective      Patient feels better today.  Dyspnea is improving.  Tolerating diet.  Dysphagia is improving as well.  EGD was consistent with esophagitis and gastritis.  Path is pending.  Path was consistent with reactive gastropathy and chronic esophagitis.  Hemoglobin is 15.6.  History:  Past Medical History:   Diagnosis Date   • Anxiety    • Arthritis    • Crohn's disease (HCC)    • Depression    • Hypertension      Past Surgical History:   Procedure Laterality Date   • ABDOMINAL HERNIA REPAIR     • COLON RESECTION  1988   • COLONOSCOPY  10/26/2015    internal hemorrhoids,otherwise normal postoperative colonoscopy with the evaluation of his ti   • ENDOSCOPY N/A 11/1/2021    Procedure: ESOPHAGOGASTRODUODENOSCOPY;  Surgeon: Kain Delgadillo MD;  Location: [facility] ENDOSCOPY;  Service: Gastroenterology;  Laterality: N/A;     History reviewed. No pertinent family history.  Social History     Tobacco Use   • Smoking status: Former Smoker   • Smokeless tobacco: Never Used   Substance Use Topics   • Alcohol use: Yes     Comment: very rarely   • Drug use: No     Medications Prior to Admission   Medication Sig Dispense Refill Last Dose   • Adalimumab (HUMIRA) 40 MG/0.4ML Prefilled Syringe Kit injection Inject 40 mg under the skin into the appropriate area as directed See Admin Instructions.   10/27/2021 at Unknown time   • ALPRAZolam (XANAX) 1 MG tablet TK 1 T PO BID PRF ANXIETY  2 10/27/2021 at Unknown time   • dicyclomine (BENTYL) 10 MG capsule Take 10 mg by mouth 4 (Four) Times a Day Before Meals & at Bedtime.   10/27/2021 at Unknown time   • gabapentin (NEURONTIN) 300 MG capsule Take 300 mg by mouth 2 (Two) Times a Day.   10/27/2021 at Unknown time   • lansoprazole (PREVACID) 15 MG capsule Take 15 mg by mouth Daily.   10/27/2021 at Unknown time   • metoprolol succinate XL (TOPROL-XL) 50 MG 24 hr tablet Take 50 mg by mouth every night at bedtime.   10/26/2021 at  Unknown time   • oxyCODONE-acetaminophen (PERCOCET) 7.5-325 MG per tablet Take 1 tablet by mouth Every 6 (Six) Hours As Needed.   10/27/2021 at Unknown time   • promethazine (PHENERGAN) 12.5 MG tablet Take 1 tablet by mouth Every 6 (Six) Hours As Needed for Nausea or Vomiting (If still nauseated after zofran). 30 tablet 1 10/27/2021 at Unknown time   • sertraline (ZOLOFT) 100 MG tablet TK 1 T PO BID  5 10/27/2021 at Unknown time   • tiZANidine (ZANAFLEX) 4 MG tablet Take 4 mg by mouth At Night As Needed for Muscle Spasms.   10/26/2021 at Unknown time   • ondansetron (ZOFRAN) 8 MG tablet Take 1 tablet by mouth Every 6 (Six) Hours As Needed for Nausea or Vomiting (give 30 minutes prior to antibiotic administration). 30 tablet 1      Allergies:  Ketamine hcl     CURRENT MEDICATIONS/OBJECTIVE/VS/PE:     Current Medications:     Current Facility-Administered Medications   Medication Dose Route Frequency Provider Last Rate Last Admin   • acetaminophen (TYLENOL) tablet 650 mg  650 mg Oral Q4H PRN Kain Delgadillo MD   650 mg at 11/01/21 1335    Or   • acetaminophen (TYLENOL) 160 MG/5ML solution 650 mg  650 mg Oral Q4H PRN Kain Delgadillo MD        Or   • acetaminophen (TYLENOL) suppository 650 mg  650 mg Rectal Q4H PRN Kain Delgadillo MD       • ALPRAZolam (XANAX) tablet 1 mg  1 mg Oral TID PRN Kain Delgadillo MD   1 mg at 11/05/21 0828   • calcium carbonate (TUMS) chewable tablet 500 mg (200 mg elemental)  2 tablet Oral BID PRN Kain Delgadillo MD   2 tablet at 11/05/21 1032   • dicyclomine (BENTYL) capsule 10 mg  10 mg Oral 4x Daily AC & at Bedtime Kain Delgadillo MD   10 mg at 11/05/21 1032   • furosemide (LASIX) injection 40 mg  40 mg Intravenous Daily Kain Delgadillo MD   40 mg at 11/05/21 0828   • gabapentin (NEURONTIN) capsule 300 mg  300 mg Oral BID Kain Delgadillo MD   300 mg at 11/05/21 0828   • guaiFENesin (MUCINEX) 12 hr tablet 1,200 mg  1,200 mg Oral Q12H Kain Delgadillo MD   1,200 mg  at 11/05/21 0828   • heparin (porcine) 5000 UNIT/ML injection 5,000 Units  5,000 Units Subcutaneous Q8H Kain Delgadillo MD   5,000 Units at 11/05/21 0546   • hydrALAZINE (APRESOLINE) injection 20 mg  20 mg Intravenous Q6H PRN Kain Delgadillo MD   20 mg at 11/01/21 2020   • ipratropium (ATROVENT) nebulizer solution 0.5 mg  0.5 mg Nebulization 4x Daily - RT Kain Delgadillo MD   0.5 mg at 11/05/21 1105   • lisinopril (PRINIVIL,ZESTRIL) tablet 20 mg  20 mg Oral Daily Kain Delgadillo MD   20 mg at 11/05/21 0828   • melatonin tablet 6 mg  6 mg Oral Nightly LevillCassi G, APRN   6 mg at 11/04/21 2004   • metoprolol succinate XL (TOPROL-XL) 24 hr tablet 50 mg  50 mg Oral Q24H Kain Delgadillo MD   50 mg at 11/05/21 0828   • naloxone (NARCAN) injection 0.4 mg  0.4 mg Intravenous Q5 Min PRN Kain Delgadillo MD       • nicotine (NICODERM CQ) 21 MG/24HR patch 1 patch  1 patch Transdermal Q24H Gemma Hernandez MD   1 patch at 11/05/21 0829   • ondansetron (ZOFRAN) tablet 4 mg  4 mg Oral Q6H PRN Kain Delgadillo MD   4 mg at 10/29/21 2146    Or   • ondansetron (ZOFRAN) injection 4 mg  4 mg Intravenous Q6H PRN Kain Delgadillo MD   4 mg at 11/02/21 0540   • oxyCODONE-acetaminophen (PERCOCET) 7.5-325 MG per tablet 1 tablet  1 tablet Oral Q6H PRN Kain Delgadillo MD   1 tablet at 11/04/21 1709   • pantoprazole (PROTONIX) EC tablet 40 mg  40 mg Oral QAM Kain Delgadillo MD   40 mg at 11/05/21 0546   • phenol (CHLORASEPTIC) 1.4 % liquid 2 spray  2 spray Mouth/Throat Q2H PRN Madeline-Dihenia, Marilyn S, MD   2 spray at 11/03/21 0537   • predniSONE (DELTASONE) tablet 40 mg  40 mg Oral Daily With Breakfast Gemma Hernandez MD   40 mg at 11/05/21 0828   • sertraline (ZOLOFT) tablet 100 mg  100 mg Oral BID Kain eDlgadillo MD   100 mg at 11/05/21 0828   • sodium chloride 0.9 % flush 10 mL  10 mL Intravenous PRN Kain Delgadillo MD       • sodium chloride 0.9 % flush 10 mL  10 mL Intravenous PRN Elie,  MD Kain       • sodium chloride 0.9 % flush 10 mL  10 mL Intravenous Q12H Kain Delgadillo MD   10 mL at 11/05/21 0829   • sodium chloride 0.9 % flush 10 mL  10 mL Intravenous PRN Kain Delgadillo MD       • tiZANidine (ZANAFLEX) tablet 4 mg  4 mg Oral Nightly PRN Kain Delgadillo MD   4 mg at 11/04/21 2201       Objective     Review of Systems:   Review of Systems   Constitutional: Negative for chills, fatigue, fever and unexpected weight change.   HENT: Negative for congestion, ear discharge, hearing loss, nosebleeds and sore throat.    Eyes: Negative for pain, discharge and redness.   Respiratory: Positive for shortness of breath. Negative for cough, chest tightness and wheezing.    Cardiovascular: Negative for chest pain and palpitations.   Gastrointestinal: Negative for abdominal distention, abdominal pain, blood in stool, constipation, diarrhea, nausea and vomiting.   Endocrine: Negative for cold intolerance, polydipsia, polyphagia and polyuria.   Genitourinary: Negative for dysuria, flank pain, frequency, hematuria and urgency.   Musculoskeletal: Negative for arthralgias, back pain, joint swelling and myalgias.   Skin: Negative for color change, pallor and rash.   Neurological: Negative for tremors, seizures, syncope, weakness and headaches.   Hematological: Negative for adenopathy. Does not bruise/bleed easily.   Psychiatric/Behavioral: Negative for behavioral problems, confusion, dysphoric mood, hallucinations and suicidal ideas. The patient is not nervous/anxious.        Physical Exam:   Temp:  [96.6 °F (35.9 °C)-97.5 °F (36.4 °C)] 97.5 °F (36.4 °C)  Heart Rate:  [] 95  Resp:  [17-18] 18  BP: (124-158)/(56-96) 133/85     Physical Exam:  General Appearance:    Alert, cooperative, in no acute distress   Head:    Normocephalic, without obvious abnormality, atraumatic   Eyes:            Lids and lashes normal, conjunctivae and sclerae normal, no   icterus, no pallor, corneas clear, PERRLA    Ears:    Ears appear intact with no abnormalities noted   Throat:   No oral lesions, no thrush, oral mucosa moist   Neck:   No adenopathy, supple, trachea midline, no thyromegaly, no     carotid bruit, no JVD   Back:     No kyphosis present, no scoliosis present, no skin lesions,       erythema or scars, no tenderness to percussion or                   palpation,   range of motion normal   Lungs:     Clear to auscultation,respirations regular, even and                   unlabored    Heart:    Regular rhythm and normal rate, normal S1 and S2, no            murmur, no gallop, no rub, no click   Breast Exam:    Deferred   Abdomen:     Normal bowel sounds, no masses, no organomegaly, soft        nontender, nondistended, no guarding, no rebound                 tenderness   Genitalia:    Deferred   Extremities:   Moves all extremities well, no edema, no cyanosis, no              redness   Pulses:   Pulses palpable and equal bilaterally   Skin:   No bleeding, bruising or rash   Lymph nodes:   No palpable adenopathy   Neurologic:   Cranial nerves 2 - 12 grossly intact, sensation intact, DTR        present and equal bilaterally      Results Review:     Lab Results (last 24 hours)     Procedure Component Value Units Date/Time    Blood Gas, Arterial - [[id number]]  (Abnormal) Collected: 11/05/21 1104    Specimen: Arterial Blood Updated: 11/05/21 1113     Site Right Radial     Jerome's Test N/A     pH, Arterial 7.462 pH units      Comment: 83 Value above reference range        pCO2, Arterial 37.1 mm Hg      pO2, Arterial 53.9 mm Hg      Comment: 85 Value below critical limit        HCO3, Arterial 26.5 mmol/L      Comment: 83 Value above reference range        Base Excess, Arterial 2.8 mmol/L      Comment: 83 Value above reference range        O2 Saturation, Arterial 88.6 %      Comment: 84 Value below reference range        Barometric Pressure for Blood Gas 756 mmHg      Modality Room Air     Ventilator Mode NA     Comment:  Meter: [id number]     :  [id number]       Comprehensive Metabolic Panel [[id number]]  (Abnormal) Collected: 11/05/21 0538    Specimen: Blood Updated: 11/05/21 0608     Glucose 105 mg/dL      BUN 31 mg/dL      Creatinine 0.96 mg/dL      Sodium 134 mmol/L      Potassium 3.9 mmol/L      Chloride 97 mmol/L      CO2 27.0 mmol/L      Calcium 9.6 mg/dL      Total Protein 7.2 g/dL      Albumin 4.00 g/dL      ALT (SGPT) 68 U/L      AST (SGOT) 37 U/L      Alkaline Phosphatase 83 U/L      Total Bilirubin 1.2 mg/dL      eGFR Non African Amer 82 mL/min/1.73      Globulin 3.2 gm/dL      A/G Ratio 1.3 g/dL      BUN/Creatinine Ratio 32.3     Anion Gap 10.0 mmol/L     Narrative:      GFR Normal >60  Chronic Kidney Disease <60  Kidney Failure <15      CBC Auto Differential [[id number]]  (Abnormal) Collected: 11/05/21 0538    Specimen: Blood Updated: 11/05/21 0545     WBC 21.72 10*3/mm3      RBC 5.34 10*6/mm3      Hemoglobin 16.2 g/dL      Hematocrit 47.5 %      MCV 89.0 fL      MCH 30.3 pg      MCHC 34.1 g/dL      RDW 13.4 %      RDW-SD 43.5 fl      MPV 9.4 fL      Platelets 243 10*3/mm3      Neutrophil % 69.8 %      Lymphocyte % 20.3 %      Monocyte % 5.6 %      Eosinophil % 1.7 %      Basophil % 0.6 %      Immature Grans % 2.0 %      Neutrophils, Absolute 15.16 10*3/mm3      Lymphocytes, Absolute 4.41 10*3/mm3      Monocytes, Absolute 1.21 10*3/mm3      Eosinophils, Absolute 0.36 10*3/mm3      Basophils, Absolute 0.14 10*3/mm3      Immature Grans, Absolute 0.44 10*3/mm3      nRBC 0.0 /100 WBC            I reviewed the patient's new clinical results.  I reviewed the patient's new imaging results and agree with the interpretation.     ASSESSMENT/PLAN:   ASSESSMENT: 1.  Dysphagia, improving.  Likely due to dysmotility.  2.  GERD, well controlled with PPI.  3.  Gastritis  4.  Congestive heart failure    PLAN: 1.  Continue PPI and Bentyl.  2.  Diet as tolerated.  3.Okay to DC from GI standpoint.  The risks, benefits, and  alternatives of this procedure have been discussed with the patient or the responsible party- the patient understands and agrees to proceed.         Kain Delgadillo MD  11/05/21  13:23 CDT

## 2021-11-05 NOTE — PROGRESS NOTES
SUBJECTIVE:   11/5/2021  Chief Complaint:     Subjective      Patient feels better today.  Dyspnea is improving.  Tolerating diet.  Dysphagia is improving as well.  EGD was consistent with esophagitis and gastritis.  Path is pending.  Path was consistent with reactive gastropathy and chronic esophagitis.  Hemoglobin is 16.2  History:  Past Medical History:   Diagnosis Date   • Anxiety    • Arthritis    • Crohn's disease (HCC)    • Depression    • Hypertension      Past Surgical History:   Procedure Laterality Date   • ABDOMINAL HERNIA REPAIR     • COLON RESECTION  1988   • COLONOSCOPY  10/26/2015    internal hemorrhoids,otherwise normal postoperative colonoscopy with the evaluation of his ti   • ENDOSCOPY N/A 11/1/2021    Procedure: ESOPHAGOGASTRODUODENOSCOPY;  Surgeon: Kain Delgadillo MD;  Location: [facility] ENDOSCOPY;  Service: Gastroenterology;  Laterality: N/A;     History reviewed. No pertinent family history.  Social History     Tobacco Use   • Smoking status: Former Smoker   • Smokeless tobacco: Never Used   Substance Use Topics   • Alcohol use: Yes     Comment: very rarely   • Drug use: No     Medications Prior to Admission   Medication Sig Dispense Refill Last Dose   • Adalimumab (HUMIRA) 40 MG/0.4ML Prefilled Syringe Kit injection Inject 40 mg under the skin into the appropriate area as directed See Admin Instructions.   10/27/2021 at Unknown time   • ALPRAZolam (XANAX) 1 MG tablet TK 1 T PO BID PRF ANXIETY  2 10/27/2021 at Unknown time   • dicyclomine (BENTYL) 10 MG capsule Take 10 mg by mouth 4 (Four) Times a Day Before Meals & at Bedtime.   10/27/2021 at Unknown time   • gabapentin (NEURONTIN) 300 MG capsule Take 300 mg by mouth 2 (Two) Times a Day.   10/27/2021 at Unknown time   • lansoprazole (PREVACID) 15 MG capsule Take 15 mg by mouth Daily.   10/27/2021 at Unknown time   • metoprolol succinate XL (TOPROL-XL) 50 MG 24 hr tablet Take 50 mg by mouth every night at bedtime.   10/26/2021 at  Unknown time   • oxyCODONE-acetaminophen (PERCOCET) 7.5-325 MG per tablet Take 1 tablet by mouth Every 6 (Six) Hours As Needed.   10/27/2021 at Unknown time   • promethazine (PHENERGAN) 12.5 MG tablet Take 1 tablet by mouth Every 6 (Six) Hours As Needed for Nausea or Vomiting (If still nauseated after zofran). 30 tablet 1 10/27/2021 at Unknown time   • sertraline (ZOLOFT) 100 MG tablet TK 1 T PO BID  5 10/27/2021 at Unknown time   • tiZANidine (ZANAFLEX) 4 MG tablet Take 4 mg by mouth At Night As Needed for Muscle Spasms.   10/26/2021 at Unknown time   • ondansetron (ZOFRAN) 8 MG tablet Take 1 tablet by mouth Every 6 (Six) Hours As Needed for Nausea or Vomiting (give 30 minutes prior to antibiotic administration). 30 tablet 1      Allergies:  Ketamine hcl     CURRENT MEDICATIONS/OBJECTIVE/VS/PE:     Current Medications:     Current Facility-Administered Medications   Medication Dose Route Frequency Provider Last Rate Last Admin   • acetaminophen (TYLENOL) tablet 650 mg  650 mg Oral Q4H PRN Kain Delgadillo MD   650 mg at 11/01/21 1335    Or   • acetaminophen (TYLENOL) 160 MG/5ML solution 650 mg  650 mg Oral Q4H PRN Kain Delgadillo MD        Or   • acetaminophen (TYLENOL) suppository 650 mg  650 mg Rectal Q4H PRN Kain Delgadillo MD       • ALPRAZolam (XANAX) tablet 1 mg  1 mg Oral TID PRN Kain Delgadillo MD   1 mg at 11/05/21 0828   • calcium carbonate (TUMS) chewable tablet 500 mg (200 mg elemental)  2 tablet Oral BID PRN Kain Delgadillo MD   2 tablet at 11/05/21 1032   • dicyclomine (BENTYL) capsule 10 mg  10 mg Oral 4x Daily AC & at Bedtime Kain Delgadillo MD   10 mg at 11/05/21 1032   • furosemide (LASIX) injection 40 mg  40 mg Intravenous Daily Kain Delgadillo MD   40 mg at 11/05/21 0828   • gabapentin (NEURONTIN) capsule 300 mg  300 mg Oral BID Kain Delgadillo MD   300 mg at 11/05/21 0828   • guaiFENesin (MUCINEX) 12 hr tablet 1,200 mg  1,200 mg Oral Q12H Kain Delgadillo MD   1,200 mg  at 11/05/21 0828   • heparin (porcine) 5000 UNIT/ML injection 5,000 Units  5,000 Units Subcutaneous Q8H Kain Delgadillo MD   5,000 Units at 11/05/21 0546   • hydrALAZINE (APRESOLINE) injection 20 mg  20 mg Intravenous Q6H PRN Kain Delgadillo MD   20 mg at 11/01/21 2020   • ipratropium (ATROVENT) nebulizer solution 0.5 mg  0.5 mg Nebulization 4x Daily - RT Kain Delgadillo MD   0.5 mg at 11/05/21 1105   • lisinopril (PRINIVIL,ZESTRIL) tablet 20 mg  20 mg Oral Daily Kain Delgadillo MD   20 mg at 11/05/21 0828   • melatonin tablet 6 mg  6 mg Oral Nightly LevillCassi G, APRN   6 mg at 11/04/21 2004   • metoprolol succinate XL (TOPROL-XL) 24 hr tablet 50 mg  50 mg Oral Q24H Kain Delgadillo MD   50 mg at 11/05/21 0828   • naloxone (NARCAN) injection 0.4 mg  0.4 mg Intravenous Q5 Min PRN Kain Delgadillo MD       • nicotine (NICODERM CQ) 21 MG/24HR patch 1 patch  1 patch Transdermal Q24H Gemma Hernandez MD   1 patch at 11/05/21 0829   • ondansetron (ZOFRAN) tablet 4 mg  4 mg Oral Q6H PRN Kain Delgadillo MD   4 mg at 10/29/21 2146    Or   • ondansetron (ZOFRAN) injection 4 mg  4 mg Intravenous Q6H PRN Kain Delgadillo MD   4 mg at 11/02/21 0540   • oxyCODONE-acetaminophen (PERCOCET) 7.5-325 MG per tablet 1 tablet  1 tablet Oral Q6H PRN Kain Delgadillo MD   1 tablet at 11/04/21 1709   • pantoprazole (PROTONIX) EC tablet 40 mg  40 mg Oral QAM Kain Delgadillo MD   40 mg at 11/05/21 0546   • phenol (CHLORASEPTIC) 1.4 % liquid 2 spray  2 spray Mouth/Throat Q2H PRN Madeline-Dihenia, Marilyn S, MD   2 spray at 11/03/21 0537   • predniSONE (DELTASONE) tablet 40 mg  40 mg Oral Daily With Breakfast Gemma Hernandez MD   40 mg at 11/05/21 0828   • sertraline (ZOLOFT) tablet 100 mg  100 mg Oral BID Kain Delgadillo MD   100 mg at 11/05/21 0828   • sodium chloride 0.9 % flush 10 mL  10 mL Intravenous PRN Kain Delgadillo MD       • sodium chloride 0.9 % flush 10 mL  10 mL Intravenous PRN Elie,  MD Kain       • sodium chloride 0.9 % flush 10 mL  10 mL Intravenous Q12H Kain Delgadillo MD   10 mL at 11/05/21 0829   • sodium chloride 0.9 % flush 10 mL  10 mL Intravenous PRN Kain Degladillo MD       • tiZANidine (ZANAFLEX) tablet 4 mg  4 mg Oral Nightly PRN Kain Delgadillo MD   4 mg at 11/04/21 2201       Objective     Review of Systems:   Review of Systems   Constitutional: Negative for chills, fatigue, fever and unexpected weight change.   HENT: Negative for congestion, ear discharge, hearing loss, nosebleeds and sore throat.    Eyes: Negative for pain, discharge and redness.   Respiratory: Positive for shortness of breath. Negative for cough, chest tightness and wheezing.    Cardiovascular: Negative for chest pain and palpitations.   Gastrointestinal: Negative for abdominal distention, abdominal pain, blood in stool, constipation, diarrhea, nausea and vomiting.   Endocrine: Negative for cold intolerance, polydipsia, polyphagia and polyuria.   Genitourinary: Negative for dysuria, flank pain, frequency, hematuria and urgency.   Musculoskeletal: Negative for arthralgias, back pain, joint swelling and myalgias.   Skin: Negative for color change, pallor and rash.   Neurological: Negative for tremors, seizures, syncope, weakness and headaches.   Hematological: Negative for adenopathy. Does not bruise/bleed easily.   Psychiatric/Behavioral: Negative for behavioral problems, confusion, dysphoric mood, hallucinations and suicidal ideas. The patient is not nervous/anxious.        Physical Exam:   Temp:  [96.6 °F (35.9 °C)-97.5 °F (36.4 °C)] 97.5 °F (36.4 °C)  Heart Rate:  [] 95  Resp:  [17-18] 18  BP: (124-158)/(56-96) 133/85     Physical Exam:  General Appearance:    Alert, cooperative, in no acute distress   Head:    Normocephalic, without obvious abnormality, atraumatic   Eyes:            Lids and lashes normal, conjunctivae and sclerae normal, no   icterus, no pallor, corneas clear, PERRLA    Ears:    Ears appear intact with no abnormalities noted   Throat:   No oral lesions, no thrush, oral mucosa moist   Neck:   No adenopathy, supple, trachea midline, no thyromegaly, no     carotid bruit, no JVD   Back:     No kyphosis present, no scoliosis present, no skin lesions,       erythema or scars, no tenderness to percussion or                   palpation,   range of motion normal   Lungs:     Clear to auscultation,respirations regular, even and                   unlabored    Heart:    Regular rhythm and normal rate, normal S1 and S2, no            murmur, no gallop, no rub, no click   Breast Exam:    Deferred   Abdomen:     Normal bowel sounds, no masses, no organomegaly, soft        nontender, nondistended, no guarding, no rebound                 tenderness   Genitalia:    Deferred   Extremities:   Moves all extremities well, no edema, no cyanosis, no              redness   Pulses:   Pulses palpable and equal bilaterally   Skin:   No bleeding, bruising or rash   Lymph nodes:   No palpable adenopathy   Neurologic:   Cranial nerves 2 - 12 grossly intact, sensation intact, DTR        present and equal bilaterally      Results Review:     Lab Results (last 24 hours)     Procedure Component Value Units Date/Time    Blood Gas, Arterial - [[id number]]  (Abnormal) Collected: 11/05/21 1104    Specimen: Arterial Blood Updated: 11/05/21 1113     Site Right Radial     Jerome's Test N/A     pH, Arterial 7.462 pH units      Comment: 83 Value above reference range        pCO2, Arterial 37.1 mm Hg      pO2, Arterial 53.9 mm Hg      Comment: 85 Value below critical limit        HCO3, Arterial 26.5 mmol/L      Comment: 83 Value above reference range        Base Excess, Arterial 2.8 mmol/L      Comment: 83 Value above reference range        O2 Saturation, Arterial 88.6 %      Comment: 84 Value below reference range        Barometric Pressure for Blood Gas 756 mmHg      Modality Room Air     Ventilator Mode NA     Comment:  Meter: [id number]     :  [id number]       Comprehensive Metabolic Panel [[id number]]  (Abnormal) Collected: 11/05/21 0538    Specimen: Blood Updated: 11/05/21 0608     Glucose 105 mg/dL      BUN 31 mg/dL      Creatinine 0.96 mg/dL      Sodium 134 mmol/L      Potassium 3.9 mmol/L      Chloride 97 mmol/L      CO2 27.0 mmol/L      Calcium 9.6 mg/dL      Total Protein 7.2 g/dL      Albumin 4.00 g/dL      ALT (SGPT) 68 U/L      AST (SGOT) 37 U/L      Alkaline Phosphatase 83 U/L      Total Bilirubin 1.2 mg/dL      eGFR Non African Amer 82 mL/min/1.73      Globulin 3.2 gm/dL      A/G Ratio 1.3 g/dL      BUN/Creatinine Ratio 32.3     Anion Gap 10.0 mmol/L     Narrative:      GFR Normal >60  Chronic Kidney Disease <60  Kidney Failure <15      CBC Auto Differential [[id number]]  (Abnormal) Collected: 11/05/21 0538    Specimen: Blood Updated: 11/05/21 0545     WBC 21.72 10*3/mm3      RBC 5.34 10*6/mm3      Hemoglobin 16.2 g/dL      Hematocrit 47.5 %      MCV 89.0 fL      MCH 30.3 pg      MCHC 34.1 g/dL      RDW 13.4 %      RDW-SD 43.5 fl      MPV 9.4 fL      Platelets 243 10*3/mm3      Neutrophil % 69.8 %      Lymphocyte % 20.3 %      Monocyte % 5.6 %      Eosinophil % 1.7 %      Basophil % 0.6 %      Immature Grans % 2.0 %      Neutrophils, Absolute 15.16 10*3/mm3      Lymphocytes, Absolute 4.41 10*3/mm3      Monocytes, Absolute 1.21 10*3/mm3      Eosinophils, Absolute 0.36 10*3/mm3      Basophils, Absolute 0.14 10*3/mm3      Immature Grans, Absolute 0.44 10*3/mm3      nRBC 0.0 /100 WBC            I reviewed the patient's new clinical results.  I reviewed the patient's new imaging results and agree with the interpretation.     ASSESSMENT/PLAN:   ASSESSMENT: 1.  Dysphagia, improving.  Likely due to dysmotility.  2.  GERD, well controlled with PPI.  3.  Gastritis  4.  Congestive heart failure    PLAN: 1.  Continue PPI and Bentyl.  2.  Diet as tolerated.  3.Okay to DC from GI standpoint.  The risks, benefits, and  alternatives of this procedure have been discussed with the patient or the responsible party- the patient understands and agrees to proceed.         Kain Delgadillo MD  11/05/21  14:06 CDT

## 2021-11-05 NOTE — DISCHARGE PLACEMENT REQUEST
"Berenice Gill (56 y.o. Male)             Date of Birth Social Security Number Address Home Phone MRN    1965  [address] [place] [address] [phone] [id number]    Oriental orthodox Marital Status             Evangelical        Admission Date Admission Type Admitting Provider Attending Provider Department, Room/Bed    10/27/21 Emergency Andres Bell MD Craig, David A, MD [address], 321/1    Discharge Date Discharge Disposition Discharge Destination                         Attending Provider: Andres Bell MD    Allergies: Ketamine Hcl    Isolation: None   Infection: None   Code Status: CPR   Advance Care Planning Activity    Ht: 182.9 cm (72.01\")   Wt: 127 kg (279 lb 3.2 oz)    Admission Cmt: None   Principal Problem: Esophageal dysphagia [R13.19] More...                 Active Insurance as of 10/27/2021     Primary Coverage     Payor Plan Insurance Group Employer/Plan Group    ANTHEM BLUE CROSS ANTHEM BLUE CROSS BLUE [employer]IELD PPO [id number]     Payor Plan Address Payor Plan Phone Number Payor Plan Fax Number Effective Dates    [address] [phone]  3/1/2013 - None Entered    [place] [address]       Subscriber Name Subscriber Birth Date Member ID       BERENICE GILL 1965 [id number]                 Emergency Contacts      (Rel.) Home Phone Work Phone Mobile Phone    VIJAY GILL (Spouse) [phone] -- [phone]            Insurance Information                ANTHEM BLUE CROSS/ANTHEM BLUE CROSS BLUE SHIELD PPO Phone: [phone]    Subscriber: Berenice Gill Subscriber#: [id number]    Group#: [id number] Precert#: --          Miscellaneous DME [[id number]] (Order [id number])  Order  Date: 11/5/2021 Department: [address] Ordering/Authorizing: Cassi Prince APRN       Order History  Outpatient  Date/Time Action Taken User Additional Information   11/05/21 1053 " Sign Cassi Prince APRN      Order Details    Frequency Duration Priority Order Class   None None Routine External     Start Date/Time    Start Date   11/05/21     Order Information    Order Date Service Start Date Start Time   11/05/21 Medicine 11/05/21      Comments    Dx:  Chronic hypercapnic respiratory failure and obesity hypoventilation syndrome.            Reference Links    Associated Reports   View Encounter     Order Questions    Question Answer   Type of DME  Noninvasive ventilator   Length of Need (99 Months = Lifetime) 99 Months = Lifetime   Note: Custom values to enter length of need for DME            Source Order Set / Preference List    Preference List   AMB SUPPLIES [1416]     Clinical Indications     ICD-10-CM ICD-9-CM   Acute congestive heart failure, unspecified heart failure type ([facility])  - Primary     I50.9 428.0   Pneumonia of both lungs due to infectious organism, unspecified part of lung     J18.9 483.8   Acute respiratory failure with hypoxia and hypercapnia ([facility])     J96.01  J96.02 518.81   Increased lactic acid level     R79.89 276.2   LAWRENCE (acute kidney injury) ([facility])     N17.9 584.9   Esophageal dysphagia     R13.19 787.29   Anxiety     F41.9 300.00     Reprint Order Requisition    Miscellaneous DME (Order #[id number]) on 11/5/21     Encounter    View Encounter            Order Provider Info        Office phone Pager E-mail   Ordering User Cassi Prince APRN [phone] -- --   Authorizing Provider Cassi Prince APRN [phone] -- --   Attending Provider Andres Bell [phone] -- --     Tracking Reports    Cosign Tracking Order Transmittal Tracking   Authorized by:  NANI Castillo  (NPI: [id number])           Lab Component SmartPhrase Guide    Miscellaneous DME (Order #[id number]) on 11/5/21         pH, Arterial                7.462  pH, Arterial     pCO2, Arterial                37.1  pCO2, Arterial     pO2, Arterial                53.9  pO2, Arterial      HCO3, Arterial                26.5  HCO3, Arterial     Base Excess                2.8  Base Excess     O2 Saturation, Arterial                88.6  O2 Saturation, Arterial     RESPIRATORY PARAMETERS    Site                Right ...  Site     Jerome's Test                N/A  Jerome's Test     Modality                Room Air  Modality     Ventilator Mode                NA  Ventilator Mode     Barometric Pressure for Blood Gas                756  Barometric Pressure for Blood Gas     CARDIAC PROFILE

## 2021-11-05 NOTE — PLAN OF CARE
Goal Outcome Evaluation:  Plan of Care Reviewed With: patient  Progress: improving   Pt on room air. No complaints at this time.

## 2021-11-06 ENCOUNTER — READMISSION MANAGEMENT (OUTPATIENT)
Dept: CALL CENTER | Facility: HOSPITAL | Age: 56
End: 2021-11-06

## 2021-11-06 VITALS
DIASTOLIC BLOOD PRESSURE: 73 MMHG | HEART RATE: 87 BPM | RESPIRATION RATE: 18 BRPM | OXYGEN SATURATION: 93 % | HEIGHT: 72 IN | TEMPERATURE: 97.7 F | BODY MASS INDEX: 38.14 KG/M2 | WEIGHT: 281.6 LBS | SYSTOLIC BLOOD PRESSURE: 128 MMHG

## 2021-11-06 LAB
ALBUMIN SERPL-MCNC: 3.8 G/DL (ref 3.5–5.2)
ALBUMIN/GLOB SERPL: 1.3 G/DL
ALP SERPL-CCNC: 79 U/L (ref 39–117)
ALT SERPL W P-5'-P-CCNC: 55 U/L (ref 1–41)
ANION GAP SERPL CALCULATED.3IONS-SCNC: 11 MMOL/L (ref 5–15)
AST SERPL-CCNC: 25 U/L (ref 1–40)
BASOPHILS # BLD AUTO: 0.09 10*3/MM3 (ref 0–0.2)
BASOPHILS NFR BLD AUTO: 0.4 % (ref 0–1.5)
BILIRUB SERPL-MCNC: 1 MG/DL (ref 0–1.2)
BUN SERPL-MCNC: 32 MG/DL (ref 6–20)
BUN/CREAT SERPL: 32.3 (ref 7–25)
CALCIUM SPEC-SCNC: 9.5 MG/DL (ref 8.6–10.5)
CHLORIDE SERPL-SCNC: 98 MMOL/L (ref 98–107)
CO2 SERPL-SCNC: 25 MMOL/L (ref 22–29)
CREAT SERPL-MCNC: 0.99 MG/DL (ref 0.76–1.27)
DEPRECATED RDW RBC AUTO: 43 FL (ref 37–54)
EOSINOPHIL # BLD AUTO: 0.3 10*3/MM3 (ref 0–0.4)
EOSINOPHIL NFR BLD AUTO: 1.3 % (ref 0.3–6.2)
ERYTHROCYTE [DISTWIDTH] IN BLOOD BY AUTOMATED COUNT: 13.3 % (ref 12.3–15.4)
GFR SERPL CREATININE-BSD FRML MDRD: 79 ML/MIN/1.73
GLOBULIN UR ELPH-MCNC: 3 GM/DL
GLUCOSE SERPL-MCNC: 108 MG/DL (ref 65–99)
HCT VFR BLD AUTO: 45.6 % (ref 37.5–51)
HGB BLD-MCNC: 15.5 G/DL (ref 13–17.7)
IMM GRANULOCYTES # BLD AUTO: 0.41 10*3/MM3 (ref 0–0.05)
IMM GRANULOCYTES NFR BLD AUTO: 1.7 % (ref 0–0.5)
LYMPHOCYTES # BLD AUTO: 4.84 10*3/MM3 (ref 0.7–3.1)
LYMPHOCYTES NFR BLD AUTO: 20.3 % (ref 19.6–45.3)
MCH RBC QN AUTO: 30 PG (ref 26.6–33)
MCHC RBC AUTO-ENTMCNC: 34 G/DL (ref 31.5–35.7)
MCV RBC AUTO: 88.4 FL (ref 79–97)
MONOCYTES # BLD AUTO: 1.26 10*3/MM3 (ref 0.1–0.9)
MONOCYTES NFR BLD AUTO: 5.3 % (ref 5–12)
NEUTROPHILS NFR BLD AUTO: 16.98 10*3/MM3 (ref 1.7–7)
NEUTROPHILS NFR BLD AUTO: 71 % (ref 42.7–76)
NRBC BLD AUTO-RTO: 0 /100 WBC (ref 0–0.2)
PLATELET # BLD AUTO: 240 10*3/MM3 (ref 140–450)
PMV BLD AUTO: 9.8 FL (ref 6–12)
POTASSIUM SERPL-SCNC: 3.7 MMOL/L (ref 3.5–5.2)
PROT SERPL-MCNC: 6.8 G/DL (ref 6–8.5)
RBC # BLD AUTO: 5.16 10*6/MM3 (ref 4.14–5.8)
SODIUM SERPL-SCNC: 134 MMOL/L (ref 136–145)
WBC # BLD AUTO: 23.88 10*3/MM3 (ref 3.4–10.8)

## 2021-11-06 PROCEDURE — 94799 UNLISTED PULMONARY SVC/PX: CPT

## 2021-11-06 PROCEDURE — 25010000002 FUROSEMIDE PER 20 MG: Performed by: INTERNAL MEDICINE

## 2021-11-06 PROCEDURE — 94760 N-INVAS EAR/PLS OXIMETRY 1: CPT

## 2021-11-06 PROCEDURE — 85025 COMPLETE CBC W/AUTO DIFF WBC: CPT | Performed by: INTERNAL MEDICINE

## 2021-11-06 PROCEDURE — 25010000002 HEPARIN (PORCINE) PER 1000 UNITS: Performed by: INTERNAL MEDICINE

## 2021-11-06 PROCEDURE — 80053 COMPREHEN METABOLIC PANEL: CPT | Performed by: INTERNAL MEDICINE

## 2021-11-06 PROCEDURE — 99231 SBSQ HOSP IP/OBS SF/LOW 25: CPT | Performed by: INTERNAL MEDICINE

## 2021-11-06 PROCEDURE — 63710000001 PREDNISONE PER 1 MG: Performed by: FAMILY MEDICINE

## 2021-11-06 RX ORDER — LISINOPRIL 20 MG/1
20 TABLET ORAL DAILY
Qty: 30 TABLET | Refills: 3 | Status: SHIPPED | OUTPATIENT
Start: 2021-11-07

## 2021-11-06 RX ORDER — PANTOPRAZOLE SODIUM 40 MG/1
40 TABLET, DELAYED RELEASE ORAL EVERY MORNING
Qty: 30 TABLET | Refills: 3 | Status: SHIPPED | OUTPATIENT
Start: 2021-11-06

## 2021-11-06 RX ORDER — PREDNISONE 10 MG/1
TABLET ORAL
Qty: 21 TABLET | Refills: 0 | Status: SHIPPED | OUTPATIENT
Start: 2021-11-06

## 2021-11-06 RX ORDER — FUROSEMIDE 40 MG/1
40 TABLET ORAL DAILY
Qty: 30 TABLET | Refills: 3 | Status: SHIPPED | OUTPATIENT
Start: 2021-11-06

## 2021-11-06 RX ADMIN — PANTOPRAZOLE SODIUM 40 MG: 40 TABLET, DELAYED RELEASE ORAL at 05:59

## 2021-11-06 RX ADMIN — NICOTINE 1 PATCH: 21 PATCH, EXTENDED RELEASE TRANSDERMAL at 09:25

## 2021-11-06 RX ADMIN — SERTRALINE 100 MG: 50 TABLET, FILM COATED ORAL at 09:25

## 2021-11-06 RX ADMIN — IPRATROPIUM BROMIDE 0.5 MG: 0.5 SOLUTION RESPIRATORY (INHALATION) at 07:45

## 2021-11-06 RX ADMIN — HEPARIN SODIUM 5000 UNITS: 5000 INJECTION INTRAVENOUS; SUBCUTANEOUS at 05:59

## 2021-11-06 RX ADMIN — PREDNISONE 40 MG: 20 TABLET ORAL at 09:25

## 2021-11-06 RX ADMIN — GUAIFENESIN 1200 MG: 600 TABLET, EXTENDED RELEASE ORAL at 09:24

## 2021-11-06 RX ADMIN — GABAPENTIN 300 MG: 300 CAPSULE ORAL at 09:25

## 2021-11-06 RX ADMIN — ALPRAZOLAM 1 MG: 1 TABLET ORAL at 09:26

## 2021-11-06 RX ADMIN — FUROSEMIDE 40 MG: 10 INJECTION INTRAMUSCULAR; INTRAVENOUS at 09:25

## 2021-11-06 RX ADMIN — OXYCODONE HYDROCHLORIDE AND ACETAMINOPHEN 1 TABLET: 7.5; 325 TABLET ORAL at 09:25

## 2021-11-06 RX ADMIN — DICYCLOMINE HYDROCHLORIDE 10 MG: 10 CAPSULE ORAL at 11:16

## 2021-11-06 RX ADMIN — SODIUM CHLORIDE, PRESERVATIVE FREE 10 ML: 5 INJECTION INTRAVENOUS at 09:25

## 2021-11-06 RX ADMIN — DICYCLOMINE HYDROCHLORIDE 10 MG: 10 CAPSULE ORAL at 09:24

## 2021-11-06 RX ADMIN — LISINOPRIL 20 MG: 20 TABLET ORAL at 09:25

## 2021-11-06 RX ADMIN — METOPROLOL SUCCINATE 50 MG: 50 TABLET, EXTENDED RELEASE ORAL at 09:25

## 2021-11-06 NOTE — DISCHARGE SUMMARY
[facility] Medicine Services  DISCHARGE SUMMARY       Date of Admission: 10/27/2021  Date of Discharge:  11/6/2021  Primary Care Physician: Halle Baron MD    Presenting Problem/History of Present Illness:  Increased lactic acid level [R79.89]  LAWRENCE (acute kidney injury) (HCC) [N17.9]  Acute respiratory failure with hypoxia and hypercapnia (HCC) [J96.01, J96.02]  Pneumonia of both lungs due to infectious organism, unspecified part of lung [J18.9]  Acute congestive heart failure, unspecified heart failure type (HCC) [I50.9]     Final Discharge Diagnoses:  Active Hospital Problems    Diagnosis    • **Esophageal dysphagia      Added automatically from request for surgery [id number]     • Acute congestive heart failure (HCC)    • Acute respiratory failure with hypoxia and hypercapnia (HCC)    • Pneumonia    • LAWRENCE (acute kidney injury) (HCC)        Consults:   Consults     Date and Time Order Name Status Description    10/29/2021  9:10 AM Inpatient Gastroenterology Consult Completed           Procedures Performed: Procedure(s):  ESOPHAGOGASTRODUODENOSCOPY                Pertinent Test Results:   Lab Results (last 24 hours)     Procedure Component Value Units Date/Time    Comprehensive Metabolic Panel [[id number]]  (Abnormal) Collected: 11/06/21 0539    Specimen: Blood Updated: 11/06/21 0634     Glucose 108 mg/dL      BUN 32 mg/dL      Creatinine 0.99 mg/dL      Sodium 134 mmol/L      Potassium 3.7 mmol/L      Comment: Slight hemolysis detected by analyzer. Results may be affected.        Chloride 98 mmol/L      CO2 25.0 mmol/L      Calcium 9.5 mg/dL      Total Protein 6.8 g/dL      Albumin 3.80 g/dL      ALT (SGPT) 55 U/L      AST (SGOT) 25 U/L      Alkaline Phosphatase 79 U/L      Total Bilirubin 1.0 mg/dL      eGFR Non African Amer 79 mL/min/1.73      Globulin 3.0 gm/dL      A/G Ratio 1.3 g/dL      BUN/Creatinine Ratio 32.3     Anion Gap 11.0 mmol/L     Narrative:      GFR Normal >60  Chronic Kidney Disease  <60  Kidney Failure <15      CBC Auto Differential [[id number]]  (Abnormal) Collected: 11/06/21 0539    Specimen: Blood Updated: 11/06/21 0614     WBC 23.88 10*3/mm3      RBC 5.16 10*6/mm3      Hemoglobin 15.5 g/dL      Hematocrit 45.6 %      MCV 88.4 fL      MCH 30.0 pg      MCHC 34.0 g/dL      RDW 13.3 %      RDW-SD 43.0 fl      MPV 9.8 fL      Platelets 240 10*3/mm3      Neutrophil % 71.0 %      Lymphocyte % 20.3 %      Monocyte % 5.3 %      Eosinophil % 1.3 %      Basophil % 0.4 %      Immature Grans % 1.7 %      Neutrophils, Absolute 16.98 10*3/mm3      Lymphocytes, Absolute 4.84 10*3/mm3      Monocytes, Absolute 1.26 10*3/mm3      Eosinophils, Absolute 0.30 10*3/mm3      Basophils, Absolute 0.09 10*3/mm3      Immature Grans, Absolute 0.41 10*3/mm3      nRBC 0.0 /100 WBC         Imaging Results (Last 24 Hours)     ** No results found for the last 24 hours. **        Chief Complaint on Day of Discharge: No complaints    Hospital Course:  This is a 56-year-old male with past medical history of anxiety, arthritis, Crohn's disease, depression, sleep apnea (noncompliant with CPAP) and hypertension that presented to [facility] on 10/27/2021 with complaints of worsening shortness of air, nonproductive cough, fever and chills.  Patient was admitted with acute respiratory failure with hypoxia and hypercapnia, acute congestive heart failure, pneumonia acute kidney injury.    Patient was started on Lasix and diuresed 14 pounds during admission.  He was weaned to room air.      Patient has chronic hypercapnic respiratory failure with obesity hypoventilation syndrome.  Patient has a home BiPAP.  Home BiPAP is insufficient due to severity of disease.  Ordering nocturnal and daytime volume ventilation.  Targeted volume ventilation needed to maintain adequate Co2 levels.  Without the use of the noninvasive ventilator, patient’s condition would lead to increased hypercapnic episodes, increased hospital readmission, and/ or  "early demise.  BiPAP is not meeting patient’s requirements due to pressure on therapy.  Patient requires noninvasive targeted volume, flow and pressure to treat hypercapnia.  Patient PCo2 level on ventilator 59.6-61.5.   A Trilogy unit was ordered for home.  [facility] will follow with the patient at home.      Lisinopril was increased for tighter blood pressure control.  Patient was given a steroid taper pack.  Follow with PCP in one week.      Condition on Discharge:  Stable    Physical Exam on Discharge:  /73 (BP Location: Right arm, Patient Position: Lying)   Pulse 87   Temp 97.7 °F (36.5 °C) (Axillary)   Resp 18   Ht 182.9 cm (72.01\")   Wt 128 kg (281 lb 9.6 oz)   SpO2 93%   BMI 38.18 kg/m²   Physical Exam  Constitutional:       Appearance: He is well-developed.   HENT:      Head: Normocephalic and atraumatic.   Eyes:      Pupils: Pupils are equal, round, and reactive to light.   Cardiovascular:      Rate and Rhythm: Normal rate and regular rhythm.   Pulmonary:      Effort: Pulmonary effort is normal.      Breath sounds: Normal breath sounds.   Abdominal:      General: Bowel sounds are normal.      Palpations: Abdomen is soft.   Musculoskeletal:         General: Normal range of motion.      Cervical back: Normal range of motion and neck supple.   Skin:     General: Skin is warm and dry.   Neurological:      Mental Status: He is alert and oriented to person, place, and time.   Psychiatric:         Behavior: Behavior normal.       Discharge Disposition:  Home or Self Care    Discharge Medications:     Discharge Medications      New Medications      Instructions Start Date   furosemide 40 MG tablet  Commonly known as: Lasix   40 mg, Oral, Daily      pantoprazole 40 MG EC tablet  Commonly known as: PROTONIX  Replaces: lansoprazole 15 MG capsule   40 mg, Oral, Every Morning      predniSONE 10 MG (21) dose pack  Commonly known as: DELTASONE   Use as directed on package         Changes to Medications      " Instructions Start Date   lisinopril 20 MG tablet  Commonly known as: PRINIVIL,ZESTRIL  What changed:   · medication strength  · how much to take   20 mg, Oral, Daily   Start Date: November 7, 2021        Continue These Medications      Instructions Start Date   ALPRAZolam 1 MG tablet  Commonly known as: XANAX   TK 1 T PO BID PRF ANXIETY      dicyclomine 10 MG capsule  Commonly known as: BENTYL   10 mg, Oral, 4 Times Daily Before Meals & Nightly      gabapentin 300 MG capsule  Commonly known as: NEURONTIN   300 mg, Oral, 2 Times Daily      Humira 40 MG/0.4ML Prefilled Syringe Kit injection  Generic drug: Adalimumab   40 mg, Subcutaneous, See Admin Instructions      metoprolol succinate XL 50 MG 24 hr tablet  Commonly known as: TOPROL-XL   50 mg, Oral, Every Night at Bedtime      ondansetron 8 MG tablet  Commonly known as: ZOFRAN   8 mg, Oral, Every 6 Hours PRN      oxyCODONE-acetaminophen 7.5-325 MG per tablet  Commonly known as: PERCOCET   1 tablet, Oral, Every 6 Hours PRN      promethazine 12.5 MG tablet  Commonly known as: PHENERGAN   12.5 mg, Oral, Every 6 Hours PRN      sertraline 100 MG tablet  Commonly known as: ZOLOFT   TK 1 T PO BID      tiZANidine 4 MG tablet  Commonly known as: ZANAFLEX   4 mg, Oral, Nightly PRN         Stop These Medications    lansoprazole 15 MG capsule  Commonly known as: PREVACID  Replaced by: pantoprazole 40 MG EC tablet            Discharge Diet:   Diet Instructions     Diet: Consistent Carbohydrate, Cardiac      Discharge Diet:  Consistent Carbohydrate  Cardiac             Activity at Discharge:   Activity Instructions     Activity as Tolerated            Discharge Care Plan/Instructions: As above.     Follow-up Appointment:   Contact information for follow-up providers     Halle Baron MD Follow up in 1 week(s).    Specialty: Family Medicine  Why: office will call you with appointment date and time  Contact information:  [address] [facility]  [id number] [phone]             Kain Delgadillo MD Follow up in 2 week(s).    Specialty: Gastroenterology  Why: office will call you with appointment date and time  Contact information:  [address] [place] [address] [address]  [facility] [address] [phone]                   Contact information for after-discharge care     Durable Medical Equipment     [facility] .    Service: Durable Medical Equipment  Contact information:  [address]  [facility] [address] [phone]                             This document has been electronically signed by NANI Castillo on November 6, 2021 15:18 CDT        Time: Greater than 30 minutes.

## 2021-11-07 NOTE — OUTREACH NOTE
Prep Survey      Responses   [facility] facility patient discharged from? [facility]   Is LACE score < 7 ? No   Emergency Room discharge w/ pulse ox? No   Eligibility Readm Mgmt   Discharge diagnosis Esophageal dysphagia acute kidney injuryAcute congestive heart failure    Does the patient have one of the following disease processes/diagnoses(primary or secondary)? Other   Does the patient have Home health ordered? No   Is there a DME ordered? Yes   What DME was ordered? [facility]   Prep survey completed? Yes          Paulina Costello RN

## 2021-11-08 NOTE — PROGRESS NOTES
SUBJECTIVE:   11/7/2021  Chief Complaint:     Subjective      Patient feels better today.  Dyspnea is improving.  Tolerating diet.  Dysphagia is improving as well.  EGD was consistent with esophagitis and gastritis.   Path was consistent with reactive gastropathy and chronic esophagitis.  Hemoglobin is 15.5  History:  Past Medical History:   Diagnosis Date   • Anxiety    • Arthritis    • Crohn's disease (HCC)    • Depression    • Hypertension      Past Surgical History:   Procedure Laterality Date   • ABDOMINAL HERNIA REPAIR     • COLON RESECTION  1988   • COLONOSCOPY  10/26/2015    internal hemorrhoids,otherwise normal postoperative colonoscopy with the evaluation of his ti   • ENDOSCOPY N/A 11/1/2021    Procedure: ESOPHAGOGASTRODUODENOSCOPY;  Surgeon: Kain Delgadillo MD;  Location: [facility] ENDOSCOPY;  Service: Gastroenterology;  Laterality: N/A;     History reviewed. No pertinent family history.  Social History     Tobacco Use   • Smoking status: Former Smoker   • Smokeless tobacco: Never Used   Substance Use Topics   • Alcohol use: Yes     Comment: very rarely   • Drug use: No     No medications prior to admission.     Allergies:  Ketamine hcl     CURRENT MEDICATIONS/OBJECTIVE/VS/PE:     Current Medications:     No current facility-administered medications for this encounter.     Current Outpatient Medications   Medication Sig Dispense Refill   • Adalimumab (HUMIRA) 40 MG/0.4ML Prefilled Syringe Kit injection Inject 40 mg under the skin into the appropriate area as directed See Admin Instructions.     • ALPRAZolam (XANAX) 1 MG tablet TK 1 T PO BID PRF ANXIETY  2   • dicyclomine (BENTYL) 10 MG capsule Take 10 mg by mouth 4 (Four) Times a Day Before Meals & at Bedtime.     • gabapentin (NEURONTIN) 300 MG capsule Take 300 mg by mouth 2 (Two) Times a Day.     • metoprolol succinate XL (TOPROL-XL) 50 MG 24 hr tablet Take 50 mg by mouth every night at bedtime.     • oxyCODONE-acetaminophen (PERCOCET) 7.5-325  MG per tablet Take 1 tablet by mouth Every 6 (Six) Hours As Needed.     • promethazine (PHENERGAN) 12.5 MG tablet Take 1 tablet by mouth Every 6 (Six) Hours As Needed for Nausea or Vomiting (If still nauseated after zofran). 30 tablet 1   • sertraline (ZOLOFT) 100 MG tablet TK 1 T PO BID  5   • tiZANidine (ZANAFLEX) 4 MG tablet Take 4 mg by mouth At Night As Needed for Muscle Spasms.     • furosemide (Lasix) 40 MG tablet Take 1 tablet by mouth Daily. 30 tablet 3   • lisinopril (PRINIVIL,ZESTRIL) 20 MG tablet Take 1 tablet by mouth Daily. 30 tablet 3   • ondansetron (ZOFRAN) 8 MG tablet Take 1 tablet by mouth Every 6 (Six) Hours As Needed for Nausea or Vomiting (give 30 minutes prior to antibiotic administration). 30 tablet 1   • pantoprazole (PROTONIX) 40 MG EC tablet Take 1 tablet by mouth Every Morning. 30 tablet 3   • predniSONE (DELTASONE) 10 MG (21) dose pack Use as directed on package 21 tablet 0       Objective     Review of Systems:   Review of Systems   Constitutional: Negative for chills, fatigue, fever and unexpected weight change.   HENT: Negative for congestion, ear discharge, hearing loss, nosebleeds and sore throat.    Eyes: Negative for pain, discharge and redness.   Respiratory: Negative for cough, chest tightness, shortness of breath and wheezing.    Cardiovascular: Negative for chest pain and palpitations.   Gastrointestinal: Negative for abdominal distention, abdominal pain, blood in stool, constipation, diarrhea, nausea and vomiting.   Endocrine: Negative for cold intolerance, polydipsia, polyphagia and polyuria.   Genitourinary: Negative for dysuria, flank pain, frequency, hematuria and urgency.   Musculoskeletal: Negative for arthralgias, back pain, joint swelling and myalgias.   Skin: Negative for color change, pallor and rash.   Neurological: Negative for tremors, seizures, syncope, weakness and headaches.   Hematological: Negative for adenopathy. Does not bruise/bleed easily.    Psychiatric/Behavioral: Negative for behavioral problems, confusion, dysphoric mood, hallucinations and suicidal ideas. The patient is not nervous/anxious.        Physical Exam:         Physical Exam:  General Appearance:    Alert, cooperative, in no acute distress   Head:    Normocephalic, without obvious abnormality, atraumatic   Eyes:            Lids and lashes normal, conjunctivae and sclerae normal, no   icterus, no pallor, corneas clear, PERRLA   Ears:    Ears appear intact with no abnormalities noted   Throat:   No oral lesions, no thrush, oral mucosa moist   Neck:   No adenopathy, supple, trachea midline, no thyromegaly, no     carotid bruit, no JVD   Back:     No kyphosis present, no scoliosis present, no skin lesions,       erythema or scars, no tenderness to percussion or                   palpation,   range of motion normal   Lungs:     Clear to auscultation,respirations regular, even and                   unlabored    Heart:    Regular rhythm and normal rate, normal S1 and S2, no            murmur, no gallop, no rub, no click   Breast Exam:    Deferred   Abdomen:     Normal bowel sounds, no masses, no organomegaly, soft        nontender, nondistended, no guarding, no rebound                 tenderness   Genitalia:    Deferred   Extremities:   Moves all extremities well, no edema, no cyanosis, no              redness   Pulses:   Pulses palpable and equal bilaterally   Skin:   No bleeding, bruising or rash   Lymph nodes:   No palpable adenopathy   Neurologic:   Cranial nerves 2 - 12 grossly intact, sensation intact, DTR        present and equal bilaterally      Results Review:     Lab Results (last 24 hours)     Procedure Component Value Units Date/Time    Comprehensive Metabolic Panel [[id number]]  (Abnormal) Collected: 11/06/21 0539    Specimen: Blood Updated: 11/06/21 0634     Glucose 108 mg/dL      BUN 32 mg/dL      Creatinine 0.99 mg/dL      Sodium 134 mmol/L      Potassium 3.7 mmol/L      Comment:  Slight hemolysis detected by analyzer. Results may be affected.        Chloride 98 mmol/L      CO2 25.0 mmol/L      Calcium 9.5 mg/dL      Total Protein 6.8 g/dL      Albumin 3.80 g/dL      ALT (SGPT) 55 U/L      AST (SGOT) 25 U/L      Alkaline Phosphatase 79 U/L      Total Bilirubin 1.0 mg/dL      eGFR Non African Amer 79 mL/min/1.73      Globulin 3.0 gm/dL      A/G Ratio 1.3 g/dL      BUN/Creatinine Ratio 32.3     Anion Gap 11.0 mmol/L     Narrative:      GFR Normal >60  Chronic Kidney Disease <60  Kidney Failure <15      CBC Auto Differential [[id number]]  (Abnormal) Collected: 11/06/21 0539    Specimen: Blood Updated: 11/06/21 0614     WBC 23.88 10*3/mm3      RBC 5.16 10*6/mm3      Hemoglobin 15.5 g/dL      Hematocrit 45.6 %      MCV 88.4 fL      MCH 30.0 pg      MCHC 34.0 g/dL      RDW 13.3 %      RDW-SD 43.0 fl      MPV 9.8 fL      Platelets 240 10*3/mm3      Neutrophil % 71.0 %      Lymphocyte % 20.3 %      Monocyte % 5.3 %      Eosinophil % 1.3 %      Basophil % 0.4 %      Immature Grans % 1.7 %      Neutrophils, Absolute 16.98 10*3/mm3      Lymphocytes, Absolute 4.84 10*3/mm3      Monocytes, Absolute 1.26 10*3/mm3      Eosinophils, Absolute 0.30 10*3/mm3      Basophils, Absolute 0.09 10*3/mm3      Immature Grans, Absolute 0.41 10*3/mm3      nRBC 0.0 /100 WBC            I reviewed the patient's new clinical results.  I reviewed the patient's new imaging results and agree with the interpretation.     ASSESSMENT/PLAN:   ASSESSMENT: 1.  Dysphagia, improving.  Likely due to dysmotility.  2.  GERD, well controlled with PPI.  3.  Gastritis  4.  Congestive heart failure    PLAN: 1.  Continue PPI and Bentyl.  2.  Diet as tolerated.  3.Okay to DC from GI standpoint.  The risks, benefits, and alternatives of this procedure have been discussed with the patient or the responsible party- the patient understands and agrees to proceed.         Kain Delgadillo MD  11/07/21  21:41 CST

## 2021-11-08 NOTE — PAYOR COMM NOTE
"Shawna Zepeda  Case Management Extender  [phone] phone  [phone] fax    Auth# [id number]    Berenice Gill (56 y.o. Male)             Date of Birth Social Security Number Address Home Phone MRN    1965  [address] [place] [address] [phone] [id number]    Mu-ism Marital Status             Worship        Admission Date Admission Type Admitting Provider Attending Provider Department, Room/Bed    10/27/21 Emergency Andres Bell MD  [address], 321/1    Discharge Date Discharge Disposition Discharge Destination          11/6/2021 Home or Self Care              Attending Provider: (none)   Allergies: Ketamine Hcl    Isolation: None   Infection: None   Code Status: Prior   Advance Care Planning Activity    Ht: 182.9 cm (72.01\")   Wt: 128 kg (281 lb 9.6 oz)    Admission Cmt: None   Principal Problem: Esophageal dysphagia [R13.19] More...                 Active Insurance as of 10/27/2021     Primary Coverage     Payor Plan Insurance Group Employer/Plan Group    [employer] PPO [id number]     Payor Plan Address Payor Plan Phone Number Payor Plan Fax Number Effective Dates    [phone]  3/1/2013 - None Entered    [place] [address]       Subscriber Name Subscriber Birth Date Member ID       BERENICE GILL 1965 [id number]                 Emergency Contacts      (Rel.) Home Phone Work Phone Mobile Phone    VIJAY GILL (Spouse) [phone] -- [phone]               Discharge Summary      Cassi Prince APRN at 11/06/21 1518     Attestation signed by Soraya Harrington MD at 11/06/21 1926    I have reviewed the documentation and agree with the plan as outlined.                      [facility] Medicine Services  DISCHARGE SUMMARY       Date of Admission: 10/27/2021  Date of Discharge:  11/6/2021  Primary Care Physician: Halle Baron, " MD    Presenting Problem/History of Present Illness:  Increased lactic acid level [R79.89]  LAWRENCE (acute kidney injury) (HCC) [N17.9]  Acute respiratory failure with hypoxia and hypercapnia (HCC) [J96.01, J96.02]  Pneumonia of both lungs due to infectious organism, unspecified part of lung [J18.9]  Acute congestive heart failure, unspecified heart failure type (HCC) [I50.9]     Final Discharge Diagnoses:  Active Hospital Problems    Diagnosis    • **Esophageal dysphagia      Added automatically from request for surgery [id number]     • Acute congestive heart failure (HCC)    • Acute respiratory failure with hypoxia and hypercapnia (HCC)    • Pneumonia    • LAWRENCE (acute kidney injury) (HCC)        Consults:   Consults     Date and Time Order Name Status Description    10/29/2021  9:10 AM Inpatient Gastroenterology Consult Completed           Procedures Performed: Procedure(s):  ESOPHAGOGASTRODUODENOSCOPY                Pertinent Test Results:   Lab Results (last 24 hours)     Procedure Component Value Units Date/Time    Comprehensive Metabolic Panel [[id number]]  (Abnormal) Collected: 11/06/21 0539    Specimen: Blood Updated: 11/06/21 0634     Glucose 108 mg/dL      BUN 32 mg/dL      Creatinine 0.99 mg/dL      Sodium 134 mmol/L      Potassium 3.7 mmol/L      Comment: Slight hemolysis detected by analyzer. Results may be affected.        Chloride 98 mmol/L      CO2 25.0 mmol/L      Calcium 9.5 mg/dL      Total Protein 6.8 g/dL      Albumin 3.80 g/dL      ALT (SGPT) 55 U/L      AST (SGOT) 25 U/L      Alkaline Phosphatase 79 U/L      Total Bilirubin 1.0 mg/dL      eGFR Non African Amer 79 mL/min/1.73      Globulin 3.0 gm/dL      A/G Ratio 1.3 g/dL      BUN/Creatinine Ratio 32.3     Anion Gap 11.0 mmol/L     Narrative:      GFR Normal >60  Chronic Kidney Disease <60  Kidney Failure <15      CBC Auto Differential [[id number]]  (Abnormal) Collected: 11/06/21 0539    Specimen: Blood Updated: 11/06/21 0614     WBC 23.88 10*3/mm3       RBC 5.16 10*6/mm3      Hemoglobin 15.5 g/dL      Hematocrit 45.6 %      MCV 88.4 fL      MCH 30.0 pg      MCHC 34.0 g/dL      RDW 13.3 %      RDW-SD 43.0 fl      MPV 9.8 fL      Platelets 240 10*3/mm3      Neutrophil % 71.0 %      Lymphocyte % 20.3 %      Monocyte % 5.3 %      Eosinophil % 1.3 %      Basophil % 0.4 %      Immature Grans % 1.7 %      Neutrophils, Absolute 16.98 10*3/mm3      Lymphocytes, Absolute 4.84 10*3/mm3      Monocytes, Absolute 1.26 10*3/mm3      Eosinophils, Absolute 0.30 10*3/mm3      Basophils, Absolute 0.09 10*3/mm3      Immature Grans, Absolute 0.41 10*3/mm3      nRBC 0.0 /100 WBC         Imaging Results (Last 24 Hours)     ** No results found for the last 24 hours. **        Chief Complaint on Day of Discharge: No complaints    Hospital Course:  This is a 56-year-old male with past medical history of anxiety, arthritis, Crohn's disease, depression, sleep apnea (noncompliant with CPAP) and hypertension that presented to [facility] on 10/27/2021 with complaints of worsening shortness of air, nonproductive cough, fever and chills.  Patient was admitted with acute respiratory failure with hypoxia and hypercapnia, acute congestive heart failure, pneumonia acute kidney injury.    Patient was started on Lasix and diuresed 14 pounds during admission.  He was weaned to room air.      Patient has chronic hypercapnic respiratory failure with obesity hypoventilation syndrome.  Patient has a home BiPAP.  Home BiPAP is insufficient due to severity of disease.  Ordering nocturnal and daytime volume ventilation.  Targeted volume ventilation needed to maintain adequate Co2 levels.  Without the use of the noninvasive ventilator, patient’s condition would lead to increased hypercapnic episodes, increased hospital readmission, and/ or early demise.  BiPAP is not meeting patient’s requirements due to pressure on therapy.  Patient requires noninvasive targeted volume, flow and pressure to treat  "hypercapnia.  Patient PCo2 level on ventilator 59.6-61.5.   A Trilogy unit was ordered for home.  [facility] will follow with the patient at home.      Lisinopril was increased for tighter blood pressure control.  Patient was given a steroid taper pack.  Follow with PCP in one week.      Condition on Discharge:  Stable    Physical Exam on Discharge:  /73 (BP Location: Right arm, Patient Position: Lying)   Pulse 87   Temp 97.7 °F (36.5 °C) (Axillary)   Resp 18   Ht 182.9 cm (72.01\")   Wt 128 kg (281 lb 9.6 oz)   SpO2 93%   BMI 38.18 kg/m²   Physical Exam  Constitutional:       Appearance: He is well-developed.   HENT:      Head: Normocephalic and atraumatic.   Eyes:      Pupils: Pupils are equal, round, and reactive to light.   Cardiovascular:      Rate and Rhythm: Normal rate and regular rhythm.   Pulmonary:      Effort: Pulmonary effort is normal.      Breath sounds: Normal breath sounds.   Abdominal:      General: Bowel sounds are normal.      Palpations: Abdomen is soft.   Musculoskeletal:         General: Normal range of motion.      Cervical back: Normal range of motion and neck supple.   Skin:     General: Skin is warm and dry.   Neurological:      Mental Status: He is alert and oriented to person, place, and time.   Psychiatric:         Behavior: Behavior normal.       Discharge Disposition:  Home or Self Care    Discharge Medications:     Discharge Medications      New Medications      Instructions Start Date   furosemide 40 MG tablet  Commonly known as: Lasix   40 mg, Oral, Daily      pantoprazole 40 MG EC tablet  Commonly known as: PROTONIX  Replaces: lansoprazole 15 MG capsule   40 mg, Oral, Every Morning      predniSONE 10 MG (21) dose pack  Commonly known as: DELTASONE   Use as directed on package         Changes to Medications      Instructions Start Date   lisinopril 20 MG tablet  Commonly known as: DENNISZESTRIL  What changed:   · medication strength  · how much to take   20 mg, Oral, " Daily   Start Date: November 7, 2021        Continue These Medications      Instructions Start Date   ALPRAZolam 1 MG tablet  Commonly known as: XANAX   TK 1 T PO BID PRF ANXIETY      dicyclomine 10 MG capsule  Commonly known as: BENTYL   10 mg, Oral, 4 Times Daily Before Meals & Nightly      gabapentin 300 MG capsule  Commonly known as: NEURONTIN   300 mg, Oral, 2 Times Daily      Humira 40 MG/0.4ML Prefilled Syringe Kit injection  Generic drug: Adalimumab   40 mg, Subcutaneous, See Admin Instructions      metoprolol succinate XL 50 MG 24 hr tablet  Commonly known as: TOPROL-XL   50 mg, Oral, Every Night at Bedtime      ondansetron 8 MG tablet  Commonly known as: ZOFRAN   8 mg, Oral, Every 6 Hours PRN      oxyCODONE-acetaminophen 7.5-325 MG per tablet  Commonly known as: PERCOCET   1 tablet, Oral, Every 6 Hours PRN      promethazine 12.5 MG tablet  Commonly known as: PHENERGAN   12.5 mg, Oral, Every 6 Hours PRN      sertraline 100 MG tablet  Commonly known as: ZOLOFT   TK 1 T PO BID      tiZANidine 4 MG tablet  Commonly known as: ZANAFLEX   4 mg, Oral, Nightly PRN         Stop These Medications    lansoprazole 15 MG capsule  Commonly known as: PREVACID  Replaced by: pantoprazole 40 MG EC tablet            Discharge Diet:   Diet Instructions     Diet: Consistent Carbohydrate, Cardiac      Discharge Diet:  Consistent Carbohydrate  Cardiac             Activity at Discharge:   Activity Instructions     Activity as Tolerated            Discharge Care Plan/Instructions: As above.     Follow-up Appointment:   Contact information for follow-up providers     Halle Baron MD Follow up in 1 week(s).    Specialty: Family Medicine  Why: office will call you with appointment date and time  Contact information:  [address] [facility] [address] [phone]             Kain Delgadillo MD Follow up in 2 week(s).    Specialty: Gastroenterology  Why: office will call you with appointment date and time  Contact  information:  [address] [place] [address]   3RD FLR  [facility] [id number] [phone]                   Contact information for after-discharge care     Durable Medical Equipment     [facility] .    Service: Durable Medical Equipment  Contact information:  [address]  [facility] [address] [phone]                             This document has been electronically signed by NANI Castillo on November 6, 2021 15:18 CDT        Time: Greater than 30 minutes.                 Electronically signed by Soraya Harrington MD at 11/06/21 7339

## 2021-11-09 ENCOUNTER — READMISSION MANAGEMENT (OUTPATIENT)
Dept: CALL CENTER | Facility: HOSPITAL | Age: 56
End: 2021-11-09

## 2021-11-09 NOTE — OUTREACH NOTE
Medical Week 1 Survey      Responses   [facility] patient discharged from? [place]   Does the patient have one of the following disease processes/diagnoses(primary or secondary)? Other   Week 1 attempt successful? No   Unsuccessful attempts Attempt 1          Barbara Cano RN

## 2021-11-10 ENCOUNTER — READMISSION MANAGEMENT (OUTPATIENT)
Dept: CALL CENTER | Facility: HOSPITAL | Age: 56
End: 2021-11-10

## 2021-11-10 NOTE — OUTREACH NOTE
Medical Week 1 Survey      Responses   [facility] patient discharged from? [place]   Does the patient have one of the following disease processes/diagnoses(primary or secondary)? Other   Week 1 attempt successful? No   Unsuccessful attempts Attempt 2          Renu Barth RN

## 2021-11-12 ENCOUNTER — READMISSION MANAGEMENT (OUTPATIENT)
Dept: CALL CENTER | Facility: HOSPITAL | Age: 56
End: 2021-11-12

## 2021-11-12 NOTE — OUTREACH NOTE
Medical Week 1 Survey      Responses   [facility] patient discharged from? [place]   Does the patient have one of the following disease processes/diagnoses(primary or secondary)? Other   Week 1 attempt successful? Yes   Call start time 1610   Call end time 1614   Discharge diagnosis Esophageal dysphagia acute kidney injuryAcute congestive heart failure    Meds reviewed with patient/caregiver? Yes   Is the patient having any side effects they believe may be caused by any medication additions or changes? No   Does the patient have all medications ordered at discharge? Yes   Is the patient taking all medications as directed (includes completed medication regime)? Yes   Does the patient have a primary care provider?  Yes   Does the patient have an appointment with their PCP within 7 days of discharge? Yes   Has the patient kept scheduled appointments due by today? Yes   Comments PCP has seen once and will see again 11/19/2021   What DME was ordered? Trilogy machine today to wear at night   Has all DME been delivered? Yes   Did the patient receive a copy of their discharge instructions? Yes   Nursing interventions Reviewed instructions with patient   What is the patient's perception of their health status since discharge? Improving   Is the patient/caregiver able to teach back the hierarchy of who to call/visit for symptoms/problems? PCP, Specialist, Home health nurse, Urgent Care, ED, 911 Yes   Week 1 call completed? Yes          Norma Green RN

## 2021-11-22 ENCOUNTER — READMISSION MANAGEMENT (OUTPATIENT)
Dept: CALL CENTER | Facility: HOSPITAL | Age: 56
End: 2021-11-22

## 2021-11-22 NOTE — OUTREACH NOTE
Medical Week 2 Survey      Responses   [facility] patient discharged from? [place]   Does the patient have one of the following disease processes/diagnoses(primary or secondary)? Other   Week 2 attempt successful? Yes   Call start time 1401   Discharge diagnosis Esophageal dysphagia acute kidney injuryAcute congestive heart failure    Call end time 1407   Meds reviewed with patient/caregiver? Yes   Is the patient taking all medications as directed (includes completed medication regime)? Yes   Has the patient kept scheduled appointments due by today? Yes   Comments Had 2 visits with PCP. Dec 3rd third appt with PCP   What DME was ordered? Trilogy machine today to wear at night   Has all DME been delivered? Yes   Psychosocial issues? No   What is the patient's perception of their health status since discharge? Improving   Is the patient/caregiver able to teach back signs and symptoms related to disease process for when to call PCP? Yes   Is the patient/caregiver able to teach back signs and symptoms related to disease process for when to call 911? Yes   Is the patient/caregiver able to teach back the hierarchy of who to call/visit for symptoms/problems? PCP, Specialist, Home health nurse, Urgent Care, ED, 911 Yes   Additional teach back comments Pt doing well and feels like he is not gaining alot of water weight. Advised to start weighing himself daily.   Week 2 Call Completed? Yes          Christy Zapien RN

## 2021-12-01 ENCOUNTER — READMISSION MANAGEMENT (OUTPATIENT)
Dept: CALL CENTER | Facility: HOSPITAL | Age: 56
End: 2021-12-01

## 2021-12-01 NOTE — OUTREACH NOTE
Medical Week 3 Survey      Responses   [facility] patient discharged from? [place]   Does the patient have one of the following disease processes/diagnoses(primary or secondary)? Other   Week 3 attempt successful? No   Unsuccessful attempts Attempt 1          Lauren Rosas RN

## 2021-12-06 ENCOUNTER — READMISSION MANAGEMENT (OUTPATIENT)
Dept: CALL CENTER | Facility: HOSPITAL | Age: 56
End: 2021-12-06

## 2021-12-06 NOTE — OUTREACH NOTE
Medical Week 3 Survey      Responses   [facility] patient discharged from? [place]   Does the patient have one of the following disease processes/diagnoses(primary or secondary)? Other   Week 3 attempt successful? Yes   Call start time 1612   Call end time 1616   Discharge diagnosis Esophageal dysphagia acute kidney injuryAcute congestive heart failure    Is the patient taking all medications as directed (includes completed medication regime)? Yes   Has the patient kept scheduled appointments due by today? Yes   Comments Has re-eval visit with PCP 12/10/2021   What is the patient's perception of their health status since discharge? Improving   Week 3 Call Completed? Yes          Norma Green RN

## 2021-12-15 ENCOUNTER — READMISSION MANAGEMENT (OUTPATIENT)
Dept: CALL CENTER | Facility: HOSPITAL | Age: 56
End: 2021-12-15

## 2021-12-15 NOTE — OUTREACH NOTE
Medical Week 4 Survey      Responses   [facility] patient discharged from? [place]   Does the patient have one of the following disease processes/diagnoses(primary or secondary)? Other   Week 4 attempt successful? Yes   Call start time 1554   Call end time 1555   Discharge diagnosis Esophageal dysphagia acute kidney injuryAcute congestive heart failure    Is the patient taking all medications as directed (includes completed medication regime)? Yes   Has the patient kept scheduled appointments due by today? Yes   Is the patient still receiving Home Health Services? N/A   Psychosocial issues? No   What is the patient's perception of their health status since discharge? Improving   Is the patient/caregiver able to teach back the hierarchy of who to call/visit for symptoms/problems? PCP, Specialist, Home health nurse, Urgent Care, ED, 911 Yes   Week 4 Call Completed? Yes   Would the patient like one additional call? No   Graduated Yes   Is the patient interested in additional calls from an ambulatory ?  NOTE:  applies to high risk patients requiring additional follow-up. No   Did the patient feel the follow up calls were helpful during their recovery period? Yes   Was the number of calls appropriate? Yes   Wrap up additional comments Doing well.          Dai Malone RN

## 2022-02-09 ENCOUNTER — TELEPHONE (OUTPATIENT)
Dept: NEUROSURGERY | Age: 57
End: 2022-02-09

## 2022-02-09 NOTE — TELEPHONE ENCOUNTER
[facility] New Patient Questionnaire    1. Diagnosis/Reason for Referral?    New spinal cord stimulator implant    2. Who is completing questionnaire? Patient  Caregiver Family      3. Has the patient had any previous spinal/brain surgeries? Yes        A. If yes, what is the name of the facility in which the surgery was performed? [facility] in [facility] [place] Procedure/Surgery performed? Cervical      C. Who was the surgeon? Dr Sammie Roberts       D. When was the surgery? 2002       E. Did the patient improve after the surgery? 4. Is this a second opinion? If yes, Dr. Mehrdad Maldonado would like to review patient first before making the appointment. 5. Have MRI Images been obtain within the last year? Yes  No      XR  CT     If yes, where was the imaging performed? [facility]     If yes, what part of the body? Lumbar  Cervical  Thoracic  Brain     If yes, when was it obtained? Nov or Dec of 2021    Note: if the scan was performed at a facility other than [facility], the disc will need to be brought to the appointment or we need to reach out to obtain the disc. A. Was the patient instructed to provide the disc? Yes   No      8. Has the patient had a NCV/EMG within the last year? Yes  No     If yes, where was it performed and date? MM/YY  Location:      9. Has the patient been to Physical Therapy? Yes  No     If yes, what location, how long attended, and last visit? Location:        Therapy Lasted:    Date of Last Visit:      10. Has the patient been to Pain Management? Yes  No     If yes, what location and last visit     Location: [facility]   Last Visit: Feb 2022   Is it helping?  Yes

## 2022-02-14 ENCOUNTER — OFFICE VISIT (OUTPATIENT)
Dept: NEUROSURGERY | Age: 57
End: 2022-02-14
Payer: COMMERCIAL

## 2022-02-14 VITALS
BODY MASS INDEX: 43.69 KG/M2 | HEART RATE: 75 BPM | SYSTOLIC BLOOD PRESSURE: 165 MMHG | HEIGHT: 69 IN | WEIGHT: 295 LBS | DIASTOLIC BLOOD PRESSURE: 91 MMHG

## 2022-02-14 DIAGNOSIS — Z51.81 ENCOUNTER FOR THERAPEUTIC DRUG MONITORING: Primary | ICD-10-CM

## 2022-02-14 DIAGNOSIS — G89.29 CHRONIC BILATERAL LOW BACK PAIN WITHOUT SCIATICA: ICD-10-CM

## 2022-02-14 DIAGNOSIS — M54.50 CHRONIC BILATERAL LOW BACK PAIN WITHOUT SCIATICA: Primary | ICD-10-CM

## 2022-02-14 DIAGNOSIS — Z01.812 PRE-OPERATIVE LABORATORY EXAMINATION: ICD-10-CM

## 2022-02-14 DIAGNOSIS — M54.50 CHRONIC BILATERAL LOW BACK PAIN WITHOUT SCIATICA: ICD-10-CM

## 2022-02-14 DIAGNOSIS — G89.29 CHRONIC BILATERAL LOW BACK PAIN WITHOUT SCIATICA: Primary | ICD-10-CM

## 2022-02-14 PROCEDURE — 99205 OFFICE O/P NEW HI 60 MIN: CPT | Performed by: NEUROLOGICAL SURGERY

## 2022-02-14 RX ORDER — PANTOPRAZOLE SODIUM 40 MG/1
40 TABLET, DELAYED RELEASE ORAL DAILY
COMMUNITY

## 2022-02-14 RX ORDER — LANSOPRAZOLE 15 MG/1
15 CAPSULE, DELAYED RELEASE ORAL DAILY
COMMUNITY

## 2022-02-14 RX ORDER — FUROSEMIDE 40 MG/1
40 TABLET ORAL DAILY
COMMUNITY

## 2022-02-14 RX ORDER — METOPROLOL TARTRATE 50 MG/1
100 TABLET, FILM COATED ORAL DAILY
COMMUNITY

## 2022-02-14 RX ORDER — DICYCLOMINE HYDROCHLORIDE 10 MG/1
10 CAPSULE ORAL
COMMUNITY

## 2022-02-14 RX ORDER — SULINDAC 150 MG/1
150 TABLET ORAL 2 TIMES DAILY
COMMUNITY

## 2022-02-14 RX ORDER — LOPERAMIDE HYDROCHLORIDE 2 MG/1
2 CAPSULE ORAL 2 TIMES DAILY
COMMUNITY

## 2022-02-14 RX ORDER — GABAPENTIN 600 MG/1
600 TABLET ORAL 3 TIMES DAILY
COMMUNITY

## 2022-02-14 RX ORDER — TIZANIDINE 4 MG/1
4 TABLET ORAL EVERY 6 HOURS PRN
COMMUNITY

## 2022-02-14 NOTE — PROGRESS NOTES
[facility]  Office Visit        Chief Complaint   Patient presents with    Consultation     SCS implant       HISTORY OF PRESENT ILLNESS:      The patient is a 56 y.o. male who presents for neurosurgical evaluation of chronic lower back pain and to discuss implantation of a spinal cord stimulator. He states his pain began in 9/2005 when he was moving into a new home but cannot recall a specific injury. Since 2005 he has been in pain management and tried multiple rounds of injections without adequate pain relief. He is currently taking gabapentin and percocet for pain control. He complains only of axial lower back pain. He has seen surgeons in the past who have reviewed his imaging and told him he would not benefit from surgery. He has not had any back surgery in the past.  He denies radicular pain, numbness or weakness in his legs. He recently underwent a trial of spinal cord stimulation with Dr. Maribel Reeder and reports that this relieved ~80% of his lower back pain.         MEDICAL HISTORY:             Past Medical History:   Diagnosis Date    Anxiety     Cardiopulmonary arrest ([facility] [address] 12/14/2016    Crohn's disease ([facility] [address]     Depression     GERD (gastroesophageal reflux disease)     Kidney stone     ANITA (obstructive sleep apnea)     Psoriasis     Psoriatic arthritis (HCC)        Past Surgical History:   Procedure Laterality Date    CERVICAL FUSION      COLONOSCOPY  10/29/2015    [facility] SMALL INTESTINE SURGERY      crohns    UMBILICAL HERNIA REPAIR  11/20/2012    Dr Oralia Reed         Current Outpatient Medications:     tiZANidine (ZANAFLEX) 4 MG tablet, Take 4 mg by mouth every 6 hours as needed, Disp: , Rfl:     dicyclomine (BENTYL) 10 MG capsule, Take 10 mg by mouth 4 times daily (before meals and nightly), Disp: , Rfl:     sulindac (CLINORIL) 150 MG tablet, Take 150 mg by mouth 2 times daily, Disp: , Rfl:     furosemide (LASIX) 40 MG tablet, Take 40 mg by mouth 2 times daily, Disp: , Rfl:     pantoprazole (PROTONIX) 40 MG tablet, Take 40 mg by mouth daily, Disp: , Rfl:     metoprolol tartrate (LOPRESSOR) 50 MG tablet, Take 50 mg by mouth 2 times daily, Disp: , Rfl:     gabapentin (NEURONTIN) 600 MG tablet, Take 600 mg by mouth 3 times daily. , Disp: , Rfl:     loperamide (IMODIUM) 2 MG capsule, Take 2 mg by mouth 4 times daily as needed for Diarrhea, Disp: , Rfl:     lansoprazole (PREVACID) 15 MG delayed release capsule, Take 15 mg by mouth daily, Disp: , Rfl:     adalimumab (HUMIRA) 40 MG/0.8ML injection, Inject 40 mg into the skin once, Disp: , Rfl:     sertraline (ZOLOFT) 50 MG tablet, Take 100 mg by mouth 2 times daily, Disp: , Rfl:     ALPRAZolam (XANAX) 1 MG tablet, Take 1 mg by mouth 2 times daily, Disp: , Rfl:     HYDROcodone-acetaminophen (NORCO) 7.5-325 MG per tablet, Take 1 tablet by mouth every 8 hours as needed for Pain ., Disp: , Rfl:     lisinopril (PRINIVIL;ZESTRIL) 10 MG tablet, Take 10 mg by mouth daily, Disp: , Rfl:     Allergies:  Ketamine hcl    Social History:   Social History     Tobacco Use   Smoking Status Former Smoker   Smokeless Tobacco Never Used     Social History     Substance and Sexual Activity   Alcohol Use No         Family History:   Family History   Problem Relation Age of Onset    Osteoarthritis Mother     Hypertension Father     Diabetes Father        REVIEW OF SYSTEMS:    Constitutional: Negative. HENT: Negative. Eyes: Negative. Respiratory: Negative. Cardiovascular: Negative. Gastrointestinal: Negative. Genitourinary: Negative. Musculoskeletal: Positive for back pain. Skin: Negative. Neurological: Negative. Endo/Heme/Allergies: Negative. Psychiatric/Behavioral: Negative. Review of Systems was obtained by the medical assistant and reviewed by myself. PHYSICAL EXAM:    Vitals:    02/14/22 1341   BP: (!) 165/91   Pulse: 75       Constitutional: Appears well-developed and well-nourished.    Eyes  conjunctiva normal.  Pupils react to light  Ear, nose,throat -Normal pinna and tragus, No scars, or lesions over external nose or ears, no obvious atrophy of tongue  Neck- symmetric, trachea midline, no jugular vein distension  Respiration- chest wall symmetric, normal effort without use of accessory muscles  Musculoskeletal  no significant wasting of muscles noted, no bony deformities, symmetric bulk  Extremities- no clubbing, cyanosis or edema  Skin  warm, dry, and intact. No rash,erythema, or pallor. Psychiatric  mood, affect, and behavior appear normal.       NEUROLOGIC EXAM:    MENTAL STATUS: Alert, oriented, thought content appropriate    CRANIAL NERVES: PERRL, EOMI, symmetric facies, tongue midline    MOTOR:     Right Upper Extremity:    Deltoid: 5/5  Biceps: 5/5  Triceps: 5/5  Wrist Extension: 5/5  Finger Abduction: 5/5      Left Upper Extremity:    Deltoid: 5/5  Biceps: 5/5  Triceps: 5/5  Wrist Extension: 5/5  Finger Abduction: 5/5      Right Lower Extremity:    Hip Flexion: 5/5  Knee Extension: 5/5  Ankle Plantarflexion: 5/5  Ankle Dorsiflexion: 5/5      Left Lower Extremity:    Hip Flexion: 5/5  Knee Extension: 5/5  Ankle Plantarflexion: 5/5  Ankle Dorsiflexion: 5/5      SOMATOSENSORY:     Right Upper Extremity: normal light touch sensation  Left Upper Extremity: normal light touch sensation  Right Lower Extremity: normal light touch sensation  Left Lower Extremity: normal light touch sensation      REFLEXES:    Biceps: 2+  Patella: 2+  Ankle Jerk: 2+    Feldman's: Negative      COORDINATION and GAIT:      Gait: Antalgic        IMAGING:    My interpretation of imaging studies: Lumbar MRI from 8/2021 reviewed. There is evidence of a chronic L2 compression fracture with ~20% height loss. No significant associated kyphosis or retropulsion. No STIR signal to suggest acute fracture. Disc hydration is adequate throughout.   There is no evidence of spinal canal, lateral recess or foraminal stenosis at any level in the lumbar spine. ASSESSMENT AND PLAN:  This is a 56 y.o. male  who presents to discuss implantation of a spinal cord stimulator for chronic lower back pain. He recently had a trial of stimulation with Dr. Leyda Cuevas and reported excellent (~80%) pain relief during his trial.  He does not have an obvious structural or compressive pathology on a recent MRI scan to suggest he would benefit from any alternative surgical intervention. We discussed the surgery (thoracic laminectomy for implantation of spinal cord stimulator lead and generator) in detail, including risks, benefits and alternatives. Specific risks of bleeding, infection, neurologic injury/paralysis, CSF leak, device malfunction, incomplete pain relief and cardiopulmonary complications were all discussed. He voiced understanding and requested that we proceed. I did advise him that I will need to obtain an MRI scan of the thoracic spine prior to surgery in order to exclude occult thoracic spinal stenosis prior to surgery.             Jalil Dumont MD

## 2022-02-14 NOTE — H&P (VIEW-ONLY)
[facility]  History and Physical        Chief Complaint   Patient presents with    Consultation     SCS implant       HISTORY OF PRESENT ILLNESS:      The patient is a 56 y.o. male who presents for neurosurgical evaluation of chronic lower back pain and to discuss implantation of a spinal cord stimulator. He states his pain began in 9/2005 when he was moving into a new home but cannot recall a specific injury. Since 2005 he has been in pain management and tried multiple rounds of injections without adequate pain relief. He is currently taking gabapentin and percocet for pain control. He complains only of axial lower back pain. He has seen surgeons in the past who have reviewed his imaging and told him he would not benefit from surgery. He has not had any back surgery in the past.  He denies radicular pain, numbness or weakness in his legs. He recently underwent a trial of spinal cord stimulation with Dr. Chanda Pierce and reports that this relieved ~80% of his lower back pain.         MEDICAL HISTORY:             Past Medical History:   Diagnosis Date    Anxiety     Cardiopulmonary arrest ([facility] [address] 12/14/2016    Crohn's disease ([facility] [address]     Depression     GERD (gastroesophageal reflux disease)     Kidney stone     ANITA (obstructive sleep apnea)     Psoriasis     Psoriatic arthritis (HCC)        Past Surgical History:   Procedure Laterality Date    CERVICAL FUSION      COLONOSCOPY  10/29/2015    [facility]      crohns    UMBILICAL HERNIA REPAIR  11/20/2012    Dr Oralia Dhillon         Current Outpatient Medications:     tiZANidine (ZANAFLEX) 4 MG tablet, Take 4 mg by mouth every 6 hours as needed, Disp: , Rfl:     dicyclomine (BENTYL) 10 MG capsule, Take 10 mg by mouth 4 times daily (before meals and nightly), Disp: , Rfl:     sulindac (CLINORIL) 150 MG tablet, Take 150 mg by mouth 2 times daily, Disp: , Rfl:     furosemide (LASIX) 40 MG tablet, Take 40 mg by mouth 2 times daily, Disp: , Rfl:     pantoprazole (PROTONIX) 40 MG tablet, Take 40 mg by mouth daily, Disp: , Rfl:     metoprolol tartrate (LOPRESSOR) 50 MG tablet, Take 50 mg by mouth 2 times daily, Disp: , Rfl:     gabapentin (NEURONTIN) 600 MG tablet, Take 600 mg by mouth 3 times daily. , Disp: , Rfl:     loperamide (IMODIUM) 2 MG capsule, Take 2 mg by mouth 4 times daily as needed for Diarrhea, Disp: , Rfl:     lansoprazole (PREVACID) 15 MG delayed release capsule, Take 15 mg by mouth daily, Disp: , Rfl:     adalimumab (HUMIRA) 40 MG/0.8ML injection, Inject 40 mg into the skin once, Disp: , Rfl:     sertraline (ZOLOFT) 50 MG tablet, Take 100 mg by mouth 2 times daily, Disp: , Rfl:     ALPRAZolam (XANAX) 1 MG tablet, Take 1 mg by mouth 2 times daily, Disp: , Rfl:     HYDROcodone-acetaminophen (NORCO) 7.5-325 MG per tablet, Take 1 tablet by mouth every 8 hours as needed for Pain ., Disp: , Rfl:     lisinopril (PRINIVIL;ZESTRIL) 10 MG tablet, Take 10 mg by mouth daily, Disp: , Rfl:     Allergies:  Ketamine hcl    Social History:   Social History     Tobacco Use   Smoking Status Former Smoker   Smokeless Tobacco Never Used     Social History     Substance and Sexual Activity   Alcohol Use No         Family History:   Family History   Problem Relation Age of Onset    Osteoarthritis Mother     Hypertension Father     Diabetes Father        REVIEW OF SYSTEMS:    Constitutional: Negative. HENT: Negative. Eyes: Negative. Respiratory: Negative. Cardiovascular: Negative. Gastrointestinal: Negative. Genitourinary: Negative. Musculoskeletal: Positive for back pain. Skin: Negative. Neurological: Negative. Endo/Heme/Allergies: Negative. Psychiatric/Behavioral: Negative. Review of Systems was obtained by the medical assistant and reviewed by myself. PHYSICAL EXAM:    Vitals:    02/14/22 1341   BP: (!) 165/91   Pulse: 75       Constitutional: Appears well-developed and well-nourished.    Eyes  conjunctiva normal.  Pupils react to light  Ear, nose,throat -Normal pinna and tragus, No scars, or lesions over external nose or ears, no obvious atrophy of tongue  Neck- symmetric, trachea midline, no jugular vein distension  Respiration- chest wall symmetric, normal effort without use of accessory muscles  Musculoskeletal  no significant wasting of muscles noted, no bony deformities, symmetric bulk  Extremities- no clubbing, cyanosis or edema  Skin  warm, dry, and intact. No rash,erythema, or pallor. Psychiatric  mood, affect, and behavior appear normal.       NEUROLOGIC EXAM:    MENTAL STATUS: Alert, oriented, thought content appropriate    CRANIAL NERVES: PERRL, EOMI, symmetric facies, tongue midline    MOTOR:     Right Upper Extremity:    Deltoid: 5/5  Biceps: 5/5  Triceps: 5/5  Wrist Extension: 5/5  Finger Abduction: 5/5      Left Upper Extremity:    Deltoid: 5/5  Biceps: 5/5  Triceps: 5/5  Wrist Extension: 5/5  Finger Abduction: 5/5      Right Lower Extremity:    Hip Flexion: 5/5  Knee Extension: 5/5  Ankle Plantarflexion: 5/5  Ankle Dorsiflexion: 5/5      Left Lower Extremity:    Hip Flexion: 5/5  Knee Extension: 5/5  Ankle Plantarflexion: 5/5  Ankle Dorsiflexion: 5/5      SOMATOSENSORY:     Right Upper Extremity: normal light touch sensation  Left Upper Extremity: normal light touch sensation  Right Lower Extremity: normal light touch sensation  Left Lower Extremity: normal light touch sensation      REFLEXES:    Biceps: 2+  Patella: 2+  Ankle Jerk: 2+    Feldman's: Negative      COORDINATION and GAIT:      Gait: Antalgic        IMAGING:    My interpretation of imaging studies: Lumbar MRI from 8/2021 reviewed. There is evidence of a chronic L2 compression fracture with ~20% height loss. No significant associated kyphosis or retropulsion. No STIR signal to suggest acute fracture. Disc hydration is adequate throughout.   There is no evidence of spinal canal, lateral recess or foraminal stenosis at any level in the lumbar spine. ASSESSMENT AND PLAN:  This is a 56 y.o. male  who presents to discuss implantation of a spinal cord stimulator for chronic lower back pain. He recently had a trial of stimulation with Dr. Brandon Villarreal and reported excellent (~80%) pain relief during his trial.  He does not have an obvious structural or compressive pathology on a recent MRI scan to suggest he would benefit from any alternative surgical intervention. We discussed the surgery (thoracic laminectomy for implantation of spinal cord stimulator lead and generator) in detail, including risks, benefits and alternatives. Specific risks of bleeding, infection, neurologic injury/paralysis, CSF leak, device malfunction, incomplete pain relief and cardiopulmonary complications were all discussed. He voiced understanding and requested that we proceed. I did advise him that I will need to obtain an MRI scan of the thoracic spine prior to surgery in order to exclude occult thoracic spinal stenosis prior to surgery.             Sola Montiel MD

## 2022-02-14 NOTE — H&P
[facility]  History and Physical        Chief Complaint   Patient presents with    Consultation     SCS implant       HISTORY OF PRESENT ILLNESS:      The patient is a 56 y.o. male who presents for neurosurgical evaluation of chronic lower back pain and to discuss implantation of a spinal cord stimulator. He states his pain began in 9/2005 when he was moving into a new home but cannot recall a specific injury. Since 2005 he has been in pain management and tried multiple rounds of injections without adequate pain relief. He is currently taking gabapentin and percocet for pain control. He complains only of axial lower back pain. He has seen surgeons in the past who have reviewed his imaging and told him he would not benefit from surgery. He has not had any back surgery in the past.  He denies radicular pain, numbness or weakness in his legs. He recently underwent a trial of spinal cord stimulation with Dr. Palmer Moe and reports that this relieved ~80% of his lower back pain.         MEDICAL HISTORY:             Past Medical History:   Diagnosis Date    Anxiety     Cardiopulmonary arrest ([facility] [address] 12/14/2016    Crohn's disease ([facility] [address]     Depression     GERD (gastroesophageal reflux disease)     Kidney stone     ANITA (obstructive sleep apnea)     Psoriasis     Psoriatic arthritis (HCC)        Past Surgical History:   Procedure Laterality Date    CERVICAL FUSION      COLONOSCOPY  10/29/2015    [facility] SMALL INTESTINE SURGERY      crohns    UMBILICAL HERNIA REPAIR  11/20/2012    Dr Oralia Samuel         Current Outpatient Medications:     tiZANidine (ZANAFLEX) 4 MG tablet, Take 4 mg by mouth every 6 hours as needed, Disp: , Rfl:     dicyclomine (BENTYL) 10 MG capsule, Take 10 mg by mouth 4 times daily (before meals and nightly), Disp: , Rfl:     sulindac (CLINORIL) 150 MG tablet, Take 150 mg by mouth 2 times daily, Disp: , Rfl:     furosemide (LASIX) 40 MG tablet, Take 40 mg by mouth 2 times daily, Disp: , Rfl:     pantoprazole (PROTONIX) 40 MG tablet, Take 40 mg by mouth daily, Disp: , Rfl:     metoprolol tartrate (LOPRESSOR) 50 MG tablet, Take 50 mg by mouth 2 times daily, Disp: , Rfl:     gabapentin (NEURONTIN) 600 MG tablet, Take 600 mg by mouth 3 times daily. , Disp: , Rfl:     loperamide (IMODIUM) 2 MG capsule, Take 2 mg by mouth 4 times daily as needed for Diarrhea, Disp: , Rfl:     lansoprazole (PREVACID) 15 MG delayed release capsule, Take 15 mg by mouth daily, Disp: , Rfl:     adalimumab (HUMIRA) 40 MG/0.8ML injection, Inject 40 mg into the skin once, Disp: , Rfl:     sertraline (ZOLOFT) 50 MG tablet, Take 100 mg by mouth 2 times daily, Disp: , Rfl:     ALPRAZolam (XANAX) 1 MG tablet, Take 1 mg by mouth 2 times daily, Disp: , Rfl:     HYDROcodone-acetaminophen (NORCO) 7.5-325 MG per tablet, Take 1 tablet by mouth every 8 hours as needed for Pain ., Disp: , Rfl:     lisinopril (PRINIVIL;ZESTRIL) 10 MG tablet, Take 10 mg by mouth daily, Disp: , Rfl:     Allergies:  Ketamine hcl    Social History:   Social History     Tobacco Use   Smoking Status Former Smoker   Smokeless Tobacco Never Used     Social History     Substance and Sexual Activity   Alcohol Use No         Family History:   Family History   Problem Relation Age of Onset    Osteoarthritis Mother     Hypertension Father     Diabetes Father        REVIEW OF SYSTEMS:    Constitutional: Negative. HENT: Negative. Eyes: Negative. Respiratory: Negative. Cardiovascular: Negative. Gastrointestinal: Negative. Genitourinary: Negative. Musculoskeletal: Positive for back pain. Skin: Negative. Neurological: Negative. Endo/Heme/Allergies: Negative. Psychiatric/Behavioral: Negative. Review of Systems was obtained by the medical assistant and reviewed by myself. PHYSICAL EXAM:    Vitals:    02/14/22 1341   BP: (!) 165/91   Pulse: 75       Constitutional: Appears well-developed and well-nourished.    Eyes  conjunctiva normal.  Pupils react to light  Ear, nose,throat -Normal pinna and tragus, No scars, or lesions over external nose or ears, no obvious atrophy of tongue  Neck- symmetric, trachea midline, no jugular vein distension  Respiration- chest wall symmetric, normal effort without use of accessory muscles  Musculoskeletal  no significant wasting of muscles noted, no bony deformities, symmetric bulk  Extremities- no clubbing, cyanosis or edema  Skin  warm, dry, and intact. No rash,erythema, or pallor. Psychiatric  mood, affect, and behavior appear normal.       NEUROLOGIC EXAM:    MENTAL STATUS: Alert, oriented, thought content appropriate    CRANIAL NERVES: PERRL, EOMI, symmetric facies, tongue midline    MOTOR:     Right Upper Extremity:    Deltoid: 5/5  Biceps: 5/5  Triceps: 5/5  Wrist Extension: 5/5  Finger Abduction: 5/5      Left Upper Extremity:    Deltoid: 5/5  Biceps: 5/5  Triceps: 5/5  Wrist Extension: 5/5  Finger Abduction: 5/5      Right Lower Extremity:    Hip Flexion: 5/5  Knee Extension: 5/5  Ankle Plantarflexion: 5/5  Ankle Dorsiflexion: 5/5      Left Lower Extremity:    Hip Flexion: 5/5  Knee Extension: 5/5  Ankle Plantarflexion: 5/5  Ankle Dorsiflexion: 5/5      SOMATOSENSORY:     Right Upper Extremity: normal light touch sensation  Left Upper Extremity: normal light touch sensation  Right Lower Extremity: normal light touch sensation  Left Lower Extremity: normal light touch sensation      REFLEXES:    Biceps: 2+  Patella: 2+  Ankle Jerk: 2+    Feldman's: Negative      COORDINATION and GAIT:      Gait: Antalgic        IMAGING:    My interpretation of imaging studies: Lumbar MRI from 8/2021 reviewed. There is evidence of a chronic L2 compression fracture with ~20% height loss. No significant associated kyphosis or retropulsion. No STIR signal to suggest acute fracture. Disc hydration is adequate throughout.   There is no evidence of spinal canal, lateral recess or foraminal stenosis at any level in the lumbar spine. ASSESSMENT AND PLAN:  This is a 56 y.o. male  who presents to discuss implantation of a spinal cord stimulator for chronic lower back pain. He recently had a trial of stimulation with Dr. Drew Leyva and reported excellent (~80%) pain relief during his trial.  He does not have an obvious structural or compressive pathology on a recent MRI scan to suggest he would benefit from any alternative surgical intervention. We discussed the surgery (thoracic laminectomy for implantation of spinal cord stimulator lead and generator) in detail, including risks, benefits and alternatives. Specific risks of bleeding, infection, neurologic injury/paralysis, CSF leak, device malfunction, incomplete pain relief and cardiopulmonary complications were all discussed. He voiced understanding and requested that we proceed. I did advise him that I will need to obtain an MRI scan of the thoracic spine prior to surgery in order to exclude occult thoracic spinal stenosis prior to surgery.             Montse Manley MD

## 2022-02-15 ENCOUNTER — TELEPHONE (OUTPATIENT)
Dept: NEUROSURGERY | Age: 57
End: 2022-02-15

## 2022-02-18 ENCOUNTER — TELEPHONE (OUTPATIENT)
Dept: NEUROSURGERY | Age: 57
End: 2022-02-18

## 2022-02-21 ENCOUNTER — HOSPITAL ENCOUNTER (OUTPATIENT)
Dept: MRI IMAGING | Age: 57
Discharge: HOME | End: 2022-02-21
Payer: COMMERCIAL

## 2022-02-21 DIAGNOSIS — G89.29 CHRONIC BILATERAL LOW BACK PAIN WITHOUT SCIATICA: ICD-10-CM

## 2022-02-21 DIAGNOSIS — M54.50 CHRONIC BILATERAL LOW BACK PAIN WITHOUT SCIATICA: ICD-10-CM

## 2022-02-21 PROCEDURE — 72146 MRI CHEST SPINE W/O DYE: CPT

## 2022-02-28 ENCOUNTER — TELEPHONE (OUTPATIENT)
Dept: NEUROSURGERY | Age: 57
End: 2022-02-28

## 2022-02-28 NOTE — TELEPHONE ENCOUNTER
I called patients insurance @ [phone] and I was able to speak to radhika. She went thru all the information and it turns out that patients upcoming surgery was denied.  They are sending me a letter to explain the denial and then I will take it from there.,  Reference number is [id number]

## 2022-03-03 ENCOUNTER — TELEPHONE (OUTPATIENT)
Dept: NEUROSURGERY | Age: 57
End: 2022-03-03

## 2022-03-03 NOTE — TELEPHONE ENCOUNTER
I called patients insurance again @ [phone] and I was able to speak to  Luna MARQUEZ. I explained that I receive a letter about a denied appeal/.  I explained this letter was dared jan 7 and we did not see patient for the first time until 2-14-22. I also stated on the form it has a different patient name and id. She apologized several times and asked for forms to be destroyed. She went thru the codes and states because of the codes, precert is not needed.   Reference [id number]

## 2022-03-04 ENCOUNTER — HOSPITAL ENCOUNTER (OUTPATIENT)
Dept: PREADMISSION TESTING | Age: 57
Discharge: HOME | End: 2022-03-08
Payer: COMMERCIAL

## 2022-03-04 ENCOUNTER — HOSPITAL ENCOUNTER (OUTPATIENT)
Dept: GENERAL RADIOLOGY | Age: 57
Discharge: HOME | End: 2022-03-04
Payer: COMMERCIAL

## 2022-03-04 VITALS — BODY MASS INDEX: 43.69 KG/M2 | HEIGHT: 69 IN | WEIGHT: 295 LBS

## 2022-03-04 DIAGNOSIS — Z01.812 PRE-OPERATIVE LABORATORY EXAMINATION: ICD-10-CM

## 2022-03-04 DIAGNOSIS — M54.50 CHRONIC BILATERAL LOW BACK PAIN WITHOUT SCIATICA: ICD-10-CM

## 2022-03-04 DIAGNOSIS — G89.29 CHRONIC BILATERAL LOW BACK PAIN WITHOUT SCIATICA: ICD-10-CM

## 2022-03-04 DIAGNOSIS — Z51.81 ENCOUNTER FOR THERAPEUTIC DRUG MONITORING: ICD-10-CM

## 2022-03-04 LAB
ABO/RH: NORMAL
ALBUMIN SERPL-MCNC: 4 G/DL (ref 3.5–5.2)
ALP BLD-CCNC: 95 U/L (ref 40–130)
ALT SERPL-CCNC: 26 U/L (ref 5–41)
ANION GAP SERPL CALCULATED.3IONS-SCNC: 11 MMOL/L (ref 7–19)
ANTIBODY SCREEN: NORMAL
APTT: 31.5 SEC (ref 26–36.2)
AST SERPL-CCNC: 28 U/L (ref 5–40)
BASOPHILS ABSOLUTE: 0 K/UL (ref 0–0.2)
BASOPHILS RELATIVE PERCENT: 0.6 % (ref 0–1)
BILIRUB SERPL-MCNC: 0.8 MG/DL (ref 0.2–1.2)
BUN BLDV-MCNC: 22 MG/DL (ref 6–20)
CALCIUM SERPL-MCNC: 9.2 MG/DL (ref 8.6–10)
CHLORIDE BLD-SCNC: 101 MMOL/L (ref 98–111)
CO2: 26 MMOL/L (ref 22–29)
CREAT SERPL-MCNC: 0.8 MG/DL (ref 0.5–1.2)
EKG P AXIS: 59 DEGREES
EKG P-R INTERVAL: 194 MS
EKG Q-T INTERVAL: 452 MS
EKG QRS DURATION: 140 MS
EKG QTC CALCULATION (BAZETT): 449 MS
EKG T AXIS: 39 DEGREES
EOSINOPHILS ABSOLUTE: 0.3 K/UL (ref 0–0.6)
EOSINOPHILS RELATIVE PERCENT: 3.7 % (ref 0–5)
GFR AFRICAN AMERICAN: >59
GFR NON-AFRICAN AMERICAN: >60
GLUCOSE BLD-MCNC: 82 MG/DL (ref 74–109)
HCT VFR BLD CALC: 45 % (ref 42–52)
HEMOGLOBIN: 14.3 G/DL (ref 14–18)
IMMATURE GRANULOCYTES #: 0 K/UL
INR BLD: 1.18 (ref 0.88–1.18)
LYMPHOCYTES ABSOLUTE: 2.8 K/UL (ref 1.1–4.5)
LYMPHOCYTES RELATIVE PERCENT: 39.5 % (ref 20–40)
MCH RBC QN AUTO: 27.9 PG (ref 27–31)
MCHC RBC AUTO-ENTMCNC: 31.8 G/DL (ref 33–37)
MCV RBC AUTO: 87.9 FL (ref 80–94)
MONOCYTES ABSOLUTE: 0.7 K/UL (ref 0–0.9)
MONOCYTES RELATIVE PERCENT: 10.5 % (ref 0–10)
NEUTROPHILS ABSOLUTE: 3.2 K/UL (ref 1.5–7.5)
NEUTROPHILS RELATIVE PERCENT: 45.4 % (ref 50–65)
PDW BLD-RTO: 12.7 % (ref 11.5–14.5)
PLATELET # BLD: 157 K/UL (ref 130–400)
PMV BLD AUTO: 10.9 FL (ref 9.4–12.4)
POTASSIUM REFLEX MAGNESIUM: 4.4 MMOL/L (ref 3.5–5)
PROTHROMBIN TIME: 15.2 SEC (ref 12–14.6)
RBC # BLD: 5.12 M/UL (ref 4.7–6.1)
SARS-COV-2, PCR: NOT DETECTED
SODIUM BLD-SCNC: 138 MMOL/L (ref 136–145)
TOTAL PROTEIN: 7 G/DL (ref 6.6–8.7)
WBC # BLD: 7 K/UL (ref 4.8–10.8)

## 2022-03-04 PROCEDURE — 71046 X-RAY EXAM CHEST 2 VIEWS: CPT

## 2022-03-04 PROCEDURE — 85730 THROMBOPLASTIN TIME PARTIAL: CPT

## 2022-03-04 PROCEDURE — 86850 RBC ANTIBODY SCREEN: CPT

## 2022-03-04 PROCEDURE — 85610 PROTHROMBIN TIME: CPT

## 2022-03-04 PROCEDURE — 86900 BLOOD TYPING SEROLOGIC ABO: CPT

## 2022-03-04 PROCEDURE — 93005 ELECTROCARDIOGRAM TRACING: CPT

## 2022-03-04 PROCEDURE — U0003 INFECTIOUS AGENT DETECTION BY NUCLEIC ACID (DNA OR RNA); SEVERE ACUTE RESPIRATORY SYNDROME CORONAVIRUS 2 (SARS-COV-2) (CORONAVIRUS DISEASE [COVID-19]), AMPLIFIED PROBE TECHNIQUE, MAKING USE OF HIGH THROUGHPUT TECHNOLOGIES AS DESCRIBED BY CMS-2020-01-R: HCPCS

## 2022-03-04 PROCEDURE — 86901 BLOOD TYPING SEROLOGIC RH(D): CPT

## 2022-03-04 PROCEDURE — 80053 COMPREHEN METABOLIC PANEL: CPT

## 2022-03-04 PROCEDURE — U0005 INFEC AGEN DETEC AMPLI PROBE: HCPCS

## 2022-03-04 PROCEDURE — 85025 COMPLETE CBC W/AUTO DIFF WBC: CPT

## 2022-03-04 PROCEDURE — 93010 ELECTROCARDIOGRAM REPORT: CPT | Performed by: INTERNAL MEDICINE

## 2022-03-04 RX ORDER — AMLODIPINE BESYLATE 10 MG/1
10 TABLET ORAL DAILY
COMMUNITY

## 2022-03-08 ENCOUNTER — APPOINTMENT (OUTPATIENT)
Dept: GENERAL RADIOLOGY | Age: 57
End: 2022-03-08
Attending: NEUROLOGICAL SURGERY
Payer: COMMERCIAL

## 2022-03-08 ENCOUNTER — ANESTHESIA EVENT (OUTPATIENT)
Dept: OPERATING ROOM | Age: 57
End: 2022-03-08
Payer: COMMERCIAL

## 2022-03-08 ENCOUNTER — ANESTHESIA (OUTPATIENT)
Dept: OPERATING ROOM | Age: 57
End: 2022-03-08
Payer: COMMERCIAL

## 2022-03-08 ENCOUNTER — HOSPITAL ENCOUNTER (OUTPATIENT)
Age: 57
Setting detail: OUTPATIENT SURGERY
Discharge: HOME | End: 2022-03-08
Attending: NEUROLOGICAL SURGERY | Admitting: NEUROLOGICAL SURGERY
Payer: COMMERCIAL

## 2022-03-08 VITALS
TEMPERATURE: 97.3 F | RESPIRATION RATE: 16 BRPM | OXYGEN SATURATION: 93 % | SYSTOLIC BLOOD PRESSURE: 120 MMHG | BODY MASS INDEX: 43.69 KG/M2 | WEIGHT: 295 LBS | HEART RATE: 65 BPM | HEIGHT: 69 IN | DIASTOLIC BLOOD PRESSURE: 78 MMHG

## 2022-03-08 VITALS
RESPIRATION RATE: 11 BRPM | DIASTOLIC BLOOD PRESSURE: 75 MMHG | SYSTOLIC BLOOD PRESSURE: 129 MMHG | TEMPERATURE: 97.2 F | OXYGEN SATURATION: 93 %

## 2022-03-08 DIAGNOSIS — M54.50 CHRONIC BILATERAL LOW BACK PAIN WITHOUT SCIATICA: Primary | ICD-10-CM

## 2022-03-08 DIAGNOSIS — G89.29 CHRONIC BILATERAL LOW BACK PAIN WITHOUT SCIATICA: Primary | ICD-10-CM

## 2022-03-08 DIAGNOSIS — Z96.89 S/P INSERTION OF SPINAL CORD STIMULATOR: ICD-10-CM

## 2022-03-08 PROCEDURE — 63685 INS/RPLC SPI NPG/RCVR POCKET: CPT | Performed by: NEUROLOGICAL SURGERY

## 2022-03-08 PROCEDURE — 2780000010 HC IMPLANT OTHER: Performed by: NEUROLOGICAL SURGERY

## 2022-03-08 PROCEDURE — 7100000000 HC PACU RECOVERY - FIRST 15 MIN: Performed by: NEUROLOGICAL SURGERY

## 2022-03-08 PROCEDURE — 3700000000 HC ANESTHESIA ATTENDED CARE: Performed by: NEUROLOGICAL SURGERY

## 2022-03-08 PROCEDURE — 7100000011 HC PHASE II RECOVERY - ADDTL 15 MIN: Performed by: NEUROLOGICAL SURGERY

## 2022-03-08 PROCEDURE — 3700000001 HC ADD 15 MINUTES (ANESTHESIA): Performed by: NEUROLOGICAL SURGERY

## 2022-03-08 PROCEDURE — A4217 STERILE WATER/SALINE, 500 ML: HCPCS | Performed by: NEUROLOGICAL SURGERY

## 2022-03-08 PROCEDURE — 2580000003 HC RX 258: Performed by: ANESTHESIOLOGY

## 2022-03-08 PROCEDURE — 3209999900 FLUORO FOR SURGICAL PROCEDURES

## 2022-03-08 PROCEDURE — 7100000001 HC PACU RECOVERY - ADDTL 15 MIN: Performed by: NEUROLOGICAL SURGERY

## 2022-03-08 PROCEDURE — 2500000003 HC RX 250 WO HCPCS: Performed by: NEUROLOGICAL SURGERY

## 2022-03-08 PROCEDURE — C1778 LEAD, NEUROSTIMULATOR: HCPCS | Performed by: NEUROLOGICAL SURGERY

## 2022-03-08 PROCEDURE — C1820 GENERATOR NEURO RECHG BAT SY: HCPCS | Performed by: NEUROLOGICAL SURGERY

## 2022-03-08 PROCEDURE — 63655 IMPLANT NEUROELECTRODES: CPT | Performed by: NEUROLOGICAL SURGERY

## 2022-03-08 PROCEDURE — 2709999900 HC NON-CHARGEABLE SUPPLY: Performed by: NEUROLOGICAL SURGERY

## 2022-03-08 PROCEDURE — 6360000002 HC RX W HCPCS: Performed by: ANESTHESIOLOGY

## 2022-03-08 PROCEDURE — 3600000004 HC SURGERY LEVEL 4 BASE: Performed by: NEUROLOGICAL SURGERY

## 2022-03-08 PROCEDURE — 2720000010 HC SURG SUPPLY STERILE: Performed by: NEUROLOGICAL SURGERY

## 2022-03-08 PROCEDURE — 7100000010 HC PHASE II RECOVERY - FIRST 15 MIN: Performed by: NEUROLOGICAL SURGERY

## 2022-03-08 PROCEDURE — 3600000014 HC SURGERY LEVEL 4 ADDTL 15MIN: Performed by: NEUROLOGICAL SURGERY

## 2022-03-08 PROCEDURE — 2580000003 HC RX 258: Performed by: NEUROLOGICAL SURGERY

## 2022-03-08 PROCEDURE — 6360000002 HC RX W HCPCS: Performed by: REGISTERED NURSE

## 2022-03-08 PROCEDURE — 72070 X-RAY EXAM THORAC SPINE 2VWS: CPT

## 2022-03-08 PROCEDURE — 2500000003 HC RX 250 WO HCPCS: Performed by: REGISTERED NURSE

## 2022-03-08 DEVICE — DEVICE NEUROSTIMULATOR 13.9CC W1.9XH2.2IN 29.1GM RECHRG: Type: IMPLANTABLE DEVICE | Status: FUNCTIONAL

## 2022-03-08 DEVICE — KIT LD L65CM 5-6-5 SPC MRI COMPATIBLE NEUROSTIM FOR CHRONIC: Type: IMPLANTABLE DEVICE | Status: FUNCTIONAL

## 2022-03-08 RX ORDER — SODIUM CHLORIDE 0.9 % (FLUSH) 0.9 %
5-40 SYRINGE (ML) INJECTION PRN
Status: DISCONTINUED | OUTPATIENT
Start: 2022-03-08 | End: 2022-03-08 | Stop reason: HOSPADM

## 2022-03-08 RX ORDER — HYDROMORPHONE HYDROCHLORIDE 1 MG/ML
0.25 INJECTION, SOLUTION INTRAMUSCULAR; INTRAVENOUS; SUBCUTANEOUS EVERY 5 MIN PRN
Status: DISCONTINUED | OUTPATIENT
Start: 2022-03-08 | End: 2022-03-08 | Stop reason: HOSPADM

## 2022-03-08 RX ORDER — MEPERIDINE HYDROCHLORIDE 25 MG/ML
12.5 INJECTION INTRAMUSCULAR; INTRAVENOUS; SUBCUTANEOUS EVERY 5 MIN PRN
Status: DISCONTINUED | OUTPATIENT
Start: 2022-03-08 | End: 2022-03-08 | Stop reason: HOSPADM

## 2022-03-08 RX ORDER — BUPIVACAINE HYDROCHLORIDE 2.5 MG/ML
INJECTION, SOLUTION EPIDURAL; INFILTRATION; INTRACAUDAL PRN
Status: DISCONTINUED | OUTPATIENT
Start: 2022-03-08 | End: 2022-03-08 | Stop reason: ALTCHOICE

## 2022-03-08 RX ORDER — FENTANYL CITRATE 50 UG/ML
INJECTION, SOLUTION INTRAMUSCULAR; INTRAVENOUS PRN
Status: DISCONTINUED | OUTPATIENT
Start: 2022-03-08 | End: 2022-03-08 | Stop reason: SDUPTHER

## 2022-03-08 RX ORDER — MIDAZOLAM HYDROCHLORIDE 1 MG/ML
2 INJECTION INTRAMUSCULAR; INTRAVENOUS
Status: COMPLETED | OUTPATIENT
Start: 2022-03-08 | End: 2022-03-08

## 2022-03-08 RX ORDER — PROPOFOL 10 MG/ML
INJECTION, EMULSION INTRAVENOUS CONTINUOUS PRN
Status: DISCONTINUED | OUTPATIENT
Start: 2022-03-08 | End: 2022-03-08 | Stop reason: SDUPTHER

## 2022-03-08 RX ORDER — SCOLOPAMINE TRANSDERMAL SYSTEM 1 MG/1
1 PATCH, EXTENDED RELEASE TRANSDERMAL
Status: DISCONTINUED | OUTPATIENT
Start: 2022-03-08 | End: 2022-03-08 | Stop reason: HOSPADM

## 2022-03-08 RX ORDER — FAMOTIDINE 20 MG/1
20 TABLET, FILM COATED ORAL ONCE
Status: DISCONTINUED | OUTPATIENT
Start: 2022-03-08 | End: 2022-03-08 | Stop reason: HOSPADM

## 2022-03-08 RX ORDER — DIPHENHYDRAMINE HYDROCHLORIDE 50 MG/ML
12.5 INJECTION INTRAMUSCULAR; INTRAVENOUS
Status: DISCONTINUED | OUTPATIENT
Start: 2022-03-08 | End: 2022-03-08 | Stop reason: HOSPADM

## 2022-03-08 RX ORDER — SUCCINYLCHOLINE CHLORIDE 20 MG/ML
INJECTION INTRAMUSCULAR; INTRAVENOUS PRN
Status: DISCONTINUED | OUTPATIENT
Start: 2022-03-08 | End: 2022-03-08 | Stop reason: SDUPTHER

## 2022-03-08 RX ORDER — DEXMEDETOMIDINE HYDROCHLORIDE 100 UG/ML
INJECTION, SOLUTION INTRAVENOUS PRN
Status: DISCONTINUED | OUTPATIENT
Start: 2022-03-08 | End: 2022-03-08 | Stop reason: SDUPTHER

## 2022-03-08 RX ORDER — ONDANSETRON 2 MG/ML
INJECTION INTRAMUSCULAR; INTRAVENOUS PRN
Status: DISCONTINUED | OUTPATIENT
Start: 2022-03-08 | End: 2022-03-08 | Stop reason: SDUPTHER

## 2022-03-08 RX ORDER — LIDOCAINE HYDROCHLORIDE 10 MG/ML
INJECTION, SOLUTION EPIDURAL; INFILTRATION; INTRACAUDAL; PERINEURAL PRN
Status: DISCONTINUED | OUTPATIENT
Start: 2022-03-08 | End: 2022-03-08 | Stop reason: SDUPTHER

## 2022-03-08 RX ORDER — SODIUM CHLORIDE 0.9 % (FLUSH) 0.9 %
5-40 SYRINGE (ML) INJECTION EVERY 12 HOURS SCHEDULED
Status: DISCONTINUED | OUTPATIENT
Start: 2022-03-08 | End: 2022-03-08 | Stop reason: HOSPADM

## 2022-03-08 RX ORDER — HYDROCODONE BITARTRATE AND ACETAMINOPHEN 7.5; 325 MG/1; MG/1
1-2 TABLET ORAL EVERY 8 HOURS PRN
Qty: 18 TABLET | Refills: 0 | Status: SHIPPED | OUTPATIENT
Start: 2022-03-08 | End: 2022-03-11

## 2022-03-08 RX ORDER — CEFAZOLIN SODIUM 1 G/3ML
INJECTION, POWDER, FOR SOLUTION INTRAMUSCULAR; INTRAVENOUS PRN
Status: DISCONTINUED | OUTPATIENT
Start: 2022-03-08 | End: 2022-03-08 | Stop reason: SDUPTHER

## 2022-03-08 RX ORDER — MIDAZOLAM HYDROCHLORIDE 1 MG/ML
INJECTION INTRAMUSCULAR; INTRAVENOUS PRN
Status: DISCONTINUED | OUTPATIENT
Start: 2022-03-08 | End: 2022-03-08 | Stop reason: SDUPTHER

## 2022-03-08 RX ORDER — LIDOCAINE HYDROCHLORIDE AND EPINEPHRINE BITARTRATE 20; .01 MG/ML; MG/ML
INJECTION, SOLUTION SUBCUTANEOUS PRN
Status: DISCONTINUED | OUTPATIENT
Start: 2022-03-08 | End: 2022-03-08 | Stop reason: ALTCHOICE

## 2022-03-08 RX ORDER — LIDOCAINE HYDROCHLORIDE 10 MG/ML
1 INJECTION, SOLUTION EPIDURAL; INFILTRATION; INTRACAUDAL; PERINEURAL
Status: DISCONTINUED | OUTPATIENT
Start: 2022-03-08 | End: 2022-03-08 | Stop reason: HOSPADM

## 2022-03-08 RX ORDER — HYDROMORPHONE HYDROCHLORIDE 1 MG/ML
0.5 INJECTION, SOLUTION INTRAMUSCULAR; INTRAVENOUS; SUBCUTANEOUS EVERY 5 MIN PRN
Status: DISCONTINUED | OUTPATIENT
Start: 2022-03-08 | End: 2022-03-08 | Stop reason: HOSPADM

## 2022-03-08 RX ORDER — METOCLOPRAMIDE HYDROCHLORIDE 5 MG/ML
10 INJECTION INTRAMUSCULAR; INTRAVENOUS
Status: DISCONTINUED | OUTPATIENT
Start: 2022-03-08 | End: 2022-03-08 | Stop reason: HOSPADM

## 2022-03-08 RX ORDER — SODIUM CHLORIDE 9 MG/ML
25 INJECTION, SOLUTION INTRAVENOUS PRN
Status: DISCONTINUED | OUTPATIENT
Start: 2022-03-08 | End: 2022-03-08 | Stop reason: HOSPADM

## 2022-03-08 RX ORDER — SODIUM CHLORIDE, SODIUM LACTATE, POTASSIUM CHLORIDE, CALCIUM CHLORIDE 600; 310; 30; 20 MG/100ML; MG/100ML; MG/100ML; MG/100ML
INJECTION, SOLUTION INTRAVENOUS CONTINUOUS
Status: DISCONTINUED | OUTPATIENT
Start: 2022-03-08 | End: 2022-03-08 | Stop reason: HOSPADM

## 2022-03-08 RX ADMIN — DEXMEDETOMIDINE HYDROCHLORIDE 4 MCG: 100 INJECTION, SOLUTION, CONCENTRATE INTRAVENOUS at 09:06

## 2022-03-08 RX ADMIN — LIDOCAINE HYDROCHLORIDE 100 MG: 10 INJECTION, SOLUTION EPIDURAL; INFILTRATION; INTRACAUDAL; PERINEURAL at 07:38

## 2022-03-08 RX ADMIN — MIDAZOLAM 2 MG: 1 INJECTION INTRAMUSCULAR; INTRAVENOUS at 07:33

## 2022-03-08 RX ADMIN — HYDROMORPHONE HYDROCHLORIDE 0.5 MG: 1 INJECTION, SOLUTION INTRAMUSCULAR; INTRAVENOUS; SUBCUTANEOUS at 10:30

## 2022-03-08 RX ADMIN — PROPOFOL 200 MCG/KG/MIN: 10 INJECTION, EMULSION INTRAVENOUS at 07:44

## 2022-03-08 RX ADMIN — PROPOFOL 200 MG: 10 INJECTION, EMULSION INTRAVENOUS at 07:38

## 2022-03-08 RX ADMIN — PROPOFOL 200 MCG/KG/MIN: 10 INJECTION, EMULSION INTRAVENOUS at 08:48

## 2022-03-08 RX ADMIN — DEXMEDETOMIDINE HYDROCHLORIDE 8 MCG: 100 INJECTION, SOLUTION, CONCENTRATE INTRAVENOUS at 09:14

## 2022-03-08 RX ADMIN — DEXMEDETOMIDINE HYDROCHLORIDE 8 MCG: 100 INJECTION, SOLUTION, CONCENTRATE INTRAVENOUS at 09:10

## 2022-03-08 RX ADMIN — FENTANYL CITRATE 100 MCG: 50 INJECTION, SOLUTION INTRAMUSCULAR; INTRAVENOUS at 07:38

## 2022-03-08 RX ADMIN — CEFAZOLIN SODIUM 3000 MG: 1 INJECTION, POWDER, FOR SOLUTION INTRAMUSCULAR; INTRAVENOUS at 07:55

## 2022-03-08 RX ADMIN — HYDROMORPHONE HYDROCHLORIDE 0.5 MG: 1 INJECTION, SOLUTION INTRAMUSCULAR; INTRAVENOUS; SUBCUTANEOUS at 08:50

## 2022-03-08 RX ADMIN — DEXMEDETOMIDINE HYDROCHLORIDE 4 MCG: 100 INJECTION, SOLUTION, CONCENTRATE INTRAVENOUS at 09:12

## 2022-03-08 RX ADMIN — ONDANSETRON 8 MG: 2 INJECTION INTRAMUSCULAR; INTRAVENOUS at 09:34

## 2022-03-08 RX ADMIN — PROPOFOL 275 MCG/KG/MIN: 10 INJECTION, EMULSION INTRAVENOUS at 08:20

## 2022-03-08 RX ADMIN — SODIUM CHLORIDE, SODIUM LACTATE, POTASSIUM CHLORIDE, AND CALCIUM CHLORIDE: 600; 310; 30; 20 INJECTION, SOLUTION INTRAVENOUS at 08:59

## 2022-03-08 RX ADMIN — MIDAZOLAM 2 MG: 1 INJECTION INTRAMUSCULAR; INTRAVENOUS at 07:02

## 2022-03-08 RX ADMIN — SODIUM CHLORIDE, SODIUM LACTATE, POTASSIUM CHLORIDE, AND CALCIUM CHLORIDE: 600; 310; 30; 20 INJECTION, SOLUTION INTRAVENOUS at 06:23

## 2022-03-08 RX ADMIN — FENTANYL CITRATE 100 MCG: 50 INJECTION, SOLUTION INTRAMUSCULAR; INTRAVENOUS at 07:52

## 2022-03-08 RX ADMIN — DEXMEDETOMIDINE HYDROCHLORIDE 8 MCG: 100 INJECTION, SOLUTION, CONCENTRATE INTRAVENOUS at 09:04

## 2022-03-08 RX ADMIN — HYDROMORPHONE HYDROCHLORIDE 0.25 MG: 1 INJECTION, SOLUTION INTRAMUSCULAR; INTRAVENOUS; SUBCUTANEOUS at 08:26

## 2022-03-08 RX ADMIN — SUCCINYLCHOLINE CHLORIDE 180 MG: 20 INJECTION, SOLUTION INTRAMUSCULAR; INTRAVENOUS at 07:39

## 2022-03-08 RX ADMIN — HYDROMORPHONE HYDROCHLORIDE 0.25 MG: 1 INJECTION, SOLUTION INTRAMUSCULAR; INTRAVENOUS; SUBCUTANEOUS at 08:55

## 2022-03-08 RX ADMIN — DEXMEDETOMIDINE HYDROCHLORIDE 8 MCG: 100 INJECTION, SOLUTION, CONCENTRATE INTRAVENOUS at 09:40

## 2022-03-08 RX ADMIN — DEXMEDETOMIDINE HYDROCHLORIDE 8 MCG: 100 INJECTION, SOLUTION, CONCENTRATE INTRAVENOUS at 09:43

## 2022-03-08 RX ADMIN — HYDROMORPHONE HYDROCHLORIDE 0.5 MG: 1 INJECTION, SOLUTION INTRAMUSCULAR; INTRAVENOUS; SUBCUTANEOUS at 10:41

## 2022-03-08 ASSESSMENT — PAIN DESCRIPTION - PAIN TYPE
TYPE: SURGICAL PAIN
TYPE: SURGICAL PAIN

## 2022-03-08 ASSESSMENT — PAIN SCALES - GENERAL
PAINLEVEL_OUTOF10: 8
PAINLEVEL_OUTOF10: 7
PAINLEVEL_OUTOF10: 2
PAINLEVEL_OUTOF10: 3

## 2022-03-08 ASSESSMENT — PAIN DESCRIPTION - ORIENTATION: ORIENTATION: MID;LOWER

## 2022-03-08 ASSESSMENT — PAIN DESCRIPTION - LOCATION
LOCATION: BACK

## 2022-03-08 ASSESSMENT — PAIN DESCRIPTION - DESCRIPTORS: DESCRIPTORS: ACHING

## 2022-03-08 ASSESSMENT — LIFESTYLE VARIABLES: SMOKING_STATUS: 0

## 2022-03-08 ASSESSMENT — PAIN - FUNCTIONAL ASSESSMENT: PAIN_FUNCTIONAL_ASSESSMENT: 0-10

## 2022-03-08 NOTE — ANESTHESIA POSTPROCEDURE EVALUATION
Department of Anesthesiology  Postprocedure Note    Patient: Kaz Pulido  MRN: [id number]  YOB: 1965  Date of evaluation: 3/8/2022  Time:  10:12 AM     Procedure Summary     Date: 03/08/22 Room / Location: [address]    Anesthesia Start: 5219 Anesthesia Stop: 0063    Procedure: THORACIC LAMINECTOMY FOR SPINAL CORD STIMULATOR PLACEMENT WITH NEURO MONITORING (N/A ) Diagnosis: (back pain)    Surgeons: Amy Roland MD Responsible Provider: REYNALDO Gage CRNA    Anesthesia Type: general ASA Status: 3          Anesthesia Type: general    Sean Phase I:      Sean Phase II:      Last vitals: Reviewed and per EMR flowsheets.        Anesthesia Post Evaluation    Patient location during evaluation: PACU  Patient participation: complete - patient participated  Level of consciousness: sleepy but conscious  Pain score: 0  Airway patency: patent  Nausea & Vomiting: no nausea and no vomiting  Complications: no  Cardiovascular status: hemodynamically stable and blood pressure returned to baseline  Respiratory status: face mask  Hydration status: euvolemic

## 2022-03-08 NOTE — ANESTHESIA PRE PROCEDURE
Department of Anesthesiology  Preprocedure Note       Name:  Stephan Pollock   Age:  56 y.o.  :  1965                                          MRN:  [id number]         Date:  3/8/2022      Surgeon: Donovan Pineda):  Andre Kimble MD    Procedure: Procedure(s):  THORACIC LAMINECTOMY FOR SPINAL CORD STIMULATOR PLACEMENT WITH NEURO MONITORING    Medications prior to admission:   Prior to Admission medications    Medication Sig Start Date End Date Taking? Authorizing Provider   amLODIPine (NORVASC) 10 MG tablet Take 10 mg by mouth daily    Historical Provider, MD   tiZANidine (ZANAFLEX) 4 MG tablet Take 4 mg by mouth every 6 hours as needed    Historical Provider, MD   dicyclomine (BENTYL) 10 MG capsule Take 10 mg by mouth 4 times daily (before meals and nightly)    Historical Provider, MD   sulindac (CLINORIL) 150 MG tablet Take 150 mg by mouth 2 times daily    Historical Provider, MD   furosemide (LASIX) 40 MG tablet Take 40 mg by mouth 2 times daily    Historical Provider, MD   pantoprazole (PROTONIX) 40 MG tablet Take 40 mg by mouth daily    Historical Provider, MD   metoprolol tartrate (LOPRESSOR) 50 MG tablet Take 100 mg by mouth daily     Historical Provider, MD   gabapentin (NEURONTIN) 600 MG tablet Take 600 mg by mouth 3 times daily. Historical Provider, MD   loperamide (IMODIUM) 2 MG capsule Take 2 mg by mouth in the morning and at bedtime 6 tablets bid    Historical Provider, MD   lansoprazole (PREVACID) 15 MG delayed release capsule Take 15 mg by mouth daily    Historical Provider, MD   adalimumab (HUMIRA) 40 MG/0.8ML injection Inject 40 mg into the skin once    Historical Provider, MD   sertraline (ZOLOFT) 100 MG tablet Take 100 mg by mouth 2 times daily     Historical Provider, MD   ALPRAZolam (XANAX) 1 MG tablet Take 1 mg by mouth 3 times daily as needed. Historical Provider, MD   HYDROcodone-acetaminophen (NORCO) 7.5-325 MG per tablet Take 1 tablet by mouth every 8 hours as needed for Pain . Historical Provider, MD   lisinopril (PRINIVIL;ZESTRIL) 10 MG tablet Take 10 mg by mouth daily    Historical Provider, MD       Current medications:    Current Facility-Administered Medications   Medication Dose Route Frequency Provider Last Rate Last Admin    lactated ringers infusion   IntraVENous Continuous Kortney Williamson  mL/hr at 03/08/22 0623 New Bag at 03/08/22 7684       Allergies: Allergies   Allergen Reactions    Ketamine Hcl Anaphylaxis       Problem List:    Patient Active Problem List   Diagnosis Code    Psoriatic arthritis ([facility] [address] L40.50    Psoriasis L40.9    ANITA (obstructive sleep apnea) G47.33    Gastroesophageal reflux disease K21.9    Depression F32. A    Crohn's disease with complication ([address] O25.983    Anxiety F41.9    Cardiopulmonary arrest ([address] I46.9    Shock liver K72.00    Encephalopathy acute G93.40    Hepatic steatosis K76.0    Chronic bilateral low back pain without sciatica M54.50, G89.29       Past Medical History:        Diagnosis Date    Anxiety     Cardiopulmonary arrest ([address] 12/14/2016    Crohn's disease ([facility] [address]     Depression     GERD (gastroesophageal reflux disease)     Hypertension     Kidney stone     Left bundle branch block     ANITA (obstructive sleep apnea)     Psoriasis     Psoriatic arthritis ([facility] [address]        Past Surgical History:        Procedure Laterality Date    CERVICAL FUSION      COLONOSCOPY  10/29/2015    [facility] MOUTH SURGERY      SMALL INTESTINE SURGERY      crohns    UMBILICAL HERNIA REPAIR  11/20/2012    Dr Oralia Palomino       Social History:    Social History     Tobacco Use    Smoking status: Former Smoker    Smokeless tobacco: Current User     Types: Chew   Substance Use Topics    Alcohol use:  No                                Ready to quit: Not Answered  Counseling given: Not Answered      Vital Signs (Current):   Vitals:    03/08/22 0604 03/08/22 0611   BP: (!) 152/91    Pulse: 65 67   Resp: 16    Temp: 98.2 °F (36.8 °C)    TempSrc: Tympanic    SpO2: 97%    Weight: 295 lb (133.8 kg)    Height: 5' 9\" (1.753 m)                                               BP Readings from Last 3 Encounters:   03/08/22 (!) 152/91   02/14/22 (!) 165/91   12/18/16 123/78       NPO Status: Time of last liquid consumption: 0000                        Time of last solid consumption: 0000                        Date of last liquid consumption: 03/07/22                        Date of last solid food consumption: 03/07/22    BMI:   Wt Readings from Last 3 Encounters:   03/08/22 295 lb (133.8 kg)   03/04/22 295 lb (133.8 kg)   02/14/22 295 lb (133.8 kg)     Body mass index is 43.56 kg/m². CBC:   Lab Results   Component Value Date    WBC 7.0 03/04/2022    RBC 5.12 03/04/2022    HGB 14.3 03/04/2022    HCT 45.0 03/04/2022    MCV 87.9 03/04/2022    RDW 12.7 03/04/2022     03/04/2022       CMP:   Lab Results   Component Value Date     03/04/2022    K 4.4 03/04/2022     03/04/2022    CO2 26 03/04/2022    BUN 22 03/04/2022    CREATININE 0.8 03/04/2022    GFRAA >59 03/04/2022    LABGLOM >60 03/04/2022    GLUCOSE 82 03/04/2022    PROT 7.0 03/04/2022    PROT 7.2 11/14/2012    CALCIUM 9.2 03/04/2022    BILITOT 0.8 03/04/2022    ALKPHOS 95 03/04/2022    AST 28 03/04/2022    ALT 26 03/04/2022       POC Tests: No results for input(s): POCGLU, POCNA, POCK, POCCL, POCBUN, POCHEMO, POCHCT in the last 72 hours.     Coags:   Lab Results   Component Value Date    PROTIME 15.2 03/04/2022    INR 1.18 03/04/2022    APTT 31.5 03/04/2022       HCG (If Applicable): No results found for: PREGTESTUR, PREGSERUM, HCG, HCGQUANT     ABGs:   Lab Results   Component Value Date    PHART 7.440 12/16/2016    PO2ART 73.0 12/16/2016    OEL8WIU 36.0 12/16/2016    WMS4RKN 24.5 12/16/2016    BEART 0.6 12/16/2016    N0NWLFGT 94.6 12/16/2016        Type & Screen (If Applicable):  No results found for: LABABO, LABRH    Drug/Infectious Status (If Applicable):  No results found for: HIV, HEPCAB    COVID-19 Screening (If Applicable):   Lab Results   Component Value Date    COVID19 Not Detected 03/04/2022           Anesthesia Evaluation  Patient summary reviewed and Nursing notes reviewed no history of anesthetic complications:   Airway: Mallampati: III  TM distance: >3 FB   Neck ROM: full  Mouth opening: < 3 FB Dental:    (+) upper dentures      Pulmonary:normal exam    (+) sleep apnea: on CPAP,      (-) not a current smoker                           Cardiovascular:    (+) hypertension:,     (-) past MI    ECG reviewed               Beta Blocker:  Dose within 24 Hrs      ROS comment: Cardiac arrest 2016 after oral surgery     Neuro/Psych:   (+) psychiatric history: stable with treatmentdepression/anxiety    (-) seizures and CVA           GI/Hepatic/Renal:   (+) GERD: well controlled,      (-) liver disease and no renal disease      ROS comment: crohns disease. Endo/Other:    (+) : arthritis:., .    (-) diabetes mellitus               Abdominal:             Vascular: negative vascular ROS. Other Findings:             Anesthesia Plan      general     ASA 3       Induction: intravenous. MIPS: Postoperative opioids intended and Prophylactic antiemetics administered. Anesthetic plan and risks discussed with patient and spouse.                       Concetta Angulo MD   3/8/2022

## 2022-03-08 NOTE — OP NOTE
NEUROSURGICAL OPERATIVE REPORT     ATTENDING SURGEON:  Saranya Dunne. Julio Cedeno MD, PhD     PREOPERATIVE DIAGNOSIS:  Intractable back pain. POSTOPERATIVE DIAGNOSIS:  Intractable back pain. PROCEDURE PERFORMED:  1. Thoracic laminectomy for implantation of Medtronic spinal cord stimulator. 2.  Intraoperative fluoroscopy with interpretation. ESTIMATED BLOOD LOSS:  50 mL. INDICATIONS:  Please see the medical record for full details. Briefly, the patient is an 56 y.o. male with a longstanding history of chronic back pain. He is followed by Pain Management and underwent a trial of dorsal column stimulation, which produced excellent pain relief. He was referred for neurosurgical evaluation and was deemed to be an appropriate candidate for permanent stimulator implant placement, and he requested that we proceed with the procedure. The risks, benefits, and potential complications were explained to the patient in detail and he provided his consent to proceed. PROCEDURE IN DETAIL:  The patient was identified in the preoperative area, consent for surgery was confirmed, and the site for surgery was marked. He was transferred to the operating room by the Anesthesia team where general endotracheal anesthesia was induced without difficulty. He was then flipped into the prone position on Loretha Serafina frame and all pressure points were carefully padded. Fluoroscopy was used to localize the T10 level and a midline incision was planned. A second incision was also planned just under the beltline in the right gluteal area. The entire area was then prepped and draped in the usual aseptic fashion. A team pause was held according to the preformatted script, all were in agreement, and the decision was made to proceed with surgery. Beginning with the thoracic incision, the incision was opened with the skin knife and carried down to the subcutaneous tissues with electrocautery.   Monopolar cautery and Yoo elevators were used to elevate paraspinous musculature off the spinous processes of T9, T10, and T11. Self-retaining retractors were then placed and we placed a Kocher clamp on the spinous process and confirmed that we were at the intended T10 level. Using the Leksell rongeur, the spinous process of T10 was removed. The Leksell rongeur and matchstick heaven on the high-speed drill were then used to eggshell the bone of the T10 lamina. Kerrison punches were then used to complete the laminectomy taking great care to protect the underlying spinal cord. Once the ligamentum flavum was completely removed and we had adequate exposure, we obtained the dissecting paddle from the Medtronic set and it was passed rostrally under the T9 lamina and its position was confirmed with fluoroscopy. We then obtained a b5media MRI-compatible lead. This was handed into field and carefully threaded under the T9 lamina and its position was confirmed with fluoroscopy. Silastic fascial anchors were threaded over the lead wires and left in place. The leads were tacked to the ligamentum flavum with a single 2-O silk suture. Once we had the lead in place, we obtained hemostasis in the epidural space using a combination of bone wax, Surgifoam, and bipolar cautery. Once we had hemostasis, we then turned our attention to the gluteal incision. The skin was incised with a #15 blade and then combination of blunt dissection and electrocautery were used to create an appropriately sized pocket. We then used a tunneling sheath to tunnel from the thoracic incision to the gluteal incision, and the lead terminals were passed through the tunneling sheath to the gluteal incision. Once the leads were tunneled, we obtained a confirmatory fluoroscopic image and were satisfied with the placement of the lead. We relaxed our retraction, we confirmed hemostasis, and then closed the thoracic incision in layers with absorbable sutures.   Once the thoracic incision was closed, we obtained a final fluoroscopic image and nothing had shifted during closure, and we were satisfied with the electrode placement. Once the electrode was in place in the thoracic spine, we then obtained a [employer] MRI-compatible impulse generator and the leads were then connected in the appropriate  orientation and secured in place with a screwdriver until the torque limit was reached. Once the leads were locked in place, the redundant lead was coiled behind the impulse generator and it was placed in the pocket in the appropriate orientation. Telemetry was performed and we noted nominal lead impedances throughout the system. We then activated the system with neuromonitoring and noted bilateral interference/EMG activity. With nominal impedances, we then irrigated the gluteal incision and it was closed with absorbable sutures. Both incisions were then cleaned and dried. The thoracic incision was closed with staples and covered with a Mepilex dressing. The gluteal incision was sealed with Dermabond skin glue. Once the Dermabond had dried, the drapes were removed and the patient was flipped into the supine position and control of the operation was returned to Anesthesia for extubation and transport to recovery. All sponge, needle, and instrument counts were correct on 2 durations at the end of the procedure. There were no apparent complications.

## 2022-03-09 RX ORDER — SODIUM CHLORIDE 9 MG/ML
25 INJECTION, SOLUTION INTRAVENOUS PRN
Status: ACTIVE | OUTPATIENT
Start: 2022-03-09

## 2022-03-09 RX ORDER — SODIUM CHLORIDE 0.9 % (FLUSH) 0.9 %
5-40 SYRINGE (ML) INJECTION EVERY 12 HOURS SCHEDULED
Status: ACTIVE | OUTPATIENT
Start: 2022-03-09

## 2022-03-09 RX ORDER — METOCLOPRAMIDE HYDROCHLORIDE 5 MG/ML
10 INJECTION INTRAMUSCULAR; INTRAVENOUS
Status: ACTIVE | OUTPATIENT
Start: 2022-03-09 | End: 2022-03-09

## 2022-03-09 RX ORDER — DIPHENHYDRAMINE HYDROCHLORIDE 50 MG/ML
12.5 INJECTION INTRAMUSCULAR; INTRAVENOUS
Status: ACTIVE | OUTPATIENT
Start: 2022-03-09 | End: 2022-03-09

## 2022-03-09 RX ORDER — SODIUM CHLORIDE 0.9 % (FLUSH) 0.9 %
5-40 SYRINGE (ML) INJECTION PRN
Status: ACTIVE | OUTPATIENT
Start: 2022-03-09

## 2022-03-09 RX ORDER — HYDROMORPHONE HYDROCHLORIDE 1 MG/ML
0.25 INJECTION, SOLUTION INTRAMUSCULAR; INTRAVENOUS; SUBCUTANEOUS EVERY 5 MIN PRN
Status: ACTIVE | OUTPATIENT
Start: 2022-03-09

## 2022-03-09 RX ORDER — MEPERIDINE HYDROCHLORIDE 25 MG/ML
12.5 INJECTION INTRAMUSCULAR; INTRAVENOUS; SUBCUTANEOUS EVERY 5 MIN PRN
Status: ACTIVE | OUTPATIENT
Start: 2022-03-09

## 2022-03-09 RX ORDER — HYDROMORPHONE HYDROCHLORIDE 1 MG/ML
0.5 INJECTION, SOLUTION INTRAMUSCULAR; INTRAVENOUS; SUBCUTANEOUS EVERY 5 MIN PRN
Status: ACTIVE | OUTPATIENT
Start: 2022-03-09

## 2022-03-09 NOTE — ADDENDUM NOTE
Addendum  created 03/09/22 1000 by REYNALDO London - CRNA    Order list changed, Order sets accessed

## 2022-03-24 ENCOUNTER — OFFICE VISIT (OUTPATIENT)
Dept: NEUROSURGERY | Age: 57
End: 2022-03-24

## 2022-03-24 VITALS
BODY MASS INDEX: 44.43 KG/M2 | SYSTOLIC BLOOD PRESSURE: 150 MMHG | HEIGHT: 69 IN | HEART RATE: 72 BPM | WEIGHT: 300 LBS | DIASTOLIC BLOOD PRESSURE: 86 MMHG

## 2022-03-24 DIAGNOSIS — Z96.89 S/P INSERTION OF SPINAL CORD STIMULATOR: ICD-10-CM

## 2022-03-24 DIAGNOSIS — T81.49XA SUPERFICIAL POSTOPERATIVE WOUND INFECTION: Primary | ICD-10-CM

## 2022-03-24 PROCEDURE — 99024 POSTOP FOLLOW-UP VISIT: CPT | Performed by: NEUROLOGICAL SURGERY

## 2022-03-24 RX ORDER — SULFAMETHOXAZOLE AND TRIMETHOPRIM 800; 160 MG/1; MG/1
1 TABLET ORAL 2 TIMES DAILY
Qty: 28 TABLET | Refills: 0 | Status: SHIPPED | OUTPATIENT
Start: 2022-03-24 | End: 2022-04-07

## 2022-03-24 NOTE — PROGRESS NOTES
NEUROSURGERY POSTOPERATIVE FOLLOW UP NOTE      Chief Complaint:   Chief Complaint   Patient presents with    Follow-up     wound check for Denver Begum         Date of Surgery: 3/24/2022    Procedure Performed: Thoracic laminectomy for implantation of spinal cord stimulator      Interval Update:  First postoperative visit after surgery. He reports some yellowish bloody drainage from the lower portion of his thoracic incision that started ~3 days ago. He denies any fevers or chills. His postoperative pain is decreasing and he denies any new neurologic symptoms. The [employer] team is en-route to activate his stimulator today. HPI: The patient is a 56 y.o. male who presents for neurosurgical evaluation of chronic lower back pain and to discuss implantation of a spinal cord stimulator. He states his pain began in 9/2005 when he was moving into a new home but cannot recall a specific injury. Since 2005 he has been in pain management and tried multiple rounds of injections without adequate pain relief. He is currently taking gabapentin and percocet for pain control. He complains only of axial lower back pain. He has seen surgeons in the past who have reviewed his imaging and told him he would not benefit from surgery. He has not had any back surgery in the past.  He denies radicular pain, numbness or weakness in his legs. He recently underwent a trial of spinal cord stimulation with Dr. Jannie Gray and reports that this relieved ~80% of his lower back pain. Objective:    BP (!) 150/86   Pulse 72   Ht 5' 9\" (1.753 m)   Wt 300 lb (136.1 kg)   BMI 44.30 kg/m²         Physical Exam:    General: alert, cooperative, no distress  Cardiorespiratory: unlabored breathing  Wound: Thoracic incision covered with bandage, removed to reveal surrounding erythema and induration.   There is scant bloody-yellowish non-purulent drainage from the inferior aspect of the incision where there is a small area of separation of the superficial skin edge. Staples removed. Gluteal incision C/D/I with dermabond. Neurologic Exam:    Mental Status: Alert, oriented, thought content appropriate  Cranial Nerves: PERRL, EOMI, symmetric facies, tongue midline  Motor: Motor exam is symmetrical 5 out of 5 all extremities bilaterally  Somatosensory: normal light touch sensation      Assessment and Plan:  56 y.o. M returns for his first postoperative visit after thoracic laminectomy for implantation of a spinal cord stimulator on 3/24/2022. He appears to have developed a superficial wound infection surrounding his thoracic incision and I will plan to treat him with a 2-week course of Bactrim. I recommended that he cleanse his incision twice daily and keep the wound covered with a bandage until the drainage stops. At that point he should keep the incision open to air. I will plan to see him again in 1 week for another wound check.          Electronically signed by Dustin Maurer MD on 3/24/2022 at 9:08 AM

## 2022-03-31 ENCOUNTER — OFFICE VISIT (OUTPATIENT)
Dept: NEUROSURGERY | Age: 57
End: 2022-03-31

## 2022-03-31 VITALS
WEIGHT: 302 LBS | DIASTOLIC BLOOD PRESSURE: 104 MMHG | BODY MASS INDEX: 44.73 KG/M2 | OXYGEN SATURATION: 96 % | RESPIRATION RATE: 24 BRPM | HEART RATE: 69 BPM | SYSTOLIC BLOOD PRESSURE: 158 MMHG | HEIGHT: 69 IN

## 2022-03-31 DIAGNOSIS — Z96.89 S/P INSERTION OF SPINAL CORD STIMULATOR: Primary | ICD-10-CM

## 2022-03-31 PROCEDURE — 99024 POSTOP FOLLOW-UP VISIT: CPT | Performed by: NEUROLOGICAL SURGERY

## 2022-03-31 RX ORDER — OXYCODONE AND ACETAMINOPHEN 7.5; 325 MG/1; MG/1
1 TABLET ORAL EVERY 8 HOURS
COMMUNITY
Start: 2022-03-29

## 2022-03-31 NOTE — PROGRESS NOTES
NEUROSURGERY POSTOPERATIVE FOLLOW UP NOTE      Chief Complaint:   Chief Complaint   Patient presents with    Wound Check     wound check for THORACIC LAMINECTOMY FOR SPINAL CORD STIMULATOR PLACEMENT 03/08/2022         Date of Surgery: 3/24/2022    Procedure Performed: Thoracic laminectomy for implantation of spinal cord stimulator      Interval Update:  First postoperative visit after surgery. At his last visit he was noted to have some scant drainage from his wound as well as surrounding erythema and he was prescribed a course of Bactrim. He reports the incisional drainage has stopped. He reports minimal back pain. He reports the stimulator is functioning well. His blood pressure was significantly elevated on arrival to the office today (>180/100). He denies any associated symptoms such as headache, chest pain or shortness of breath. HPI: The patient is a 56 y.o. male who presents for neurosurgical evaluation of chronic lower back pain and to discuss implantation of a spinal cord stimulator. He states his pain began in 9/2005 when he was moving into a new home but cannot recall a specific injury. Since 2005 he has been in pain management and tried multiple rounds of injections without adequate pain relief. He is currently taking gabapentin and percocet for pain control. He complains only of axial lower back pain. He has seen surgeons in the past who have reviewed his imaging and told him he would not benefit from surgery. He has not had any back surgery in the past.  He denies radicular pain, numbness or weakness in his legs. He recently underwent a trial of spinal cord stimulation with Dr. Gareth Gaston and reports that this relieved ~80% of his lower back pain.       Objective:    BP (!) 158/104 Comment: manual re-check large cuff  Pulse 69   Resp 24   Ht 5' 9\" (1.753 m)   Wt (!) 302 lb (137 kg)   SpO2 96%   BMI 44.60 kg/m²         Physical Exam:    General: alert, cooperative, no distress  Cardiorespiratory: unlabored breathing  Wound: Thoracic incision closed, no erythema or drainage. Gluteal incision C/D/I, no erythema, fluctuance or drainage. Neurologic Exam:    Mental Status: Alert, oriented, thought content appropriate  Cranial Nerves: PERRL, EOMI, symmetric facies, tongue midline  Motor: Motor exam is symmetrical 5 out of 5 all extremities bilaterally  Somatosensory: normal light touch sensation      Assessment and Plan:  56 y.o. M returns for his first postoperative visit after thoracic laminectomy for implantation of a spinal cord stimulator on 3/24/2022. At his last visit he was prescribed a 2-week course of bactrim for scant wound drainage and erythema and his incision is now healing well. His stimulator is functioning normally. His blood pressure was noted to be significantly elevated during our visit but reduced to 158/104 on recheck. He was asymptomatic from this without any headaches, chest pain or shortness of breath. I recommended that he contact his PCP today to discuss his blood pressure and determine if any medication adjustments are needed. I will plan to see him again in 1 month for another wound check.         Electronically signed by Sony Randolph MD on 3/31/2022 at 11:18 AM

## 2022-05-02 ENCOUNTER — OFFICE VISIT (OUTPATIENT)
Dept: NEUROSURGERY | Age: 57
End: 2022-05-02

## 2022-05-02 VITALS
WEIGHT: 298 LBS | HEART RATE: 62 BPM | HEIGHT: 68 IN | BODY MASS INDEX: 45.16 KG/M2 | DIASTOLIC BLOOD PRESSURE: 68 MMHG | SYSTOLIC BLOOD PRESSURE: 109 MMHG

## 2022-05-02 DIAGNOSIS — Z96.89 S/P INSERTION OF SPINAL CORD STIMULATOR: Primary | ICD-10-CM

## 2022-05-02 PROCEDURE — 99024 POSTOP FOLLOW-UP VISIT: CPT | Performed by: NEUROLOGICAL SURGERY

## 2022-05-02 NOTE — PROGRESS NOTES
NEUROSURGERY POSTOPERATIVE FOLLOW UP NOTE      Chief Complaint:   Chief Complaint   Patient presents with    Follow-up     one month f/u for spinal cord stim         Date of Surgery: 3/24/2022    Procedure Performed: Thoracic laminectomy for implantation of spinal cord stimulator      Interval Update: Third postoperative visit after surgery. He reports minimal back pain at his surgical site. He reports the stimulator is providing appropriate coverage but he is still having back and leg pain. The [employer] team is here to adjust his programming parameters. HPI: The patient is a 56 y.o. male who presents for neurosurgical evaluation of chronic lower back pain and to discuss implantation of a spinal cord stimulator. He states his pain began in 9/2005 when he was moving into a new home but cannot recall a specific injury. Since 2005 he has been in pain management and tried multiple rounds of injections without adequate pain relief. He is currently taking gabapentin and percocet for pain control. He complains only of axial lower back pain. He has seen surgeons in the past who have reviewed his imaging and told him he would not benefit from surgery. He has not had any back surgery in the past.  He denies radicular pain, numbness or weakness in his legs. He recently underwent a trial of spinal cord stimulation with Dr. Alanna Malik and reports that this relieved ~80% of his lower back pain. Objective:    /68   Pulse 62   Ht 5' 8\" (1.727 m)   Wt 298 lb (135.2 kg)   BMI 45.31 kg/m²         Physical Exam:    General: alert, cooperative, no distress  Cardiorespiratory: unlabored breathing  Wound: Incisions C/D/I without swelling, erythema or drainage.       Neurologic Exam:    Mental Status: Alert, oriented, thought content appropriate  Cranial Nerves: PERRL, EOMI, symmetric facies, tongue midline  Motor: Motor exam is symmetrical 5 out of 5 all extremities bilaterally  Somatosensory: normal light touch sensation      Assessment and Plan:  56 y.o. M returns for a planned postoperative visit after thoracic laminectomy for implantation of a spinal cord stimulator on 3/24/2022. His stimulator is providing adequate coverage but he still reports back and leg pain. The [employer] team is here to adjust his programming. I advised him that he may resume his normal activities and I will plan to see him again in 3 months.         Electronically signed by Kingsley Sánchez MD on 5/2/2022 at 2:26 PM

## 2022-07-11 NOTE — ANESTHESIA POSTPROCEDURE EVALUATION
Last OV: 6/15/2022  Last labs: 3/10/2022  Next OV and labs: 9/15/2022 Patient: Jon Gill    Procedure Summary     Date: 11/01/21 Room / Location: [facility] ENDOSCOPY 4 / [facility] ENDOSCOPY    Anesthesia Start: 1123 Anesthesia Stop: 1132    Procedure: ESOPHAGOGASTRODUODENOSCOPY (N/A ) Diagnosis:       Esophageal dysphagia      (Esophageal dysphagia [R13.19])    Surgeons: Kain Delgadillo MD Provider: Ronald Pierre CRNA    Anesthesia Type: MAC ASA Status: 3          Anesthesia Type: MAC    Vitals  No vitals data found for the desired time range.          Post Anesthesia Care and Evaluation    Patient location during evaluation: bedside  Patient participation: complete - patient cannot participate  Level of consciousness: awake  Pain score: 0  Pain management: adequate  Airway patency: patent  Anesthetic complications: No anesthetic complications  PONV Status: none  Cardiovascular status: acceptable  Respiratory status: acceptable  Hydration status: acceptable

## 2022-08-02 ENCOUNTER — PATIENT MESSAGE (OUTPATIENT)
Dept: NEUROSURGERY | Age: 57
End: 2022-08-02

## 2022-08-08 ENCOUNTER — OFFICE VISIT (OUTPATIENT)
Dept: NEUROSURGERY | Age: 57
End: 2022-08-08
Payer: COMMERCIAL

## 2022-08-08 VITALS
WEIGHT: 298 LBS | HEART RATE: 67 BPM | BODY MASS INDEX: 45.16 KG/M2 | RESPIRATION RATE: 18 BRPM | DIASTOLIC BLOOD PRESSURE: 82 MMHG | SYSTOLIC BLOOD PRESSURE: 140 MMHG | HEIGHT: 68 IN

## 2022-08-08 DIAGNOSIS — Z96.89 S/P INSERTION OF SPINAL CORD STIMULATOR: Primary | ICD-10-CM

## 2022-08-08 PROCEDURE — 99213 OFFICE O/P EST LOW 20 MIN: CPT | Performed by: NEUROLOGICAL SURGERY

## 2022-08-08 ASSESSMENT — ENCOUNTER SYMPTOMS
EYES NEGATIVE: 1
RESPIRATORY NEGATIVE: 1
GASTROINTESTINAL NEGATIVE: 1
BACK PAIN: 1

## 2022-08-08 NOTE — PROGRESS NOTES
NEUROSURGERY POSTOPERATIVE FOLLOW UP NOTE      Chief Complaint:   Chief Complaint   Patient presents with    Follow-up     Patient is here for a follow up from a thoracic laminectomy for implantation of a spinal cord stimulator on 3/24/2022. He states that he is having lower back pain. He states that the pain is the same as it was before the surgery and that the surgery only helped a little. Date of Surgery: 3/24/2022    Procedure Performed: Thoracic laminectomy for implantation of spinal cord stimulator      Interval Update:  Planned postoperative visit after surgery. He denies back pain at the surgical site but does report some discomfort at the generator site. He again states that he is not receiving much pain relief from stimulation. He feels that when the stimulator is covering the areas of pain, but the pain relief has not been dramatic. He has not contacted any of the [facility] representatives to attempt additional programming since his last visit. HPI: The patient is a 56 y.o. male who presents for neurosurgical evaluation of chronic lower back pain and to discuss implantation of a spinal cord stimulator. He states his pain began in 9/2005 when he was moving into a new home but cannot recall a specific injury. Since 2005 he has been in pain management and tried multiple rounds of injections without adequate pain relief. He is currently taking gabapentin and percocet for pain control. He complains only of axial lower back pain. He has seen surgeons in the past who have reviewed his imaging and told him he would not benefit from surgery. He has not had any back surgery in the past.  He denies radicular pain, numbness or weakness in his legs. He recently underwent a trial of spinal cord stimulation with Dr. Mat Dave and reports that this relieved ~80% of his lower back pain. ROS:    Constitutional: Negative. HENT: Negative. Eyes: Negative. Respiratory: Negative. Cardiovascular: Negative. Gastrointestinal: Negative. Genitourinary: Negative. Musculoskeletal:  Positive for back pain, joint pain and myalgias. Skin: Negative. Neurological: Negative. Endo/Heme/Allergies: Negative. Psychiatric/Behavioral: Negative. ROS was obtained by the medical assistant and reviewed by myself      Objective:    BP (!) 140/82   Pulse 67   Resp 18   Ht 5' 8\" (1.727 m)   Wt 298 lb (135.2 kg)   BMI 45.31 kg/m²         Physical Exam:    General: alert, cooperative, no distress  Cardiorespiratory: unlabored breathing  Wound: Thoracic and gluteal incisions healed, no swelling, erythema or fluctuance. Neurologic Exam:    Mental Status: Alert, oriented, thought content appropriate  Cranial Nerves: PERRL, EOMI, symmetric facies, tongue midline  Motor: Motor exam is symmetrical 5 out of 5 all extremities bilaterally  Somatosensory: normal light touch sensation      Assessment and Plan:  56 y.o. M returns for a planned postoperative visit after thoracic laminectomy for implantation of a spinal cord stimulator on 3/24/2022. His stimulator is providing adequate coverage but he continues to report back and leg pain. His incisions have healed well. I recommended that he contact the [facility] team to schedule a meeting for additional optimization of his programming. I will plan to follow up with him in six months.         Electronically signed by Savannah Lozano MD on 8/8/2022 at 1:02 PM

## 2023-05-31 ENCOUNTER — APPOINTMENT (OUTPATIENT)
Dept: GENERAL RADIOLOGY | Facility: HOSPITAL | Age: 58
End: 2023-05-31
Payer: COMMERCIAL

## 2023-05-31 ENCOUNTER — APPOINTMENT (OUTPATIENT)
Dept: ULTRASOUND IMAGING | Facility: HOSPITAL | Age: 58
End: 2023-05-31
Payer: COMMERCIAL

## 2023-05-31 ENCOUNTER — HOSPITAL ENCOUNTER (INPATIENT)
Facility: HOSPITAL | Age: 58
LOS: 4 days | Discharge: HOSPICE | End: 2023-06-04
Attending: EMERGENCY MEDICINE | Admitting: HOSPITALIST
Payer: COMMERCIAL

## 2023-05-31 ENCOUNTER — APPOINTMENT (OUTPATIENT)
Dept: CT IMAGING | Facility: HOSPITAL | Age: 58
End: 2023-05-31
Payer: COMMERCIAL

## 2023-05-31 ENCOUNTER — APPOINTMENT (OUTPATIENT)
Dept: INTERVENTIONAL RADIOLOGY/VASCULAR | Facility: HOSPITAL | Age: 58
End: 2023-05-31
Payer: COMMERCIAL

## 2023-05-31 DIAGNOSIS — J96.02 ACUTE RESPIRATORY FAILURE WITH HYPOXIA AND HYPERCAPNIA: Primary | ICD-10-CM

## 2023-05-31 DIAGNOSIS — R06.00 DYSPNEA, UNSPECIFIED TYPE: ICD-10-CM

## 2023-05-31 DIAGNOSIS — I11.9 BENIGN HYPERTENSIVE HEART DISEASE WITHOUT HEART FAILURE: ICD-10-CM

## 2023-05-31 DIAGNOSIS — I10 BENIGN HYPERTENSION: ICD-10-CM

## 2023-05-31 DIAGNOSIS — A41.9 SEPSIS, DUE TO UNSPECIFIED ORGANISM, UNSPECIFIED WHETHER ACUTE ORGAN DYSFUNCTION PRESENT: ICD-10-CM

## 2023-05-31 DIAGNOSIS — J96.01 ACUTE RESPIRATORY FAILURE WITH HYPOXIA AND HYPERCAPNIA: Primary | ICD-10-CM

## 2023-05-31 LAB
A-A DO2: 42.4 MMHG
A-A DO2: 470.8 MMHG
ALBUMIN SERPL-MCNC: 4.1 G/DL (ref 3.5–5.2)
ALBUMIN/GLOB SERPL: 1.2 G/DL
ALP SERPL-CCNC: 89 U/L (ref 39–117)
ALT SERPL W P-5'-P-CCNC: 27 U/L (ref 1–41)
ANION GAP SERPL CALCULATED.3IONS-SCNC: 11 MMOL/L (ref 5–15)
ARTERIAL PATENCY WRIST A: ABNORMAL
AST SERPL-CCNC: 39 U/L (ref 1–40)
ATMOSPHERIC PRESS: 748 MMHG
ATMOSPHERIC PRESS: 749 MMHG
ATMOSPHERIC PRESS: 749 MMHG
ATMOSPHERIC PRESS: 750 MMHG
BASE EXCESS BLDA CALC-SCNC: -0.2 MMOL/L (ref 0–2)
BASE EXCESS BLDA CALC-SCNC: 2.1 MMOL/L (ref 0–2)
BASE EXCESS BLDA CALC-SCNC: 2.2 MMOL/L (ref 0–2)
BASE EXCESS BLDA CALC-SCNC: 5.5 MMOL/L (ref 0–2)
BASOPHILS # BLD AUTO: 0.07 10*3/MM3 (ref 0–0.2)
BASOPHILS NFR BLD AUTO: 0.4 % (ref 0–1.5)
BDY SITE: ABNORMAL
BILIRUB SERPL-MCNC: 1.9 MG/DL (ref 0–1.2)
BILIRUB UR QL STRIP: NEGATIVE
BUN SERPL-MCNC: 22 MG/DL (ref 6–20)
BUN/CREAT SERPL: 22.4 (ref 7–25)
CA-I BLD-MCNC: 4.85 MG/DL (ref 4.6–5.6)
CA-I BLD-MCNC: 4.94 MG/DL (ref 4.6–5.6)
CALCIUM SPEC-SCNC: 9.4 MG/DL (ref 8.6–10.5)
CHLORIDE SERPL-SCNC: 101 MMOL/L (ref 98–107)
CLARITY UR: CLEAR
CO2 SERPL-SCNC: 28 MMOL/L (ref 22–29)
COHGB MFR BLD: <1 % (ref 0–5)
COHGB MFR BLD: <1 % (ref 0–5)
COLOR UR: YELLOW
CREAT SERPL-MCNC: 0.98 MG/DL (ref 0.76–1.27)
D-DIMER, QUANTITATIVE (MAD,POW, STR): 860 NG/ML (FEU) (ref 0–560)
D-LACTATE SERPL-SCNC: 1.4 MMOL/L (ref 0.5–2)
D-LACTATE SERPL-SCNC: 2.4 MMOL/L (ref 0.5–2)
D-LACTATE SERPL-SCNC: 3 MMOL/L (ref 0.5–2)
DEPRECATED RDW RBC AUTO: 44.8 FL (ref 37–54)
EGFRCR SERPLBLD CKD-EPI 2021: 90.5 ML/MIN/1.73
EOSINOPHIL # BLD AUTO: 0.02 10*3/MM3 (ref 0–0.4)
EOSINOPHIL NFR BLD AUTO: 0.1 % (ref 0.3–6.2)
ERYTHROCYTE [DISTWIDTH] IN BLOOD BY AUTOMATED COUNT: 13.4 % (ref 12.3–15.4)
FLUAV RNA RESP QL NAA+PROBE: NOT DETECTED
FLUBV RNA RESP QL NAA+PROBE: NOT DETECTED
GAS FLOW AIRWAY: 1 LPM
GAS FLOW AIRWAY: 22 LPM
GLOBULIN UR ELPH-MCNC: 3.5 GM/DL
GLUCOSE BLDA-MCNC: 126 MG/DL (ref 65–95)
GLUCOSE BLDA-MCNC: 133 MG/DL (ref 65–95)
GLUCOSE SERPL-MCNC: 152 MG/DL (ref 65–99)
GLUCOSE UR STRIP-MCNC: NEGATIVE MG/DL
HCO3 BLDA-SCNC: 27.7 MMOL/L (ref 20–26)
HCO3 BLDA-SCNC: 30 MMOL/L (ref 20–26)
HCO3 BLDA-SCNC: 30.1 MMOL/L (ref 20–26)
HCO3 BLDA-SCNC: 32.9 MMOL/L (ref 20–26)
HCT VFR BLD AUTO: 41.2 % (ref 37.5–51)
HCT VFR BLD CALC: 40.2 % (ref 38–51)
HCT VFR BLD CALC: 41.6 % (ref 38–51)
HGB BLD-MCNC: 13.2 G/DL (ref 13–17.7)
HGB BLDA-MCNC: 13.1 G/DL (ref 14–18)
HGB BLDA-MCNC: 13.6 G/DL (ref 14–18)
HGB UR QL STRIP.AUTO: NEGATIVE
HOLD SPECIMEN: NORMAL
HOLD SPECIMEN: NORMAL
IMM GRANULOCYTES # BLD AUTO: 0.17 10*3/MM3 (ref 0–0.05)
IMM GRANULOCYTES NFR BLD AUTO: 0.9 % (ref 0–0.5)
INHALED O2 CONCENTRATION: 100 %
KETONES UR QL STRIP: ABNORMAL
LEUKOCYTE ESTERASE UR QL STRIP.AUTO: NEGATIVE
LIPASE SERPL-CCNC: 15 U/L (ref 13–60)
LYMPHOCYTES # BLD AUTO: 1.5 10*3/MM3 (ref 0.7–3.1)
LYMPHOCYTES NFR BLD AUTO: 7.9 % (ref 19.6–45.3)
Lab: ABNORMAL
MCH RBC QN AUTO: 29.1 PG (ref 26.6–33)
MCHC RBC AUTO-ENTMCNC: 32 G/DL (ref 31.5–35.7)
MCV RBC AUTO: 90.9 FL (ref 79–97)
METHGB BLD QL: <1 % (ref 0–3)
METHGB BLD QL: <1 % (ref 0–3)
MODALITY: ABNORMAL
MONOCYTES # BLD AUTO: 1.16 10*3/MM3 (ref 0.1–0.9)
MONOCYTES NFR BLD AUTO: 6.1 % (ref 5–12)
NEUTROPHILS NFR BLD AUTO: 15.97 10*3/MM3 (ref 1.7–7)
NEUTROPHILS NFR BLD AUTO: 84.6 % (ref 42.7–76)
NITRITE UR QL STRIP: NEGATIVE
NOTE: ABNORMAL
NOTE: ABNORMAL
NRBC BLD AUTO-RTO: 0.1 /100 WBC (ref 0–0.2)
OXYHGB MFR BLDV: 62.9 % (ref 94–99)
OXYHGB MFR BLDV: 95.8 % (ref 94–99)
PAW @ PEAK INSP FLOW SETTING VENT: 20 CMH2O
PCO2 BLDA: 58.2 MM HG (ref 35–45)
PCO2 BLDA: 59.9 MM HG (ref 35–45)
PCO2 BLDA: 61.1 MM HG (ref 35–45)
PCO2 BLDA: 61.2 MM HG (ref 35–45)
PCO2 TEMP ADJ BLD: ABNORMAL MM[HG]
PCO2 TEMP ADJ BLD: ABNORMAL MM[HG]
PEEP RESPIRATORY: 7 CM[H2O]
PEEP RESPIRATORY: 8 CM[H2O]
PEEP RESPIRATORY: 8 CM[H2O]
PH BLDA: 7.29 PH UNITS (ref 7.35–7.45)
PH BLDA: 7.3 PH UNITS (ref 7.35–7.45)
PH BLDA: 7.3 PH UNITS (ref 7.35–7.45)
PH BLDA: 7.35 PH UNITS (ref 7.35–7.45)
PH UR STRIP.AUTO: 5.5 [PH] (ref 5–9)
PH, TEMP CORRECTED: ABNORMAL
PH, TEMP CORRECTED: ABNORMAL
PLATELET # BLD AUTO: 250 10*3/MM3 (ref 140–450)
PMV BLD AUTO: 10 FL (ref 6–12)
PO2 BLDA: 173 MM HG (ref 83–108)
PO2 BLDA: 36.3 MM HG (ref 83–108)
PO2 BLDA: 67.6 MM HG (ref 83–108)
PO2 BLDA: 81.5 MM HG (ref 83–108)
PO2 TEMP ADJ BLD: ABNORMAL MM[HG]
PO2 TEMP ADJ BLD: ABNORMAL MM[HG]
POTASSIUM BLDA-SCNC: 4.5 MMOL/L (ref 3.4–4.5)
POTASSIUM BLDA-SCNC: 4.8 MMOL/L (ref 3.4–4.5)
POTASSIUM SERPL-SCNC: 5 MMOL/L (ref 3.5–5.2)
PROT SERPL-MCNC: 7.6 G/DL (ref 6–8.5)
PROT UR QL STRIP: ABNORMAL
QT INTERVAL: 396 MS
QTC INTERVAL: 481 MS
RBC # BLD AUTO: 4.53 10*6/MM3 (ref 4.14–5.8)
SAO2 % BLDCOA: 63.9 % (ref 94–99)
SAO2 % BLDCOA: 90.5 % (ref 94–99)
SAO2 % BLDCOA: 92.6 % (ref 94–99)
SAO2 % BLDCOA: 96.2 % (ref 94–99)
SARS-COV-2 RNA RESP QL NAA+PROBE: NOT DETECTED
SET MECH RESP RATE: 16
SET MECH RESP RATE: 22
SODIUM BLDA-SCNC: 140 MMOL/L (ref 136–146)
SODIUM BLDA-SCNC: 143 MMOL/L (ref 136–146)
SODIUM SERPL-SCNC: 140 MMOL/L (ref 136–145)
SP GR UR STRIP: 1.03 (ref 1–1.03)
TROPONIN T SERPL HS-MCNC: 17 NG/L
UROBILINOGEN UR QL STRIP: ABNORMAL
VENTILATOR MODE: ABNORMAL
VT ON VENT VENT: 450 ML
WBC NRBC COR # BLD: 18.89 10*3/MM3 (ref 3.4–10.8)
WHOLE BLOOD HOLD COAG: NORMAL

## 2023-05-31 PROCEDURE — 36600 WITHDRAWAL OF ARTERIAL BLOOD: CPT

## 2023-05-31 PROCEDURE — 71045 X-RAY EXAM CHEST 1 VIEW: CPT

## 2023-05-31 PROCEDURE — 76937 US GUIDE VASCULAR ACCESS: CPT

## 2023-05-31 PROCEDURE — 84484 ASSAY OF TROPONIN QUANT: CPT | Performed by: EMERGENCY MEDICINE

## 2023-05-31 PROCEDURE — 87636 SARSCOV2 & INF A&B AMP PRB: CPT | Performed by: EMERGENCY MEDICINE

## 2023-05-31 PROCEDURE — 94799 UNLISTED PULMONARY SVC/PX: CPT

## 2023-05-31 PROCEDURE — 25010000002 PROPOFOL 10 MG/ML EMULSION: Performed by: INTERNAL MEDICINE

## 2023-05-31 PROCEDURE — 93005 ELECTROCARDIOGRAM TRACING: CPT | Performed by: EMERGENCY MEDICINE

## 2023-05-31 PROCEDURE — 82803 BLOOD GASES ANY COMBINATION: CPT

## 2023-05-31 PROCEDURE — 25010000002 SUCCINYLCHOLINE PER 20 MG: Performed by: INTERNAL MEDICINE

## 2023-05-31 PROCEDURE — 25010000002 VANCOMYCIN 10 G RECONSTITUTED SOLUTION: Performed by: HOSPITALIST

## 2023-05-31 PROCEDURE — 71275 CT ANGIOGRAPHY CHEST: CPT

## 2023-05-31 PROCEDURE — 94660 CPAP INITIATION&MGMT: CPT

## 2023-05-31 PROCEDURE — 25010000002 METHYLPREDNISOLONE PER 125 MG: Performed by: HOSPITALIST

## 2023-05-31 PROCEDURE — 83050 HGB METHEMOGLOBIN QUAN: CPT

## 2023-05-31 PROCEDURE — 25010000002 PROPOFOL 10 MG/ML EMULSION

## 2023-05-31 PROCEDURE — 99285 EMERGENCY DEPT VISIT HI MDM: CPT

## 2023-05-31 PROCEDURE — 25010000002 AZITHROMYCIN PER 500 MG: Performed by: EMERGENCY MEDICINE

## 2023-05-31 PROCEDURE — 25010000002 MIDAZOLAM PER 1 MG

## 2023-05-31 PROCEDURE — 85379 FIBRIN DEGRADATION QUANT: CPT | Performed by: EMERGENCY MEDICINE

## 2023-05-31 PROCEDURE — 0 CEFEPIME PER 500 MG: Performed by: HOSPITALIST

## 2023-05-31 PROCEDURE — 83605 ASSAY OF LACTIC ACID: CPT | Performed by: EMERGENCY MEDICINE

## 2023-05-31 PROCEDURE — 0BH17EZ INSERTION OF ENDOTRACHEAL AIRWAY INTO TRACHEA, VIA NATURAL OR ARTIFICIAL OPENING: ICD-10-PCS | Performed by: INTERNAL MEDICINE

## 2023-05-31 PROCEDURE — C1751 CATH, INF, PER/CENT/MIDLINE: HCPCS

## 2023-05-31 PROCEDURE — 87040 BLOOD CULTURE FOR BACTERIA: CPT | Performed by: EMERGENCY MEDICINE

## 2023-05-31 PROCEDURE — 36410 VNPNXR 3YR/> PHY/QHP DX/THER: CPT

## 2023-05-31 PROCEDURE — 25010000002 FUROSEMIDE PER 20 MG: Performed by: INTERNAL MEDICINE

## 2023-05-31 PROCEDURE — 25010000002 FENTANYL CITRATE (PF) 50 MCG/ML SOLUTION: Performed by: INTERNAL MEDICINE

## 2023-05-31 PROCEDURE — 25010000002 MIDAZOLAM 50 MG/10ML SOLUTION: Performed by: INTERNAL MEDICINE

## 2023-05-31 PROCEDURE — 85025 COMPLETE CBC W/AUTO DIFF WBC: CPT | Performed by: EMERGENCY MEDICINE

## 2023-05-31 PROCEDURE — 25010000002 ONDANSETRON PER 1 MG: Performed by: EMERGENCY MEDICINE

## 2023-05-31 PROCEDURE — 81003 URINALYSIS AUTO W/O SCOPE: CPT | Performed by: EMERGENCY MEDICINE

## 2023-05-31 PROCEDURE — 25510000001 IOPAMIDOL PER 1 ML: Performed by: EMERGENCY MEDICINE

## 2023-05-31 PROCEDURE — 82375 ASSAY CARBOXYHB QUANT: CPT

## 2023-05-31 PROCEDURE — 94761 N-INVAS EAR/PLS OXIMETRY MLT: CPT

## 2023-05-31 PROCEDURE — 25010000002 CEFTRIAXONE PER 250 MG: Performed by: EMERGENCY MEDICINE

## 2023-05-31 PROCEDURE — 82805 BLOOD GASES W/O2 SATURATION: CPT

## 2023-05-31 PROCEDURE — 80053 COMPREHEN METABOLIC PANEL: CPT | Performed by: EMERGENCY MEDICINE

## 2023-05-31 PROCEDURE — 36415 COLL VENOUS BLD VENIPUNCTURE: CPT | Performed by: EMERGENCY MEDICINE

## 2023-05-31 PROCEDURE — 94002 VENT MGMT INPAT INIT DAY: CPT

## 2023-05-31 PROCEDURE — 83690 ASSAY OF LIPASE: CPT | Performed by: EMERGENCY MEDICINE

## 2023-05-31 RX ORDER — POLYETHYLENE GLYCOL 3350 17 G/17G
17 POWDER, FOR SOLUTION ORAL DAILY PRN
Status: DISCONTINUED | OUTPATIENT
Start: 2023-05-31 | End: 2023-06-01

## 2023-05-31 RX ORDER — BISACODYL 10 MG
10 SUPPOSITORY, RECTAL RECTAL DAILY PRN
Status: DISCONTINUED | OUTPATIENT
Start: 2023-05-31 | End: 2023-06-01

## 2023-05-31 RX ORDER — FUROSEMIDE 10 MG/ML
INJECTION INTRAMUSCULAR; INTRAVENOUS
Status: DISPENSED
Start: 2023-05-31 | End: 2023-06-01

## 2023-05-31 RX ORDER — MIDAZOLAM HYDROCHLORIDE 1 MG/ML
3 INJECTION INTRAMUSCULAR; INTRAVENOUS ONCE
Status: COMPLETED | OUTPATIENT
Start: 2023-05-31 | End: 2023-05-31

## 2023-05-31 RX ORDER — SODIUM CHLORIDE 0.9 % (FLUSH) 0.9 %
10 SYRINGE (ML) INJECTION EVERY 12 HOURS SCHEDULED
Status: DISCONTINUED | OUTPATIENT
Start: 2023-05-31 | End: 2023-06-04 | Stop reason: HOSPADM

## 2023-05-31 RX ORDER — SUCCINYLCHOLINE CHLORIDE 20 MG/ML
100 INJECTION INTRAMUSCULAR; INTRAVENOUS ONCE
Status: COMPLETED | OUTPATIENT
Start: 2023-05-31 | End: 2023-05-31

## 2023-05-31 RX ORDER — ACETYLCYSTEINE 100 MG/ML
400 SOLUTION ORAL; RESPIRATORY (INHALATION) EVERY 8 HOURS PRN
Status: DISCONTINUED | OUTPATIENT
Start: 2023-05-31 | End: 2023-05-31

## 2023-05-31 RX ORDER — NOREPINEPHRINE BITARTRATE 0.03 MG/ML
.02-.3 INJECTION, SOLUTION INTRAVENOUS
Status: DISCONTINUED | OUTPATIENT
Start: 2023-05-31 | End: 2023-06-04 | Stop reason: HOSPADM

## 2023-05-31 RX ORDER — SODIUM CHLORIDE 0.9 % (FLUSH) 0.9 %
20 SYRINGE (ML) INJECTION AS NEEDED
Status: DISCONTINUED | OUTPATIENT
Start: 2023-05-31 | End: 2023-06-04 | Stop reason: HOSPADM

## 2023-05-31 RX ORDER — METHYLPREDNISOLONE SODIUM SUCCINATE 125 MG/2ML
80 INJECTION, POWDER, LYOPHILIZED, FOR SOLUTION INTRAMUSCULAR; INTRAVENOUS EVERY 8 HOURS
Status: DISCONTINUED | OUTPATIENT
Start: 2023-05-31 | End: 2023-06-04 | Stop reason: HOSPADM

## 2023-05-31 RX ORDER — MIDAZOLAM HYDROCHLORIDE 1 MG/ML
INJECTION INTRAMUSCULAR; INTRAVENOUS
Status: COMPLETED
Start: 2023-05-31 | End: 2023-05-31

## 2023-05-31 RX ORDER — GUAIFENESIN 600 MG/1
1200 TABLET, EXTENDED RELEASE ORAL EVERY 12 HOURS SCHEDULED
Status: DISCONTINUED | OUTPATIENT
Start: 2023-05-31 | End: 2023-06-01

## 2023-05-31 RX ORDER — MIDAZOLAM HYDROCHLORIDE 1 MG/ML
4 INJECTION INTRAMUSCULAR; INTRAVENOUS ONCE
Status: COMPLETED | OUTPATIENT
Start: 2023-05-31 | End: 2023-05-31

## 2023-05-31 RX ORDER — FUROSEMIDE 10 MG/ML
40 INJECTION INTRAMUSCULAR; INTRAVENOUS EVERY 6 HOURS
Status: DISCONTINUED | OUTPATIENT
Start: 2023-05-31 | End: 2023-05-31

## 2023-05-31 RX ORDER — FENTANYL CITRATE 50 UG/ML
50 INJECTION, SOLUTION INTRAMUSCULAR; INTRAVENOUS
Status: DISCONTINUED | OUTPATIENT
Start: 2023-05-31 | End: 2023-06-04 | Stop reason: HOSPADM

## 2023-05-31 RX ORDER — AMOXICILLIN 250 MG
2 CAPSULE ORAL 2 TIMES DAILY
Status: DISCONTINUED | OUTPATIENT
Start: 2023-05-31 | End: 2023-06-01

## 2023-05-31 RX ORDER — ONDANSETRON 2 MG/ML
4 INJECTION INTRAMUSCULAR; INTRAVENOUS ONCE
Status: COMPLETED | OUTPATIENT
Start: 2023-05-31 | End: 2023-05-31

## 2023-05-31 RX ORDER — MONTELUKAST SODIUM 10 MG/1
10 TABLET ORAL NIGHTLY
Status: DISCONTINUED | OUTPATIENT
Start: 2023-05-31 | End: 2023-06-04 | Stop reason: HOSPADM

## 2023-05-31 RX ORDER — MIDAZOLAM HYDROCHLORIDE 1 MG/ML
INJECTION INTRAMUSCULAR; INTRAVENOUS
Status: DISPENSED
Start: 2023-05-31 | End: 2023-06-01

## 2023-05-31 RX ORDER — SODIUM CHLORIDE 0.9 % (FLUSH) 0.9 %
10 SYRINGE (ML) INJECTION AS NEEDED
Status: DISCONTINUED | OUTPATIENT
Start: 2023-05-31 | End: 2023-06-04 | Stop reason: HOSPADM

## 2023-05-31 RX ORDER — VANCOMYCIN 2 GRAM/500 ML IN 0.9 % SODIUM CHLORIDE INTRAVENOUS
15 ONCE
Status: DISCONTINUED | OUTPATIENT
Start: 2023-06-01 | End: 2023-06-01

## 2023-05-31 RX ORDER — FUROSEMIDE 10 MG/ML
60 INJECTION INTRAMUSCULAR; INTRAVENOUS ONCE
Status: COMPLETED | OUTPATIENT
Start: 2023-05-31 | End: 2023-05-31

## 2023-05-31 RX ORDER — ETOMIDATE 2 MG/ML
20 INJECTION INTRAVENOUS ONCE
Status: COMPLETED | OUTPATIENT
Start: 2023-05-31 | End: 2023-05-31

## 2023-05-31 RX ORDER — ALBUTEROL SULFATE 90 UG/1
2 AEROSOL, METERED RESPIRATORY (INHALATION)
Status: DISCONTINUED | OUTPATIENT
Start: 2023-05-31 | End: 2023-06-01

## 2023-05-31 RX ORDER — ACETYLCYSTEINE 100 MG/ML
400 SOLUTION ORAL; RESPIRATORY (INHALATION) EVERY 8 HOURS PRN
Status: DISCONTINUED | OUTPATIENT
Start: 2023-05-31 | End: 2023-06-04 | Stop reason: HOSPADM

## 2023-05-31 RX ORDER — BISACODYL 5 MG/1
5 TABLET, DELAYED RELEASE ORAL DAILY PRN
Status: DISCONTINUED | OUTPATIENT
Start: 2023-05-31 | End: 2023-06-01

## 2023-05-31 RX ORDER — PROPOFOL 10 MG/ML
VIAL (ML) INTRAVENOUS
Status: COMPLETED
Start: 2023-05-31 | End: 2023-05-31

## 2023-05-31 RX ORDER — MIDAZOLAM HYDROCHLORIDE 1 MG/ML
4 INJECTION INTRAMUSCULAR; INTRAVENOUS ONCE
Status: DISCONTINUED | OUTPATIENT
Start: 2023-05-31 | End: 2023-06-04 | Stop reason: HOSPADM

## 2023-05-31 RX ADMIN — ALBUTEROL SULFATE 2 PUFF: 90 AEROSOL, METERED RESPIRATORY (INHALATION) at 22:25

## 2023-05-31 RX ADMIN — Medication 10 ML: at 20:06

## 2023-05-31 RX ADMIN — PROPOFOL 100 MCG/KG/MIN: 10 INJECTION, EMULSION INTRAVENOUS at 22:52

## 2023-05-31 RX ADMIN — CEFTRIAXONE SODIUM 2 G: 2 INJECTION, POWDER, FOR SOLUTION INTRAMUSCULAR; INTRAVENOUS at 13:13

## 2023-05-31 RX ADMIN — PROPOFOL 5 MCG/KG/MIN: 10 INJECTION, EMULSION INTRAVENOUS at 16:23

## 2023-05-31 RX ADMIN — PROPOFOL 100 MCG/KG/MIN: 10 INJECTION, EMULSION INTRAVENOUS at 18:45

## 2023-05-31 RX ADMIN — MIDAZOLAM HYDROCHLORIDE 3 MG: 1 INJECTION INTRAMUSCULAR; INTRAVENOUS at 16:22

## 2023-05-31 RX ADMIN — SODIUM CHLORIDE 2000 ML: 9 INJECTION, SOLUTION INTRAVENOUS at 15:12

## 2023-05-31 RX ADMIN — CEFEPIME 2 G: 2 INJECTION, POWDER, FOR SOLUTION INTRAVENOUS at 18:47

## 2023-05-31 RX ADMIN — PROPOFOL 100 MCG/KG/MIN: 10 INJECTION, EMULSION INTRAVENOUS at 22:12

## 2023-05-31 RX ADMIN — Medication 10 ML: at 20:07

## 2023-05-31 RX ADMIN — FUROSEMIDE 20 MG/HR: 10 INJECTION, SOLUTION INTRAVENOUS at 23:18

## 2023-05-31 RX ADMIN — MIDAZOLAM HYDROCHLORIDE 4 MG: 1 INJECTION, SOLUTION INTRAMUSCULAR; INTRAVENOUS at 16:58

## 2023-05-31 RX ADMIN — FUROSEMIDE 40 MG: 10 INJECTION, SOLUTION INTRAMUSCULAR; INTRAVENOUS at 17:36

## 2023-05-31 RX ADMIN — ETOMIDATE 20 MG: 20 INJECTION, SOLUTION INTRAVENOUS at 16:17

## 2023-05-31 RX ADMIN — DOCUSATE SODIUM 50 MG AND SENNOSIDES 8.6 MG 2 TABLET: 8.6; 5 TABLET, FILM COATED ORAL at 20:06

## 2023-05-31 RX ADMIN — PROPOFOL 100 MCG/KG/MIN: 10 INJECTION, EMULSION INTRAVENOUS at 17:26

## 2023-05-31 RX ADMIN — MONTELUKAST 10 MG: 10 TABLET, FILM COATED ORAL at 20:06

## 2023-05-31 RX ADMIN — IOPAMIDOL 62 ML: 755 INJECTION, SOLUTION INTRAVENOUS at 14:35

## 2023-05-31 RX ADMIN — MIDAZOLAM HYDROCHLORIDE 3 MG: 1 INJECTION, SOLUTION INTRAMUSCULAR; INTRAVENOUS at 16:22

## 2023-05-31 RX ADMIN — SUCCINYLCHOLINE CHLORIDE 100 MG: 20 INJECTION, SOLUTION INTRAMUSCULAR; INTRAVENOUS at 16:17

## 2023-05-31 RX ADMIN — MIDAZOLAM 1 MG/HR: 5 INJECTION, SOLUTION INTRAMUSCULAR; INTRAVENOUS at 17:33

## 2023-05-31 RX ADMIN — FUROSEMIDE 20 MG/HR: 10 INJECTION, SOLUTION INTRAVENOUS at 19:47

## 2023-05-31 RX ADMIN — METHYLPREDNISOLONE SODIUM SUCCINATE 80 MG: 125 INJECTION INTRAMUSCULAR; INTRAVENOUS at 19:50

## 2023-05-31 RX ADMIN — AZITHROMYCIN MONOHYDRATE 500 MG: 500 INJECTION, POWDER, LYOPHILIZED, FOR SOLUTION INTRAVENOUS at 13:43

## 2023-05-31 RX ADMIN — PROPOFOL 100 MCG/KG/MIN: 10 INJECTION, EMULSION INTRAVENOUS at 20:05

## 2023-05-31 RX ADMIN — FUROSEMIDE 60 MG: 10 INJECTION, SOLUTION INTRAMUSCULAR; INTRAVENOUS at 16:57

## 2023-05-31 RX ADMIN — MIDAZOLAM HYDROCHLORIDE 4 MG: 1 INJECTION INTRAMUSCULAR; INTRAVENOUS at 16:58

## 2023-05-31 RX ADMIN — FENTANYL CITRATE 50 MCG: 50 INJECTION, SOLUTION INTRAMUSCULAR; INTRAVENOUS at 16:58

## 2023-05-31 RX ADMIN — ACETYLCYSTEINE 400 MG: 100 SOLUTION ORAL; RESPIRATORY (INHALATION) at 19:10

## 2023-05-31 RX ADMIN — VANCOMYCIN HYDROCHLORIDE 2500 MG: 10 INJECTION, POWDER, LYOPHILIZED, FOR SOLUTION INTRAVENOUS at 19:51

## 2023-05-31 RX ADMIN — PROPOFOL INJECTABLE EMULSION 5 MCG/KG/MIN: 10 INJECTION, EMULSION INTRAVENOUS at 16:23

## 2023-05-31 RX ADMIN — ONDANSETRON 4 MG: 2 INJECTION INTRAMUSCULAR; INTRAVENOUS at 11:41

## 2023-05-31 RX ADMIN — GUAIFENESIN 1200 MG: 600 TABLET, EXTENDED RELEASE ORAL at 20:06

## 2023-05-31 NOTE — PROGRESS NOTES
TWO PATIENT IDENTIFIERS WERE USED. THE PATIENT WAS DRAPED WITH A FULL BODY DRAPE AND THE PATIENT'S RIGHT ARM WAS PREPPED WITH CHLORA PREP. ULTRASOUND WAS USED TO LOCALIZE THERIGHT BASILIC VEIN. SUBCUTANEOUS TISSUE AT THE CATHETER SITE WAS INFILTRATED WITH 2% LIDOCAINE. UNDER ULTRASOUND GUIDANCE, THE VEIN WAS ACCESSED WITH A 21 GAUGE  NEEDLE. AN 0.018 WIRE WAS THEN THREADED THROUGH THE NEEDLE. THE 21 GAUGE NEEDLE WAS REMOVED AND A 4 Amharic SHEATH WAS PLACED OVER THE WIRE INTO THE VEIN.THE MIDLINE CATHETER WAS TRIMMED TO 20CM. THE MIDLINE CATHETER WAS THEN PLACED OVER THE WIRE INTO THE VEIN, THE SHEATH WAS PEELED AWAY, WIRE WAS REMOVED. CATHETER WAS FLUSHED WITH NORMAL SALINE AND CATHETER TIP APPLIED. BIOPATCH PLACED. CATHETER SECURED WITH STAT LOCK AND TEGADERM. PATIENT TOLERATED PROCEDURE WELL. THIS WAS DONE AT BEDSIDE      IMPRESSION:SUCCESSFUL PLACEMENT OF DUAL LUMEN MIDLINE.           Sheron Argueta  5/31/2023  16:56 CDT

## 2023-05-31 NOTE — H&P
[facility] Medicine  HISTORY AND PHYSICAL      Date of Admission: 5/31/2023  Primary Care Physician: Halle Baron MD    Subjective     Chief Complaint:   Shortness of breath  History of Present Illness  This is a 58-year-old male with history of Crohn's disease who is taking immunosuppressive medication at baseline who presents for nausea and shortness of breath.  Symptoms began 3 days ago with cough and minor hemoptysis and have progressed to dyspnea with rest.  Cough has not been productive since then.  No chest pain.  No abdominal pain.  No back pain.  No rash or itching.  No recent change in medications.  No falls or injuries.  No headache.    ER assessment.  5/31/2023.  The patient is seen and evaluated in the ER.  The patient appears to be stable initially and currently on BiPAP.  ABG initial is reviewed and patient has been given 2 L bolus of fluids for sepsis.  Patient was feeling poorly for the past several days becoming progressively worse.  He has had fevers chills shortness of breath chest pain cough which has been productive of some hemoptysis.  Upon transfer from the ER to the CCU the patient's respiratory status decompensated and required emergent intubation.  Dr. Jean performed intubation with out any problems and patient is currently stable on the ventilator.  The patient was given Rocephin and azithromycin in ER due to patient's current decompensation patient was placed on cefepime and vancomycin and will continue to monitor.  Patient is having high peak pressures on the ventilator Mucomyst is added on we will follow response to treatment.    Review of Systems   Unable to perform ROS: Intubated        Otherwise complete ROS reviewed and negative except as mentioned in the HPI.    Past Medical History:   Past Medical History:   Diagnosis Date   • Anxiety    • Arthritis    • Crohn's disease    • Depression    • Hypertension      Past Surgical  History:  Past Surgical History:   Procedure Laterality Date   • ABDOMINAL HERNIA REPAIR     • COLON RESECTION  1988   • COLONOSCOPY  10/26/2015    internal hemorrhoids,otherwise normal postoperative colonoscopy with the evaluation of his ti   • ENDOSCOPY N/A 11/1/2021    Procedure: ESOPHAGOGASTRODUODENOSCOPY;  Surgeon: Kain Delgadillo MD;  Location: [facility] ENDOSCOPY;  Service: Gastroenterology;  Laterality: N/A;     Social History:  reports that he has quit smoking. He has never used smokeless tobacco. He reports current alcohol use. He reports that he does not use drugs.    Family History: family history is not on file.       Allergies:  Allergies   Allergen Reactions   • Ketamine Hcl Anaphylaxis       Medications:  Prior to Admission medications    Medication Sig Start Date End Date Taking? Authorizing Provider   Adalimumab (HUMIRA) 40 MG/0.4ML Prefilled Syringe Kit injection Inject 40 mg under the skin into the appropriate area as directed See Admin Instructions.   Yes Waldo Benjamin MD   gabapentin (NEURONTIN) 300 MG capsule Take 2 capsules by mouth 2 (Two) Times a Day.   Yes Waldo Benjamin MD   lisinopril (PRINIVIL,ZESTRIL) 20 MG tablet Take 1 tablet by mouth Daily.  Patient taking differently: Take 2 tablets by mouth Daily. 11/7/21  Yes Cassi Prince APRN   ALPRAZolam (XANAX) 1 MG tablet TK 1 T PO BID PRF ANXIETY 9/19/16   Waldo Benjamin MD   dicyclomine (BENTYL) 10 MG capsule Take 1 capsule by mouth 4 (Four) Times a Day Before Meals & at Bedtime.    Waldo Benjamin MD   furosemide (Lasix) 40 MG tablet Take 1 tablet by mouth Daily. 11/6/21   Cassi Prince APRN   metoprolol succinate XL (TOPROL-XL) 50 MG 24 hr tablet Take 50 mg by mouth every night at bedtime.    Waldo Benjamin MD   ondansetron (ZOFRAN) 8 MG tablet Take 1 tablet by mouth Every 6 (Six) Hours As Needed for Nausea or Vomiting (give 30 minutes prior to antibiotic administration). 10/6/19   Agustin Sandra  "KIRTI GROSSMAN   oxyCODONE-acetaminophen (PERCOCET) 7.5-325 MG per tablet Take 1 tablet by mouth Every 6 (Six) Hours As Needed.    ProviderWaldo MD   pantoprazole (PROTONIX) 40 MG EC tablet Take 1 tablet by mouth Every Morning. 11/6/21   Levill, Cassi G APRNICOLE   predniSONE (DELTASONE) 10 MG (21) dose pack Use as directed on package 11/6/21   Levill, Cassi G, APRN   promethazine (PHENERGAN) 12.5 MG tablet Take 1 tablet by mouth Every 6 (Six) Hours As Needed for Nausea or Vomiting (If still nauseated after zofran). 10/6/19   Agustin Sandra PA   sertraline (ZOLOFT) 100 MG tablet TK 1 T PO BID 9/19/16   ProviderWaldo MD   tiZANidine (ZANAFLEX) 4 MG tablet Take 4 mg by mouth At Night As Needed for Muscle Spasms.    ProviderWaldo MD     I have utilized all available immediate resources to obtain, update, and review the patient's current medications.    Objective     Vital Signs: /58   Pulse 77   Temp 98.4 °F (36.9 °C) (Oral)   Resp 22   Ht 175.3 cm (69\")   Wt 136 kg (300 lb)   SpO2 92%   BMI 44.30 kg/m²   Physical Exam  Vitals and nursing note reviewed.   Constitutional:       Appearance: He is ill-appearing.   HENT:      Head: Normocephalic and atraumatic.      Right Ear: External ear normal.      Left Ear: External ear normal.      Nose: Nose normal.      Mouth/Throat:      Mouth: Mucous membranes are dry.      Pharynx: Oropharynx is clear.   Eyes:      General: No scleral icterus.     Extraocular Movements: Extraocular movements intact.      Conjunctiva/sclera: Conjunctivae normal.      Pupils: Pupils are equal, round, and reactive to light.   Cardiovascular:      Rate and Rhythm: Normal rate and regular rhythm.      Pulses: Normal pulses.      Heart sounds: Normal heart sounds.   Pulmonary:      Effort: Pulmonary effort is normal.      Breath sounds: Wheezing, rhonchi and rales present.   Abdominal:      General: Bowel sounds are normal. There is no distension.      Palpations: Abdomen " is soft.      Tenderness: There is no abdominal tenderness.   Musculoskeletal:         General: Normal range of motion.      Cervical back: Normal range of motion and neck supple.   Skin:     General: Skin is warm and dry.      Coloration: Skin is not jaundiced.   Neurological:      General: No focal deficit present.      Mental Status: He is alert and oriented to person, place, and time.      Motor: Weakness present.   Psychiatric:         Mood and Affect: Mood normal.         Behavior: Behavior normal.              Results Reviewed:  Lab Results (last 24 hours)     Procedure Component Value Units Date/Time    STAT Lactic Acid, Reflex [[id number]]  (Normal) Collected: 05/31/23 1711    Specimen: Blood Updated: 05/31/23 1738     Lactate 1.4 mmol/L     Blood Gas, Arterial - [[id number]]  (Abnormal) Collected: 05/31/23 1737    Specimen: Arterial Blood Updated: 05/31/23 1736     Site Right Radial     Jerome's Test N/A     pH, Arterial 7.298 pH units      Comment: 84 Value below reference range        pCO2, Arterial 61.2 mm Hg      Comment: 83 Value above reference range        pO2, Arterial 81.5 mm Hg      Comment: 84 Value below reference range        HCO3, Arterial 30.0 mmol/L      Comment: 83 Value above reference range        Base Excess, Arterial 2.1 mmol/L      Comment: 83 Value above reference range        O2 Saturation, Arterial 92.6 %      Comment: 84 Value below reference range        Barometric Pressure for Blood Gas 748 mmHg      Modality Ventilator     FIO2 100 %      Ventilator Mode PC     Set [facility] Resp Rate 16.0     PEEP 8.0     Collected by RT     Comment: Meter: [id number]     :  [id number]       Extra Tubes [[id number]] Collected: 05/31/23 1127    Specimen: Blood Updated: 05/31/23 1532    Narrative:      The following orders were created for panel order Extra Tubes.  Procedure                               Abnormality         Status                     ---------                                -----------         ------                     [facility][id number]]                                   Final result               Jean Top[[id number]]                                         Final result               Light Blue Top[[id number]]                                   Final result                 Please view results for these tests on the individual orders.    Jean Top [[id number]] Collected: 05/31/23 1127    Specimen: Blood Updated: 05/31/23 1532     Extra Tube Hold for add-ons.     Comment: Auto resulted.       STAT Lactic Acid, Reflex [[id number]]  (Abnormal) Collected: 05/31/23 1413    Specimen: Blood Updated: 05/31/23 1441     Lactate 3.0 mmol/L     Urinalysis With Microscopic If Indicated (No Culture) - Urine, Clean Catch [[id number]]  (Abnormal) Collected: 05/31/23 1413    Specimen: Urine, Clean Catch Updated: 05/31/23 1431     Color, UA Yellow     Appearance, UA Clear     pH, UA 5.5     Specific Gravity, UA 1.026     Glucose, UA Negative     Ketones, UA Trace     Bilirubin, UA Negative     Blood, UA Negative     Protein, UA Trace     Leuk Esterase, UA Negative     Nitrite, UA Negative     Urobilinogen, UA 1.0 E.U./dL    Narrative:      Urine microscopic not indicated.    Blood Gas, Arterial With Co-Ox [[id number]]  (Abnormal) Collected: 05/31/23 1425    Specimen: Arterial Blood Updated: 05/31/23 1424     Site Arterial Line     Jerome's Test N/A     pH, Arterial 7.286 pH units      Comment: 84 Value below reference range        pCO2, Arterial 58.2 mm Hg      Comment: 83 Value above reference range        pO2, Arterial 173.0 mm Hg      Comment: 83 Value above reference range        HCO3, Arterial 27.7 mmol/L      Comment: 83 Value above reference range        Base Excess, Arterial -0.2 mmol/L      Comment: 84 Value below reference range        O2 Saturation, Arterial 96.2 %      Hemoglobin, Blood Gas 13.6 g/dL      Comment: 84 Value below reference range        Hematocrit, Blood Gas 41.6 %       Oxyhemoglobin 95.8 %      Methemoglobin <1.00 %      Comment: 94 Value below reportable range < 1.0        Carboxyhemoglobin <1.0 %      Comment: 94 Value below reportable range < 1.0        A-a DO2 470.8 mmHg      Sodium, Arterial 143 mmol/L      Potassium, Arterial 4.5 mmol/L      Ionized Calcium 4.85 mg/dL      Glucose, Arterial 126 mg/dL      Barometric Pressure for Blood Gas 749 mmHg      Modality Ventilator     FIO2 100 %      Flow Rate 22.0 lpm      Ventilator Mode AVAP     Set Tidal Volume 450     PEEP 7.0     Comment: Meter: [id number]     :  [id number]        Note --     pH, Temp Corrected --     pCO2, Temperature Corrected --     pO2, Temperature Corrected --    COVID-19 and FLU A/B PCR - Swab, Nasopharynx [[id number]]  (Normal) Collected: 05/31/23 1241    Specimen: Swab from Nasopharynx Updated: 05/31/23 1315     COVID19 Not Detected     Influenza A PCR Not Detected     Influenza B PCR Not Detected    Narrative:      Fact sheet for providers: https://www.fda.gov/media/432998/download    Fact sheet for patients: https://www.fda.gov/media/009083/download    Test performed by PCR.    D-dimer, Quantitative [[id number]]  (Abnormal) Collected: 05/31/23 1127    Specimen: Blood Updated: 05/31/23 1254     D-Dimer, Quantitative 860 ng/mL (FEU)     Narrative:      According to the assay 's published package insert, a normal (<500 ng/mL (FEU)) D-dimer result in conjunction with a non-high clinical probability assessment, excludes deep vein thrombosis (DVT) and pulmonary embolism (PE) with high sensitivity.    D-dimer values increase with age and this can make VTE exclusion of an older population difficult. To address this, the [employer], based on best available evidence and recent guidelines, recommends that clinicians use age-adjusted D-dimer thresholds in patients greater than 50 years of age with: a) a low probability of PE who do not meet all Pulmonary Embolism Rule Out  "Criteria, or b) in those with intermediate probability of PE.   The formula for an age-adjusted D-dimer cut-off is \"age*10\".  For example, a 60 year old patient would have an age-adjusted cut-off of 600 ng/mL (FEU) and an 80 year old 800 ng/mL (FEU).      Blood Culture - Blood, Hand, Left [[id number]] Collected: 05/31/23 1248    Specimen: Blood from Hand, Left Updated: 05/31/23 1248    Blood Culture - Blood, Hand, Right [[id number]] Collected: 05/31/23 1248    Specimen: Blood from Hand, Right Updated: 05/31/23 1248    Gold Top - SST [[id number]] Collected: 05/31/23 1127    Specimen: Blood Updated: 05/31/23 1230     Extra Tube Hold for add-ons.     Comment: Auto resulted.       Light Blue Top [[id number]] Collected: 05/31/23 1127    Specimen: Blood Updated: 05/31/23 1230     Extra Tube Hold for add-ons.     Comment: Auto resulted       Blood Gas, Arterial With Co-Ox [[id number]]  (Abnormal) Collected: 05/31/23 1216    Specimen: Arterial Blood Updated: 05/31/23 1216     Site Arterial Line     Jerome's Test N/A     pH, Arterial 7.302 pH units      Comment: 84 Value below reference range        pCO2, Arterial 61.1 mm Hg      Comment: 83 Value above reference range        pO2, Arterial 36.3 mm Hg      Comment: 85 Value below critical limit        HCO3, Arterial 30.1 mmol/L      Comment: 83 Value above reference range        Base Excess, Arterial 2.2 mmol/L      Comment: 83 Value above reference range        O2 Saturation, Arterial 63.9 %      Comment: 84 Value below reference range        Hemoglobin, Blood Gas 13.1 g/dL      Comment: 84 Value below reference range        Hematocrit, Blood Gas 40.2 %      Oxyhemoglobin 62.9 %      Comment: 84 Value below reference range        Methemoglobin <1.00 %      Comment: 94 Value below reportable range < 1.0        Carboxyhemoglobin <1.0 %      Comment: 94 Value below reportable range < 1.0        A-a DO2 42.4 mmHg      Sodium, Arterial 140 mmol/L      Potassium, Arterial 4.8 mmol/L     "  Ionized Calcium 4.94 mg/dL      Glucose, Arterial 133 mg/dL      Barometric Pressure for Blood Gas 750 mmHg      Modality Nasal Cannula     Flow Rate 1.0 lpm      Ventilator Mode NA     Note --     Collected by GIL. RRT     Comment: Meter: [id number]     :  [id number]        pH, Temp Corrected --     pCO2, Temperature Corrected --     pO2, Temperature Corrected --    Comprehensive Metabolic Panel [[id number]]  (Abnormal) Collected: 05/31/23 1124    Specimen: Blood Updated: 05/31/23 1152     Glucose 152 mg/dL      BUN 22 mg/dL      Creatinine 0.98 mg/dL      Sodium 140 mmol/L      Potassium 5.0 mmol/L      Chloride 101 mmol/L      CO2 28.0 mmol/L      Calcium 9.4 mg/dL      Total Protein 7.6 g/dL      Albumin 4.1 g/dL      ALT (SGPT) 27 U/L      AST (SGOT) 39 U/L      Alkaline Phosphatase 89 U/L      Total Bilirubin 1.9 mg/dL      Globulin 3.5 gm/dL      A/G Ratio 1.2 g/dL      BUN/Creatinine Ratio 22.4     Anion Gap 11.0 mmol/L      eGFR 90.5 mL/min/1.73     Narrative:      GFR Normal >60  Chronic Kidney Disease <60  Kidney Failure <15      Lipase [[id number]]  (Normal) Collected: 05/31/23 1124    Specimen: Blood Updated: 05/31/23 1152     Lipase 15 U/L     Single High Sensitivity Troponin T [[id number]]  (Abnormal) Collected: 05/31/23 1124    Specimen: Blood Updated: 05/31/23 1150     HS Troponin T 17 ng/L     Narrative:      High Sensitive Troponin T Reference Range:  <10.0 ng/L- Negative Female for AMI  <15.0 ng/L- Negative Male for AMI  >=10 - Abnormal Female indicating possible myocardial injury.  >=15 - Abnormal Male indicating possible myocardial injury.   Clinicians would have to utilize clinical acumen, EKG, Troponin, and serial changes to determine if it is an Acute Myocardial Infarction or myocardial injury due to an underlying chronic condition.         Lactic Acid, Plasma [[id number]]  (Abnormal) Collected: 05/31/23 1133    Specimen: Blood Updated: 05/31/23 1145     Lactate 2.4 mmol/L     CBC &  Differential [[id number]]  (Abnormal) Collected: 05/31/23 1124    Specimen: Blood Updated: 05/31/23 1137    Narrative:      The following orders were created for panel order CBC & Differential.  Procedure                               Abnormality         Status                     ---------                               -----------         ------                     CBC Auto Differential[[id number]]        Abnormal            Final result                 Please view results for these tests on the individual orders.    CBC Auto Differential [[id number]]  (Abnormal) Collected: 05/31/23 1124    Specimen: Blood Updated: 05/31/23 1137     WBC 18.89 10*3/mm3      RBC 4.53 10*6/mm3      Hemoglobin 13.2 g/dL      Hematocrit 41.2 %      MCV 90.9 fL      MCH 29.1 pg      MCHC 32.0 g/dL      RDW 13.4 %      RDW-SD 44.8 fl      MPV 10.0 fL      Platelets 250 10*3/mm3      Neutrophil % 84.6 %      Lymphocyte % 7.9 %      Monocyte % 6.1 %      Eosinophil % 0.1 %      Basophil % 0.4 %      Immature Grans % 0.9 %      Neutrophils, Absolute 15.97 10*3/mm3      Lymphocytes, Absolute 1.50 10*3/mm3      Monocytes, Absolute 1.16 10*3/mm3      Eosinophils, Absolute 0.02 10*3/mm3      Basophils, Absolute 0.07 10*3/mm3      Immature Grans, Absolute 0.17 10*3/mm3      nRBC 0.1 /100 WBC         Imaging Results (Last 24 Hours)     Procedure Component Value Units Date/Time    US Guided Vascular Access [[id number]] Collected: 05/31/23 1723     Updated: 05/31/23 1726    Narrative:      US GUIDANCE VASCULAR    HISTORY: access    COMPARISON: None      Impression:      Grayscale sonographic images were obtained during ultrasound guided vascular  access.    The accessed blood vessel appears to be patent. The vessel was accessed under  direct ultrasound guidance.    Please refer to the procedure note for additional details.      XR Chest 1 View [[id number]] Collected: 05/31/23 1640     Updated: 05/31/23 1703    Narrative:      FINDINGS:  Endotracheal tube  3 cm above the sonali.  The heart is enlarged. There is severe  bilateral diffuse infiltration.      Impression:      Impression:  1. In comparison with study done earlier today, there is worsening bilateral  diffuse infiltration.    2. Endotracheal tube appears to be in good position.    IR Insert Midline Without Port Pump 5 Plus [[id number]] Resulted: 05/31/23 1657     Updated: 05/31/23 1657    Narrative:      This procedure was auto-finalized with no dictation required.    CT Angiogram Chest [[id number]] Collected: 05/31/23 1450     Updated: 05/31/23 1500    Narrative:      TECHNIQUE:  IV contrast was administered and axial images from the thoracic inlet through  the diaphragms were performed.  3D multiplanar reformats of the thoracic aorta  were completed on a separate workstation under concurrent supervision.  Intravenous contrast was used for this case.    FINDINGS:  No pathologically enlarged mediastinal, hilar, or axillary lymph nodes.  No  pulmonary embolus.  No aortic dissection.  There is severe consolidation  throughout the right lung with mild infiltration throughout the left lung.      Impression:        1.  No PE.  No aortic dissection.    2.  Severe consolidation throughout the right lung with mild infiltration  throughout the left lung.    XR Chest 1 View [[id number]] Collected: 05/31/23 1145     Updated: 05/31/23 1159    Narrative:      FINDINGS:  An AP view of the chest shows cardiomegaly.  There is vascular congestion.  There is diffuse right lung infiltrate.      Impression:      1.  Diffuse right lung infiltrate most likely represents pneumonia.  The heart  is enlarged and there also appears to be some mild vascular congestion.    2.  No other significant finding.  No prior studies for comparison.          I have personally reviewed and interpreted the radiology studies and ECG obtained at time of admission.     Assessment / Plan     Assessment:   Active Hospital Problems    Diagnosis    • **Acute  respiratory failure with hypoxia and hypercapnia      Impressions/plan  Sepsis secondary to pneumonia  Patient will be placed on cefepime and vancomycin  Guaifenesin  Mucomyst with DuoNeb's  Currently intubated we will wean vent as tolerated.  Solu-Medrol    Pneumonia  Continue all medications as noted above.    Volume overload  Patient given 2 L of IV fluids in the ER due to use sepsis protocol  Patient given Lasix after intubation and placed on Lasix drip  Will we will check echocardiogram in the a.m.    Hypertension  Continue to hold until stabilized lisinopril and   metoprolol    GERD   continue Protonix    Depression/anxiety   continue sertraline    CODE STATUS full code  DVT prophylaxis Lovenox       Medical Decision Making  Number and Complexity of problems: 5 complex problems  Differential Diagnosis: Pneumonia versus CHF    Conditions and Status:        Condition is worsening.     [facility] Data  External documents reviewed:   My EKG interpretation:    My plain film interpretation:   Chest x-ray completed 5/31/2023  Bilateral diffuse infiltrates  CT scan completed 5/31/2023  No PE or aortic dissection  Severe consolidation throughout the right lung with mild infiltration of the left lung    Tests considered but not ordered: None     Decision rules/scores evaluated (example [id number], Wells, etc): N/A     Discussed with: The patient and his wife and they agree to treatment plan and intubation     Treatment Plan  As above    Care Planning  Shared decision making: Patient updated on current status and informed of proposed care plan; is in agreement with plan  Code status and discussions: Full code    Disposition  Social Determinants of Health that impact treatment or disposition: None  I expect the patient to be discharged to home once stabilized.    I confirmed that the patient's Advance Care Plan is present, code status is documented, or surrogate decision maker is listed in the patient's medical record.        The patient's surrogate decision maker is his wife    I discussed my findings and recommendations with the ER physician, the patient and his wife and Dr. Jean who  did the intubation.    Estimated length of stay is to be determined    The patient was seen and examined by me on 5/31/2023    Electronically signed by Lamberto Mojica DO, 05/31/23, 17:54 CDT.

## 2023-05-31 NOTE — ED NOTES
Patient presented to the ED with c/o nausea x3 days. Patient also reports shortness of breath; SpO2 noted to be 61% on room air upon arrival.     Patient encouraged to cough and deep breathe; SpO2 noted to rise to 99% on 2L nasal cannula.

## 2023-05-31 NOTE — ED NOTES
Patient SpO2 noted to be 70%; placed on NRB with a change in SpO2 to 94%. No other acute distress noted at this time; MD made aware and further testing ordered.

## 2023-05-31 NOTE — PROCEDURES
Endotracheal Intubation Procedure Note   INDICATION: Acute respiratory failure with hypoxia   PROCEDURE : Cholo eJan MD   ATTENDING PHYSICIAN: Cholo Jean MD.  In Attendance (Y)_        CONSENT:   [X] During the informed consent discussion regarding the procedure, or treatment, I explained the following to the patient/designee(Wife):    a. Nature of the procedure or treatment and who will perform the procedure or treatment.    b. Necessity for procedure and the possible benefits.    c. Risks and complications (most common and serious).    d. Alternative treatments and the risks, benefits and side effects of each (including no treatment).    e. Likelihood of the patient achieving his/her goals without this procedure and surgery treatment.    f. Problems that might occur during the recuperation.    g. Conflicts of interest, if any         PROCEDURE SUMMARY:       A time out was performed. My hands were washed immediately prior to the  procedure. I wore a surgical cap, mask with protective eyewear, gown  and gloves throughout the procedure. The patient was placed on a cardiac monitor including continuous pulse oximetry. Rapid Sequence Intubation  was conducted. The patient received 20 mg of etomidate for induction and 100 mg of succinylcholine for adequate paralysis. Cricoid pressure was maintained from time  induction agent was given to time of cuff balloon inflation. Using a 4 Red laryngoscope and a size 8.0 endotracheal tube with stylet, the patient was intubated on the 1st attempt. The stylet was removed and cuff balloon was  inflated. Appropriate endotracheal tube position was confirmed by  direct visualization of vocal cord passage, fogging of the tube, CO2  colormetric indicator and symmetric breath sounds. The tube was secured  At 26 cm at the lips. Post intubation chest x-ray reviewed by Dr. Jean with ET placement noted 3 cm above the sonali.

## 2023-05-31 NOTE — Clinical Note
Level of Care: Stepdown [25]   Diagnosis: Acute respiratory failure with hypoxia and hypercapnia [[id number]]   Admitting Physician: JEFF GONG [[id number]]   Attending Physician: JEFF GONG [[id number]]   Certification: I Certify That Inpatient Hospital Services Are Medically Necessary For Greater Than 2 Midnights

## 2023-05-31 NOTE — ED PROVIDER NOTES
Subjective   History of Present Illness  This is a 58-year-old male with history of Crohn's disease who is taking immunosuppressive medication at baseline who presents for nausea and shortness of breath.  Symptoms began 3 days ago with cough and minor hemoptysis and have progressed to dyspnea with rest.  Cough has not been productive since then.  No chest pain.  No abdominal pain.  No back pain.  No rash or itching.  No recent change in medications.  No falls or injuries.  No headache.        Review of Systems   All other systems reviewed and are negative.      Past Medical History:   Diagnosis Date   • Anxiety    • Arthritis    • Crohn's disease    • Depression    • Hypertension        Allergies   Allergen Reactions   • Ketamine Hcl Anaphylaxis       Past Surgical History:   Procedure Laterality Date   • ABDOMINAL HERNIA REPAIR     • COLON RESECTION  1988   • COLONOSCOPY  10/26/2015    internal hemorrhoids,otherwise normal postoperative colonoscopy with the evaluation of his ti   • ENDOSCOPY N/A 11/1/2021    Procedure: ESOPHAGOGASTRODUODENOSCOPY;  Surgeon: Kain Delgadillo MD;  Location: [facility] ENDOSCOPY;  Service: Gastroenterology;  Laterality: N/A;       History reviewed. No pertinent family history.    Social History     Socioeconomic History   • Marital status:    Tobacco Use   • Smoking status: Former   • Smokeless tobacco: Never   Vaping Use   • Vaping Use: Former   Substance and Sexual Activity   • Alcohol use: Yes     Comment: very rarely   • Drug use: No           Objective   Physical Exam  Vitals and nursing note reviewed.   Constitutional:       General: He is not in acute distress.     Appearance: He is obese. He is ill-appearing.   HENT:      Head: Normocephalic and atraumatic.      Mouth/Throat:      Mouth: Mucous membranes are moist.   Neck:      Vascular: No JVD.   Cardiovascular:      Rate and Rhythm: Normal rate and regular rhythm.      Heart sounds: No murmur heard.  Pulmonary:       "Effort: Tachypnea present.      Breath sounds: Rales present.   Chest:      Chest wall: No tenderness.   Abdominal:      Palpations: Abdomen is soft.      Tenderness: There is no abdominal tenderness.      Comments: Large chronic midline abdominal scar.   Musculoskeletal:      Comments: Trace edema and no tenderness of the bilateral lower extremities.   Skin:     General: Skin is warm and dry.   Neurological:      Mental Status: He is alert and oriented to person, place, and time.   Psychiatric:         Behavior: Behavior normal.         Procedures           ED Course  ED Course as of 05/31/23 1720   Wed May 31, 2023   1237 Blood Gas, Arterial With Co-Ox(!!)  Respiratory acidosis with partial compensation and hypoxia. [JV]      ED Course User Index  [JV] Jaydon Oneal, DO                                           Medical Decision Making  The patient's recent past medical charts for this facility as well as outside facilities via the \"care everywhere\" application of epic reviewed.  The patient was last seen and admitted at this facility in 2021 for hypoxic and hypercapnic respiratory failure.  The patient also receives outpatient care at an outside facility for back pain.  In 2021 the patient had an echocardiogram that was essentially normal.    The differential diagnosis includes pneumonia, sepsis, CHF, and carbon oxide poisoning, among others.    On final reevaluation the patient is in stable condition and agreeable to being admitted.      Acute respiratory failure with hypoxia and hypercapnia: acute illness or injury  Sepsis, due to unspecified organism, unspecified whether acute organ dysfunction present: acute illness or injury  Amount and/or Complexity of Data Reviewed  Independent Historian: spouse     Details: The patient significant other provides additional history.  Labs: ordered. Decision-making details documented in ED Course.  Radiology: ordered.  ECG/medicine tests: ordered and independent " interpretation performed.     Details: EKG interpretation: Interpreted myself.  Normal sinus rhythm.  Rate 89.  Normal P wave and MN interval.  Left bundle branch block.  Left axis deviation.  Normal QTc interval.  ST segments are nonspecific.  Discussion of management or test interpretation with external provider(s): Case discussed with admitting hospitalist physician.    Risk  OTC drugs.  Prescription drug management.  Decision regarding hospitalization.      Critical Care  Total time providing critical care: minutes (35 minutes of critical care provided. This time excludes other billable procedures. Time does include preparation of documents, medical consultations, review of old records, and direct bedside care. Patient is at high risk for life-threatening deterioration due to respiratory system failure with severe hypoxia requiring titration of supplemental oxygen and BiPAP management.  )      Final diagnoses:   Acute respiratory failure with hypoxia and hypercapnia   Sepsis, due to unspecified organism, unspecified whether acute organ dysfunction present       ED Disposition  ED Disposition     ED Disposition   Decision to Admit    Condition   --    Comment   Level of Care: Stepdown [25]   Diagnosis: Acute respiratory failure with hypoxia and hypercapnia [[id number]]   Admitting Physician: JEFF GONG [[id number]]   Attending Physician: JEFF GONG [[id number]]   Certification: I Certify That Inpatient Hospital Services Are Medically Necessary For Greater Than 2 Midnights               No follow-up provider specified.       Medication List      Changed    lisinopril 20 MG tablet  Commonly known as: PRINIVIL,ZESTRIL  Take 1 tablet by mouth Daily.  What changed: how much to take             Jaydon Oneal DO  05/31/23 [id number]

## 2023-06-01 ENCOUNTER — APPOINTMENT (OUTPATIENT)
Dept: CARDIOLOGY | Facility: HOSPITAL | Age: 58
End: 2023-06-01
Payer: COMMERCIAL

## 2023-06-01 ENCOUNTER — APPOINTMENT (OUTPATIENT)
Dept: GENERAL RADIOLOGY | Facility: HOSPITAL | Age: 58
End: 2023-06-01
Payer: COMMERCIAL

## 2023-06-01 LAB
ARTERIAL PATENCY WRIST A: ABNORMAL
ARTERIAL PATENCY WRIST A: POSITIVE
ARTERIAL PATENCY WRIST A: POSITIVE
ATMOSPHERIC PRESS: 750 MMHG
ATMOSPHERIC PRESS: 751 MMHG
ATMOSPHERIC PRESS: 751 MMHG
BASE EXCESS BLDA CALC-SCNC: 6.8 MMOL/L (ref 0–2)
BASE EXCESS BLDA CALC-SCNC: 8.4 MMOL/L (ref 0–2)
BASE EXCESS BLDA CALC-SCNC: 9.3 MMOL/L (ref 0–2)
BDY SITE: ABNORMAL
BH CV ECHO MEAS - ACS: 2.6 CM
BH CV ECHO MEAS - AO MAX PG: 12.7 MMHG
BH CV ECHO MEAS - AO ROOT DIAM: 4.1 CM
BH CV ECHO MEAS - AO V2 MAX: 178.1 CM/SEC
BH CV ECHO MEAS - EDV(CUBED): 107.4 ML
BH CV ECHO MEAS - ESV(CUBED): 37.2 ML
BH CV ECHO MEAS - FS: 29.8 %
BH CV ECHO MEAS - IVS/LVPW: 1.12 CM
BH CV ECHO MEAS - IVSD: 1.49 CM
BH CV ECHO MEAS - LA DIMENSION: 4 CM
BH CV ECHO MEAS - LV MASS(C)D: 273.3 GRAMS
BH CV ECHO MEAS - LVIDD: 4.8 CM
BH CV ECHO MEAS - LVIDS: 3.3 CM
BH CV ECHO MEAS - LVOT AREA: 3.9 CM2
BH CV ECHO MEAS - LVOT DIAM: 2.22 CM
BH CV ECHO MEAS - LVPWD: 1.33 CM
BH CV ECHO MEAS - PA V2 MAX: 127.6 CM/SEC
BH CV ECHO MEAS - RVDD: 2.7 CM
BH CV ECHO MEAS - TR MAX PG: 31.3 MMHG
BH CV ECHO MEAS - TR MAX VEL: 279.6 CM/SEC
CREAT SERPL-MCNC: 0.97 MG/DL (ref 0.76–1.27)
EGFRCR SERPLBLD CKD-EPI 2021: 91.6 ML/MIN/1.73
HCO3 BLDA-SCNC: 33.7 MMOL/L (ref 20–26)
HCO3 BLDA-SCNC: 34.2 MMOL/L (ref 20–26)
HCO3 BLDA-SCNC: 34.6 MMOL/L (ref 20–26)
INHALED O2 CONCENTRATION: 100 %
INHALED O2 CONCENTRATION: 100 %
INHALED O2 CONCENTRATION: 50 %
Lab: ABNORMAL
MAXIMAL PREDICTED HEART RATE: 164 BPM
MODALITY: ABNORMAL
MRSA DNA SPEC QL NAA+PROBE: POSITIVE
PAW @ PEAK INSP FLOW SETTING VENT: 18 CMH2O
PCO2 BLDA: 48.1 MM HG (ref 35–45)
PCO2 BLDA: 50.6 MM HG (ref 35–45)
PCO2 BLDA: 56.2 MM HG (ref 35–45)
PEEP RESPIRATORY: 10 CM[H2O]
PEEP RESPIRATORY: 5 CM[H2O]
PEEP RESPIRATORY: 8 CM[H2O]
PH BLDA: 7.39 PH UNITS (ref 7.35–7.45)
PH BLDA: 7.44 PH UNITS (ref 7.35–7.45)
PH BLDA: 7.46 PH UNITS (ref 7.35–7.45)
PO2 BLDA: 52.4 MM HG (ref 83–108)
PO2 BLDA: 68.1 MM HG (ref 83–108)
PO2 BLDA: 68.7 MM HG (ref 83–108)
SAO2 % BLDCOA: 88.7 % (ref 94–99)
SAO2 % BLDCOA: 90.9 % (ref 94–99)
SAO2 % BLDCOA: 92 % (ref 94–99)
SET MECH RESP RATE: 22
STRESS TARGET HR: 139 BPM
VENTILATOR MODE: ABNORMAL

## 2023-06-01 PROCEDURE — 93010 ELECTROCARDIOGRAM REPORT: CPT | Performed by: INTERNAL MEDICINE

## 2023-06-01 PROCEDURE — 87641 MR-STAPH DNA AMP PROBE: CPT | Performed by: HOSPITALIST

## 2023-06-01 PROCEDURE — 25010000002 ENOXAPARIN PER 10 MG: Performed by: HOSPITALIST

## 2023-06-01 PROCEDURE — 25010000002 FENTANYL CITRATE (PF) 50 MCG/ML SOLUTION: Performed by: INTERNAL MEDICINE

## 2023-06-01 PROCEDURE — 25010000002 VANCOMYCIN 10 G RECONSTITUTED SOLUTION: Performed by: HOSPITALIST

## 2023-06-01 PROCEDURE — 93325 DOPPLER ECHO COLOR FLOW MAPG: CPT

## 2023-06-01 PROCEDURE — 94003 VENT MGMT INPAT SUBQ DAY: CPT

## 2023-06-01 PROCEDURE — 25010000002 FUROSEMIDE PER 20 MG: Performed by: INTERNAL MEDICINE

## 2023-06-01 PROCEDURE — 94760 N-INVAS EAR/PLS OXIMETRY 1: CPT

## 2023-06-01 PROCEDURE — 36600 WITHDRAWAL OF ARTERIAL BLOOD: CPT

## 2023-06-01 PROCEDURE — 94002 VENT MGMT INPAT INIT DAY: CPT

## 2023-06-01 PROCEDURE — 93321 DOPPLER ECHO F-UP/LMTD STD: CPT

## 2023-06-01 PROCEDURE — 25010000002 METHYLPREDNISOLONE PER 125 MG: Performed by: HOSPITALIST

## 2023-06-01 PROCEDURE — 25010000002 PROPOFOL 10 MG/ML EMULSION: Performed by: INTERNAL MEDICINE

## 2023-06-01 PROCEDURE — 94761 N-INVAS EAR/PLS OXIMETRY MLT: CPT

## 2023-06-01 PROCEDURE — 93005 ELECTROCARDIOGRAM TRACING: CPT | Performed by: HOSPITALIST

## 2023-06-01 PROCEDURE — 71045 X-RAY EXAM CHEST 1 VIEW: CPT

## 2023-06-01 PROCEDURE — 82565 ASSAY OF CREATININE: CPT | Performed by: HOSPITALIST

## 2023-06-01 PROCEDURE — 94664 DEMO&/EVAL PT USE INHALER: CPT

## 2023-06-01 PROCEDURE — 93308 TTE F-UP OR LMTD: CPT

## 2023-06-01 PROCEDURE — 82803 BLOOD GASES ANY COMBINATION: CPT

## 2023-06-01 PROCEDURE — 25010000002 HYDRALAZINE PER 20 MG: Performed by: HOSPITALIST

## 2023-06-01 PROCEDURE — 94799 UNLISTED PULMONARY SVC/PX: CPT

## 2023-06-01 PROCEDURE — 87070 CULTURE OTHR SPECIMN AEROBIC: CPT | Performed by: HOSPITALIST

## 2023-06-01 PROCEDURE — 0 CEFEPIME PER 500 MG: Performed by: HOSPITALIST

## 2023-06-01 PROCEDURE — 25510000001 PERFLUTREN (DEFINITY) 8.476 MG IN SODIUM CHLORIDE (PF) 0.9 % 10 ML INJECTION: Performed by: HOSPITALIST

## 2023-06-01 PROCEDURE — 25010000002 MIDAZOLAM 50 MG/10ML SOLUTION: Performed by: INTERNAL MEDICINE

## 2023-06-01 PROCEDURE — 87205 SMEAR GRAM STAIN: CPT | Performed by: HOSPITALIST

## 2023-06-01 RX ORDER — ENOXAPARIN SODIUM 100 MG/ML
40 INJECTION SUBCUTANEOUS EVERY 12 HOURS SCHEDULED
Status: DISCONTINUED | OUTPATIENT
Start: 2023-06-01 | End: 2023-06-04 | Stop reason: HOSPADM

## 2023-06-01 RX ORDER — SODIUM CHLORIDE 0.9 % (FLUSH) 0.9 %
10 SYRINGE (ML) INJECTION AS NEEDED
Status: DISCONTINUED | OUTPATIENT
Start: 2023-06-01 | End: 2023-06-04 | Stop reason: HOSPADM

## 2023-06-01 RX ORDER — HYDRALAZINE HYDROCHLORIDE 20 MG/ML
20 INJECTION INTRAMUSCULAR; INTRAVENOUS EVERY 6 HOURS PRN
Status: DISCONTINUED | OUTPATIENT
Start: 2023-06-01 | End: 2023-06-04 | Stop reason: HOSPADM

## 2023-06-01 RX ORDER — CLONIDINE 0.2 MG/24H
1 PATCH, EXTENDED RELEASE TRANSDERMAL WEEKLY
Status: DISCONTINUED | OUTPATIENT
Start: 2023-06-01 | End: 2023-06-04 | Stop reason: HOSPADM

## 2023-06-01 RX ORDER — SODIUM CHLORIDE 9 MG/ML
40 INJECTION, SOLUTION INTRAVENOUS AS NEEDED
Status: DISCONTINUED | OUTPATIENT
Start: 2023-06-01 | End: 2023-06-04 | Stop reason: HOSPADM

## 2023-06-01 RX ORDER — VANCOMYCIN 2 GRAM/500 ML IN 0.9 % SODIUM CHLORIDE INTRAVENOUS
2000 EVERY 12 HOURS
Status: DISCONTINUED | OUTPATIENT
Start: 2023-06-01 | End: 2023-06-03

## 2023-06-01 RX ORDER — SODIUM CHLORIDE 0.9 % (FLUSH) 0.9 %
10 SYRINGE (ML) INJECTION EVERY 12 HOURS SCHEDULED
Status: DISCONTINUED | OUTPATIENT
Start: 2023-06-01 | End: 2023-06-04 | Stop reason: HOSPADM

## 2023-06-01 RX ORDER — BISACODYL 5 MG/1
5 TABLET, DELAYED RELEASE ORAL DAILY PRN
Status: DISCONTINUED | OUTPATIENT
Start: 2023-06-01 | End: 2023-06-04 | Stop reason: HOSPADM

## 2023-06-01 RX ORDER — IPRATROPIUM BROMIDE AND ALBUTEROL SULFATE 2.5; .5 MG/3ML; MG/3ML
3 SOLUTION RESPIRATORY (INHALATION)
Status: DISCONTINUED | OUTPATIENT
Start: 2023-06-01 | End: 2023-06-01

## 2023-06-01 RX ORDER — NALOXONE HCL 0.4 MG/ML
0.4 VIAL (ML) INJECTION
Status: DISCONTINUED | OUTPATIENT
Start: 2023-06-01 | End: 2023-06-04 | Stop reason: HOSPADM

## 2023-06-01 RX ORDER — SODIUM CHLORIDE 0.9 % (FLUSH) 0.9 %
10 SYRINGE (ML) INJECTION EVERY 12 HOURS SCHEDULED
Status: DISCONTINUED | OUTPATIENT
Start: 2023-06-01 | End: 2023-06-01

## 2023-06-01 RX ORDER — VANCOMYCIN 2 GRAM/500 ML IN 0.9 % SODIUM CHLORIDE INTRAVENOUS
15 ONCE
Status: COMPLETED | OUTPATIENT
Start: 2023-06-01 | End: 2023-06-01

## 2023-06-01 RX ORDER — AMOXICILLIN 250 MG
2 CAPSULE ORAL 2 TIMES DAILY
Status: DISCONTINUED | OUTPATIENT
Start: 2023-06-01 | End: 2023-06-04 | Stop reason: HOSPADM

## 2023-06-01 RX ORDER — POLYETHYLENE GLYCOL 3350 17 G/17G
17 POWDER, FOR SOLUTION ORAL DAILY PRN
Status: DISCONTINUED | OUTPATIENT
Start: 2023-06-01 | End: 2023-06-04 | Stop reason: HOSPADM

## 2023-06-01 RX ORDER — METHYLPREDNISOLONE SODIUM SUCCINATE 125 MG/2ML
80 INJECTION, POWDER, LYOPHILIZED, FOR SOLUTION INTRAMUSCULAR; INTRAVENOUS EVERY 8 HOURS
Status: DISCONTINUED | OUTPATIENT
Start: 2023-06-01 | End: 2023-06-01

## 2023-06-01 RX ORDER — MORPHINE SULFATE 2 MG/ML
1 INJECTION, SOLUTION INTRAMUSCULAR; INTRAVENOUS EVERY 4 HOURS PRN
Status: DISCONTINUED | OUTPATIENT
Start: 2023-06-01 | End: 2023-06-04 | Stop reason: HOSPADM

## 2023-06-01 RX ORDER — ENOXAPARIN SODIUM 100 MG/ML
40 INJECTION SUBCUTANEOUS DAILY
Status: DISCONTINUED | OUTPATIENT
Start: 2023-06-01 | End: 2023-06-01 | Stop reason: DRUGHIGH

## 2023-06-01 RX ORDER — SODIUM CHLORIDE 9 MG/ML
75 INJECTION, SOLUTION INTRAVENOUS CONTINUOUS
Status: DISCONTINUED | OUTPATIENT
Start: 2023-06-01 | End: 2023-06-01

## 2023-06-01 RX ORDER — ALBUTEROL SULFATE 90 UG/1
2 AEROSOL, METERED RESPIRATORY (INHALATION)
Status: DISCONTINUED | OUTPATIENT
Start: 2023-06-01 | End: 2023-06-04 | Stop reason: HOSPADM

## 2023-06-01 RX ORDER — PANTOPRAZOLE SODIUM 40 MG/10ML
40 INJECTION, POWDER, LYOPHILIZED, FOR SOLUTION INTRAVENOUS
Status: DISCONTINUED | OUTPATIENT
Start: 2023-06-01 | End: 2023-06-04 | Stop reason: HOSPADM

## 2023-06-01 RX ORDER — ONDANSETRON 2 MG/ML
4 INJECTION INTRAMUSCULAR; INTRAVENOUS EVERY 6 HOURS PRN
Status: DISCONTINUED | OUTPATIENT
Start: 2023-06-01 | End: 2023-06-04 | Stop reason: HOSPADM

## 2023-06-01 RX ORDER — BISACODYL 10 MG
10 SUPPOSITORY, RECTAL RECTAL DAILY PRN
Status: DISCONTINUED | OUTPATIENT
Start: 2023-06-01 | End: 2023-06-04 | Stop reason: HOSPADM

## 2023-06-01 RX ORDER — ONDANSETRON 4 MG/1
4 TABLET, FILM COATED ORAL EVERY 6 HOURS PRN
Status: DISCONTINUED | OUTPATIENT
Start: 2023-06-01 | End: 2023-06-04 | Stop reason: HOSPADM

## 2023-06-01 RX ADMIN — CEFEPIME 2 G: 2 INJECTION, POWDER, FOR SOLUTION INTRAVENOUS at 11:19

## 2023-06-01 RX ADMIN — ENOXAPARIN SODIUM 40 MG: 40 INJECTION SUBCUTANEOUS at 21:35

## 2023-06-01 RX ADMIN — PROPOFOL 100 MCG/KG/MIN: 10 INJECTION, EMULSION INTRAVENOUS at 11:18

## 2023-06-01 RX ADMIN — ALBUTEROL SULFATE 2 PUFF: 90 AEROSOL, METERED RESPIRATORY (INHALATION) at 07:19

## 2023-06-01 RX ADMIN — Medication 10 ML: at 21:25

## 2023-06-01 RX ADMIN — PROPOFOL 100 MCG/KG/MIN: 10 INJECTION, EMULSION INTRAVENOUS at 16:53

## 2023-06-01 RX ADMIN — METHYLPREDNISOLONE SODIUM SUCCINATE 80 MG: 125 INJECTION INTRAMUSCULAR; INTRAVENOUS at 02:52

## 2023-06-01 RX ADMIN — FUROSEMIDE 20 MG/HR: 10 INJECTION, SOLUTION INTRAVENOUS at 15:31

## 2023-06-01 RX ADMIN — DOCUSATE SODIUM 50 MG AND SENNOSIDES 8.6 MG 2 TABLET: 8.6; 5 TABLET, FILM COATED ORAL at 08:01

## 2023-06-01 RX ADMIN — FENTANYL CITRATE 50 MCG: 50 INJECTION, SOLUTION INTRAMUSCULAR; INTRAVENOUS at 19:59

## 2023-06-01 RX ADMIN — MIDAZOLAM 4 MG/HR: 5 INJECTION, SOLUTION INTRAMUSCULAR; INTRAVENOUS at 20:33

## 2023-06-01 RX ADMIN — CEFEPIME 2 G: 2 INJECTION, POWDER, FOR SOLUTION INTRAVENOUS at 19:37

## 2023-06-01 RX ADMIN — CLONIDINE 1 PATCH: 0.2 PATCH TRANSDERMAL at 19:27

## 2023-06-01 RX ADMIN — ALBUTEROL SULFATE 2 PUFF: 90 AEROSOL, METERED RESPIRATORY (INHALATION) at 22:02

## 2023-06-01 RX ADMIN — PROPOFOL 100 MCG/KG/MIN: 10 INJECTION, EMULSION INTRAVENOUS at 13:54

## 2023-06-01 RX ADMIN — PROPOFOL 100 MCG/KG/MIN: 10 INJECTION, EMULSION INTRAVENOUS at 12:40

## 2023-06-01 RX ADMIN — PROPOFOL 100 MCG/KG/MIN: 10 INJECTION, EMULSION INTRAVENOUS at 07:02

## 2023-06-01 RX ADMIN — ALBUTEROL SULFATE 2 PUFF: 90 AEROSOL, METERED RESPIRATORY (INHALATION) at 16:09

## 2023-06-01 RX ADMIN — VANCOMYCIN HYDROCHLORIDE 2000 MG: 10 INJECTION, POWDER, LYOPHILIZED, FOR SOLUTION INTRAVENOUS at 21:24

## 2023-06-01 RX ADMIN — MONTELUKAST 10 MG: 10 TABLET, FILM COATED ORAL at 21:35

## 2023-06-01 RX ADMIN — DOCUSATE SODIUM 50 MG AND SENNOSIDES 8.6 MG 2 TABLET: 8.6; 5 TABLET, FILM COATED ORAL at 21:35

## 2023-06-01 RX ADMIN — PANTOPRAZOLE SODIUM 40 MG: 40 INJECTION, POWDER, FOR SOLUTION INTRAVENOUS at 08:01

## 2023-06-01 RX ADMIN — PROPOFOL 100 MCG/KG/MIN: 10 INJECTION, EMULSION INTRAVENOUS at 02:52

## 2023-06-01 RX ADMIN — ALBUTEROL SULFATE 2 PUFF: 90 AEROSOL, METERED RESPIRATORY (INHALATION) at 12:58

## 2023-06-01 RX ADMIN — FUROSEMIDE 20 MG/HR: 10 INJECTION, SOLUTION INTRAVENOUS at 10:22

## 2023-06-01 RX ADMIN — PROPOFOL 100 MCG/KG/MIN: 10 INJECTION, EMULSION INTRAVENOUS at 08:41

## 2023-06-01 RX ADMIN — Medication 10 ML: at 08:02

## 2023-06-01 RX ADMIN — PROPOFOL 100 MCG/KG/MIN: 10 INJECTION, EMULSION INTRAVENOUS at 21:24

## 2023-06-01 RX ADMIN — ENOXAPARIN SODIUM 40 MG: 40 INJECTION SUBCUTANEOUS at 08:01

## 2023-06-01 RX ADMIN — METHYLPREDNISOLONE SODIUM SUCCINATE 80 MG: 125 INJECTION INTRAMUSCULAR; INTRAVENOUS at 19:40

## 2023-06-01 RX ADMIN — PROPOFOL 100 MCG/KG/MIN: 10 INJECTION, EMULSION INTRAVENOUS at 15:30

## 2023-06-01 RX ADMIN — METHYLPREDNISOLONE SODIUM SUCCINATE 80 MG: 125 INJECTION INTRAMUSCULAR; INTRAVENOUS at 11:20

## 2023-06-01 RX ADMIN — PERFLUTREN 1.5 ML: 6.52 INJECTION, SUSPENSION INTRAVENOUS at 08:51

## 2023-06-01 RX ADMIN — CEFEPIME 2 G: 2 INJECTION, POWDER, FOR SOLUTION INTRAVENOUS at 02:52

## 2023-06-01 RX ADMIN — PROPOFOL 100 MCG/KG/MIN: 10 INJECTION, EMULSION INTRAVENOUS at 23:08

## 2023-06-01 RX ADMIN — PROPOFOL 100 MCG/KG/MIN: 10 INJECTION, EMULSION INTRAVENOUS at 00:22

## 2023-06-01 RX ADMIN — PROPOFOL 100 MCG/KG/MIN: 10 INJECTION, EMULSION INTRAVENOUS at 02:27

## 2023-06-01 RX ADMIN — PROPOFOL 100 MCG/KG/MIN: 10 INJECTION, EMULSION INTRAVENOUS at 04:11

## 2023-06-01 RX ADMIN — FENTANYL CITRATE 50 MCG: 50 INJECTION, SOLUTION INTRAMUSCULAR; INTRAVENOUS at 16:45

## 2023-06-01 RX ADMIN — PROPOFOL 100 MCG/KG/MIN: 10 INJECTION, EMULSION INTRAVENOUS at 19:51

## 2023-06-01 RX ADMIN — FUROSEMIDE 20 MG/HR: 10 INJECTION, SOLUTION INTRAVENOUS at 04:09

## 2023-06-01 RX ADMIN — VANCOMYCIN HYDROCHLORIDE 2000 MG: 10 INJECTION, POWDER, LYOPHILIZED, FOR SOLUTION INTRAVENOUS at 08:00

## 2023-06-01 RX ADMIN — FUROSEMIDE 20 MG/HR: 10 INJECTION, SOLUTION INTRAVENOUS at 21:24

## 2023-06-01 RX ADMIN — PROPOFOL 100 MCG/KG/MIN: 10 INJECTION, EMULSION INTRAVENOUS at 09:57

## 2023-06-01 RX ADMIN — PROPOFOL 100 MCG/KG/MIN: 10 INJECTION, EMULSION INTRAVENOUS at 18:24

## 2023-06-01 RX ADMIN — HYDRALAZINE HYDROCHLORIDE 20 MG: 20 INJECTION INTRAMUSCULAR; INTRAVENOUS at 16:45

## 2023-06-01 RX ADMIN — PROPOFOL 100 MCG/KG/MIN: 10 INJECTION, EMULSION INTRAVENOUS at 06:04

## 2023-06-01 NOTE — PROGRESS NOTES
[facility] Medicine Services  INPATIENT PROGRESS NOTE      Length of Stay: 1  Date of Admission: 5/31/2023  Primary Care Physician: Halle Baron MD    Subjective   Chief Complaint:   Sedated and intubated  HPI:    This is a 58-year-old male with history of Crohn's disease who is taking immunosuppressive medication at baseline who presents for nausea and shortness of breath.  Symptoms began 3 days ago with cough and minor hemoptysis and have progressed to dyspnea with rest.  Cough has not been productive since then.  No chest pain.  No abdominal pain.  No back pain.  No rash or itching.  No recent change in medications.  No falls or injuries.  No headache.     The patient is seen and evaluated in the ER.  The patient appears to be stable initially and currently on BiPAP.  ABG initial is reviewed and patient has been given 2 L bolus of fluids for sepsis.  Patient was feeling poorly for the past several days becoming progressively worse.  He has had fevers chills shortness of breath chest pain cough which has been productive of some hemoptysis.  Upon transfer from the ER to the CCU the patient's respiratory status decompensated and required emergent intubation.  Dr. Jean performed intubation with out any problems and patient is currently stable on the ventilator.  The patient was given Rocephin and azithromycin in ER due to patient's current decompensation patient was placed on cefepime and vancomycin and will continue to monitor.  Patient is having high peak pressures on the ventilator Mucomyst is added on we will follow response to treatment.     Daily assessment 6/1/2023  On evaluation the patient is currently lying in bed.  He is intubated and sedated.  Remains stable.  O2 saturations good ABG evaluated we will wean O2 and PEEP and monitor response.      Review of Systems   Unable to perform ROS: Intubated        All pertinent negatives and positives are as  above. All other systems have been reviewed and are negative unless otherwise stated.     Objective    As of today 06/01/23  Temp:  [97.9 °F (36.6 °C)-98.7 °F (37.1 °C)] 98.7 °F (37.1 °C)  Heart Rate:  [] 107  Resp:  [22-25] 25  BP: (116-207)/(58-99) 167/79  FiO2 (%):  [50 %-100 %] 65 %    Physical Exam  Vitals and nursing note reviewed.   Constitutional:       Comments: Intubated and sedated   HENT:      Head: Normocephalic and atraumatic.      Right Ear: External ear normal.      Left Ear: External ear normal.      Nose: Nose normal.      Mouth/Throat:      Mouth: Mucous membranes are dry.      Pharynx: Oropharynx is clear.   Eyes:      General: No scleral icterus.     Conjunctiva/sclera: Conjunctivae normal.   Cardiovascular:      Rate and Rhythm: Normal rate and regular rhythm.      Pulses: Normal pulses.      Heart sounds: Normal heart sounds.   Pulmonary:      Breath sounds: Wheezing and rhonchi present.   Abdominal:      General: Bowel sounds are normal. There is no distension.      Palpations: Abdomen is soft.      Tenderness: There is no abdominal tenderness.   Skin:     General: Skin is warm and dry.      Coloration: Skin is not jaundiced.   Neurological:      Motor: No weakness.      Comments: Intubated and sedated           albuterol sulfate HFA, 2 puff, Inhalation, 4x Daily - RT  cefepime, 2 g, Intravenous, Q8H  enoxaparin, 40 mg, Subcutaneous, Q12H  methylPREDNISolone sodium succinate, 80 mg, Intravenous, Q8H  midazolam, 4 mg, Intravenous, Once  montelukast, 10 mg, Oral, Nightly  pantoprazole, 40 mg, Intravenous, Q AM  senna-docusate sodium, 2 tablet, Oral, BID  sodium chloride, 10 mL, Intravenous, Q12H  sodium chloride, 10 mL, Intravenous, Q12H  sodium chloride, 10 mL, Intravenous, Q12H  vancomycin, 2,000 mg, Intravenous, Q12H         Results Review:  I have reviewed the labs, radiology results, and diagnostic studies.    Laboratory Data:   Results from last 7 days   Lab Units 06/01/23  0908  05/31/23  1425 05/31/23  1216 05/31/23  1124   SODIUM mmol/L  --   --   --  140   SODIUM, ARTERIAL mmol/L  --  143 140  --    POTASSIUM mmol/L  --   --   --  5.0   CHLORIDE mmol/L  --   --   --  101   CO2 mmol/L  --   --   --  28.0   BUN mg/dL  --   --   --  22*   CREATININE mg/dL 0.97  --   --  0.98   GLUCOSE mg/dL  --   --   --  152*   GLUCOSE, ARTERIAL mg/dL  --  126* 133*  --    CALCIUM mg/dL  --   --   --  9.4   BILIRUBIN mg/dL  --   --   --  1.9*   ALK PHOS U/L  --   --   --  89   ALT (SGPT) U/L  --   --   --  27   AST (SGOT) U/L  --   --   --  39   ANION GAP mmol/L  --   --   --  11.0     Estimated Creatinine Clearance: 116.4 mL/min (by C-G formula based on SCr of 0.97 mg/dL).          Results from last 7 days   Lab Units 05/31/23  1124   WBC 10*3/mm3 18.89*   HEMOGLOBIN g/dL 13.2   HEMATOCRIT % 41.2   PLATELETS 10*3/mm3 250           Culture Data:   Blood Culture   Date Value Ref Range Status   05/31/2023 No growth at 24 hours  Preliminary   05/31/2023 No growth at 24 hours  Preliminary     No results found for: URINECX  No results found for: RESPCX  No results found for: WOUNDCX  No results found for: STOOLCX  No components found for: BODYFLD    Radiology Data:   Imaging Results (Last 24 Hours)     Procedure Component Value Units Date/Time    XR Chest 1 View [[id number]] Collected: 06/01/23 0425     Updated: 06/01/23 0457    Narrative:      INDICATION:  Reevaluation    COMPARISON:  05/31/2023      Impression:      FINDINGS/IMPRESSION:  Lines/tubes project in approximately stable positions.  Right-sided lung opacities are worsened.    US Guided Vascular Access [[id number]] Collected: 05/31/23 1723     Updated: 05/31/23 1726    Narrative:      US GUIDANCE VASCULAR    HISTORY: access    COMPARISON: None      Impression:      Grayscale sonographic images were obtained during ultrasound guided vascular  access.    The accessed blood vessel appears to be patent. The vessel was accessed under  direct ultrasound  guidance.    Please refer to the procedure note for additional details.      XR Chest 1 View [[id number]] Collected: 05/31/23 1640     Updated: 05/31/23 1703    Narrative:      FINDINGS:  Endotracheal tube 3 cm above the sonali.  The heart is enlarged. There is severe  bilateral diffuse infiltration.      Impression:      Impression:  1. In comparison with study done earlier today, there is worsening bilateral  diffuse infiltration.    2. Endotracheal tube appears to be in good position.    IR Insert Midline Without Port Pump 5 Plus [[id number]] Resulted: 05/31/23 1657     Updated: 05/31/23 1657    Narrative:      This procedure was auto-finalized with no dictation required.          I have reviewed the patient's current medications.     Assessment/Plan     Principal Problem:    Acute respiratory failure with hypoxia and hypercapnia    Impressions/plan  Sepsis secondary to pneumonia  Patient will be placed on cefepime and vancomycin  Guaifenesin  Mucomyst with DuoNeb's  Currently intubated we will wean vent as tolerated.  Solu-Medrol  ABG reviewed.  We will wean O2 and follow ABGs     Pneumonia  Continue all medications as noted above.     Volume overload  Patient given 2 L of IV fluids in the ER due to use sepsis protocol  Patient given Lasix after intubation and placed on Lasix drip  Will we will check echocardiogram in the a.m.     Hypertension  Continue to hold until stabilized lisinopril and   metoprolol     GERD   continue Protonix     Depression/anxiety   continue sertraline     CODE STATUS full code  DVT prophylaxis Lovenox        Medical Decision Making  Number and Complexity of problems: 5 complex problems  Differential Diagnosis: Pneumonia versus CHF     Conditions and Status:        Condition is worsening.     [facility] Data  External documents reviewed:   My EKG interpretation:     My plain film interpretation:   Chest x-ray completed 5/31/2023  Bilateral diffuse infiltrates  CT scan completed 5/31/2023  No PE  or aortic dissection  Severe consolidation throughout the right lung with mild infiltration of the left lung     Tests considered but not ordered: None     Decision rules/scores evaluated (example [id number], Wells, etc): N/A     Discussed with: The patient and his wife and they agree to treatment plan and intubation     Treatment Plan  As above     Care Planning  Shared decision making: Patient updated on current status and informed of proposed care plan; is in agreement with plan  Code status and discussions: Full code     Disposition  Social Determinants of Health that impact treatment or disposition: None  I expect the patient to be discharged to home once stabilized.     I confirmed that the patient's Advance Care Plan is present, code status is documented, or surrogate decision maker is listed in the patient's medical record.         The patient's surrogate decision maker is his wife     I discussed my findings and recommendations with the ER physician, the patient and his wife and Dr. Jean who  did the intubation.     Estimated length of stay is to be determined     Critical care time 40 minutes.  Utilized to evaluate patient review of chart and documentation    Lamberto Mojica DO

## 2023-06-01 NOTE — PAYOR COMM NOTE
"[facility]  Case Managment Extender   Edilma Chambers  (P) [phone]  (F) [phone]          Berenice Gill (58 y.o. Male)     Date of Birth   1965    Social Security Number       Address   [address] [place] [address]    Home Phone   [phone]    MRN   [id number]       Yazidism   Mormonism    Marital Status                               Admission Date   5/31/23    Admission Type   Emergency    Admitting Provider   Lamberto Mojica DO    Attending Provider   Lamberto Mojica DO    Department, Room/Bed   [facility] CRITICAL CARE STEPDOWN, 04/A       Discharge Date       Discharge Disposition       Discharge Destination                               Attending Provider: Lamberto Mojica DO    Allergies: Ketamine Hcl    Isolation: None   Infection: None   Code Status: CPR    Ht: 175.3 cm (69.02\")   Wt: 136 kg (299 lb 13.2 oz)    Admission Cmt: None   Principal Problem: Acute respiratory failure with hypoxia and hypercapnia [J96.01,J96.02]                 Active Insurance as of 5/31/2023     Primary Coverage     Payor Plan Insurance Group Employer/Plan Group    [employer]HE[employer] BLUE CROSS AN[employer]M BLUE CROSS BLUE [employer]IELD PPO [id number]     Payor Plan Address Payor Plan Phone Number Payor Plan Fax Number Effective Dates    [address] [phone]  3/1/2013 - None Entered    [place] [address]       Subscriber Name Subscriber Birth Date Member ID       BERENICE GILL 1965 [id number]                 Emergency Contacts      (Rel.) Home Phone Work Phone Mobile Phone    VIJAY GILL (Spouse) [phone] -- [phone]               History & Physical      Lamberto Mojica DO at 05/31/23 1754              [facility] Medicine  HISTORY AND PHYSICAL      Date of Admission: 5/31/2023  Primary Care Physician: Halle Baron MD    Subjective "     Chief Complaint:   Shortness of breath  History of Present Illness  This is a 58-year-old male with history of Crohn's disease who is taking immunosuppressive medication at baseline who presents for nausea and shortness of breath.  Symptoms began 3 days ago with cough and minor hemoptysis and have progressed to dyspnea with rest.  Cough has not been productive since then.  No chest pain.  No abdominal pain.  No back pain.  No rash or itching.  No recent change in medications.  No falls or injuries.  No headache.    ER assessment.  5/31/2023.  The patient is seen and evaluated in the ER.  The patient appears to be stable initially and currently on BiPAP.  ABG initial is reviewed and patient has been given 2 L bolus of fluids for sepsis.  Patient was feeling poorly for the past several days becoming progressively worse.  He has had fevers chills shortness of breath chest pain cough which has been productive of some hemoptysis.  Upon transfer from the ER to the CCU the patient's respiratory status decompensated and required emergent intubation.  Dr. Jean performed intubation with out any problems and patient is currently stable on the ventilator.  The patient was given Rocephin and azithromycin in ER due to patient's current decompensation patient was placed on cefepime and vancomycin and will continue to monitor.  Patient is having high peak pressures on the ventilator Mucomyst is added on we will follow response to treatment.    Review of Systems   Unable to perform ROS: Intubated        Otherwise complete ROS reviewed and negative except as mentioned in the HPI.    Past Medical History:   Past Medical History:   Diagnosis Date   • Anxiety    • Arthritis    • Crohn's disease    • Depression    • Hypertension      Past Surgical History:  Past Surgical History:   Procedure Laterality Date   • ABDOMINAL HERNIA REPAIR     • COLON RESECTION  1988   • COLONOSCOPY  10/26/2015    internal hemorrhoids,otherwise normal  postoperative colonoscopy with the evaluation of his ti   • ENDOSCOPY N/A 11/1/2021    Procedure: ESOPHAGOGASTRODUODENOSCOPY;  Surgeon: Kain Delgadillo MD;  Location: [facility] ENDOSCOPY;  Service: Gastroenterology;  Laterality: N/A;     Social History:  reports that he has quit smoking. He has never used smokeless tobacco. He reports current alcohol use. He reports that he does not use drugs.    Family History: family history is not on file.       Allergies:  Allergies   Allergen Reactions   • Ketamine Hcl Anaphylaxis       Medications:  Prior to Admission medications    Medication Sig Start Date End Date Taking? Authorizing Provider   Adalimumab (HUMIRA) 40 MG/0.4ML Prefilled Syringe Kit injection Inject 40 mg under the skin into the appropriate area as directed See Admin Instructions.   Yes Waldo Benjamin MD   gabapentin (NEURONTIN) 300 MG capsule Take 2 capsules by mouth 2 (Two) Times a Day.   Yes Waldo Benjamin MD   lisinopril (PRINIVIL,ZESTRIL) 20 MG tablet Take 1 tablet by mouth Daily.  Patient taking differently: Take 2 tablets by mouth Daily. 11/7/21  Yes Cassi Prince APRN   ALPRAZolam (XANAX) 1 MG tablet TK 1 T PO BID PRF ANXIETY 9/19/16   Waldo Benjamin MD   dicyclomine (BENTYL) 10 MG capsule Take 1 capsule by mouth 4 (Four) Times a Day Before Meals & at Bedtime.    Waldo Benjamin MD   furosemide (Lasix) 40 MG tablet Take 1 tablet by mouth Daily. 11/6/21   Cassi Prince APRN   metoprolol succinate XL (TOPROL-XL) 50 MG 24 hr tablet Take 50 mg by mouth every night at bedtime.    Waldo Benjamin MD   ondansetron (ZOFRAN) 8 MG tablet Take 1 tablet by mouth Every 6 (Six) Hours As Needed for Nausea or Vomiting (give 30 minutes prior to antibiotic administration). 10/6/19   Agustin Sandra PA   oxyCODONE-acetaminophen (PERCOCET) 7.5-325 MG per tablet Take 1 tablet by mouth Every 6 (Six) Hours As Needed.    Waldo Benjamin MD   pantoprazole (PROTONIX) 40 MG EC  "tablet Take 1 tablet by mouth Every Morning. 11/6/21   Cassi Prince APRN   predniSONE (DELTASONE) 10 MG (21) dose pack Use as directed on package 11/6/21   ElCassi wolff NANI BAIRD   promethazine (PHENERGAN) 12.5 MG tablet Take 1 tablet by mouth Every 6 (Six) Hours As Needed for Nausea or Vomiting (If still nauseated after zofran). 10/6/19   Agustin Sandra, PA   sertraline (ZOLOFT) 100 MG tablet TK 1 T PO BID 9/19/16   Provider, MD Waldo   tiZANidine (ZANAFLEX) 4 MG tablet Take 4 mg by mouth At Night As Needed for Muscle Spasms.    Provider, MD ELYSSA Haas have utilized all available immediate resources to obtain, update, and review the patient's current medications.    Objective     Vital Signs: /58   Pulse 77   Temp 98.4 °F (36.9 °C) (Oral)   Resp 22   Ht 175.3 cm (69\")   Wt 136 kg (300 lb)   SpO2 92%   BMI 44.30 kg/m²   Physical Exam  Vitals and nursing note reviewed.   Constitutional:       Appearance: He is ill-appearing.   HENT:      Head: Normocephalic and atraumatic.      Right Ear: External ear normal.      Left Ear: External ear normal.      Nose: Nose normal.      Mouth/Throat:      Mouth: Mucous membranes are dry.      Pharynx: Oropharynx is clear.   Eyes:      General: No scleral icterus.     Extraocular Movements: Extraocular movements intact.      Conjunctiva/sclera: Conjunctivae normal.      Pupils: Pupils are equal, round, and reactive to light.   Cardiovascular:      Rate and Rhythm: Normal rate and regular rhythm.      Pulses: Normal pulses.      Heart sounds: Normal heart sounds.   Pulmonary:      Effort: Pulmonary effort is normal.      Breath sounds: Wheezing, rhonchi and rales present.   Abdominal:      General: Bowel sounds are normal. There is no distension.      Palpations: Abdomen is soft.      Tenderness: There is no abdominal tenderness.   Musculoskeletal:         General: Normal range of motion.      Cervical back: Normal range of motion and neck supple. "   Skin:     General: Skin is warm and dry.      Coloration: Skin is not jaundiced.   Neurological:      General: No focal deficit present.      Mental Status: He is alert and oriented to person, place, and time.      Motor: Weakness present.   Psychiatric:         Mood and Affect: Mood normal.         Behavior: Behavior normal.              Results Reviewed:  Lab Results (last 24 hours)     Procedure Component Value Units Date/Time    STAT Lactic Acid, Reflex [[id number]]  (Normal) Collected: 05/31/23 1711    Specimen: Blood Updated: 05/31/23 1738     Lactate 1.4 mmol/L     Blood Gas, Arterial - [[id number]]  (Abnormal) Collected: 05/31/23 1737    Specimen: Arterial Blood Updated: 05/31/23 1736     Site Right Radial     Jerome's Test N/A     pH, Arterial 7.298 pH units      Comment: 84 Value below reference range        pCO2, Arterial 61.2 mm Hg      Comment: 83 Value above reference range        pO2, Arterial 81.5 mm Hg      Comment: 84 Value below reference range        HCO3, Arterial 30.0 mmol/L      Comment: 83 Value above reference range        Base Excess, Arterial 2.1 mmol/L      Comment: 83 Value above reference range        O2 Saturation, Arterial 92.6 %      Comment: 84 Value below reference range        Barometric Pressure for Blood Gas 748 mmHg      Modality Ventilator     FIO2 100 %      Ventilator Mode PC     Set [facility] Resp Rate 16.0     PEEP 8.0     Collected by RT     Comment: Meter: [id number]     :  [id number]       Extra Tubes [[id number]] Collected: 05/31/23 1127    Specimen: Blood Updated: 05/31/23 1532    Narrative:      The following orders were created for panel order Extra Tubes.  Procedure                               Abnormality         Status                     ---------                               -----------         ------                     Gold Top - [facility][[id number]]                                   Final result               Jean Top[[id number]]                                          Final result               Light Blue Top[[id number]]                                   Final result                 Please view results for these tests on the individual orders.    Jean Top [[id number]] Collected: 05/31/23 1127    Specimen: Blood Updated: 05/31/23 1532     Extra Tube Hold for add-ons.     Comment: Auto resulted.       STAT Lactic Acid, Reflex [[id number]]  (Abnormal) Collected: 05/31/23 1413    Specimen: Blood Updated: 05/31/23 1441     Lactate 3.0 mmol/L     Urinalysis With Microscopic If Indicated (No Culture) - Urine, Clean Catch [[id number]]  (Abnormal) Collected: 05/31/23 1413    Specimen: Urine, Clean Catch Updated: 05/31/23 1431     Color, UA Yellow     Appearance, UA Clear     pH, UA 5.5     Specific Gravity, UA 1.026     Glucose, UA Negative     Ketones, UA Trace     Bilirubin, UA Negative     Blood, UA Negative     Protein, UA Trace     Leuk Esterase, UA Negative     Nitrite, UA Negative     Urobilinogen, UA 1.0 E.U./dL    Narrative:      Urine microscopic not indicated.    Blood Gas, Arterial With Co-Ox [[id number]]  (Abnormal) Collected: 05/31/23 1425    Specimen: Arterial Blood Updated: 05/31/23 1424     Site Arterial Line     Jerome's Test N/A     pH, Arterial 7.286 pH units      Comment: 84 Value below reference range        pCO2, Arterial 58.2 mm Hg      Comment: 83 Value above reference range        pO2, Arterial 173.0 mm Hg      Comment: 83 Value above reference range        HCO3, Arterial 27.7 mmol/L      Comment: 83 Value above reference range        Base Excess, Arterial -0.2 mmol/L      Comment: 84 Value below reference range        O2 Saturation, Arterial 96.2 %      Hemoglobin, Blood Gas 13.6 g/dL      Comment: 84 Value below reference range        Hematocrit, Blood Gas 41.6 %      Oxyhemoglobin 95.8 %      Methemoglobin <1.00 %      Comment: 94 Value below reportable range < 1.0        Carboxyhemoglobin <1.0 %      Comment: 94 Value below reportable range < 1.0         "A-a DO2 470.8 mmHg      Sodium, Arterial 143 mmol/L      Potassium, Arterial 4.5 mmol/L      Ionized Calcium 4.85 mg/dL      Glucose, Arterial 126 mg/dL      Barometric Pressure for Blood Gas 749 mmHg      Modality Ventilator     FIO2 100 %      Flow Rate 22.0 lpm      Ventilator Mode AVAP     Set Tidal Volume 450     PEEP 7.0     Comment: Meter: [id number]     :  [id number]        Note --     pH, Temp Corrected --     pCO2, Temperature Corrected --     pO2, Temperature Corrected --    COVID-19 and FLU A/B PCR - Swab, Nasopharynx [[id number]]  (Normal) Collected: 05/31/23 1241    Specimen: Swab from Nasopharynx Updated: 05/31/23 1315     COVID19 Not Detected     Influenza A PCR Not Detected     Influenza B PCR Not Detected    Narrative:      Fact sheet for providers: https://www.fda.gov/media/353450/download    Fact sheet for patients: https://www.fda.gov/media/646138/download    Test performed by PCR.    D-dimer, Quantitative [[id number]]  (Abnormal) Collected: 05/31/23 1127    Specimen: Blood Updated: 05/31/23 1254     D-Dimer, Quantitative 860 ng/mL (FEU)     Narrative:      According to the assay 's published package insert, a normal (<500 ng/mL (FEU)) D-dimer result in conjunction with a non-high clinical probability assessment, excludes deep vein thrombosis (DVT) and pulmonary embolism (PE) with high sensitivity.    D-dimer values increase with age and this can make VTE exclusion of an older population difficult. To address this, the [employer] College [employer], based on best available evidence and recent guidelines, recommends that clinicians use age-adjusted D-dimer thresholds in patients greater than 50 years of age with: a) a low probability of PE who do not meet all Pulmonary Embolism Rule Out Criteria, or b) in those with intermediate probability of PE.   The formula for an age-adjusted D-dimer cut-off is \"age*10\".  For example, a 60 year old patient would have an age-adjusted " cut-off of 600 ng/mL (FEU) and an 80 year old 800 ng/mL (FEU).      Blood Culture - Blood, Hand, Left [[id number]] Collected: 05/31/23 1248    Specimen: Blood from Hand, Left Updated: 05/31/23 1248    Blood Culture - Blood, Hand, Right [[id number]] Collected: 05/31/23 1248    Specimen: Blood from Hand, Right Updated: 05/31/23 1248    Gold Top - SST [[id number]] Collected: 05/31/23 1127    Specimen: Blood Updated: 05/31/23 1230     Extra Tube Hold for add-ons.     Comment: Auto resulted.       Light Blue Top [[id number]] Collected: 05/31/23 1127    Specimen: Blood Updated: 05/31/23 1230     Extra Tube Hold for add-ons.     Comment: Auto resulted       Blood Gas, Arterial With Co-Ox [[id number]]  (Abnormal) Collected: 05/31/23 1216    Specimen: Arterial Blood Updated: 05/31/23 1216     Site Arterial Line     Jerome's Test N/A     pH, Arterial 7.302 pH units      Comment: 84 Value below reference range        pCO2, Arterial 61.1 mm Hg      Comment: 83 Value above reference range        pO2, Arterial 36.3 mm Hg      Comment: 85 Value below critical limit        HCO3, Arterial 30.1 mmol/L      Comment: 83 Value above reference range        Base Excess, Arterial 2.2 mmol/L      Comment: 83 Value above reference range        O2 Saturation, Arterial 63.9 %      Comment: 84 Value below reference range        Hemoglobin, Blood Gas 13.1 g/dL      Comment: 84 Value below reference range        Hematocrit, Blood Gas 40.2 %      Oxyhemoglobin 62.9 %      Comment: 84 Value below reference range        Methemoglobin <1.00 %      Comment: 94 Value below reportable range < 1.0        Carboxyhemoglobin <1.0 %      Comment: 94 Value below reportable range < 1.0        A-a DO2 42.4 mmHg      Sodium, Arterial 140 mmol/L      Potassium, Arterial 4.8 mmol/L      Ionized Calcium 4.94 mg/dL      Glucose, Arterial 133 mg/dL      Barometric Pressure for Blood Gas 750 mmHg      Modality Nasal Cannula     Flow Rate 1.0 lpm      Ventilator Mode NA      Note --     Collected by GIL. RRT     Comment: Meter: [id number]     :  [id number]        pH, Temp Corrected --     pCO2, Temperature Corrected --     pO2, Temperature Corrected --    Comprehensive Metabolic Panel [[id number]]  (Abnormal) Collected: 05/31/23 1124    Specimen: Blood Updated: 05/31/23 1152     Glucose 152 mg/dL      BUN 22 mg/dL      Creatinine 0.98 mg/dL      Sodium 140 mmol/L      Potassium 5.0 mmol/L      Chloride 101 mmol/L      CO2 28.0 mmol/L      Calcium 9.4 mg/dL      Total Protein 7.6 g/dL      Albumin 4.1 g/dL      ALT (SGPT) 27 U/L      AST (SGOT) 39 U/L      Alkaline Phosphatase 89 U/L      Total Bilirubin 1.9 mg/dL      Globulin 3.5 gm/dL      A/G Ratio 1.2 g/dL      BUN/Creatinine Ratio 22.4     Anion Gap 11.0 mmol/L      eGFR 90.5 mL/min/1.73     Narrative:      GFR Normal >60  Chronic Kidney Disease <60  Kidney Failure <15      Lipase [[id number]]  (Normal) Collected: 05/31/23 1124    Specimen: Blood Updated: 05/31/23 1152     Lipase 15 U/L     Single High Sensitivity Troponin T [[id number]]  (Abnormal) Collected: 05/31/23 1124    Specimen: Blood Updated: 05/31/23 1150     HS Troponin T 17 ng/L     Narrative:      High Sensitive Troponin T Reference Range:  <10.0 ng/L- Negative Female for AMI  <15.0 ng/L- Negative Male for AMI  >=10 - Abnormal Female indicating possible myocardial injury.  >=15 - Abnormal Male indicating possible myocardial injury.   Clinicians would have to utilize clinical acumen, EKG, Troponin, and serial changes to determine if it is an Acute Myocardial Infarction or myocardial injury due to an underlying chronic condition.         Lactic Acid, Plasma [[id number]]  (Abnormal) Collected: 05/31/23 1133    Specimen: Blood Updated: 05/31/23 1145     Lactate 2.4 mmol/L     CBC & Differential [[id number]]  (Abnormal) Collected: 05/31/23 1124    Specimen: Blood Updated: 05/31/23 1137    Narrative:      The following orders were created for panel order CBC &  Differential.  Procedure                               Abnormality         Status                     ---------                               -----------         ------                     CBC Auto Differential[[id number]]        Abnormal            Final result                 Please view results for these tests on the individual orders.    CBC Auto Differential [[id number]]  (Abnormal) Collected: 05/31/23 1124    Specimen: Blood Updated: 05/31/23 1137     WBC 18.89 10*3/mm3      RBC 4.53 10*6/mm3      Hemoglobin 13.2 g/dL      Hematocrit 41.2 %      MCV 90.9 fL      MCH 29.1 pg      MCHC 32.0 g/dL      RDW 13.4 %      RDW-SD 44.8 fl      MPV 10.0 fL      Platelets 250 10*3/mm3      Neutrophil % 84.6 %      Lymphocyte % 7.9 %      Monocyte % 6.1 %      Eosinophil % 0.1 %      Basophil % 0.4 %      Immature Grans % 0.9 %      Neutrophils, Absolute 15.97 10*3/mm3      Lymphocytes, Absolute 1.50 10*3/mm3      Monocytes, Absolute 1.16 10*3/mm3      Eosinophils, Absolute 0.02 10*3/mm3      Basophils, Absolute 0.07 10*3/mm3      Immature Grans, Absolute 0.17 10*3/mm3      nRBC 0.1 /100 WBC         Imaging Results (Last 24 Hours)     Procedure Component Value Units Date/Time    US Guided Vascular Access [[id number]] Collected: 05/31/23 1723     Updated: 05/31/23 1726    Narrative:      US GUIDANCE VASCULAR    HISTORY: access    COMPARISON: None      Impression:      Grayscale sonographic images were obtained during ultrasound guided vascular  access.    The accessed blood vessel appears to be patent. The vessel was accessed under  direct ultrasound guidance.    Please refer to the procedure note for additional details.      XR Chest 1 View [[id number]] Collected: 05/31/23 1640     Updated: 05/31/23 1703    Narrative:      FINDINGS:  Endotracheal tube 3 cm above the sonali.  The heart is enlarged. There is severe  bilateral diffuse infiltration.      Impression:      Impression:  1. In comparison with study done earlier today,  there is worsening bilateral  diffuse infiltration.    2. Endotracheal tube appears to be in good position.    IR Insert Midline Without Port Pump 5 Plus [[id number]] Resulted: 05/31/23 1657     Updated: 05/31/23 1657    Narrative:      This procedure was auto-finalized with no dictation required.    CT Angiogram Chest [[id number]] Collected: 05/31/23 1450     Updated: 05/31/23 1500    Narrative:      TECHNIQUE:  IV contrast was administered and axial images from the thoracic inlet through  the diaphragms were performed.  3D multiplanar reformats of the thoracic aorta  were completed on a separate workstation under concurrent supervision.  Intravenous contrast was used for this case.    FINDINGS:  No pathologically enlarged mediastinal, hilar, or axillary lymph nodes.  No  pulmonary embolus.  No aortic dissection.  There is severe consolidation  throughout the right lung with mild infiltration throughout the left lung.      Impression:        1.  No PE.  No aortic dissection.    2.  Severe consolidation throughout the right lung with mild infiltration  throughout the left lung.    XR Chest 1 View [[id number]] Collected: 05/31/23 1145     Updated: 05/31/23 1159    Narrative:      FINDINGS:  An AP view of the chest shows cardiomegaly.  There is vascular congestion.  There is diffuse right lung infiltrate.      Impression:      1.  Diffuse right lung infiltrate most likely represents pneumonia.  The heart  is enlarged and there also appears to be some mild vascular congestion.    2.  No other significant finding.  No prior studies for comparison.          I have personally reviewed and interpreted the radiology studies and ECG obtained at time of admission.     Assessment / Plan     Assessment:   Active Hospital Problems    Diagnosis    • **Acute respiratory failure with hypoxia and hypercapnia      Impressions/plan  Sepsis secondary to pneumonia  Patient will be placed on cefepime and vancomycin  Guaifenesin  Mucomyst  with Juan's  Currently intubated we will wean vent as tolerated.  Solu-Medrol    Pneumonia  Continue all medications as noted above.    Volume overload  Patient given 2 L of IV fluids in the ER due to use sepsis protocol  Patient given Lasix after intubation and placed on Lasix drip  Will we will check echocardiogram in the a.m.    Hypertension  Continue to hold until stabilized lisinopril and   metoprolol    GERD   continue Protonix    Depression/anxiety   continue sertraline    CODE STATUS full code  DVT prophylaxis Lovenox       Medical Decision Making  Number and Complexity of problems: 5 complex problems  Differential Diagnosis: Pneumonia versus CHF    Conditions and Status:        Condition is worsening.     [facility] Data  External documents reviewed:   My EKG interpretation:    My plain film interpretation:   Chest x-ray completed 5/31/2023  Bilateral diffuse infiltrates  CT scan completed 5/31/2023  No PE or aortic dissection  Severe consolidation throughout the right lung with mild infiltration of the left lung    Tests considered but not ordered: None     Decision rules/scores evaluated (example [id number], Wells, etc): N/A     Discussed with: The patient and his wife and they agree to treatment plan and intubation     Treatment Plan  As above    Care Planning  Shared decision making: Patient updated on current status and informed of proposed care plan; is in agreement with plan  Code status and discussions: Full code    Disposition  Social Determinants of Health that impact treatment or disposition: None  I expect the patient to be discharged to home once stabilized.    I confirmed that the patient's Advance Care Plan is present, code status is documented, or surrogate decision maker is listed in the patient's medical record.       The patient's surrogate decision maker is his wife    I discussed my findings and recommendations with the ER physician, the patient and his wife and Dr. Jean who  did the  intubation.    Estimated length of stay is to be determined    The patient was seen and examined by me on 5/31/2023    Electronically signed by Lamberto Mojica DO, 05/31/23, 17:54 CDT.              Electronically signed by Lamberto Mojica DO at 05/31/23 1854          Emergency Department Notes      JmJaydon renee DO NICOLE at 05/31/23 1121          Subjective   History of Present Illness  This is a 58-year-old male with history of Crohn's disease who is taking immunosuppressive medication at baseline who presents for nausea and shortness of breath.  Symptoms began 3 days ago with cough and minor hemoptysis and have progressed to dyspnea with rest.  Cough has not been productive since then.  No chest pain.  No abdominal pain.  No back pain.  No rash or itching.  No recent change in medications.  No falls or injuries.  No headache.        Review of Systems   All other systems reviewed and are negative.      Past Medical History:   Diagnosis Date   • Anxiety    • Arthritis    • Crohn's disease    • Depression    • Hypertension        Allergies   Allergen Reactions   • Ketamine Hcl Anaphylaxis       Past Surgical History:   Procedure Laterality Date   • ABDOMINAL HERNIA REPAIR     • COLON RESECTION  1988   • COLONOSCOPY  10/26/2015    internal hemorrhoids,otherwise normal postoperative colonoscopy with the evaluation of his ti   • ENDOSCOPY N/A 11/1/2021    Procedure: ESOPHAGOGASTRODUODENOSCOPY;  Surgeon: Kain Delgadillo MD;  Location: [facility] ENDOSCOPY;  Service: Gastroenterology;  Laterality: N/A;       History reviewed. No pertinent family history.    Social History     Socioeconomic History   • Marital status:    Tobacco Use   • Smoking status: Former   • Smokeless tobacco: Never   Vaping Use   • Vaping Use: Former   Substance and Sexual Activity   • Alcohol use: Yes     Comment: very rarely   • Drug use: No           Objective   Physical Exam  Vitals and nursing note reviewed.   Constitutional:        "General: He is not in acute distress.     Appearance: He is obese. He is ill-appearing.   HENT:      Head: Normocephalic and atraumatic.      Mouth/Throat:      Mouth: Mucous membranes are moist.   Neck:      Vascular: No JVD.   Cardiovascular:      Rate and Rhythm: Normal rate and regular rhythm.      Heart sounds: No murmur heard.  Pulmonary:      Effort: Tachypnea present.      Breath sounds: Rales present.   Chest:      Chest wall: No tenderness.   Abdominal:      Palpations: Abdomen is soft.      Tenderness: There is no abdominal tenderness.      Comments: Large chronic midline abdominal scar.   Musculoskeletal:      Comments: Trace edema and no tenderness of the bilateral lower extremities.   Skin:     General: Skin is warm and dry.   Neurological:      Mental Status: He is alert and oriented to person, place, and time.   Psychiatric:         Behavior: Behavior normal.         Procedures          ED Course  ED Course as of 05/31/23 1720   Wed May 31, 2023   1237 Blood Gas, Arterial With Co-Ox(!!)  Respiratory acidosis with partial compensation and hypoxia. [JV]      ED Course User Index  [JV] Jaydon Oneal, DO                                           Medical Decision Making  The patient's recent past medical charts for this facility as well as outside facilities via the \"care everywhere\" application of epic reviewed.  The patient was last seen and admitted at this facility in 2021 for hypoxic and hypercapnic respiratory failure.  The patient also receives outpatient care at an outside facility for back pain.  In 2021 the patient had an echocardiogram that was essentially normal.    The differential diagnosis includes pneumonia, sepsis, CHF, and carbon oxide poisoning, among others.    On final reevaluation the patient is in stable condition and agreeable to being admitted.      Acute respiratory failure with hypoxia and hypercapnia: acute illness or injury  Sepsis, due to unspecified organism, unspecified " whether acute organ dysfunction present: acute illness or injury  Amount and/or Complexity of Data Reviewed  Independent Historian: spouse     Details: The patient significant other provides additional history.  Labs: ordered. Decision-making details documented in ED Course.  Radiology: ordered.  ECG/medicine tests: ordered and independent interpretation performed.     Details: EKG interpretation: Interpreted myself.  Normal sinus rhythm.  Rate 89.  Normal P wave and VT interval.  Left bundle branch block.  Left axis deviation.  Normal QTc interval.  ST segments are nonspecific.  Discussion of management or test interpretation with external provider(s): Case discussed with admitting hospitalist physician.    Risk  OTC drugs.  Prescription drug management.  Decision regarding hospitalization.      Critical Care  Total time providing critical care: minutes (35 minutes of critical care provided. This time excludes other billable procedures. Time does include preparation of documents, medical consultations, review of old records, and direct bedside care. Patient is at high risk for life-threatening deterioration due to respiratory system failure with severe hypoxia requiring titration of supplemental oxygen and BiPAP management.  )      Final diagnoses:   Acute respiratory failure with hypoxia and hypercapnia   Sepsis, due to unspecified organism, unspecified whether acute organ dysfunction present       ED Disposition  ED Disposition     ED Disposition   Decision to Admit    Condition   --    Comment   Level of Care: Stepdown [25]   Diagnosis: Acute respiratory failure with hypoxia and hypercapnia [3525464]   Admitting Physician: JEFF GONG [[id number]]   Attending Physician: JEFF GONG [[id number]]   Certification: I Certify That Inpatient Hospital Services Are Medically Necessary For Greater Than 2 Midnights               No follow-up provider specified.       Medication List      Changed    lisinopril 20  MG tablet  Commonly known as: PRINIVIL,ZESTRIL  Take 1 tablet by mouth Daily.  What changed: how much to take             Jaydon Oneal DO  05/31/23 1720      Electronically signed by Jaydon Oneal DO at 05/31/23 1720     Mu Alanis, RN at 05/31/23 1130        Patient presented to the ED with c/o nausea x3 days. Patient also reports shortness of breath; SpO2 noted to be 61% on room air upon arrival.     Patient encouraged to cough and deep breathe; SpO2 noted to rise to 99% on 2L nasal cannula.     Electronically signed by Mu Alanis, RN at 05/31/23 1132     Mu Alanis, RN at 05/31/23 1211        Patient SpO2 noted to be 70%; placed on NRB with a change in SpO2 to 94%. No other acute distress noted at this time; MD made aware and further testing ordered.    Electronically signed by Mu Alanis RN at 05/31/23 1219         Lab Results (last 24 hours)     Procedure Component Value Units Date/Time    Creatinine Serum (kidney function) GFR component [[id number]]  (Normal) Collected: 06/01/23 0908    Specimen: Blood Updated: 06/01/23 0935     Creatinine 0.97 mg/dL      eGFR 91.6 mL/min/1.73     Narrative:      GFR Normal >60  Chronic Kidney Disease <60  Kidney Failure <15      MRSA Screen, PCR (Inpatient) - Swab, Nares [[id number]] Collected: 06/01/23 0925    Specimen: Swab from Nares Updated: 06/01/23 0927    Blood Gas, Arterial - [[id number]]  (Abnormal) Collected: 06/01/23 0736    Specimen: Arterial Blood Updated: 06/01/23 0735     Site Left Radial     Jerome's Test Positive     pH, Arterial 7.438 pH units      pCO2, Arterial 50.6 mm Hg      Comment: 83 Value above reference range        pO2, Arterial 68.7 mm Hg      Comment: 84 Value below reference range        HCO3, Arterial 34.2 mmol/L      Comment: 83 Value above reference range        Base Excess, Arterial 8.4 mmol/L      Comment: 83 Value above reference range        O2 Saturation, Arterial 92.0 %      Comment: 84 Value below reference range         Barometric Pressure for Blood Gas 751 mmHg      Modality Ventilator     FIO2 100 %      Ventilator Mode PC     Set Mech Resp Rate 22.0     PEEP 10.0     Collected by baljit     Comment: Meter: [id number]     :  [id number]       Blood Gas, Arterial - [[id number]]  (Abnormal) Collected: 06/01/23 0549    Specimen: Arterial Blood Updated: 06/01/23 0548     Site Right Radial     Jerome's Test N/A     pH, Arterial 7.386 pH units      pCO2, Arterial 56.2 mm Hg      Comment: 83 Value above reference range        pO2, Arterial 68.1 mm Hg      Comment: 84 Value below reference range        HCO3, Arterial 33.7 mmol/L      Comment: 83 Value above reference range        Base Excess, Arterial 6.8 mmol/L      Comment: 83 Value above reference range        O2 Saturation, Arterial 90.9 %      Comment: 84 Value below reference range        Barometric Pressure for Blood Gas 750 mmHg      Modality Ventilator     FIO2 100 %      Ventilator Mode PC     Set Mech Resp Rate 22.0     PEEP 8.0     PIP 18 cmH2O      Comment: Meter: [id number]     :  [id number]        Collected by Kaylee    Blood Gas, Arterial - [[id number]]  (Abnormal) Collected: 05/31/23 2159    Specimen: Arterial Blood Updated: 05/31/23 2158     Site Right Radial     Jerome's Test N/A     pH, Arterial 7.348 pH units      Comment: 84 Value below reference range        pCO2, Arterial 59.9 mm Hg      Comment: 83 Value above reference range        pO2, Arterial 67.6 mm Hg      Comment: 84 Value below reference range        HCO3, Arterial 32.9 mmol/L      Comment: 83 Value above reference range        Base Excess, Arterial 5.5 mmol/L      Comment: 83 Value above reference range        O2 Saturation, Arterial 90.5 %      Comment: 84 Value below reference range        Barometric Pressure for Blood Gas 749 mmHg      Modality Ventilator     FIO2 100 %      Ventilator Mode PC     Set Mech Resp Rate 22.0     PEEP 8.0     PIP 20 cmH2O      Comment: Meter:  [id number]     :  [id number]        Collected by Kaylee    STAT Lactic Acid, Reflex [[id number]]  (Normal) Collected: 05/31/23 1711    Specimen: Blood Updated: 05/31/23 1738     Lactate 1.4 mmol/L     Blood Gas, Arterial - [[id number]]  (Abnormal) Collected: 05/31/23 1737    Specimen: Arterial Blood Updated: 05/31/23 1736     Site Right Radial     Jerome's Test N/A     pH, Arterial 7.298 pH units      Comment: 84 Value below reference range        pCO2, Arterial 61.2 mm Hg      Comment: 83 Value above reference range        pO2, Arterial 81.5 mm Hg      Comment: 84 Value below reference range        HCO3, Arterial 30.0 mmol/L      Comment: 83 Value above reference range        Base Excess, Arterial 2.1 mmol/L      Comment: 83 Value above reference range        O2 Saturation, Arterial 92.6 %      Comment: 84 Value below reference range        Barometric Pressure for Blood Gas 748 mmHg      Modality Ventilator     FIO2 100 %      Ventilator Mode PC     Set [facility] Resp Rate 16.0     PEEP 8.0     Collected by RT     Comment: Meter: [id number]     :  [id number]       Extra Tubes [[id number]] Collected: 05/31/23 1127    Specimen: Blood Updated: 05/31/23 1532    Narrative:      The following orders were created for panel order Extra Tubes.  Procedure                               Abnormality         Status                     ---------                               -----------         ------                     Gold Top - [facility][[id number]]                                   Final result               Jean Top[[id number]]                                         Final result               Light Blue Top[[id number]]                                   Final result                 Please view results for these tests on the individual orders.    Jean Top [[id number]] Collected: 05/31/23 1127    Specimen: Blood Updated: 05/31/23 1532     Extra Tube Hold for add-ons.     Comment: Auto resulted.       STAT Lactic Acid,  Reflex [[id number]]  (Abnormal) Collected: 05/31/23 1413    Specimen: Blood Updated: 05/31/23 1441     Lactate 3.0 mmol/L     Urinalysis With Microscopic If Indicated (No Culture) - Urine, Clean Catch [[id number]]  (Abnormal) Collected: 05/31/23 1413    Specimen: Urine, Clean Catch Updated: 05/31/23 1431     Color, UA Yellow     Appearance, UA Clear     pH, UA 5.5     Specific Gravity, UA 1.026     Glucose, UA Negative     Ketones, UA Trace     Bilirubin, UA Negative     Blood, UA Negative     Protein, UA Trace     Leuk Esterase, UA Negative     Nitrite, UA Negative     Urobilinogen, UA 1.0 E.U./dL    Narrative:      Urine microscopic not indicated.    Blood Gas, Arterial With Co-Ox [[id number]]  (Abnormal) Collected: 05/31/23 1425    Specimen: Arterial Blood Updated: 05/31/23 1424     Site Arterial Line     Jerome's Test N/A     pH, Arterial 7.286 pH units      Comment: 84 Value below reference range        pCO2, Arterial 58.2 mm Hg      Comment: 83 Value above reference range        pO2, Arterial 173.0 mm Hg      Comment: 83 Value above reference range        HCO3, Arterial 27.7 mmol/L      Comment: 83 Value above reference range        Base Excess, Arterial -0.2 mmol/L      Comment: 84 Value below reference range        O2 Saturation, Arterial 96.2 %      Hemoglobin, Blood Gas 13.6 g/dL      Comment: 84 Value below reference range        Hematocrit, Blood Gas 41.6 %      Oxyhemoglobin 95.8 %      Methemoglobin <1.00 %      Comment: 94 Value below reportable range < 1.0        Carboxyhemoglobin <1.0 %      Comment: 94 Value below reportable range < 1.0        A-a DO2 470.8 mmHg      Sodium, Arterial 143 mmol/L      Potassium, Arterial 4.5 mmol/L      Ionized Calcium 4.85 mg/dL      Glucose, Arterial 126 mg/dL      Barometric Pressure for Blood Gas 749 mmHg      Modality Ventilator     FIO2 100 %      Flow Rate 22.0 lpm      Ventilator Mode AVAP     Set Tidal Volume 450     PEEP 7.0     Comment: Meter:  [id number]     :  [id number]        Note --     pH, Temp Corrected --     pCO2, Temperature Corrected --     pO2, Temperature Corrected --    COVID-19 and FLU A/B PCR - Swab, Nasopharynx [[id number]]  (Normal) Collected: 05/31/23 1241    Specimen: Swab from Nasopharynx Updated: 05/31/23 1315     COVID19 Not Detected     Influenza A PCR Not Detected     Influenza B PCR Not Detected    Narrative:      Fact sheet for providers: https://www.fda.gov/media/805047/download    Fact sheet for patients: https://www.fda.gov/media/196466/download    Test performed by PCR.    D-dimer, Quantitative [[id number]]  (Abnormal) Collected: 05/31/23 1127    Specimen: Blood Updated: 05/31/23 1254     D-Dimer, Quantitative 860 ng/mL (FEU)     Narrative:      According to the assay 's published package insert, a normal (<500 ng/mL (FEU)) D-dimer result in conjunction with a non-high clinical probability assessment, excludes deep vein thrombosis (DVT) and pulmonary embolism (PE) with high sensitivity.    D-dimer values increase with age and this can make VTE exclusion of an older population difficult. To address this, the [employer], based on best available evidence and recent guidelines, recommends that clinicians use age-adjusted D-dimer thresholds in patients greater than 50 years of age with: a) a low probability of PE who do not meet all Pulmonary Embolism Rule Out Criteria, or b) in those with intermediate probability of PE.   The formula for an age-adjusted D-dimer cut-off is \"age*10\".  For example, a 60 year old patient would have an age-adjusted cut-off of 600 ng/mL (FEU) and an 80 year old 800 ng/mL (FEU).      Blood Culture - Blood, Hand, Left [[id number]] Collected: 05/31/23 1248    Specimen: Blood from Hand, Left Updated: 05/31/23 1248    Blood Culture - Blood, Hand, Right [[id number]] Collected: 05/31/23 1248    Specimen: Blood from Hand, Right Updated: 05/31/23 1248    [facility] " [[id number]] Collected: 05/31/23 1127    Specimen: Blood Updated: 05/31/23 1230     Extra Tube Hold for add-ons.     Comment: Auto resulted.       Light Blue Top [[id number]] Collected: 05/31/23 1127    Specimen: Blood Updated: 05/31/23 1230     Extra Tube Hold for add-ons.     Comment: Auto resulted       Blood Gas, Arterial With Co-Ox [[id number]]  (Abnormal) Collected: 05/31/23 1216    Specimen: Arterial Blood Updated: 05/31/23 1216     Site Arterial Line     Jerome's Test N/A     pH, Arterial 7.302 pH units      Comment: 84 Value below reference range        pCO2, Arterial 61.1 mm Hg      Comment: 83 Value above reference range        pO2, Arterial 36.3 mm Hg      Comment: 85 Value below critical limit        HCO3, Arterial 30.1 mmol/L      Comment: 83 Value above reference range        Base Excess, Arterial 2.2 mmol/L      Comment: 83 Value above reference range        O2 Saturation, Arterial 63.9 %      Comment: 84 Value below reference range        Hemoglobin, Blood Gas 13.1 g/dL      Comment: 84 Value below reference range        Hematocrit, Blood Gas 40.2 %      Oxyhemoglobin 62.9 %      Comment: 84 Value below reference range        Methemoglobin <1.00 %      Comment: 94 Value below reportable range < 1.0        Carboxyhemoglobin <1.0 %      Comment: 94 Value below reportable range < 1.0        A-a DO2 42.4 mmHg      Sodium, Arterial 140 mmol/L      Potassium, Arterial 4.8 mmol/L      Ionized Calcium 4.94 mg/dL      Glucose, Arterial 133 mg/dL      Barometric Pressure for Blood Gas 750 mmHg      Modality Nasal Cannula     Flow Rate 1.0 lpm      Ventilator Mode NA     Note --     Collected by GIL. RRT     Comment: Meter: [id number]     :  [id number]        pH, Temp Corrected --     pCO2, Temperature Corrected --     pO2, Temperature Corrected --    Comprehensive Metabolic Panel [[id number]]  (Abnormal) Collected: 05/31/23 1124    Specimen: Blood Updated: 05/31/23 1152     Glucose 152 mg/dL      BUN 22  mg/dL      Creatinine 0.98 mg/dL      Sodium 140 mmol/L      Potassium 5.0 mmol/L      Chloride 101 mmol/L      CO2 28.0 mmol/L      Calcium 9.4 mg/dL      Total Protein 7.6 g/dL      Albumin 4.1 g/dL      ALT (SGPT) 27 U/L      AST (SGOT) 39 U/L      Alkaline Phosphatase 89 U/L      Total Bilirubin 1.9 mg/dL      Globulin 3.5 gm/dL      A/G Ratio 1.2 g/dL      BUN/Creatinine Ratio 22.4     Anion Gap 11.0 mmol/L      eGFR 90.5 mL/min/1.73     Narrative:      GFR Normal >60  Chronic Kidney Disease <60  Kidney Failure <15      Lipase [[id number]]  (Normal) Collected: 05/31/23 1124    Specimen: Blood Updated: 05/31/23 1152     Lipase 15 U/L     Single High Sensitivity Troponin T [[id number]]  (Abnormal) Collected: 05/31/23 1124    Specimen: Blood Updated: 05/31/23 1150     HS Troponin T 17 ng/L     Narrative:      High Sensitive Troponin T Reference Range:  <10.0 ng/L- Negative Female for AMI  <15.0 ng/L- Negative Male for AMI  >=10 - Abnormal Female indicating possible myocardial injury.  >=15 - Abnormal Male indicating possible myocardial injury.   Clinicians would have to utilize clinical acumen, EKG, Troponin, and serial changes to determine if it is an Acute Myocardial Infarction or myocardial injury due to an underlying chronic condition.         Lactic Acid, Plasma [[id number]]  (Abnormal) Collected: 05/31/23 1133    Specimen: Blood Updated: 05/31/23 1145     Lactate 2.4 mmol/L     CBC & Differential [[id number]]  (Abnormal) Collected: 05/31/23 1124    Specimen: Blood Updated: 05/31/23 1137    Narrative:      The following orders were created for panel order CBC & Differential.  Procedure                               Abnormality         Status                     ---------                               -----------         ------                     CBC Auto Differential[[id number]]        Abnormal            Final result                 Please view results for these tests on the individual orders.    CBC Auto  Differential [[id number]]  (Abnormal) Collected: 05/31/23 1124    Specimen: Blood Updated: 05/31/23 1137     WBC 18.89 10*3/mm3      RBC 4.53 10*6/mm3      Hemoglobin 13.2 g/dL      Hematocrit 41.2 %      MCV 90.9 fL      MCH 29.1 pg      MCHC 32.0 g/dL      RDW 13.4 %      RDW-SD 44.8 fl      MPV 10.0 fL      Platelets 250 10*3/mm3      Neutrophil % 84.6 %      Lymphocyte % 7.9 %      Monocyte % 6.1 %      Eosinophil % 0.1 %      Basophil % 0.4 %      Immature Grans % 0.9 %      Neutrophils, Absolute 15.97 10*3/mm3      Lymphocytes, Absolute 1.50 10*3/mm3      Monocytes, Absolute 1.16 10*3/mm3      Eosinophils, Absolute 0.02 10*3/mm3      Basophils, Absolute 0.07 10*3/mm3      Immature Grans, Absolute 0.17 10*3/mm3      nRBC 0.1 /100 WBC         Imaging Results (Last 24 Hours)     Procedure Component Value Units Date/Time    XR Chest 1 View [[id number]] Collected: 06/01/23 0425     Updated: 06/01/23 0457    Narrative:      INDICATION:  Reevaluation    COMPARISON:  05/31/2023      Impression:      FINDINGS/IMPRESSION:  Lines/tubes project in approximately stable positions.  Right-sided lung opacities are worsened.    US Guided Vascular Access [[id number]] Collected: 05/31/23 1723     Updated: 05/31/23 1726    Narrative:      US GUIDANCE VASCULAR    HISTORY: access    COMPARISON: None      Impression:      Grayscale sonographic images were obtained during ultrasound guided vascular  access.    The accessed blood vessel appears to be patent. The vessel was accessed under  direct ultrasound guidance.    Please refer to the procedure note for additional details.      XR Chest 1 View [[id number]] Collected: 05/31/23 1640     Updated: 05/31/23 1703    Narrative:      FINDINGS:  Endotracheal tube 3 cm above the sonali.  The heart is enlarged. There is severe  bilateral diffuse infiltration.      Impression:      Impression:  1. In comparison with study done earlier today, there is worsening bilateral  diffuse  infiltration.    2. Endotracheal tube appears to be in good position.    IR Insert Midline Without Port Pump 5 Plus [[id number]] Resulted: 05/31/23 1657     Updated: 05/31/23 1657    Narrative:      This procedure was auto-finalized with no dictation required.    CT Angiogram Chest [[id number]] Collected: 05/31/23 1450     Updated: 05/31/23 1500    Narrative:      TECHNIQUE:  IV contrast was administered and axial images from the thoracic inlet through  the diaphragms were performed.  3D multiplanar reformats of the thoracic aorta  were completed on a separate workstation under concurrent supervision.  Intravenous contrast was used for this case.    FINDINGS:  No pathologically enlarged mediastinal, hilar, or axillary lymph nodes.  No  pulmonary embolus.  No aortic dissection.  There is severe consolidation  throughout the right lung with mild infiltration throughout the left lung.      Impression:        1.  No PE.  No aortic dissection.    2.  Severe consolidation throughout the right lung with mild infiltration  throughout the left lung.    XR Chest 1 View [[id number]] Collected: 05/31/23 1145     Updated: 05/31/23 1159    Narrative:      FINDINGS:  An AP view of the chest shows cardiomegaly.  There is vascular congestion.  There is diffuse right lung infiltrate.      Impression:      1.  Diffuse right lung infiltrate most likely represents pneumonia.  The heart  is enlarged and there also appears to be some mild vascular congestion.    2.  No other significant finding.  No prior studies for comparison.          ECG/EMG Results (last 24 hours)     Procedure Component Value Units Date/Time    ECG 12 Lead Dyspnea [[id number]] Collected: 05/31/23 1124     Updated: 05/31/23 1127     QT Interval 396 ms      QTC Interval 481 ms     Narrative:      Test Reason : SOA  Blood Pressure :   */*   mmHG  Vent. Rate :  89 BPM     Atrial Rate :  89 BPM     P-R Int : 170 ms          QRS Dur : 150 ms      QT Int : 396 ms       P-R-T  Axes :  63 -47  84 degrees     QTc Int : 481 ms    ** Poor data quality, interpretation may be adversely affected  Normal sinus rhythm  Left axis deviation  Left bundle branch block  Abnormal ECG  When compared with ECG of 27-OCT-2021 10:34,  Left bundle branch block has replaced Nonspecific intraventricular block    Referred By: ERDR           Confirmed By:     Adult Transthoracic Echo Limited W/ Cont if Necessary Per Protocol [[id number]] Resulted: 06/01/23 0853     Updated: 06/01/23 0858     Target HR (85%) 139 bpm      Max. Pred. HR (100%) 164 bpm      LVIDd 4.8 cm      LVIDs 3.3 cm      IVSd 1.49 cm      LVPWd 1.33 cm      FS 29.8 %      IVS/LVPW 1.12 cm      ESV(cubed) 37.2 ml      EDV(cubed) 107.4 ml      LVOT area 3.9 cm2      LV mass(C)d 273.3 grams      LVOT diam 2.22 cm      RVIDd 2.7 cm      LA dimension (2D)  4.0 cm      Ao pk dorcas 178.1 cm/sec      Ao max PG 12.7 mmHg      TR max dorcas 279.6 cm/sec      TR max PG 31.3 mmHg      PA V2 max 127.6 cm/sec      Ao root diam 4.1 cm      ACS 2.6 cm           Physician Progress Notes (last 24 hours)  Notes from 05/31/23 0954 through 06/01/23 0954   No notes of this type exist for this encounter.         Medical Student Notes (last 24 hours)  Notes from 05/31/23 0954 through 06/01/23 0954   No notes of this type exist for this encounter.         Consult Notes (last 24 hours)  Notes from 05/31/23 0954 through 06/01/23 0954   No notes of this type exist for this encounter.

## 2023-06-01 NOTE — PROGRESS NOTES
Enter Query Response Below      Query Response:  Due to sepsis           If applicable, please update the problem list.

## 2023-06-01 NOTE — PROGRESS NOTES
No SBT this morning due to increased need for oxygen. Upon arriving to my shift Pt was on PEEP 10 and 100%. Have weaned pt to 60% PEEP 5.

## 2023-06-01 NOTE — CONSULTS
"Adult Nutrition  Assessment/PES    Patient Name:  Jon Gill  YOB: 1965  MRN: [id number]  Admit Date:  5/31/2023    Assessment Date:  6/1/2023    Comments:  Nutrition Assessment:  Pt currently NPO on the vent related to Acute Resp failure with Sepsis and pneumonia.  He received IVF in the ED and is currently in volume overload--on IV Lasix.  Hx of Crohn's.   At this time he is requiring a large amt of propofol--providing 2154 calories (81.6cc/hr).  This is meeting 100% of his EEN.  Will monitor for the need to assess for nutritions support..  Monitor POC     Reason for Assessment     Row Name 06/01/23 1323          Reason for Assessment    Reason For Assessment other (see comments)  vent     Diagnosis pulmonary disease     Identified At Risk by Screening Criteria other (see comments)  NPO on the vent                Nutrition/Diet History     Row Name 06/01/23 1324          Nutrition/Diet History    Typical Intake (Food/Fluid/EN/PN) Pt sedated on the vent                Labs/Tests/Procedures/Meds     Row Name 06/01/23 1324          Labs/Procedures/Meds    Lab Results Reviewed reviewed, pertinent     Lab Results Comments Gluc 152; Bun 22        Diagnostic Tests/Procedures    Diagnostic Test/Procedure Reviewed reviewed, pertinent     Diagnostic Test/Procedures Comments Intubated        Medications    Pertinent Medications Reviewed reviewed, pertinent     Pertinent Medications Comments Cefepime; Solumedrol; Versed; Protonix; Lasix; Versed; Levophed; Propofol 81.6cc/hr                Physical Findings     Row Name 06/01/23 1326          Physical Findings    Enteral Access Devices orogastric tube                Estimated/Assessed Needs - Anthropometrics     Row Name 06/01/23 1326 06/01/23 0851       Anthropometrics    Height -- 175.3 cm (69.02\")    Weight -- 136 kg (299 lb 13.2 oz)    Height for Calculation 1.753 m (5' 9\") --    Weight for Calculation 136 kg (299 lb) --       Estimated/Assessed " Needs    Additional Documentation Additional Requirements (Group);Fluid Requirements (Group);Modified [place] State Equation (Group);Estimated Calorie Needs (Group);KCAL/KG (Group);[place]-St. Jeor Equation (Group);Protein Requirements (Group) --       Estimated Calorie Needs    Estimated Calorie Need Method kcal/kg --       KCAL/KG    KCAL/KG 15 Kcal/Kg (kcal) --    15 Kcal/Kg (kcal) 2034.39 --       [place]-St. Jeor Equation    RMR ([place]-St. Jeor Equation) 2176.889 --       Protein Requirements    Weight Used For Protein Calculations 69.9 kg (154 lb) --    Est Protein Requirement Amount (gms/kg) 1.3 gm protein --    Estimated Protein Requirements (gms/day) 90.81 --       Fluid Requirements    Fluid Requirements (mL/day) 2000 --    Estimated Fluid Requirement Method RDA Method --    RDA Method (mL) 2000 --               Nutrition Prescription Ordered     Row Name 06/01/23 1329          Nutrition Prescription PO    Current PO Diet NPO        Propofol Considerations    Propofol (mL/hr) 81.6 mL/hr     Propofol (Kcal/day) 2154 Kcal/day                Evaluation of Received Nutrient/Fluid Intake     Row Name 06/01/23 1328          Calories Evaluation    Total Calorie Target (kcal) 2000        Protein Evaluation    Total Protein Target (g) 91                   Problem/Interventions:   Problem 1     Row Name 06/01/23 1329          Nutrition Diagnoses Problem 1    Problem 1 Inadequate Intake/Infusion     Inadequate Intake Type Oral     Macronutrient Kcal;Protein     Micronutrient Mineral     Etiology (related to) Medical Diagnosis     Infectious Disease Sepsis     Pulmonary/Critical Care Acute respiratory failure;Pneumonia;Ventilator     Signs/Symptoms (evidenced by) NPO                      Intervention Goal     Row Name 06/01/23 1330          Intervention Goal    General Reduce/improve symptoms     Weight No significant weight loss                Nutrition Intervention     Row Name 06/01/23 1330          Nutrition  Intervention    RD/Tech Action Follow Tx progress;Care plan reviewd;Recommend/ordered     Recommended/Ordered Other (comment)  Nutrition support if pt is not extubated in a timely manner                  Education/Evaluation     Row Name 06/01/23 9500          Education    Education Education not appropriate at this time     Please explain Patient intubated        Monitor/Evaluation    Monitor Per protocol;I&O;Pertinent labs;Weight;Skin status;Symptoms                 Electronically signed by:  Hue Leal RD  06/01/23 13:34 CDT

## 2023-06-01 NOTE — NURSING NOTE
I just called Dr. Mojica again as blood pressure is elevated, I saw 190's and went in the room to recycle the cuff and it is reading 214/84. He said order clonidine patch.2

## 2023-06-01 NOTE — PLAN OF CARE
Goal Outcome Evaluation:  Plan of Care Reviewed With: caregiver        Progress: no change  Outcome Evaluation: Nutrition Assessment:  Pt currently NPO on the vent due to Acute Resp failure related to pneumonia.  He is currently requiring a large amt of propofol. Will continue to assess the need for nutrition support.

## 2023-06-01 NOTE — PLAN OF CARE
Problem: Adult Inpatient Plan of Care  Goal: Plan of Care Review  Outcome: Ongoing, Progressing  Goal: Patient-Specific Goal (Individualized)  Outcome: Ongoing, Progressing  Goal: Absence of Hospital-Acquired Illness or Injury  Outcome: Ongoing, Progressing  Goal: Optimal Comfort and Wellbeing  Outcome: Ongoing, Progressing  Goal: Readiness for Transition of Care  Outcome: Ongoing, Progressing     Problem: Skin Injury Risk Increased  Goal: Skin Health and Integrity  Outcome: Ongoing, Progressing  Intervention: Optimize Skin Protection  Recent Flowsheet Documentation  Taken 6/1/2023 0720 by Luiza Ocampo RRT  Head of Bed (HOB) Positioning: HOB elevated     Problem: Diabetes Comorbidity  Goal: Blood Glucose Level Within Targeted Range  Outcome: Ongoing, Progressing     Problem: Restraint, Nonviolent  Goal: Absence of Harm or Injury  Outcome: Ongoing, Progressing     Problem: Fall Injury Risk  Goal: Absence of Fall and Fall-Related Injury  Outcome: Ongoing, Progressing     Problem: Communication Impairment (Mechanical Ventilation, Invasive)  Goal: Effective Communication  Outcome: Ongoing, Progressing     Problem: Device-Related Complication Risk (Mechanical Ventilation, Invasive)  Goal: Optimal Device Function  Outcome: Ongoing, Progressing     Problem: Inability to Wean (Mechanical Ventilation, Invasive)  Goal: Mechanical Ventilation Liberation  Outcome: Ongoing, Progressing     Problem: Nutrition Impairment (Mechanical Ventilation, Invasive)  Goal: Optimal Nutrition Delivery  Outcome: Ongoing, Progressing     Problem: Skin and Tissue Injury (Mechanical Ventilation, Invasive)  Goal: Absence of Device-Related Skin and Tissue Injury  Outcome: Ongoing, Progressing     Problem: Ventilator-Induced Lung Injury (Mechanical Ventilation, Invasive)  Goal: Absence of Ventilator-Induced Lung Injury  Outcome: Ongoing, Progressing   Goal Outcome Evaluation:  Pt was able to be weaned significantly today from a peep of 10  to 5. Fio2 from 100% to 65%. Pt is stable on current vent settings. Will continue to monitor and wean as tolerated.

## 2023-06-01 NOTE — PROGRESS NOTES
"Pharmacokinetics by Pharmacy - Vancomycin Initial Consult    Jon Gill is a 58 y.o. male being initiated on vancomycin for pneumonia. Patient is also receiving cefepime.    Objective:     [Ht: 175.3 cm (69.02\"); Wt: 136 kg (299 lb 13.2 oz)]     WBC   Date Value Ref Range Status   05/31/2023 18.89 (H) 3.40 - 10.80 10*3/mm3 Final      Lactate   Date Value Ref Range Status   05/31/2023 1.4 0.5 - 2.0 mmol/L Final   05/31/2023 3.0 (C) 0.5 - 2.0 mmol/L Final   05/31/2023 2.4 (C) 0.5 - 2.0 mmol/L Final      Temp Readings from Last 1 Encounters:   06/01/23 98.2 °F (36.8 °C) (Oral)     Estimated Creatinine Clearance: 116.4 mL/min (by C-G formula based on SCr of 0.97 mg/dL).   Creatinine   Date Value Ref Range Status   06/01/2023 0.97 0.76 - 1.27 mg/dL Final   05/31/2023 0.98 0.76 - 1.27 mg/dL Final       Baseline culture results:  Microbiology Results (last 10 days)     Procedure Component Value - Date/Time    COVID-19 and FLU A/B PCR - Swab, Nasopharynx [[id number]]  (Normal) Collected: 05/31/23 1241    Lab Status: Final result Specimen: Swab from Nasopharynx Updated: 05/31/23 1315     COVID19 Not Detected     Influenza A PCR Not Detected     Influenza B PCR Not Detected    Narrative:      Fact sheet for providers: https://www.fda.gov/media/647965/download    Fact sheet for patients: https://www.fda.gov/media/298891/download    Test performed by PCR.        No results found for: RESPCX    Assessment  No WBC today. Will continue to monitor trends  Scr 0.97, WNL and at baseline  Patient afebrile    6/1 MRSA PCR: in process  6/1 respiratory culture: in process  5/31 blood culture x2: in process    5/31 chest x-ray: \"right sided lung opacities are worsened\"    Patient received a one time loading dose of vancomycin 2500 mg IV on 5/31 at 1951. Patient was then initiated on vancomycin 2000 mg IV Q12H. This gives an estimated trough and AUC of 10-20 and 400-600, respectively.    Plan  1. Continue vancomycin 2000 mg IV " Q12H. Consult ends on 6/7/23.  2. Will order levels when clinically indicated.  3. Pharmacy will monitor renal function and adjust dose accordingly.    Jaydon Morfin RPH  06/01/23 10:17 CDT

## 2023-06-02 ENCOUNTER — APPOINTMENT (OUTPATIENT)
Dept: GENERAL RADIOLOGY | Facility: HOSPITAL | Age: 58
End: 2023-06-02
Payer: COMMERCIAL

## 2023-06-02 LAB
ALBUMIN SERPL-MCNC: 3.8 G/DL (ref 3.5–5.2)
ALBUMIN/GLOB SERPL: 1 G/DL
ALP SERPL-CCNC: 81 U/L (ref 39–117)
ALT SERPL W P-5'-P-CCNC: 21 U/L (ref 1–41)
ANION GAP SERPL CALCULATED.3IONS-SCNC: 14 MMOL/L (ref 5–15)
ARTERIAL PATENCY WRIST A: ABNORMAL
ARTERIAL PATENCY WRIST A: POSITIVE
ARTERIAL PATENCY WRIST A: POSITIVE
AST SERPL-CCNC: 25 U/L (ref 1–40)
ATMOSPHERIC PRESS: 746 MMHG
ATMOSPHERIC PRESS: 747 MMHG
ATMOSPHERIC PRESS: 749 MMHG
BASE EXCESS BLDA CALC-SCNC: 12.6 MMOL/L (ref 0–2)
BASE EXCESS BLDA CALC-SCNC: 13.4 MMOL/L (ref 0–2)
BASE EXCESS BLDA CALC-SCNC: 13.4 MMOL/L (ref 0–2)
BASOPHILS # BLD AUTO: 0.04 10*3/MM3 (ref 0–0.2)
BASOPHILS NFR BLD AUTO: 0.2 % (ref 0–1.5)
BDY SITE: ABNORMAL
BILIRUB SERPL-MCNC: 1.3 MG/DL (ref 0–1.2)
BUN SERPL-MCNC: 22 MG/DL (ref 6–20)
BUN/CREAT SERPL: 21.2 (ref 7–25)
CALCIUM SPEC-SCNC: 8.8 MG/DL (ref 8.6–10.5)
CHLORIDE SERPL-SCNC: 95 MMOL/L (ref 98–107)
CO2 SERPL-SCNC: 33 MMOL/L (ref 22–29)
CREAT SERPL-MCNC: 1.04 MG/DL (ref 0.76–1.27)
DEPRECATED RDW RBC AUTO: 40.9 FL (ref 37–54)
EGFRCR SERPLBLD CKD-EPI 2021: 84.3 ML/MIN/1.73
EOSINOPHIL # BLD AUTO: 0 10*3/MM3 (ref 0–0.4)
EOSINOPHIL NFR BLD AUTO: 0 % (ref 0.3–6.2)
ERYTHROCYTE [DISTWIDTH] IN BLOOD BY AUTOMATED COUNT: 13.1 % (ref 12.3–15.4)
GAS FLOW AIRWAY: 22 LPM
GLOBULIN UR ELPH-MCNC: 3.8 GM/DL
GLUCOSE SERPL-MCNC: 192 MG/DL (ref 65–99)
HCO3 BLDA-SCNC: 37.3 MMOL/L (ref 20–26)
HCO3 BLDA-SCNC: 38.1 MMOL/L (ref 20–26)
HCO3 BLDA-SCNC: 38.2 MMOL/L (ref 20–26)
HCT VFR BLD AUTO: 42 % (ref 37.5–51)
HGB BLD-MCNC: 14.2 G/DL (ref 13–17.7)
IMM GRANULOCYTES # BLD AUTO: 0.31 10*3/MM3 (ref 0–0.05)
IMM GRANULOCYTES NFR BLD AUTO: 1.7 % (ref 0–0.5)
INHALED O2 CONCENTRATION: 75 %
INHALED O2 CONCENTRATION: 80 %
INHALED O2 CONCENTRATION: 85 %
LYMPHOCYTES # BLD AUTO: 0.83 10*3/MM3 (ref 0.7–3.1)
LYMPHOCYTES NFR BLD AUTO: 4.4 % (ref 19.6–45.3)
Lab: ABNORMAL
MAGNESIUM SERPL-MCNC: 1.7 MG/DL (ref 1.6–2.6)
MCH RBC QN AUTO: 29.3 PG (ref 26.6–33)
MCHC RBC AUTO-ENTMCNC: 33.8 G/DL (ref 31.5–35.7)
MCV RBC AUTO: 86.8 FL (ref 79–97)
MODALITY: ABNORMAL
MONOCYTES # BLD AUTO: 0.82 10*3/MM3 (ref 0.1–0.9)
MONOCYTES NFR BLD AUTO: 4.4 % (ref 5–12)
NEUTROPHILS NFR BLD AUTO: 16.76 10*3/MM3 (ref 1.7–7)
NEUTROPHILS NFR BLD AUTO: 89.3 % (ref 42.7–76)
NRBC BLD AUTO-RTO: 0.4 /100 WBC (ref 0–0.2)
PCO2 BLDA: 45.7 MM HG (ref 35–45)
PCO2 BLDA: 46 MM HG (ref 35–45)
PCO2 BLDA: 46.3 MM HG (ref 35–45)
PEEP RESPIRATORY: 8 CM[H2O]
PH BLDA: 7.52 PH UNITS (ref 7.35–7.45)
PH BLDA: 7.52 PH UNITS (ref 7.35–7.45)
PH BLDA: 7.53 PH UNITS (ref 7.35–7.45)
PHOSPHATE SERPL-MCNC: 2.3 MG/DL (ref 2.5–4.5)
PLATELET # BLD AUTO: 289 10*3/MM3 (ref 140–450)
PMV BLD AUTO: 10.3 FL (ref 6–12)
PO2 BLDA: 74.8 MM HG (ref 83–108)
PO2 BLDA: 81.5 MM HG (ref 83–108)
PO2 BLDA: 92.7 MM HG (ref 83–108)
POTASSIUM SERPL-SCNC: 2.7 MMOL/L (ref 3.5–5.2)
POTASSIUM SERPL-SCNC: 3.4 MMOL/L (ref 3.5–5.2)
PROT SERPL-MCNC: 7.6 G/DL (ref 6–8.5)
RBC # BLD AUTO: 4.84 10*6/MM3 (ref 4.14–5.8)
SAO2 % BLDCOA: 93.7 % (ref 94–99)
SAO2 % BLDCOA: 96.3 % (ref 94–99)
SAO2 % BLDCOA: 98.7 % (ref 94–99)
SET MECH RESP RATE: 22
SET MECH RESP RATE: 24
SODIUM SERPL-SCNC: 142 MMOL/L (ref 136–145)
VANCOMYCIN SERPL-MCNC: 23.3 MCG/ML (ref 5–40)
VENTILATOR MODE: ABNORMAL
WBC NRBC COR # BLD: 18.76 10*3/MM3 (ref 3.4–10.8)

## 2023-06-02 PROCEDURE — 84100 ASSAY OF PHOSPHORUS: CPT | Performed by: HOSPITALIST

## 2023-06-02 PROCEDURE — 25010000002 MORPHINE PER 10 MG: Performed by: HOSPITALIST

## 2023-06-02 PROCEDURE — 36600 WITHDRAWAL OF ARTERIAL BLOOD: CPT

## 2023-06-02 PROCEDURE — 84132 ASSAY OF SERUM POTASSIUM: CPT | Performed by: HOSPITALIST

## 2023-06-02 PROCEDURE — 94664 DEMO&/EVAL PT USE INHALER: CPT

## 2023-06-02 PROCEDURE — 25010000002 MIDAZOLAM PER 1 MG: Performed by: HOSPITALIST

## 2023-06-02 PROCEDURE — 0 CEFEPIME PER 500 MG: Performed by: HOSPITALIST

## 2023-06-02 PROCEDURE — 25010000002 METHYLPREDNISOLONE PER 125 MG: Performed by: HOSPITALIST

## 2023-06-02 PROCEDURE — 71045 X-RAY EXAM CHEST 1 VIEW: CPT

## 2023-06-02 PROCEDURE — 25010000002 FUROSEMIDE PER 20 MG: Performed by: HOSPITALIST

## 2023-06-02 PROCEDURE — 25010000002 MIDAZOLAM 50 MG/10ML SOLUTION: Performed by: INTERNAL MEDICINE

## 2023-06-02 PROCEDURE — 80053 COMPREHEN METABOLIC PANEL: CPT | Performed by: HOSPITALIST

## 2023-06-02 PROCEDURE — 94003 VENT MGMT INPAT SUBQ DAY: CPT

## 2023-06-02 PROCEDURE — 5A1935Z RESPIRATORY VENTILATION, LESS THAN 24 CONSECUTIVE HOURS: ICD-10-PCS | Performed by: HOSPITALIST

## 2023-06-02 PROCEDURE — 94761 N-INVAS EAR/PLS OXIMETRY MLT: CPT

## 2023-06-02 PROCEDURE — 25010000002 FENTANYL CITRATE (PF) 250 MCG/5ML SOLUTION: Performed by: HOSPITALIST

## 2023-06-02 PROCEDURE — 0 POTASSIUM CHLORIDE 10 MEQ/100ML SOLUTION: Performed by: INTERNAL MEDICINE

## 2023-06-02 PROCEDURE — 25010000002 ENOXAPARIN PER 10 MG: Performed by: HOSPITALIST

## 2023-06-02 PROCEDURE — 25010000002 FUROSEMIDE PER 20 MG: Performed by: INTERNAL MEDICINE

## 2023-06-02 PROCEDURE — 85025 COMPLETE CBC W/AUTO DIFF WBC: CPT | Performed by: HOSPITALIST

## 2023-06-02 PROCEDURE — 94799 UNLISTED PULMONARY SVC/PX: CPT

## 2023-06-02 PROCEDURE — 82803 BLOOD GASES ANY COMBINATION: CPT

## 2023-06-02 PROCEDURE — 25010000002 HYDRALAZINE PER 20 MG: Performed by: HOSPITALIST

## 2023-06-02 PROCEDURE — 80202 ASSAY OF VANCOMYCIN: CPT | Performed by: HOSPITALIST

## 2023-06-02 PROCEDURE — 25010000002 FENTANYL CITRATE (PF) 50 MCG/ML SOLUTION: Performed by: INTERNAL MEDICINE

## 2023-06-02 PROCEDURE — 83735 ASSAY OF MAGNESIUM: CPT | Performed by: HOSPITALIST

## 2023-06-02 PROCEDURE — 25010000002 VANCOMYCIN 10 G RECONSTITUTED SOLUTION: Performed by: HOSPITALIST

## 2023-06-02 PROCEDURE — 25010000002 PROPOFOL 10 MG/ML EMULSION: Performed by: INTERNAL MEDICINE

## 2023-06-02 PROCEDURE — 74018 RADEX ABDOMEN 1 VIEW: CPT

## 2023-06-02 RX ORDER — POTASSIUM CHLORIDE 7.45 MG/ML
10 INJECTION INTRAVENOUS
Status: COMPLETED | OUTPATIENT
Start: 2023-06-02 | End: 2023-06-03

## 2023-06-02 RX ORDER — LABETALOL HYDROCHLORIDE 5 MG/ML
10 INJECTION, SOLUTION INTRAVENOUS EVERY 4 HOURS PRN
Status: DISCONTINUED | OUTPATIENT
Start: 2023-06-02 | End: 2023-06-04 | Stop reason: HOSPADM

## 2023-06-02 RX ORDER — VECURONIUM BROMIDE 1 MG/ML
100 INJECTION, POWDER, LYOPHILIZED, FOR SOLUTION INTRAVENOUS ONCE
Status: COMPLETED | OUTPATIENT
Start: 2023-06-02 | End: 2023-06-02

## 2023-06-02 RX ORDER — FUROSEMIDE 10 MG/ML
40 INJECTION INTRAMUSCULAR; INTRAVENOUS 2 TIMES DAILY
Status: DISCONTINUED | OUTPATIENT
Start: 2023-06-02 | End: 2023-06-04 | Stop reason: HOSPADM

## 2023-06-02 RX ORDER — POTASSIUM CHLORIDE 1.5 G/1.77G
40 POWDER, FOR SOLUTION ORAL EVERY 4 HOURS
Status: COMPLETED | OUTPATIENT
Start: 2023-06-02 | End: 2023-06-02

## 2023-06-02 RX ORDER — MIDAZOLAM HYDROCHLORIDE 1 MG/ML
4 INJECTION INTRAMUSCULAR; INTRAVENOUS
Status: DISCONTINUED | OUTPATIENT
Start: 2023-06-02 | End: 2023-06-04 | Stop reason: HOSPADM

## 2023-06-02 RX ADMIN — LABETALOL HYDROCHLORIDE 10 MG: 5 INJECTION, SOLUTION INTRAVENOUS at 10:44

## 2023-06-02 RX ADMIN — PROPOFOL 100 MCG/KG/MIN: 10 INJECTION, EMULSION INTRAVENOUS at 08:49

## 2023-06-02 RX ADMIN — FUROSEMIDE 40 MG: 10 INJECTION, SOLUTION INTRAMUSCULAR; INTRAVENOUS at 18:11

## 2023-06-02 RX ADMIN — MONTELUKAST 10 MG: 10 TABLET, FILM COATED ORAL at 21:16

## 2023-06-02 RX ADMIN — METHYLPREDNISOLONE SODIUM SUCCINATE 80 MG: 125 INJECTION INTRAMUSCULAR; INTRAVENOUS at 19:30

## 2023-06-02 RX ADMIN — FUROSEMIDE 20 MG/HR: 10 INJECTION, SOLUTION INTRAVENOUS at 02:42

## 2023-06-02 RX ADMIN — CEFEPIME 2 G: 2 INJECTION, POWDER, FOR SOLUTION INTRAVENOUS at 12:15

## 2023-06-02 RX ADMIN — DOCUSATE SODIUM 50 MG AND SENNOSIDES 8.6 MG 2 TABLET: 8.6; 5 TABLET, FILM COATED ORAL at 21:37

## 2023-06-02 RX ADMIN — FUROSEMIDE 20 MG/HR: 10 INJECTION, SOLUTION INTRAVENOUS at 08:31

## 2023-06-02 RX ADMIN — ALBUTEROL SULFATE 2 PUFF: 90 AEROSOL, METERED RESPIRATORY (INHALATION) at 18:56

## 2023-06-02 RX ADMIN — MORPHINE SULFATE 1 MG: 2 INJECTION, SOLUTION INTRAMUSCULAR; INTRAVENOUS at 18:12

## 2023-06-02 RX ADMIN — Medication 10 ML: at 08:24

## 2023-06-02 RX ADMIN — PROPOFOL 60 MCG/KG/MIN: 10 INJECTION, EMULSION INTRAVENOUS at 17:49

## 2023-06-02 RX ADMIN — VECURONIUM BROMIDE 13.6 MG: 1 INJECTION, POWDER, LYOPHILIZED, FOR SOLUTION INTRAVENOUS at 20:14

## 2023-06-02 RX ADMIN — CEFEPIME 2 G: 2 INJECTION, POWDER, FOR SOLUTION INTRAVENOUS at 03:02

## 2023-06-02 RX ADMIN — VANCOMYCIN HYDROCHLORIDE 2000 MG: 10 INJECTION, POWDER, LYOPHILIZED, FOR SOLUTION INTRAVENOUS at 21:24

## 2023-06-02 RX ADMIN — VECURONIUM BROMIDE 0.6 MCG/KG/MIN: 1 INJECTION, POWDER, LYOPHILIZED, FOR SOLUTION INTRAVENOUS at 20:18

## 2023-06-02 RX ADMIN — PROPOFOL 100 MCG/KG/MIN: 10 INJECTION, EMULSION INTRAVENOUS at 01:38

## 2023-06-02 RX ADMIN — CEFEPIME 2 G: 2 INJECTION, POWDER, FOR SOLUTION INTRAVENOUS at 19:30

## 2023-06-02 RX ADMIN — PANTOPRAZOLE SODIUM 40 MG: 40 INJECTION, POWDER, FOR SOLUTION INTRAVENOUS at 06:13

## 2023-06-02 RX ADMIN — ALBUTEROL SULFATE 2 PUFF: 90 AEROSOL, METERED RESPIRATORY (INHALATION) at 07:23

## 2023-06-02 RX ADMIN — FENTANYL CITRATE 50 MCG: 50 INJECTION, SOLUTION INTRAMUSCULAR; INTRAVENOUS at 17:15

## 2023-06-02 RX ADMIN — PROPOFOL 60 MCG/KG/MIN: 10 INJECTION, EMULSION INTRAVENOUS at 22:05

## 2023-06-02 RX ADMIN — VANCOMYCIN HYDROCHLORIDE 2000 MG: 10 INJECTION, POWDER, LYOPHILIZED, FOR SOLUTION INTRAVENOUS at 08:24

## 2023-06-02 RX ADMIN — ENOXAPARIN SODIUM 40 MG: 40 INJECTION SUBCUTANEOUS at 08:23

## 2023-06-02 RX ADMIN — METHYLPREDNISOLONE SODIUM SUCCINATE 80 MG: 125 INJECTION INTRAMUSCULAR; INTRAVENOUS at 12:15

## 2023-06-02 RX ADMIN — PROPOFOL 80 MCG/KG/MIN: 10 INJECTION, EMULSION INTRAVENOUS at 20:10

## 2023-06-02 RX ADMIN — Medication 10 ML: at 21:17

## 2023-06-02 RX ADMIN — MIDAZOLAM 4 MG/HR: 5 INJECTION, SOLUTION INTRAMUSCULAR; INTRAVENOUS at 11:20

## 2023-06-02 RX ADMIN — MIDAZOLAM 1 MG/HR: 5 INJECTION, SOLUTION INTRAMUSCULAR; INTRAVENOUS at 19:19

## 2023-06-02 RX ADMIN — POTASSIUM CHLORIDE 10 MEQ: 7.46 INJECTION, SOLUTION INTRAVENOUS at 22:47

## 2023-06-02 RX ADMIN — PROPOFOL 60 MCG/KG/MIN: 10 INJECTION, EMULSION INTRAVENOUS at 23:43

## 2023-06-02 RX ADMIN — ALBUTEROL SULFATE 2 PUFF: 90 AEROSOL, METERED RESPIRATORY (INHALATION) at 16:00

## 2023-06-02 RX ADMIN — POTASSIUM CHLORIDE 40 MEQ: 1.5 POWDER, FOR SOLUTION ORAL at 12:14

## 2023-06-02 RX ADMIN — HYDRALAZINE HYDROCHLORIDE 20 MG: 20 INJECTION INTRAMUSCULAR; INTRAVENOUS at 16:43

## 2023-06-02 RX ADMIN — MINERAL OIL, PETROLATUM: 425; 573 OINTMENT OPHTHALMIC at 21:45

## 2023-06-02 RX ADMIN — MINERAL OIL, PETROLATUM: 425; 573 OINTMENT OPHTHALMIC at 23:45

## 2023-06-02 RX ADMIN — MIDAZOLAM HYDROCHLORIDE 4 MG: 1 INJECTION, SOLUTION INTRAMUSCULAR; INTRAVENOUS at 19:34

## 2023-06-02 RX ADMIN — PROPOFOL 100 MCG/KG/MIN: 10 INJECTION, EMULSION INTRAVENOUS at 08:25

## 2023-06-02 RX ADMIN — ENOXAPARIN SODIUM 40 MG: 40 INJECTION SUBCUTANEOUS at 21:16

## 2023-06-02 RX ADMIN — PROPOFOL 100 MCG/KG/MIN: 10 INJECTION, EMULSION INTRAVENOUS at 18:52

## 2023-06-02 RX ADMIN — FENTANYL CITRATE: 50 INJECTION, SOLUTION INTRAMUSCULAR; INTRAVENOUS at 10:24

## 2023-06-02 RX ADMIN — PROPOFOL 100 MCG/KG/MIN: 10 INJECTION, EMULSION INTRAVENOUS at 03:14

## 2023-06-02 RX ADMIN — PROPOFOL 100 MCG/KG/MIN: 10 INJECTION, EMULSION INTRAVENOUS at 06:13

## 2023-06-02 RX ADMIN — PROPOFOL 100 MCG/KG/MIN: 10 INJECTION, EMULSION INTRAVENOUS at 10:00

## 2023-06-02 RX ADMIN — POTASSIUM CHLORIDE 40 MEQ: 1.5 POWDER, FOR SOLUTION ORAL at 16:25

## 2023-06-02 RX ADMIN — FENTANYL CITRATE 50 MCG: 50 INJECTION, SOLUTION INTRAMUSCULAR; INTRAVENOUS at 18:48

## 2023-06-02 RX ADMIN — LABETALOL HYDROCHLORIDE 10 MG: 5 INJECTION, SOLUTION INTRAVENOUS at 03:49

## 2023-06-02 RX ADMIN — HYDRALAZINE HYDROCHLORIDE 20 MG: 20 INJECTION INTRAMUSCULAR; INTRAVENOUS at 01:39

## 2023-06-02 RX ADMIN — PROPOFOL 100 MCG/KG/MIN: 10 INJECTION, EMULSION INTRAVENOUS at 04:33

## 2023-06-02 RX ADMIN — PROPOFOL 90 MCG/KG/MIN: 10 INJECTION, EMULSION INTRAVENOUS at 11:19

## 2023-06-02 RX ADMIN — PROPOFOL 40 MCG/KG/MIN: 10 INJECTION, EMULSION INTRAVENOUS at 16:25

## 2023-06-02 RX ADMIN — POTASSIUM CHLORIDE 10 MEQ: 7.46 INJECTION, SOLUTION INTRAVENOUS at 23:45

## 2023-06-02 RX ADMIN — FENTANYL CITRATE: 50 INJECTION, SOLUTION INTRAMUSCULAR; INTRAVENOUS at 19:28

## 2023-06-02 RX ADMIN — POTASSIUM CHLORIDE 40 MEQ: 1.5 POWDER, FOR SOLUTION ORAL at 08:23

## 2023-06-02 RX ADMIN — DOCUSATE SODIUM 50 MG AND SENNOSIDES 8.6 MG 2 TABLET: 8.6; 5 TABLET, FILM COATED ORAL at 08:23

## 2023-06-02 RX ADMIN — POLYETHYLENE GLYCOL 3350 17 G: 17 POWDER, FOR SOLUTION ORAL at 09:05

## 2023-06-02 RX ADMIN — ALBUTEROL SULFATE 2 PUFF: 90 AEROSOL, METERED RESPIRATORY (INHALATION) at 11:08

## 2023-06-02 RX ADMIN — METHYLPREDNISOLONE SODIUM SUCCINATE 80 MG: 125 INJECTION INTRAMUSCULAR; INTRAVENOUS at 03:03

## 2023-06-02 RX ADMIN — PROPOFOL 100 MCG/KG/MIN: 10 INJECTION, EMULSION INTRAVENOUS at 00:20

## 2023-06-02 RX ADMIN — MORPHINE SULFATE 1 MG: 2 INJECTION, SOLUTION INTRAMUSCULAR; INTRAVENOUS at 08:23

## 2023-06-02 NOTE — PLAN OF CARE
Goal Outcome Evaluation:  Plan of Care Reviewed With: patient        Progress: no change  Outcome Evaluation: Pt remains intubated and sedated.  Pt not a canidate for SBT this AM.  Pt remained tachy overnight.  A cerna cath was placed due to acute retention and best practice while on Lasix gtt.  Pt continues to have elevated BP, anti-hypertensives given PRN.  UOP has been adequate since cerna placement.  Pts home meds show Oxycodone daily for back pain, consider possible withdraw if hospital stay is extensive.  Will continue to monitor.

## 2023-06-02 NOTE — PLAN OF CARE
Pt progress of weaning FiO2 85% to 75% today. Continue current vent settings and meet SBT protocol criteria.  Problem: Communication Impairment (Mechanical Ventilation, Invasive)  Goal: Effective Communication  Outcome: Ongoing, Not Progressing     Problem: Inability to Wean (Mechanical Ventilation, Invasive)  Goal: Mechanical Ventilation Liberation  Outcome: Ongoing, Not Progressing     Problem: Device-Related Complication Risk (Mechanical Ventilation, Invasive)  Goal: Optimal Device Function  Outcome: Ongoing, Progressing  Intervention: Optimize Device Care and Function  Recent Flowsheet Documentation  Taken 6/2/2023 1600 by Ruth Gonzalez RRT  Airway Safety Measures: mask valve resuscitator at bedside  Taken 6/2/2023 1259 by Ruth Gonzalez RRT  Airway Safety Measures: mask valve resuscitator at bedside  Taken 6/2/2023 1020 by Ruth Gonzalez RRT  Airway Safety Measures: mask valve resuscitator at bedside  Taken 6/2/2023 0720 by Ruth Gonzalez RRT  Airway Safety Measures: mask valve resuscitator at bedside     Problem: Nutrition Impairment (Mechanical Ventilation, Invasive)  Goal: Optimal Nutrition Delivery  Outcome: Ongoing, Progressing     Problem: Skin and Tissue Injury (Mechanical Ventilation, Invasive)  Goal: Absence of Device-Related Skin and Tissue Injury  Outcome: Ongoing, Progressing     Problem: Ventilator-Induced Lung Injury (Mechanical Ventilation, Invasive)  Goal: Absence of Ventilator-Induced Lung Injury  Outcome: Ongoing, Progressing  Intervention: Prevent Ventilator-Associated Pneumonia  Recent Flowsheet Documentation  Taken 6/2/2023 1600 by Ruth Gonzalez RRT  Head of Bed (HOB) Positioning: HOB at 30 degrees  Taken 6/2/2023 1259 by Ruth Gonzalez RRT  Head of Bed (HOB) Positioning: HOB at 30 degrees  Taken 6/2/2023 1020 by Ruth Gonzalez RRT  Head of Bed (HOB) Positioning: HOB at 30 degrees  Taken 6/2/2023 0720 by Ruth Gonzalez RRT  Head of Bed (HOB) Positioning: HOB at 30 degrees   Goal Outcome Evaluation:

## 2023-06-02 NOTE — PROGRESS NOTES
CRITICAL CARE DAILY PROGRESS NOTE  Family Medicine Residency Service  NAME: Jon Gill  : 1965  MRN: [id number]      LOS: 2 days     PROVIDER OF SERVICE: Maya Roa MD    Chief Complaint: Acute respiratory failure with hypoxia and hypercapnia    Subjective:     Interval History: History provided by: RN     Overnight event: per nursing report remained tachycardic, medeiros cath in place for retention and lasix gtt, with adequate UOP.   Patient is intubated and sedated, not a candidate for SBT this morning.     Feeding: NPO  Analgesia: Morphine IV  and Fentanyl IV  Sedation: propofol  Thromboprophylaxis: Lovenox  HOB: 30 degrees  Ulcer ppx: PPIs  SBT: not a candidate this am   Bowel Regimen:Miralax  Indwelling catheters: midline and IV; Medeiros catheter is in place.  De-escalation of antibiotics: continue vanc and cefepime, no de-escalation at this time.     Review of Systems:   Review of Systems  Patient intubated and sedated        Objective:     Vital Signs  Temp:  [98.7 °F (37.1 °C)-99.5 °F (37.5 °C)] 98.7 °F (37.1 °C)  Heart Rate:  [] 98  Resp:  [22-26] 22  BP: (138-214)/(72-91) 149/84  FiO2 (%):  [50 %-85 %] 85 %  Body mass index is 44.24 kg/m².    FiO2 (%):  [50 %-85 %] 85 %  S RR:  [22] 22  PEEP/CPAP (cm H2O):  [4.8 cm H20-8 cm H20] 8 cm H20  MAP (cm H2O):  [11-15] 14    I/O last 3 completed shifts:  In: 3969.4 [I.V.:3439.4; Other:30; IV Piggyback:500]  Out: 9180 [Urine:9030; Emesis/NG output:150]    Physical Exam  Physical Exam  Constitutional:       Appearance: He is ill-appearing.      Comments: Intubated and sedated   HENT:      Head: Normocephalic and atraumatic.      Nose:      Comments: Ng tube in place     Mouth/Throat:      Comments: ETT in place  Cardiovascular:      Rate and Rhythm: Normal rate.   Pulmonary:      Breath sounds: Wheezing present.      Comments: Mechanical ventilation  Abdominal:      General: Bowel sounds are normal.      Palpations: Abdomen is soft.    Skin:     General: Skin is warm.      Capillary Refill: Capillary refill takes 2 to 3 seconds.         Medication Review    Current Facility-Administered Medications:   •  acetylcysteine (MUCOMYST) 10 % solution 400 mg, 400 mg, Endotracheal, Q8H PRN, Lamberto Mojica DO, 400 mg at 05/31/23 1910  •  albuterol sulfate HFA (PROVENTIL HFA;VENTOLIN HFA;PROAIR HFA) inhaler 2 puff, 2 puff, Inhalation, 4x Daily - RT, Lamberto Mojica DO, 2 puff at 06/02/23 0723  •  sennosides-docusate (PERICOLACE) 8.6-50 MG per tablet 2 tablet, 2 tablet, Oral, BID, 2 tablet at 06/02/23 0823 **AND** polyethylene glycol (MIRALAX) packet 17 g, 17 g, Oral, Daily PRN, 17 g at 06/02/23 0905 **AND** bisacodyl (DULCOLAX) EC tablet 5 mg, 5 mg, Oral, Daily PRN **AND** bisacodyl (DULCOLAX) suppository 10 mg, 10 mg, Rectal, Daily PRN, Lamberto Mojica DO  •  Calcium Replacement - Follow Nurse / BPA Driven Protocol, , Does not apply, PRN, Lamberto Mojica DO  •  cefepime (MAXIPIME) 2 g/100 mL 0.9% NS (mbp), 2 g, Intravenous, Q8H, Lamberto Mojica DO, 2 g at 06/02/23 0302  •  cloNIDine (CATAPRES-TTS) 0.2 MG/24HR patch 1 patch, 1 patch, Transdermal, Weekly, Lamberto Mojica DO, 1 patch at 06/01/23 1927  •  Enoxaparin Sodium (LOVENOX) syringe 40 mg, 40 mg, Subcutaneous, Q12H, Lamberto Mojica DO, 40 mg at 06/02/23 0823  •  fentaNYL (SUBLIMAZE) PCA 20 mcg/mL 50 mL syringe, , Intravenous, Continuous, Lamberto Mojica DO  •  fentaNYL citrate (PF) (SUBLIMAZE) injection 50 mcg, 50 mcg, Intravenous, Q1H PRN, Cholo Jean MD, 50 mcg at 06/01/23 1959  •  furosemide (LASIX) injection 40 mg, 40 mg, Intravenous, BID, Lamberto Mojica DO  •  hydrALAZINE (APRESOLINE) injection 20 mg, 20 mg, Intravenous, Q6H PRN, Lamberto Mojica DO, 20 mg at 06/02/23 0139  •  labetalol (NORMODYNE,TRANDATE) injection 10 mg, 10 mg, Intravenous, Q4H PRN, Behroozi, Saeid, MD, 10 mg at 06/02/23 0349  •  Magnesium Standard Dose Replacement - Follow  Nurse / BPA Driven Protocol, , Does not apply, PRN, Lamberto Mojica DO  •  methylPREDNISolone sodium succinate (SOLU-Medrol) injection 80 mg, 80 mg, Intravenous, Q8H, Lamberto Mojica DO, 80 mg at 06/02/23 0303  •  midazolam (VERSED) 50mg in 100 mL NS infusion, 1-10 mg/hr, Intravenous, Titrated, Cholo Jean MD, Last Rate: 8 mL/hr at 06/02/23 0850, 4 mg/hr at 06/02/23 0850  •  midazolam (VERSED) injection 4 mg, 4 mg, Intravenous, Once, Cholo Jean MD  •  montelukast (SINGULAIR) tablet 10 mg, 10 mg, Oral, Nightly, Lamberto Mojica DO, 10 mg at 06/01/23 2135  •  morphine injection 1 mg, 1 mg, Intravenous, Q4H PRN, 1 mg at 06/02/23 0823 **AND** naloxone (NARCAN) injection 0.4 mg, 0.4 mg, Intravenous, Q5 Min PRN, Lamberto Mojica DO  •  norepinephrine (LEVOPHED) 8 mg in 250 mL NS infusion (premix), 0.02-0.3 mcg/kg/min, Intravenous, Titrated, Cholo Jean MD  •  ondansetron (ZOFRAN) tablet 4 mg, 4 mg, Oral, Q6H PRN **OR** ondansetron (ZOFRAN) injection 4 mg, 4 mg, Intravenous, Q6H PRN, Lamberto Mojica DO  •  pantoprazole (PROTONIX) injection 40 mg, 40 mg, Intravenous, Q AM, Lamberto Mojica DO, 40 mg at 06/02/23 0613  •  Pharmacy to dose vancomycin, , Does not apply, Continuous PRN, Lamberto Mojica DO  •  Phosphorus Replacement - Follow Nurse / BPA Driven Protocol, , Does not apply, PRN, Lamberto Mojica DO  •  potassium chloride (KLOR-CON) packet 40 mEq, 40 mEq, Oral, Q4H, Lamberto Mojica DO, 40 mEq at 06/02/23 0823  •  Potassium Replacement - Follow Nurse / BPA Driven Protocol, , Does not apply, PRN, Lamberto Mojica, DO  •  propofol (DIPRIVAN) infusion 10 mg/mL 100 mL, 5-100 mcg/kg/min, Intravenous, Titrated, Cholo Jean MD, Last Rate: 81.6 mL/hr at 06/02/23 0849, 100 mcg/kg/min at 06/02/23 0849  •  sodium chloride 0.9 % flush 10 mL, 10 mL, Intravenous, Q12H, Lamberto Mojica, DO  •  sodium chloride 0.9 % flush 10 mL, 10 mL, Intravenous, PRN, Lamberto Mojica,  DO  •  sodium chloride 0.9 % flush 10 mL, 10 mL, Intravenous, Q12H, Cholo Jean MD, 10 mL at 05/31/23 2007  •  sodium chloride 0.9 % flush 10 mL, 10 mL, Intravenous, Q12H, Cholo Jean MD, 10 mL at 06/02/23 0824  •  sodium chloride 0.9 % flush 10 mL, 10 mL, Intravenous, PRN, Cholo Jean MD  •  sodium chloride 0.9 % flush 10 mL, 10 mL, Intravenous, PRN, Yunior, Lamberto R, DO  •  sodium chloride 0.9 % flush 20 mL, 20 mL, Intravenous, PRN, Cholo Jean MD  •  sodium chloride 0.9 % infusion 40 mL, 40 mL, Intravenous, PRN, Yunior, Lamberto R, DO  •  sodium chloride 0.9 % infusion 40 mL, 40 mL, Intravenous, PRN, Yunior, Lamberto R, DO  •  vancomycin IVPB 2000 mg in 0.9% Sodium Chloride (premix) 500 mL, 2,000 mg, Intravenous, Q12H, Yunior Lamberto R, DO, 2,000 mg at 06/02/23 0824     Diagnostic Data    Lab Results (last 24 hours)     Procedure Component Value Units Date/Time    Phosphorus [[id number]]  (Abnormal) Collected: 06/02/23 0546    Specimen: Blood Updated: 06/02/23 0800     Phosphorus 2.3 mg/dL     Magnesium [[id number]]  (Normal) Collected: 06/02/23 0546    Specimen: Blood Updated: 06/02/23 0800     Magnesium 1.7 mg/dL     Comprehensive Metabolic Panel [[id number]]  (Abnormal) Collected: 06/02/23 0546    Specimen: Blood Updated: 06/02/23 0650     Glucose 192 mg/dL      BUN 22 mg/dL      Creatinine 1.04 mg/dL      Sodium 142 mmol/L      Potassium 2.7 mmol/L      Chloride 95 mmol/L      CO2 33.0 mmol/L      Calcium 8.8 mg/dL      Total Protein 7.6 g/dL      Albumin 3.8 g/dL      ALT (SGPT) 21 U/L      AST (SGOT) 25 U/L      Alkaline Phosphatase 81 U/L      Total Bilirubin 1.3 mg/dL      Globulin 3.8 gm/dL      A/G Ratio 1.0 g/dL      BUN/Creatinine Ratio 21.2     Anion Gap 14.0 mmol/L      eGFR 84.3 mL/min/1.73     Narrative:      GFR Normal >60  Chronic Kidney Disease <60  Kidney Failure <15      CBC Auto Differential [[id number]]  (Abnormal) Collected: 06/02/23 0546    Specimen: Blood Updated:  06/02/23 0631     WBC 18.76 10*3/mm3      RBC 4.84 10*6/mm3      Hemoglobin 14.2 g/dL      Hematocrit 42.0 %      MCV 86.8 fL      MCH 29.3 pg      MCHC 33.8 g/dL      RDW 13.1 %      RDW-SD 40.9 fl      MPV 10.3 fL      Platelets 289 10*3/mm3      Neutrophil % 89.3 %      Lymphocyte % 4.4 %      Monocyte % 4.4 %      Eosinophil % 0.0 %      Basophil % 0.2 %      Immature Grans % 1.7 %      Neutrophils, Absolute 16.76 10*3/mm3      Lymphocytes, Absolute 0.83 10*3/mm3      Monocytes, Absolute 0.82 10*3/mm3      Eosinophils, Absolute 0.00 10*3/mm3      Basophils, Absolute 0.04 10*3/mm3      Immature Grans, Absolute 0.31 10*3/mm3      nRBC 0.4 /100 WBC     Blood Culture - Blood, Hand, Right [[id number]]  (Normal) Collected: 05/31/23 1248    Specimen: Blood from Hand, Right Updated: 06/01/23 1300     Blood Culture No growth at 24 hours    Blood Culture - Blood, Hand, Left [[id number]]  (Normal) Collected: 05/31/23 1248    Specimen: Blood from Hand, Left Updated: 06/01/23 1300     Blood Culture No growth at 24 hours    Blood Gas, Arterial - [[id number]]  (Abnormal) Collected: 06/01/23 1115    Specimen: Arterial Blood Updated: 06/01/23 1115     Site Right Radial     Jerome's Test Positive     pH, Arterial 7.465 pH units      Comment: 83 Value above reference range        pCO2, Arterial 48.1 mm Hg      Comment: 83 Value above reference range        pO2, Arterial 52.4 mm Hg      Comment: 85 Value below critical limit        HCO3, Arterial 34.6 mmol/L      Comment: 83 Value above reference range        Base Excess, Arterial 9.3 mmol/L      Comment: 83 Value above reference range        O2 Saturation, Arterial 88.7 %      Comment: 84 Value below reference range        Barometric Pressure for Blood Gas 751 mmHg      Modality Ventilator     FIO2 50 %      Ventilator Mode PC     Set [facility] Resp Rate 22.0     PEEP 5.0     Collected by baljit     Comment: Meter: [id number]     :  [id number]       Respiratory Culture - Sputum,  Cough [[id number]] Collected: 06/01/23 0955    Specimen: Sputum from Cough Updated: 06/01/23 1048     Gram Stain Many (4+) WBCs per low power field      Rare (1+) Epithelial cells per low power field      Rare (1+) Mixed bacterial perla    MRSA Screen, PCR (Inpatient) - Swab, Nares [[id number]]  (Abnormal) Collected: 06/01/23 0925    Specimen: Swab from Nares Updated: 06/01/23 1046     MRSA, PCR Positive    Narrative:      Performed by real-time polymerase chain reaction (qPCR).  The negative predictive value of this diagnostic test is high and should only be used to consider de-escalating anti-MRSA therapy. A positive result may indicate colonization with MRSA and must be correlated clinically.    Creatinine Serum (kidney function) GFR component [[id number]]  (Normal) Collected: 06/01/23 0908    Specimen: Blood Updated: 06/01/23 0935     Creatinine 0.97 mg/dL      eGFR 91.6 mL/min/1.73     Narrative:      GFR Normal >60  Chronic Kidney Disease <60  Kidney Failure <15            I reviewed the patient's new clinical results.    Assessment/Plan:     Active Hospital Problems    Diagnosis  POA   • **Acute respiratory failure with hypoxia and hypercapnia [J96.01, J96.02]  Yes     Sepsis 2/2 pneumonia:  -cont cefepime and vanc  -cont to follow ABG and wean off O2 as appropriate  -cont mucomyst, albuterol, and singular     Volume overload:  -s/p IVF in the ED per sepsis protocol  -on lasix drip will transition to Lasix 40mg IV BID    -Echo shows EF 61-65%, with mild concentric hypertrophy, lt atrial cavity mildly dialated.   -cont phos and K replacement for hypokalemia and hypophosphatemia     Hypertension:  -Clonidine patch  -hydralazine 20 IV  PRN  -labetolol 10 PRN  -holding metoprolol, and lisinopril till stabilized      Intubated and sedated:  SBT trial when adequate     Agree with resident evaluation and treatment.  Lasix drip has been DC'd and patient has been placed on Lasix 40 mg twice a day.  Due to patient's on  comfort level on the vent fentanyl PCA has been added.  Overall the patient remains stable.  We will continue current treatment as noted above and monitor.    Critical care time 35 minutes.  To evaluate patient, review chart and establish treatment plan    Code status is   Code Status and Medical Interventions:   Ordered at: 05/31/23 1610     Level Of Support Discussed With:    Patient     Code Status (Patient has no pulse and is not breathing):    CPR (Attempt to Resuscitate)     Medical Interventions (Patient has pulse or is breathing):    Full Support       Prognosis: fair  Plan for disposition: 3 days    Time: 30 min      This document has been electronically signed by Maya Roa MD on June 2, 2023 09:40 CDT

## 2023-06-02 NOTE — NURSING NOTE
Pt had an external urinary catheter.  Pt on Lasix gtt.  Pt had no UOP since external catheter placed.  Bladder scanned pt, scanner showed 614 ml present.  Spoke with charge RN Vesta who confirmed best practices for pts on Lasix gtt is to have a cerna for accurate measurement and since pt had no UOP for at least 2 hours while on a Lasix gtt a cerna was warranted. Cerna placed 2010.

## 2023-06-02 NOTE — PROGRESS NOTES
Pharmacokinetics by Pharmacy - Vancomycin    Jon Gill is a 58 y.o. male receiving vancomycin 2000 mg IV q12hr (day 3) for pneumonia.  Patient is also receiving cefepime.    Objective:    Temp Readings from Last 1 Encounters:   06/02/23 98.7 °F (37.1 °C) (Axillary)     WBC   Date Value Ref Range Status   06/02/2023 18.76 (H) 3.40 - 10.80 10*3/mm3 Final   05/31/2023 18.89 (H) 3.40 - 10.80 10*3/mm3 Final      Lactate   Date Value Ref Range Status   05/31/2023 1.4 0.5 - 2.0 mmol/L Final   05/31/2023 3.0 (C) 0.5 - 2.0 mmol/L Final   05/31/2023 2.4 (C) 0.5 - 2.0 mmol/L Final      Creatinine   Date Value Ref Range Status   06/02/2023 1.04 0.76 - 1.27 mg/dL Final   06/01/2023 0.97 0.76 - 1.27 mg/dL Final   05/31/2023 0.98 0.76 - 1.27 mg/dL Final       No results found for: VANCOPEAK, VANCOTROUGH, VANCORANDOM    Culture Results:  Microbiology Results (last 10 days)     Procedure Component Value - Date/Time    Respiratory Culture - Sputum, Cough [[id number]] Collected: 06/01/23 0955    Lab Status: Preliminary result Specimen: Sputum from Cough Updated: 06/01/23 1048     Gram Stain Many (4+) WBCs per low power field      Rare (1+) Epithelial cells per low power field      Rare (1+) Mixed bacterial perla    MRSA Screen, PCR (Inpatient) - Swab, Nares [[id number]]  (Abnormal) Collected: 06/01/23 0925    Lab Status: Final result Specimen: Swab from Nares Updated: 06/01/23 1046     MRSA, PCR Positive    Narrative:      Performed by real-time polymerase chain reaction (qPCR).  The negative predictive value of this diagnostic test is high and should only be used to consider de-escalating anti-MRSA therapy. A positive result may indicate colonization with MRSA and must be correlated clinically.    Blood Culture - Blood, Hand, Right [[id number]]  (Normal) Collected: 05/31/23 1248    Lab Status: Preliminary result Specimen: Blood from Hand, Right Updated: 06/01/23 1300     Blood Culture No growth at 24 hours    Blood Culture -  "Blood, Hand, Left [[id number]]  (Normal) Collected: 05/31/23 1248    Lab Status: Preliminary result Specimen: Blood from Hand, Left Updated: 06/01/23 1300     Blood Culture No growth at 24 hours    COVID-19 and FLU A/B PCR - Swab, Nasopharynx [[id number]]  (Normal) Collected: 05/31/23 1241    Lab Status: Final result Specimen: Swab from Nasopharynx Updated: 05/31/23 1315     COVID19 Not Detected     Influenza A PCR Not Detected     Influenza B PCR Not Detected    Narrative:      Fact sheet for providers: https://www.fda.gov/media/528162/download    Fact sheet for patients: https://www.fda.gov/media/075387/download    Test performed by PCR.        No results found for: RESPCX    [Ht: 175.3 cm (69.02\"); Wt: 136 kg (299 lb 11.2 oz)]   Body mass index is 44.24 kg/m².  Ideal body weight: 70.7 kg (155 lb 15.1 oz)  Adjusted ideal body weight: 96.8 kg (213 lb 7.2 oz)      Assessment:  • WBCs elevated, afebrile  • Creatinine WNL and appears stable  • Cultures  o 5/31 blood culture NGD1  o 6/1 respiratory culture pending   o 6/1 MRSA PCR positive. Positive MRSA PCR has a low positive predictive value and may only indicate colonization. Correlate clinically. Follow respiratory culture if available.   • Vancomycin random check resulted at 23.3 mcg/mL this morning ~3 hours before the next dose was due. True trough likely within goal range.   • AUC and trough goals are 400-600 and 10-20 mcg/mL, respectively.     Plan:  1. Continue Vancomycin 2000 mg IV q12hrs   2. Vancomycin peak and trough to be collected on 6/2 @2300 and 6/3 @0730, respectively (Currently ordered). Consulted until 6/7.  3. Pharmacy will monitor renal function and adjust dose accordingly.    Thank you for this consult.     Home Pete, [facility]   06/02/23 09:43 CDT    "

## 2023-06-02 NOTE — PLAN OF CARE
Problem: Adult Inpatient Plan of Care  Goal: Plan of Care Review  Outcome: Ongoing, Progressing  Flowsheets (Taken 6/2/2023 2421)  Progress: improving  Plan of Care Reviewed With: patient  Outcome Evaluation: vss   Goal Outcome Evaluation:  Plan of Care Reviewed With: patient        Progress: improving  Outcome Evaluation: vss

## 2023-06-03 ENCOUNTER — APPOINTMENT (OUTPATIENT)
Dept: GENERAL RADIOLOGY | Facility: HOSPITAL | Age: 58
End: 2023-06-03
Payer: COMMERCIAL

## 2023-06-03 LAB
ALBUMIN SERPL-MCNC: 3.2 G/DL (ref 3.5–5.2)
ALBUMIN/GLOB SERPL: 0.9 G/DL
ALP SERPL-CCNC: 65 U/L (ref 39–117)
ALT SERPL W P-5'-P-CCNC: 17 U/L (ref 1–41)
ANION GAP SERPL CALCULATED.3IONS-SCNC: 13 MMOL/L (ref 5–15)
ARTERIAL PATENCY WRIST A: ABNORMAL
AST SERPL-CCNC: 23 U/L (ref 1–40)
ATMOSPHERIC PRESS: 746 MMHG
BACTERIA SPEC RESP CULT: NO GROWTH
BASE EXCESS BLDA CALC-SCNC: 9.6 MMOL/L (ref 0–2)
BASOPHILS # BLD AUTO: 0.09 10*3/MM3 (ref 0–0.2)
BASOPHILS NFR BLD AUTO: 0.6 % (ref 0–1.5)
BDY SITE: ABNORMAL
BILIRUB SERPL-MCNC: 0.8 MG/DL (ref 0–1.2)
BUN SERPL-MCNC: 39 MG/DL (ref 6–20)
BUN/CREAT SERPL: 33.9 (ref 7–25)
CALCIUM SPEC-SCNC: 8.3 MG/DL (ref 8.6–10.5)
CHLORIDE SERPL-SCNC: 100 MMOL/L (ref 98–107)
CO2 SERPL-SCNC: 29 MMOL/L (ref 22–29)
CREAT SERPL-MCNC: 1.15 MG/DL (ref 0.76–1.27)
DEPRECATED RDW RBC AUTO: 44 FL (ref 37–54)
EGFRCR SERPLBLD CKD-EPI 2021: 74.7 ML/MIN/1.73
EOSINOPHIL # BLD AUTO: 0.01 10*3/MM3 (ref 0–0.4)
EOSINOPHIL NFR BLD AUTO: 0.1 % (ref 0.3–6.2)
ERYTHROCYTE [DISTWIDTH] IN BLOOD BY AUTOMATED COUNT: 13.6 % (ref 12.3–15.4)
GLOBULIN UR ELPH-MCNC: 3.5 GM/DL
GLUCOSE BLDC GLUCOMTR-MCNC: 168 MG/DL (ref 70–130)
GLUCOSE BLDC GLUCOMTR-MCNC: 184 MG/DL (ref 70–130)
GLUCOSE SERPL-MCNC: 190 MG/DL (ref 65–99)
GRAM STN SPEC: NORMAL
HCO3 BLDA-SCNC: 35.9 MMOL/L (ref 20–26)
HCT VFR BLD AUTO: 40 % (ref 37.5–51)
HGB BLD-MCNC: 13.1 G/DL (ref 13–17.7)
IMM GRANULOCYTES # BLD AUTO: 0.15 10*3/MM3 (ref 0–0.05)
IMM GRANULOCYTES NFR BLD AUTO: 1.1 % (ref 0–0.5)
INHALED O2 CONCENTRATION: 85 %
LYMPHOCYTES # BLD AUTO: 0.77 10*3/MM3 (ref 0.7–3.1)
LYMPHOCYTES NFR BLD AUTO: 5.5 % (ref 19.6–45.3)
Lab: ABNORMAL
MAGNESIUM SERPL-MCNC: 1.9 MG/DL (ref 1.6–2.6)
MCH RBC QN AUTO: 29.4 PG (ref 26.6–33)
MCHC RBC AUTO-ENTMCNC: 32.8 G/DL (ref 31.5–35.7)
MCV RBC AUTO: 89.7 FL (ref 79–97)
MODALITY: ABNORMAL
MONOCYTES # BLD AUTO: 0.77 10*3/MM3 (ref 0.1–0.9)
MONOCYTES NFR BLD AUTO: 5.5 % (ref 5–12)
NEUTROPHILS NFR BLD AUTO: 12.18 10*3/MM3 (ref 1.7–7)
NEUTROPHILS NFR BLD AUTO: 87.2 % (ref 42.7–76)
NRBC BLD AUTO-RTO: 0.2 /100 WBC (ref 0–0.2)
PAW @ PEAK INSP FLOW SETTING VENT: 18 CMH2O
PCO2 BLDA: 54.6 MM HG (ref 35–45)
PEEP RESPIRATORY: 10 CM[H2O]
PH BLDA: 7.43 PH UNITS (ref 7.35–7.45)
PHOSPHATE SERPL-MCNC: 4.4 MG/DL (ref 2.5–4.5)
PLATELET # BLD AUTO: 197 10*3/MM3 (ref 140–450)
PMV BLD AUTO: 10.4 FL (ref 6–12)
PO2 BLDA: 69.7 MM HG (ref 83–108)
POTASSIUM SERPL-SCNC: 3.8 MMOL/L (ref 3.5–5.2)
POTASSIUM SERPL-SCNC: 3.9 MMOL/L (ref 3.5–5.2)
PROT SERPL-MCNC: 6.7 G/DL (ref 6–8.5)
QT INTERVAL: 414 MS
QTC INTERVAL: 547 MS
RBC # BLD AUTO: 4.46 10*6/MM3 (ref 4.14–5.8)
SAO2 % BLDCOA: 94.6 % (ref 94–99)
SET MECH RESP RATE: 24
SODIUM SERPL-SCNC: 142 MMOL/L (ref 136–145)
VANCOMYCIN PEAK SERPL-MCNC: 47.1 MCG/ML (ref 20–40)
VANCOMYCIN TROUGH SERPL-MCNC: 26.4 MCG/ML (ref 5–20)
VENTILATOR MODE: ABNORMAL
WBC NRBC COR # BLD: 13.97 10*3/MM3 (ref 3.4–10.8)
WHOLE BLOOD HOLD SPECIMEN: NORMAL

## 2023-06-03 PROCEDURE — 0 CEFEPIME PER 500 MG: Performed by: HOSPITALIST

## 2023-06-03 PROCEDURE — 83735 ASSAY OF MAGNESIUM: CPT | Performed by: HOSPITALIST

## 2023-06-03 PROCEDURE — 74018 RADEX ABDOMEN 1 VIEW: CPT

## 2023-06-03 PROCEDURE — 80202 ASSAY OF VANCOMYCIN: CPT | Performed by: INTERNAL MEDICINE

## 2023-06-03 PROCEDURE — 25010000002 FENTANYL CITRATE (PF) 250 MCG/5ML SOLUTION: Performed by: HOSPITALIST

## 2023-06-03 PROCEDURE — 25010000002 ENOXAPARIN PER 10 MG: Performed by: HOSPITALIST

## 2023-06-03 PROCEDURE — 25010000002 VANCOMYCIN 10 G RECONSTITUTED SOLUTION: Performed by: HOSPITALIST

## 2023-06-03 PROCEDURE — 25010000002 METHYLPREDNISOLONE PER 125 MG: Performed by: HOSPITALIST

## 2023-06-03 PROCEDURE — 25010000002 HYDRALAZINE PER 20 MG: Performed by: HOSPITALIST

## 2023-06-03 PROCEDURE — 94664 DEMO&/EVAL PT USE INHALER: CPT

## 2023-06-03 PROCEDURE — 25010000002 MIDAZOLAM 50 MG/10ML SOLUTION: Performed by: INTERNAL MEDICINE

## 2023-06-03 PROCEDURE — 25010000002 FUROSEMIDE PER 20 MG: Performed by: HOSPITALIST

## 2023-06-03 PROCEDURE — 94003 VENT MGMT INPAT SUBQ DAY: CPT

## 2023-06-03 PROCEDURE — 82948 REAGENT STRIP/BLOOD GLUCOSE: CPT

## 2023-06-03 PROCEDURE — 85025 COMPLETE CBC W/AUTO DIFF WBC: CPT | Performed by: HOSPITALIST

## 2023-06-03 PROCEDURE — 84132 ASSAY OF SERUM POTASSIUM: CPT | Performed by: INTERNAL MEDICINE

## 2023-06-03 PROCEDURE — 80202 ASSAY OF VANCOMYCIN: CPT | Performed by: HOSPITALIST

## 2023-06-03 PROCEDURE — 0 POTASSIUM CHLORIDE 10 MEQ/100ML SOLUTION: Performed by: INTERNAL MEDICINE

## 2023-06-03 PROCEDURE — 94799 UNLISTED PULMONARY SVC/PX: CPT

## 2023-06-03 PROCEDURE — 82803 BLOOD GASES ANY COMBINATION: CPT

## 2023-06-03 PROCEDURE — 25010000002 PROPOFOL 10 MG/ML EMULSION: Performed by: INTERNAL MEDICINE

## 2023-06-03 PROCEDURE — 80053 COMPREHEN METABOLIC PANEL: CPT | Performed by: HOSPITALIST

## 2023-06-03 PROCEDURE — 84100 ASSAY OF PHOSPHORUS: CPT | Performed by: HOSPITALIST

## 2023-06-03 PROCEDURE — 94761 N-INVAS EAR/PLS OXIMETRY MLT: CPT

## 2023-06-03 RX ORDER — METOPROLOL TARTRATE 50 MG/1
50 TABLET, FILM COATED ORAL EVERY 12 HOURS SCHEDULED
Status: DISCONTINUED | OUTPATIENT
Start: 2023-06-03 | End: 2023-06-04 | Stop reason: HOSPADM

## 2023-06-03 RX ORDER — SODIUM CHLORIDE 9 MG/ML
INJECTION, SOLUTION INTRAVENOUS
Status: COMPLETED
Start: 2023-06-03 | End: 2023-06-03

## 2023-06-03 RX ORDER — LISINOPRIL 40 MG/1
40 TABLET ORAL
Status: DISCONTINUED | OUTPATIENT
Start: 2023-06-03 | End: 2023-06-04 | Stop reason: HOSPADM

## 2023-06-03 RX ADMIN — ALBUTEROL SULFATE 2 PUFF: 90 AEROSOL, METERED RESPIRATORY (INHALATION) at 13:21

## 2023-06-03 RX ADMIN — MINERAL OIL, PETROLATUM: 425; 573 OINTMENT OPHTHALMIC at 07:52

## 2023-06-03 RX ADMIN — MONTELUKAST 10 MG: 10 TABLET, FILM COATED ORAL at 20:05

## 2023-06-03 RX ADMIN — ENOXAPARIN SODIUM 40 MG: 40 INJECTION SUBCUTANEOUS at 08:37

## 2023-06-03 RX ADMIN — ALBUTEROL SULFATE 2 PUFF: 90 AEROSOL, METERED RESPIRATORY (INHALATION) at 16:20

## 2023-06-03 RX ADMIN — SODIUM CHLORIDE 40 ML: 9 INJECTION, SOLUTION INTRAVENOUS at 23:16

## 2023-06-03 RX ADMIN — METHYLPREDNISOLONE SODIUM SUCCINATE 80 MG: 125 INJECTION INTRAMUSCULAR; INTRAVENOUS at 20:06

## 2023-06-03 RX ADMIN — VECURONIUM BROMIDE 1 MCG/KG/MIN: 1 INJECTION, POWDER, LYOPHILIZED, FOR SOLUTION INTRAVENOUS at 10:54

## 2023-06-03 RX ADMIN — PROPOFOL 40 MCG/KG/MIN: 10 INJECTION, EMULSION INTRAVENOUS at 15:00

## 2023-06-03 RX ADMIN — MINERAL OIL, PETROLATUM: 425; 573 OINTMENT OPHTHALMIC at 16:10

## 2023-06-03 RX ADMIN — MINERAL OIL, PETROLATUM 1 APPLICATION: 425; 573 OINTMENT OPHTHALMIC at 23:59

## 2023-06-03 RX ADMIN — METHYLPREDNISOLONE SODIUM SUCCINATE 80 MG: 125 INJECTION INTRAMUSCULAR; INTRAVENOUS at 04:17

## 2023-06-03 RX ADMIN — CEFEPIME 2 G: 2 INJECTION, POWDER, FOR SOLUTION INTRAVENOUS at 03:18

## 2023-06-03 RX ADMIN — MINERAL OIL, PETROLATUM 1 APPLICATION: 425; 573 OINTMENT OPHTHALMIC at 20:10

## 2023-06-03 RX ADMIN — PROPOFOL 40 MCG/KG/MIN: 10 INJECTION, EMULSION INTRAVENOUS at 12:27

## 2023-06-03 RX ADMIN — MINERAL OIL, PETROLATUM: 425; 573 OINTMENT OPHTHALMIC at 11:49

## 2023-06-03 RX ADMIN — PROPOFOL 60 MCG/KG/MIN: 10 INJECTION, EMULSION INTRAVENOUS at 05:56

## 2023-06-03 RX ADMIN — PROPOFOL 40 MCG/KG/MIN: 10 INJECTION, EMULSION INTRAVENOUS at 17:38

## 2023-06-03 RX ADMIN — LABETALOL HYDROCHLORIDE 10 MG: 5 INJECTION, SOLUTION INTRAVENOUS at 22:12

## 2023-06-03 RX ADMIN — DOCUSATE SODIUM 50 MG AND SENNOSIDES 8.6 MG 2 TABLET: 8.6; 5 TABLET, FILM COATED ORAL at 08:37

## 2023-06-03 RX ADMIN — VANCOMYCIN HYDROCHLORIDE 1250 MG: 10 INJECTION, POWDER, LYOPHILIZED, FOR SOLUTION INTRAVENOUS at 21:57

## 2023-06-03 RX ADMIN — MINERAL OIL, PETROLATUM: 425; 573 OINTMENT OPHTHALMIC at 04:20

## 2023-06-03 RX ADMIN — HYDRALAZINE HYDROCHLORIDE 20 MG: 20 INJECTION INTRAMUSCULAR; INTRAVENOUS at 23:57

## 2023-06-03 RX ADMIN — FENTANYL CITRATE: 50 INJECTION, SOLUTION INTRAMUSCULAR; INTRAVENOUS at 19:56

## 2023-06-03 RX ADMIN — FUROSEMIDE 40 MG: 10 INJECTION, SOLUTION INTRAMUSCULAR; INTRAVENOUS at 08:38

## 2023-06-03 RX ADMIN — FENTANYL CITRATE: 50 INJECTION, SOLUTION INTRAMUSCULAR; INTRAVENOUS at 00:04

## 2023-06-03 RX ADMIN — FUROSEMIDE 40 MG: 10 INJECTION, SOLUTION INTRAMUSCULAR; INTRAVENOUS at 20:06

## 2023-06-03 RX ADMIN — POTASSIUM CHLORIDE 10 MEQ: 7.46 INJECTION, SOLUTION INTRAVENOUS at 00:41

## 2023-06-03 RX ADMIN — CEFEPIME 2 G: 2 INJECTION, POWDER, FOR SOLUTION INTRAVENOUS at 18:02

## 2023-06-03 RX ADMIN — MIDAZOLAM 6 MG/HR: 5 INJECTION, SOLUTION INTRAMUSCULAR; INTRAVENOUS at 00:20

## 2023-06-03 RX ADMIN — FENTANYL CITRATE: 50 INJECTION, SOLUTION INTRAMUSCULAR; INTRAVENOUS at 09:44

## 2023-06-03 RX ADMIN — ALBUTEROL SULFATE 2 PUFF: 90 AEROSOL, METERED RESPIRATORY (INHALATION) at 21:53

## 2023-06-03 RX ADMIN — MIDAZOLAM 4 MG/HR: 5 INJECTION, SOLUTION INTRAMUSCULAR; INTRAVENOUS at 09:43

## 2023-06-03 RX ADMIN — POLYETHYLENE GLYCOL 3350 17 G: 17 POWDER, FOR SOLUTION ORAL at 08:38

## 2023-06-03 RX ADMIN — HYDRALAZINE HYDROCHLORIDE 20 MG: 20 INJECTION INTRAMUSCULAR; INTRAVENOUS at 11:46

## 2023-06-03 RX ADMIN — METOPROLOL TARTRATE 50 MG: 50 TABLET, FILM COATED ORAL at 12:54

## 2023-06-03 RX ADMIN — LABETALOL HYDROCHLORIDE 10 MG: 5 INJECTION, SOLUTION INTRAVENOUS at 10:43

## 2023-06-03 RX ADMIN — PROPOFOL 60 MCG/KG/MIN: 10 INJECTION, EMULSION INTRAVENOUS at 02:30

## 2023-06-03 RX ADMIN — ALBUTEROL SULFATE 2 PUFF: 90 AEROSOL, METERED RESPIRATORY (INHALATION) at 07:01

## 2023-06-03 RX ADMIN — CEFEPIME 2 G: 2 INJECTION, POWDER, FOR SOLUTION INTRAVENOUS at 10:18

## 2023-06-03 RX ADMIN — PANTOPRAZOLE SODIUM 40 MG: 40 INJECTION, POWDER, FOR SOLUTION INTRAVENOUS at 05:56

## 2023-06-03 RX ADMIN — METOPROLOL TARTRATE 50 MG: 50 TABLET, FILM COATED ORAL at 20:05

## 2023-06-03 RX ADMIN — PROPOFOL 60 MCG/KG/MIN: 10 INJECTION, EMULSION INTRAVENOUS at 04:17

## 2023-06-03 RX ADMIN — POTASSIUM CHLORIDE 10 MEQ: 7.46 INJECTION, SOLUTION INTRAVENOUS at 02:15

## 2023-06-03 RX ADMIN — LISINOPRIL 40 MG: 40 TABLET ORAL at 14:16

## 2023-06-03 RX ADMIN — BISACODYL 10 MG: 10 SUPPOSITORY RECTAL at 19:54

## 2023-06-03 RX ADMIN — PROPOFOL 45 MCG/KG/MIN: 10 INJECTION, EMULSION INTRAVENOUS at 08:37

## 2023-06-03 RX ADMIN — PROPOFOL 40 MCG/KG/MIN: 10 INJECTION, EMULSION INTRAVENOUS at 20:07

## 2023-06-03 RX ADMIN — Medication: at 09:55

## 2023-06-03 RX ADMIN — ENOXAPARIN SODIUM 40 MG: 40 INJECTION SUBCUTANEOUS at 20:09

## 2023-06-03 RX ADMIN — METHYLPREDNISOLONE SODIUM SUCCINATE 80 MG: 125 INJECTION INTRAMUSCULAR; INTRAVENOUS at 10:45

## 2023-06-03 RX ADMIN — LABETALOL HYDROCHLORIDE 10 MG: 5 INJECTION, SOLUTION INTRAVENOUS at 16:40

## 2023-06-03 NOTE — SIGNIFICANT NOTE
Patient oxygen saturation dropped to 80% while on ventilator. RT began bagging and sats improved. Multiple attempts were made to be reconnected to ventilator but sats decreased each time and required bagging to recover. Patient was bagged for approximately 30 mins and reconnected to ventilator with sat of 92% and currently maintaining at this time. PEEP increased to 12 after being placed back on ventilator. Will continue to monitor patient.

## 2023-06-03 NOTE — PROGRESS NOTES
Nutrition Services    Patient Name:  Jon Gill  YOB: 1965  MRN: [id number]  Admit Date:  5/31/2023    Spoke with RN this date about starting EN.  RN stated hospitalist has made rounds and said it is okay to start nutrition.  Pt is now on vec.  Spoke with RN about starting feeding low to see how pt tolerates and then advancing to goal if pt tolerates.  Last BM noted 5/30 - sent RN a chat that pt will likely need a good bowel regimen to help have a BM.  Do not want pt to start to have adb distention when EN is started and he has not had a BM for several days.      EEN using critical care guidelines:  3809-3158 kcal and 87 g pro.    Will order peptamen 1.5 for easier digestion start @ 10 ml/hr.  If pt for 12 hours, can adv 10 ml every 12 hours to goal of 40 ml/hr.  Pt currently getting 539 kcal from propofol rate.    1440 kcal from formula + 539 kcal from propofol = total of 1979 kcal per day  65 g pro  1459 ml free water including a 30 ml flush every hour.    Pt has noted edema:  generalized 1, arms 1, hands 2, legs 1, ankles 1, feet 1  Flush may need to be adjusted with hydration status.    LAB RESULTS:      Lab 06/03/23  0445 06/02/23  0546 05/31/23  1711 05/31/23  1413 05/31/23  1133 05/31/23  1124   WBC 13.97* 18.76*  --   --   --  18.89*   HEMOGLOBIN 13.1 14.2  --   --   --  13.2   HEMATOCRIT 40.0 42.0  --   --   --  41.2   PLATELETS 197 289  --   --   --  250   NEUTROS ABS 12.18* 16.76*  --   --   --  15.97*   IMMATURE GRANS (ABS) 0.15* 0.31*  --   --   --  0.17*   LYMPHS ABS 0.77 0.83  --   --   --  1.50   MONOS ABS 0.77 0.82  --   --   --  1.16*   EOS ABS 0.01 0.00  --   --   --  0.02   MCV 89.7 86.8  --   --   --  90.9   LACTATE  --   --  1.4 3.0* 2.4*  --          Lab 06/03/23  0757 06/03/23  0445 06/02/23  2041 06/02/23  0546 06/01/23  0908 05/31/23  1425 05/31/23  1216 05/31/23  1216 05/31/23  1124   SODIUM  --  142  --  142  --   --   --   --  140   SODIUM, ARTERIAL  --   --    --   --   --  143  --  140  --    POTASSIUM 3.8 3.9 3.4* 2.7*  --   --   --   --  5.0   CHLORIDE  --  100  --  95*  --   --   --   --  101   CO2  --  29.0  --  33.0*  --   --   --   --  28.0   ANION GAP  --  13.0  --  14.0  --   --   --   --  11.0   BUN  --  39*  --  22*  --   --   --   --  22*   CREATININE  --  1.15  --  1.04 0.97  --   --   --  0.98   EGFR  --  74.7  --  84.3 91.6  --   --   --  90.5   GLUCOSE  --  190*  --  192*  --   --   --   --  152*   GLUCOSE, ARTERIAL  --   --   --   --   --  126*  --  133*  --    CALCIUM  --  8.3*  --  8.8  --   --   --   --  9.4   IONIZED CALCIUM  --   --   --   --   --  4.85   < > 4.94  --    MAGNESIUM  --  1.9  --  1.7  --   --   --   --   --    PHOSPHORUS  --  4.4  --  2.3*  --   --   --   --   --     < > = values in this interval not displayed.         Lab 06/03/23  0445 06/02/23  0546 05/31/23  1124   TOTAL PROTEIN 6.7 7.6 7.6   ALBUMIN 3.2* 3.8 4.1   GLOBULIN 3.5 3.8 3.5   ALT (SGPT) 17 21 27   AST (SGOT) 23 25 39   BILIRUBIN 0.8 1.3* 1.9*   ALK PHOS 65 81 89   LIPASE  --   --  15         Electronically signed by:  Stephanie Chávez, LENO  06/03/23 11:45 CDT

## 2023-06-03 NOTE — PROGRESS NOTES
Pharmacokinetics by Pharmacy - Vancomycin    Jon Gill is a 58 y.o. male receiving vancomycin 2000 mg IV q12hr (day 4) for pneumonia.  Patient is also receiving cefepime.    Objective:    Temp Readings from Last 1 Encounters:   06/03/23 97.6 °F (36.4 °C) (Oral)     WBC   Date Value Ref Range Status   06/03/2023 13.97 (H) 3.40 - 10.80 10*3/mm3 Final   06/02/2023 18.76 (H) 3.40 - 10.80 10*3/mm3 Final      Lactate   Date Value Ref Range Status   05/31/2023 1.4 0.5 - 2.0 mmol/L Final   05/31/2023 3.0 (C) 0.5 - 2.0 mmol/L Final      Creatinine   Date Value Ref Range Status   06/03/2023 1.15 0.76 - 1.27 mg/dL Final   06/02/2023 1.04 0.76 - 1.27 mg/dL Final   06/01/2023 0.97 0.76 - 1.27 mg/dL Final       Vancomycin Peak   Date Value Ref Range Status   06/03/2023 47.10 (C) 20.00 - 40.00 mcg/mL Final     Vancomycin Trough   Date Value Ref Range Status   06/03/2023 26.40 (C) 5.00 - 20.00 mcg/mL Final     Vancomycin Random   Date Value Ref Range Status   06/02/2023 23.30 5.00 - 40.00 mcg/mL Final       Culture Results:  Microbiology Results (last 10 days)       Procedure Component Value - Date/Time    Respiratory Culture - Sputum, Cough [[id number]] Collected: 06/01/23 0955    Lab Status: Final result Specimen: Sputum from Cough Updated: 06/03/23 1042     Respiratory Culture No growth     Gram Stain Many (4+) WBCs per low power field      Rare (1+) Epithelial cells per low power field      Rare (1+) Mixed bacterial perla    MRSA Screen, PCR (Inpatient) - Swab, Nares [[id number]]  (Abnormal) Collected: 06/01/23 0925    Lab Status: Final result Specimen: Swab from Nares Updated: 06/01/23 1046     MRSA, PCR Positive    Narrative:      Performed by real-time polymerase chain reaction (qPCR).  The negative predictive value of this diagnostic test is high and should only be used to consider de-escalating anti-MRSA therapy. A positive result may indicate colonization with MRSA and must be correlated clinically.    Blood  "Culture - Blood, Hand, Right [[id number]]  (Normal) Collected: 05/31/23 1248    Lab Status: Preliminary result Specimen: Blood from Hand, Right Updated: 06/03/23 1301     Blood Culture No growth at 3 days    Blood Culture - Blood, Hand, Left [[id number]]  (Normal) Collected: 05/31/23 1248    Lab Status: Preliminary result Specimen: Blood from Hand, Left Updated: 06/03/23 1301     Blood Culture No growth at 3 days    COVID-19 and FLU A/B PCR - Swab, Nasopharynx [[id number]]  (Normal) Collected: 05/31/23 1241    Lab Status: Final result Specimen: Swab from Nasopharynx Updated: 05/31/23 1315     COVID19 Not Detected     Influenza A PCR Not Detected     Influenza B PCR Not Detected    Narrative:      Fact sheet for providers: https://www.fda.gov/media/260887/download    Fact sheet for patients: https://www.fda.gov/media/962942/download    Test performed by PCR.          No results found for: RESPCX    [Ht: 175.3 cm (69.02\"); Wt: (!) 137 kg (301 lb)]   Body mass index is 44.43 kg/m².  Ideal body weight: 70.7 kg (155 lb 15.1 oz)  Adjusted ideal body weight: 97.1 kg (213 lb 15.5 oz)      Assessment:  WBCs elevated but improving, afebrile  Creatinine slightly up from yesterday. UOP 0.6 mL/kg/hr in the past 24 hours   Cultures  5/31 blood culture NGD3  6/1 respiratory culture pending   6/1 MRSA PCR positive. Positive MRSA PCR has a low positive predictive value and may only indicate colonization. Correlate clinically. Follow respiratory culture if available.   Vancomycin peak and trough resulted at 47.1 mcg/mL and 26.4 mcg/mL, respectively. These given a calculated AUC and trough of 882 and 24.2 mcg/mL, respectively. These are above goal.   AUC and trough goals are 400-600 and 10-20 mcg/mL, respectively.     Plan:  Reduce Vancomycin to 1250 mg IV q12hrs, new estimated AUC and trough of 527 and 15.1 mcg/mL, respectively.  Vancomycin levels when clinically indicated. Consulted until 6/7.  Pharmacy will monitor renal function and " adjust dose accordingly.    Thank you for this consult.     [facility]   06/03/23 13:09 CDT

## 2023-06-03 NOTE — NURSING NOTE
At approximately 1900  the pt became noncompliant with the vent and de-satted to the 80s.  RT began bagging the pt.  Sedation was increased,  versed pushes administered to no real affect. Restarted Versed gtt.  Placed pt on BIS monitor in anticipation of paralytic orders.  Contacted Dr. Mojica at approximately 1940.  Received verbal orders to fully sedate and paralyze.  TOF base line established at 4/4 at 5 mA.  Sedation confirmed, Vec bolus administered, Vec gtt started.  Pts O2 sats returned to mid to high 90s.      Potassium replaced during night.

## 2023-06-03 NOTE — PROGRESS NOTES
[facility]  INPATIENT PROGRESS NOTE      Length of Stay: 3  Date of Admission: 5/31/2023  Primary Care Physician: Halle Baron MD    Subjective   Chief Complaint:   Sedated and intubated  HPI:    This is a 58-year-old male with history of Crohn's disease who is taking immunosuppressive medication at baseline who presents for nausea and shortness of breath.  Symptoms began 3 days ago with cough and minor hemoptysis and have progressed to dyspnea with rest.  Cough has not been productive since then.  No chest pain.  No abdominal pain.  No back pain.  No rash or itching.  No recent change in medications.  No falls or injuries.  No headache.     The patient is seen and evaluated in the ER.  The patient appears to be stable initially and currently on BiPAP.  ABG initial is reviewed and patient has been given 2 L bolus of fluids for sepsis.  Patient was feeling poorly for the past several days becoming progressively worse.  He has had fevers chills shortness of breath chest pain cough which has been productive of some hemoptysis.  Upon transfer from the ER to the CCU the patient's respiratory status decompensated and required emergent intubation.  Dr. Jean performed intubation with out any problems and patient is currently stable on the ventilator.  The patient was given Rocephin and azithromycin in ER due to patient's current decompensation patient was placed on cefepime and vancomycin and will continue to monitor.  Patient is having high peak pressures on the ventilator Mucomyst is added on we will follow response to treatment.     Daily assessment 6/3/2023  Patient seen and examined.  Core track in place.  Blood pressure is elevated will continue home medications.  Patient is now paralyzed due to dyssynchrony of the ventilator.  We will continue to wean as tolerated.  Remains essentially stable for now      Review of Systems   Unable to perform  ROS: Intubated      All pertinent negatives and positives are as above. All other systems have been reviewed and are negative unless otherwise stated.     Objective    As of today 06/03/23  Temp:  [96.9 °F (36.1 °C)-100.1 °F (37.8 °C)] 98.3 °F (36.8 °C)  Heart Rate:  [] 87  Resp:  [20-24] 24  BP: (103-201)/(58-98) 171/88  FiO2 (%):  [85 %-100 %] 85 %    Physical Exam  Vitals and nursing note reviewed.   Constitutional:       Appearance: He is ill-appearing.      Comments: Intubated and sedated   HENT:      Head: Normocephalic and atraumatic.      Right Ear: External ear normal.      Left Ear: External ear normal.      Nose: Nose normal.      Mouth/Throat:      Mouth: Mucous membranes are dry.      Pharynx: Oropharynx is clear.   Eyes:      General: No scleral icterus.     Conjunctiva/sclera: Conjunctivae normal.   Cardiovascular:      Rate and Rhythm: Normal rate and regular rhythm.      Pulses: Normal pulses.      Heart sounds: Normal heart sounds.   Pulmonary:      Breath sounds: Wheezing and rhonchi present.   Abdominal:      General: Bowel sounds are normal. There is no distension.      Palpations: Abdomen is soft.      Tenderness: There is no abdominal tenderness.   Skin:     General: Skin is warm and dry.      Coloration: Skin is not jaundiced.   Neurological:      Motor: No weakness.      Comments: Intubated and sedated         albuterol sulfate HFA, 2 puff, Inhalation, 4x Daily - RT  artificial tears, , Both Eyes, Q4H  cefepime, 2 g, Intravenous, Q8H  cloNIDine, 1 patch, Transdermal, Weekly  enoxaparin, 40 mg, Subcutaneous, Q12H  furosemide, 40 mg, Intravenous, BID  lisinopril, 40 mg, Nasogastric, Q24H  methylPREDNISolone sodium succinate, 80 mg, Intravenous, Q8H  metoprolol tartrate, 50 mg, Nasogastric, Q12H  midazolam, 4 mg, Intravenous, Once  montelukast, 10 mg, Oral, Nightly  pantoprazole, 40 mg, Intravenous, Q AM  senna-docusate sodium, 2 tablet, Oral, BID  sodium chloride, 10 mL, Intravenous,  Q12H  sodium chloride, 10 mL, Intravenous, Q12H  sodium chloride, 10 mL, Intravenous, Q12H  vancomycin, 1,250 mg, Intravenous, Q12H         Results Review:  I have reviewed the labs, radiology results, and diagnostic studies.    Laboratory Data:   Results from last 7 days   Lab Units 06/03/23  0757 06/03/23 0445 06/02/23  2041 06/02/23  0546 06/01/23  0908 05/31/23  1425 05/31/23  1216 05/31/23  1124   SODIUM mmol/L  --  142  --  142  --   --   --  140   SODIUM, ARTERIAL mmol/L  --   --   --   --   --  143   < >  --    POTASSIUM mmol/L 3.8 3.9 3.4* 2.7*  --   --   --  5.0   CHLORIDE mmol/L  --  100  --  95*  --   --   --  101   CO2 mmol/L  --  29.0  --  33.0*  --   --   --  28.0   BUN mg/dL  --  39*  --  22*  --   --   --  22*   CREATININE mg/dL  --  1.15  --  1.04 0.97  --   --  0.98   GLUCOSE mg/dL  --  190*  --  192*  --   --   --  152*   GLUCOSE, ARTERIAL mg/dL  --   --   --   --   --  126*   < >  --    CALCIUM mg/dL  --  8.3*  --  8.8  --   --   --  9.4   BILIRUBIN mg/dL  --  0.8  --  1.3*  --   --   --  1.9*   ALK PHOS U/L  --  65  --  81  --   --   --  89   ALT (SGPT) U/L  --  17  --  21  --   --   --  27   AST (SGOT) U/L  --  23  --  25  --   --   --  39   ANION GAP mmol/L  --  13.0  --  14.0  --   --   --  11.0    < > = values in this interval not displayed.       Estimated Creatinine Clearance: 98.6 mL/min (by C-G formula based on SCr of 1.15 mg/dL).  Results from last 7 days   Lab Units 06/03/23 0445 06/02/23  0546   MAGNESIUM mg/dL 1.9 1.7   PHOSPHORUS mg/dL 4.4 2.3*         Results from last 7 days   Lab Units 06/03/23  0445 06/02/23  0546 05/31/23  1124   WBC 10*3/mm3 13.97* 18.76* 18.89*   HEMOGLOBIN g/dL 13.1 14.2 13.2   HEMATOCRIT % 40.0 42.0 41.2   PLATELETS 10*3/mm3 197 289 250             Culture Data:   No results found for: BLOODCX    No results found for: URINECX  Respiratory Culture   Date Value Ref Range Status   06/01/2023 No growth  Final     No results found for: WOUNDCX  No results  found for: STOOLCX  No components found for: BODYFLD    Radiology Data:   Imaging Results (Last 24 Hours)       Procedure Component Value Units Date/Time    XR Abdomen KUB [[id number]] Collected: 06/03/23 1123     Updated: 06/03/23 1228    Narrative:      FINDINGS:  Nasogastric tube tip identified in the distal stomach.    XR Abdomen KUB [[id number]] Collected: 06/02/23 2258     Updated: 06/02/23 2302    Narrative:      INDICATION:  rigid abdomen, increased fiO2 demand    COMPARISON:  None.      Impression:      FINDINGS/IMPRESSION:  Bowel gas pattern is within normal limits.  No free intraperitoneal gas is appreciated.    XR Chest 1 View [[id number]] Collected: 06/02/23 2005     Updated: 06/02/23 2012    Narrative:      INDICATION:  Hypoxia.    FINDINGS:  ET tube in good position.  NG tube in the stomach.  Right-sided infiltrates seen  on yesterday's study have improved.  No new findings.    SUMMARY:  Improving right-sided infiltrate.  No other change.            I have reviewed the patient's current medications.     Assessment/Plan     Principal Problem:    Acute respiratory failure with hypoxia and hypercapnia    Impressions/plan  Currently sedated intubated and paralyzed.  Sepsis secondary to pneumonia  Patient will be placed on cefepime and vancomycin  Guaifenesin  Mucomyst with DuoNeb's  Currently intubated we will wean vent as tolerated.  Solu-Medrol     Pneumonia  Continue all medications as noted above.     Volume overload  Continue Lasix 40 mg IV every 12 hours.     Hypertension  Restart lisinopril and metoprolol.  Continue labetalol as needed continue hydralazine as needed  Continue clonidine patch     GERD   continue Protonix     Depression/anxiety   continue sertraline     CODE STATUS full code  DVT prophylaxis Lovenox        Medical Decision Making  Number and Complexity of problems: 5 complex problems  Differential Diagnosis: Pneumonia versus CHF     Conditions and Status:        Condition is  worsening.     [facility] Data  External documents reviewed:   My EKG interpretation:     My plain film interpretation:   Chest x-ray completed 5/31/2023  Bilateral diffuse infiltrates  CT scan completed 5/31/2023  No PE or aortic dissection  Severe consolidation throughout the right lung with mild infiltration of the left lung     Tests considered but not ordered: None     Decision rules/scores evaluated (example [id number], Wells, etc): N/A     Discussed with: The patient and his wife and they agree to treatment plan and intubation     Treatment Plan  As above     Care Planning  Shared decision making: Patient updated on current status and informed of proposed care plan; is in agreement with plan  Code status and discussions: Full code     Disposition  Social Determinants of Health that impact treatment or disposition: None  I expect the patient to be discharged to home once stabilized.     I confirmed that the patient's Advance Care Plan is present, code status is documented, or surrogate decision maker is listed in the patient's medical record.         The patient's surrogate decision maker is his wife     I discussed my findings and recommendations with the ER physician, the patient and his wife and Dr. Jean who  did the intubation.     Estimated length of stay is to be determined     Critical care time 40 minutes.  Utilized to evaluate patient review of chart and documentation    Lamberto Mojica DO

## 2023-06-03 NOTE — PLAN OF CARE
Pt unable to SBT trial today due to pt demand high FiO2/PEEP and paralyzed. Continue current settings and wean as able.  Problem: Communication Impairment (Mechanical Ventilation, Invasive)  Goal: Effective Communication  Outcome: Ongoing, Not Progressing     Problem: Inability to Wean (Mechanical Ventilation, Invasive)  Goal: Mechanical Ventilation Liberation  Outcome: Ongoing, Not Progressing     Problem: Device-Related Complication Risk (Mechanical Ventilation, Invasive)  Goal: Optimal Device Function  Outcome: Ongoing, Progressing  Intervention: Optimize Device Care and Function  Recent Flowsheet Documentation  Taken 6/3/2023 1321 by Ruth Gonzalez RRT  Airway Safety Measures: mask valve resuscitator at bedside  Taken 6/3/2023 1030 by Ruth Gonzalez RRT  Airway Safety Measures: mask valve resuscitator at bedside  Taken 6/3/2023 0701 by Ruth Gonzalez RRT  Airway Safety Measures: mask valve resuscitator at bedside     Problem: Nutrition Impairment (Mechanical Ventilation, Invasive)  Goal: Optimal Nutrition Delivery  Outcome: Ongoing, Progressing     Problem: Skin and Tissue Injury (Mechanical Ventilation, Invasive)  Goal: Absence of Device-Related Skin and Tissue Injury  Outcome: Ongoing, Progressing     Problem: Ventilator-Induced Lung Injury (Mechanical Ventilation, Invasive)  Goal: Absence of Ventilator-Induced Lung Injury  Outcome: Ongoing, Progressing  Intervention: Prevent Ventilator-Associated Pneumonia  Recent Flowsheet Documentation  Taken 6/3/2023 1321 by Ruth Gonzalez RRT  Head of Bed (HOB) Positioning: HOB at 30 degrees  Taken 6/3/2023 1030 by Ruth Gonzalez RRT  Head of Bed (HOB) Positioning: HOB at 30 degrees  Taken 6/3/2023 0701 by Ruth Gonzalez RRT  Head of Bed (HOB) Positioning: HOB at 30 degrees   Goal Outcome Evaluation:

## 2023-06-03 NOTE — PLAN OF CARE
Goal Outcome Evaluation:  Patient weaned as tolerated during shift (See previous note). No SBT performed due to being paralyzed and high oxygen demand. Will continue to monitor.

## 2023-06-04 ENCOUNTER — APPOINTMENT (OUTPATIENT)
Dept: GENERAL RADIOLOGY | Facility: HOSPITAL | Age: 58
End: 2023-06-04
Payer: COMMERCIAL

## 2023-06-04 VITALS
SYSTOLIC BLOOD PRESSURE: 179 MMHG | WEIGHT: 295.9 LBS | BODY MASS INDEX: 43.83 KG/M2 | TEMPERATURE: 97.6 F | RESPIRATION RATE: 24 BRPM | HEIGHT: 69 IN | OXYGEN SATURATION: 94 % | HEART RATE: 85 BPM | DIASTOLIC BLOOD PRESSURE: 84 MMHG

## 2023-06-04 LAB
ALBUMIN SERPL-MCNC: 3.6 G/DL (ref 3.5–5.2)
ALBUMIN/GLOB SERPL: 0.9 G/DL
ALP SERPL-CCNC: 75 U/L (ref 39–117)
ALT SERPL W P-5'-P-CCNC: 21 U/L (ref 1–41)
ANION GAP SERPL CALCULATED.3IONS-SCNC: 11 MMOL/L (ref 5–15)
ARTERIAL PATENCY WRIST A: ABNORMAL
ARTERIAL PATENCY WRIST A: ABNORMAL
AST SERPL-CCNC: 25 U/L (ref 1–40)
ATMOSPHERIC PRESS: 747 MMHG
ATMOSPHERIC PRESS: 747 MMHG
BASE EXCESS BLDA CALC-SCNC: 11 MMOL/L (ref 0–2)
BASE EXCESS BLDA CALC-SCNC: 11.5 MMOL/L (ref 0–2)
BDY SITE: ABNORMAL
BDY SITE: ABNORMAL
BILIRUB SERPL-MCNC: 0.7 MG/DL (ref 0–1.2)
BUN SERPL-MCNC: 41 MG/DL (ref 6–20)
BUN/CREAT SERPL: 52.6 (ref 7–25)
CALCIUM SPEC-SCNC: 9.1 MG/DL (ref 8.6–10.5)
CHLORIDE SERPL-SCNC: 100 MMOL/L (ref 98–107)
CO2 SERPL-SCNC: 34 MMOL/L (ref 22–29)
CREAT SERPL-MCNC: 0.78 MG/DL (ref 0.76–1.27)
DEPRECATED RDW RBC AUTO: 47.3 FL (ref 37–54)
EGFRCR SERPLBLD CKD-EPI 2021: 104.7 ML/MIN/1.73
ERYTHROCYTE [DISTWIDTH] IN BLOOD BY AUTOMATED COUNT: 14 % (ref 12.3–15.4)
GAS FLOW AIRWAY: 24 LPM
GLOBULIN UR ELPH-MCNC: 3.8 GM/DL
GLUCOSE BLDC GLUCOMTR-MCNC: 227 MG/DL (ref 70–130)
GLUCOSE SERPL-MCNC: 242 MG/DL (ref 65–99)
HCO3 BLDA-SCNC: 38.4 MMOL/L (ref 20–26)
HCO3 BLDA-SCNC: 40.2 MMOL/L (ref 20–26)
HCT VFR BLD AUTO: 49 % (ref 37.5–51)
HGB BLD-MCNC: 15.1 G/DL (ref 13–17.7)
LYMPHOCYTES # BLD MANUAL: 0.56 10*3/MM3 (ref 0.7–3.1)
LYMPHOCYTES NFR BLD MANUAL: 5 % (ref 5–12)
Lab: ABNORMAL
MAGNESIUM SERPL-MCNC: 2.6 MG/DL (ref 1.6–2.6)
MCH RBC QN AUTO: 28.9 PG (ref 26.6–33)
MCHC RBC AUTO-ENTMCNC: 30.8 G/DL (ref 31.5–35.7)
MCV RBC AUTO: 93.9 FL (ref 79–97)
METAMYELOCYTES NFR BLD MANUAL: 2 % (ref 0–0)
MODALITY: ABNORMAL
MODALITY: ABNORMAL
MONOCYTES # BLD: 0.93 10*3/MM3 (ref 0.1–0.9)
MYELOCYTES NFR BLD MANUAL: 1 % (ref 0–0)
NEUTROPHILS # BLD AUTO: 16.51 10*3/MM3 (ref 1.7–7)
NEUTROPHILS NFR BLD MANUAL: 89 % (ref 42.7–76)
PCO2 BLDA: 59.5 MM HG (ref 35–45)
PCO2 BLDA: 67.9 MM HG (ref 35–45)
PH BLDA: 7.38 PH UNITS (ref 7.35–7.45)
PH BLDA: 7.42 PH UNITS (ref 7.35–7.45)
PHOSPHATE SERPL-MCNC: 3.7 MG/DL (ref 2.5–4.5)
PLAT MORPH BLD: NORMAL
PLATELET # BLD AUTO: 289 10*3/MM3 (ref 140–450)
PMV BLD AUTO: 10.3 FL (ref 6–12)
PO2 BLDA: 40.2 MM HG (ref 83–108)
PO2 BLDA: 70.7 MM HG (ref 83–108)
POTASSIUM SERPL-SCNC: 4.2 MMOL/L (ref 3.5–5.2)
PROT SERPL-MCNC: 7.4 G/DL (ref 6–8.5)
RBC # BLD AUTO: 5.22 10*6/MM3 (ref 4.14–5.8)
RBC MORPH BLD: NORMAL
SAO2 % BLDCOA: 72.1 % (ref 94–99)
SAO2 % BLDCOA: 91.3 % (ref 94–99)
SODIUM SERPL-SCNC: 145 MMOL/L (ref 136–145)
TOXIC GRANULATION: ABNORMAL
VARIANT LYMPHS NFR BLD MANUAL: 0 % (ref 0–5)
VARIANT LYMPHS NFR BLD MANUAL: 3 % (ref 19.6–45.3)
VENTILATOR MODE: ABNORMAL
VENTILATOR MODE: AC
WBC NRBC COR # BLD: 18.55 10*3/MM3 (ref 3.4–10.8)

## 2023-06-04 PROCEDURE — 82803 BLOOD GASES ANY COMBINATION: CPT

## 2023-06-04 PROCEDURE — 85025 COMPLETE CBC W/AUTO DIFF WBC: CPT | Performed by: HOSPITALIST

## 2023-06-04 PROCEDURE — 25010000002 FENTANYL CITRATE (PF) 250 MCG/5ML SOLUTION: Performed by: HOSPITALIST

## 2023-06-04 PROCEDURE — 94799 UNLISTED PULMONARY SVC/PX: CPT

## 2023-06-04 PROCEDURE — 0 CEFEPIME PER 500 MG: Performed by: HOSPITALIST

## 2023-06-04 PROCEDURE — 84100 ASSAY OF PHOSPHORUS: CPT | Performed by: HOSPITALIST

## 2023-06-04 PROCEDURE — 85007 BL SMEAR W/DIFF WBC COUNT: CPT | Performed by: HOSPITALIST

## 2023-06-04 PROCEDURE — 25010000002 FUROSEMIDE PER 20 MG: Performed by: HOSPITALIST

## 2023-06-04 PROCEDURE — 63710000001 INSULIN ASPART PER 5 UNITS: Performed by: HOSPITALIST

## 2023-06-04 PROCEDURE — 83735 ASSAY OF MAGNESIUM: CPT | Performed by: HOSPITALIST

## 2023-06-04 PROCEDURE — 25010000002 ENOXAPARIN PER 10 MG: Performed by: HOSPITALIST

## 2023-06-04 PROCEDURE — 94761 N-INVAS EAR/PLS OXIMETRY MLT: CPT

## 2023-06-04 PROCEDURE — 25010000002 PROPOFOL 10 MG/ML EMULSION: Performed by: INTERNAL MEDICINE

## 2023-06-04 PROCEDURE — 82948 REAGENT STRIP/BLOOD GLUCOSE: CPT

## 2023-06-04 PROCEDURE — 94003 VENT MGMT INPAT SUBQ DAY: CPT

## 2023-06-04 PROCEDURE — 25010000002 MORPHINE PER 10 MG: Performed by: HOSPITALIST

## 2023-06-04 PROCEDURE — 80053 COMPREHEN METABOLIC PANEL: CPT | Performed by: HOSPITALIST

## 2023-06-04 PROCEDURE — 71045 X-RAY EXAM CHEST 1 VIEW: CPT

## 2023-06-04 PROCEDURE — 36600 WITHDRAWAL OF ARTERIAL BLOOD: CPT

## 2023-06-04 PROCEDURE — 25010000002 HYDRALAZINE PER 20 MG: Performed by: HOSPITALIST

## 2023-06-04 PROCEDURE — 25010000002 VANCOMYCIN 10 G RECONSTITUTED SOLUTION: Performed by: HOSPITALIST

## 2023-06-04 PROCEDURE — 25010000002 METHYLPREDNISOLONE PER 125 MG: Performed by: HOSPITALIST

## 2023-06-04 RX ORDER — GLUCAGON 1 MG/ML
1 KIT INJECTION
Qty: 1 EACH
Start: 2023-06-04

## 2023-06-04 RX ORDER — FENTANYL CITRATE 50 UG/ML
50 INJECTION, SOLUTION INTRAMUSCULAR; INTRAVENOUS
Refills: 0
Start: 2023-06-04 | End: 2023-06-07

## 2023-06-04 RX ORDER — METHYLPREDNISOLONE SODIUM SUCCINATE 125 MG/2ML
80 INJECTION, POWDER, LYOPHILIZED, FOR SOLUTION INTRAMUSCULAR; INTRAVENOUS EVERY 8 HOURS
Start: 2023-06-04

## 2023-06-04 RX ORDER — PANTOPRAZOLE SODIUM 40 MG/10ML
40 INJECTION, POWDER, LYOPHILIZED, FOR SOLUTION INTRAVENOUS
Start: 2023-06-05

## 2023-06-04 RX ORDER — ENOXAPARIN SODIUM 100 MG/ML
40 INJECTION SUBCUTANEOUS EVERY 12 HOURS SCHEDULED
Start: 2023-06-04

## 2023-06-04 RX ORDER — NICOTINE POLACRILEX 4 MG
15 LOZENGE BUCCAL
Status: DISCONTINUED | OUTPATIENT
Start: 2023-06-04 | End: 2023-06-04 | Stop reason: HOSPADM

## 2023-06-04 RX ORDER — FUROSEMIDE 10 MG/ML
40 INJECTION INTRAMUSCULAR; INTRAVENOUS 2 TIMES DAILY
Start: 2023-06-04

## 2023-06-04 RX ORDER — GLUCAGON 1 MG/ML
1 KIT INJECTION
Status: DISCONTINUED | OUTPATIENT
Start: 2023-06-04 | End: 2023-06-04 | Stop reason: HOSPADM

## 2023-06-04 RX ORDER — MONTELUKAST SODIUM 10 MG/1
10 TABLET ORAL NIGHTLY
Start: 2023-06-04

## 2023-06-04 RX ORDER — INSULIN ASPART 100 [IU]/ML
0-9 INJECTION, SOLUTION INTRAVENOUS; SUBCUTANEOUS EVERY 6 HOURS
Status: DISCONTINUED | OUTPATIENT
Start: 2023-06-04 | End: 2023-06-04 | Stop reason: HOSPADM

## 2023-06-04 RX ORDER — DEXTROSE MONOHYDRATE 25 G/50ML
25 INJECTION, SOLUTION INTRAVENOUS
Status: DISCONTINUED | OUTPATIENT
Start: 2023-06-04 | End: 2023-06-04 | Stop reason: HOSPADM

## 2023-06-04 RX ORDER — NALOXONE HYDROCHLORIDE 4 MG/.1ML
SPRAY NASAL
Qty: 2 EACH | Refills: 0 | Status: SHIPPED | OUTPATIENT
Start: 2023-06-04

## 2023-06-04 RX ORDER — ALBUTEROL SULFATE 90 UG/1
2 AEROSOL, METERED RESPIRATORY (INHALATION)
Start: 2023-06-04

## 2023-06-04 RX ORDER — CLONIDINE 0.2 MG/24H
1 PATCH, EXTENDED RELEASE TRANSDERMAL WEEKLY
Start: 2023-06-08

## 2023-06-04 RX ORDER — ACETYLCYSTEINE 100 MG/ML
400 SOLUTION ORAL; RESPIRATORY (INHALATION)
Start: 2023-06-04

## 2023-06-04 RX ORDER — METOPROLOL TARTRATE 50 MG/1
50 TABLET, FILM COATED ORAL EVERY 12 HOURS SCHEDULED
Start: 2023-06-04

## 2023-06-04 RX ADMIN — PROPOFOL 40 MCG/KG/MIN: 10 INJECTION, EMULSION INTRAVENOUS at 05:41

## 2023-06-04 RX ADMIN — HYDRALAZINE HYDROCHLORIDE 20 MG: 20 INJECTION INTRAMUSCULAR; INTRAVENOUS at 12:13

## 2023-06-04 RX ADMIN — LABETALOL HYDROCHLORIDE 10 MG: 5 INJECTION, SOLUTION INTRAVENOUS at 04:32

## 2023-06-04 RX ADMIN — MINERAL OIL, PETROLATUM: 425; 573 OINTMENT OPHTHALMIC at 11:50

## 2023-06-04 RX ADMIN — ENOXAPARIN SODIUM 40 MG: 40 INJECTION SUBCUTANEOUS at 08:16

## 2023-06-04 RX ADMIN — PROPOFOL 40 MCG/KG/MIN: 10 INJECTION, EMULSION INTRAVENOUS at 14:06

## 2023-06-04 RX ADMIN — ALBUTEROL SULFATE 2 PUFF: 90 AEROSOL, METERED RESPIRATORY (INHALATION) at 07:13

## 2023-06-04 RX ADMIN — INSULIN ASPART 2 UNITS: 100 INJECTION, SOLUTION INTRAVENOUS; SUBCUTANEOUS at 11:43

## 2023-06-04 RX ADMIN — PROPOFOL 50 MCG/KG/MIN: 10 INJECTION, EMULSION INTRAVENOUS at 13:11

## 2023-06-04 RX ADMIN — PROPOFOL 40 MCG/KG/MIN: 10 INJECTION, EMULSION INTRAVENOUS at 00:00

## 2023-06-04 RX ADMIN — LISINOPRIL 40 MG: 40 TABLET ORAL at 08:16

## 2023-06-04 RX ADMIN — HYDRALAZINE HYDROCHLORIDE 20 MG: 20 INJECTION INTRAMUSCULAR; INTRAVENOUS at 06:22

## 2023-06-04 RX ADMIN — PANTOPRAZOLE SODIUM 40 MG: 40 INJECTION, POWDER, FOR SOLUTION INTRAVENOUS at 05:56

## 2023-06-04 RX ADMIN — CEFEPIME 2 G: 2 INJECTION, POWDER, FOR SOLUTION INTRAVENOUS at 10:34

## 2023-06-04 RX ADMIN — SULFAMETHOXAZOLE AND TRIMETHOPRIM 360 MG OF TRIMETHOPRIM: 80; 16 INJECTION, SOLUTION, CONCENTRATE INTRAVENOUS at 12:46

## 2023-06-04 RX ADMIN — MORPHINE SULFATE 1 MG: 2 INJECTION, SOLUTION INTRAMUSCULAR; INTRAVENOUS at 10:34

## 2023-06-04 RX ADMIN — PROPOFOL 50 MCG/KG/MIN: 10 INJECTION, EMULSION INTRAVENOUS at 08:17

## 2023-06-04 RX ADMIN — MINERAL OIL, PETROLATUM 1 APPLICATION: 425; 573 OINTMENT OPHTHALMIC at 04:18

## 2023-06-04 RX ADMIN — FUROSEMIDE 40 MG: 10 INJECTION, SOLUTION INTRAMUSCULAR; INTRAVENOUS at 08:16

## 2023-06-04 RX ADMIN — METHYLPREDNISOLONE SODIUM SUCCINATE 80 MG: 125 INJECTION INTRAMUSCULAR; INTRAVENOUS at 10:49

## 2023-06-04 RX ADMIN — VANCOMYCIN HYDROCHLORIDE 1250 MG: 10 INJECTION, POWDER, LYOPHILIZED, FOR SOLUTION INTRAVENOUS at 08:17

## 2023-06-04 RX ADMIN — PROPOFOL 45 MCG/KG/MIN: 10 INJECTION, EMULSION INTRAVENOUS at 02:22

## 2023-06-04 RX ADMIN — MINERAL OIL, PETROLATUM: 425; 573 OINTMENT OPHTHALMIC at 08:17

## 2023-06-04 RX ADMIN — METOPROLOL TARTRATE 50 MG: 50 TABLET, FILM COATED ORAL at 08:16

## 2023-06-04 RX ADMIN — FENTANYL CITRATE: 50 INJECTION, SOLUTION INTRAMUSCULAR; INTRAVENOUS at 05:56

## 2023-06-04 RX ADMIN — LABETALOL HYDROCHLORIDE 10 MG: 5 INJECTION, SOLUTION INTRAVENOUS at 09:57

## 2023-06-04 RX ADMIN — VECURONIUM BROMIDE 0.7 MCG/KG/MIN: 1 INJECTION, POWDER, LYOPHILIZED, FOR SOLUTION INTRAVENOUS at 08:30

## 2023-06-04 RX ADMIN — ALBUTEROL SULFATE 2 PUFF: 90 AEROSOL, METERED RESPIRATORY (INHALATION) at 13:24

## 2023-06-04 RX ADMIN — PROPOFOL 50 MCG/KG/MIN: 10 INJECTION, EMULSION INTRAVENOUS at 10:49

## 2023-06-04 RX ADMIN — METHYLPREDNISOLONE SODIUM SUCCINATE 80 MG: 125 INJECTION INTRAMUSCULAR; INTRAVENOUS at 02:57

## 2023-06-04 RX ADMIN — CEFEPIME 2 G: 2 INJECTION, POWDER, FOR SOLUTION INTRAVENOUS at 02:17

## 2023-06-04 NOTE — PROGRESS NOTES
"Physicians Statement of Medical Necessity for  Ambulance Transportation    GENERAL INFORMATION     Name: Jon Gill  YOB: 1965  Medicare #: [id number]   Transport Date: 6/4/2023  Origin: [facility] CCU ROOM 4 Destination: [facility] 2006  Is the Patient's stay covered under Medicare Part A (PPS/DRG?)Yes  Closest appropriate facility? Yes  If this a hosp-hosp transfer? Yes, describe services needed at 2nd facility not available at 1st facility .   Pt is in Acute respiratory failure with hypoxia and hypercapnia.  We have no pulmonary care.  Respiratory failure secondary to sepsis due to COPD exacerbation and right-sided pneumonia  Has been on ventilator for 5 days  The patient was placed on propofol Versed fentanyl due to high peak pressures.  On day of transfer the patient is currently paralyzed due to high peak pressures.  The PEEP is now 10 FiO2 85%.  Is this a hospice patient? No    MEDICAL NECESSITY QUESTIONAIRE    Ambulance Transportation is medically necessary only if other means of transportation are contraindicated or would be potentially harmful to the patient.  To meet this requirement, the patient must be either \"bed confined\" or suffer from a condition such that transport by means other than an ambulance is contraindicated by the patient's condition.  The following questions must be answered by the healthcare professional signing below for this form to be valid:     1) Describe the MEDICAL CONDITION (physical and/or mental) of this patient AT THE TIME OF AMBULANCE TRANSPORT that requires the patient to be transported in an ambulance, and why transport by other means is contraindicated by the patient's condition:   Past Medical History:   Diagnosis Date    Anxiety     Arthritis     Crohn's disease     Depression     Hypertension       Past Surgical History:   Procedure Laterality Date    ABDOMINAL HERNIA REPAIR      COLON RESECTION  1988    COLONOSCOPY  10/26/2015    " "internal hemorrhoids,otherwise normal postoperative colonoscopy with the evaluation of his ti    ENDOSCOPY N/A 11/1/2021    Procedure: ESOPHAGOGASTRODUODENOSCOPY;  Surgeon: Kain Delgadillo MD;  Location: [facility] ENDOSCOPY;  Service: Gastroenterology;  Laterality: N/A;      2) Is this patient \"bed confined\" as defined below?No    To be \"bed confined\" the patient must satisfy all three of the following criteria:  (1) unable to get up from bed without assistance; AND (2) unable to ambulate;  AND (3) unable to sit in a chair or wheelchair.  3) Can this patient safely be transported by car or wheelchair van (I.e., may safely sit during transport, without an attendant or monitoring?)No   4. In addition to completing questions 1-3 above, please check any of the following conditions that apply*:          *Note: supporting documentation for any boxes checked must be maintained in the patient's medical records Moderate/severe pain on movement, Medical attendant required, Unable to tolerate seated position for time needed to transport, Hemodynamic monitoring required en route, Unable to sit in a chair or wheelchair due to decubitus ulcers or other wounds, Cardiac monitoring required en route, and Morbid obesity requires additional personnel/equipment to safely handle patient      SIGNATURE OF PHYSICIAN OR OTHER AUTHORIZED HEALTHCARE PROFESSIONAL    I certify that the above information is true and correct based on my evaluation of this patient, and represent that the patient requires transport by ambulance and that other forms of transport are contraindicated.  I understand that this information will be used by the Centers for Medicare and Medicaid Services (CMS) to support the determiniation of medical necessity for ambulance services, and I represent that I have personal knowledge of the patient's condition at the time of transport.       If this box is checked, I also certify that the patient is physically or mentally " incapable of signing the ambulance service's claim form and that the institution with which I am affiliated has furnished care, services or assistance to the patient.  My signature below is made on behalf of the patient pursuant to 42 .36(b)(4). In accordance with 42 .37, the specific reason(s) that the patient is physically or mentally incapable of signing the claim for is as follows:     Signature of Physician or Healthcare Professional   [address] Date/Time:   6/4/223     (For Scheduled repetitive transport, this form is not valid for transports performed more than 60 days after this date).                                                                                                                                            --------------------------------------------------------------------------------------------  Printed Name and Credentials of Physician or Authorized Healthcare Professional     *Form must be signed by patient's attending physician for scheduled, repetitive transports,.  For non-repetitive ambulance transports, if unable to obtain the signature of the attending physician, any of the following may sign (please select below):     Physician  Clinical Nurse Specialist  Registered Nurse     Physician Assistant x Discharge Planner  Licensed Practical Nurse     Nurse Practitioner x

## 2023-06-04 NOTE — PLAN OF CARE
Goal Outcome Evaluation:                      [facility] at this [facility]. Left with flight crew at 1423. Gertr[facility] RN made aware the patient is on way to facility at 1426.

## 2023-06-04 NOTE — DISCHARGE PLACEMENT REQUEST
"Berenice Gill (58 y.o. Male)       Date of Birth   1965    Social Security Number       Address   [address] [place] [address]    Home Phone   [phone]    MRN   [id number]       Jewish   Anabaptism    Marital Status                               Admission Date   5/31/23    Admission Type   Emergency    Admitting Provider   Lamberto Mojica DO    Attending Provider   Lamberto Mojica DO    Department, Room/Bed   [facility] CRITICAL CARE STEPDOWN, 04/A       Discharge Date       Discharge Disposition   Hospice/Medical Facility (DC - External)    Discharge Destination                                 Attending Provider: Lamberto Mojica DO    Allergies: Ketamine Hcl    Isolation: None   Infection: None   Code Status: CPR    Ht: 175.3 cm (69.02\")   Wt: 134 kg (295 lb 14.4 oz)    Admission Cmt: None   Principal Problem: Acute respiratory failure with hypoxia and hypercapnia [J96.01,J96.02]                   Active Insurance as of 5/31/2023       Primary Coverage       Payor Plan Insurance Group Employer/Plan Group    [employer] BLUE [facility] [id number]       Payor Plan Address Payor Plan Phone Number Payor Plan Fax Number Effective Dates    [address] [phone]  3/1/2013 - None Entered    [facility] [id number]         Subscriber Name Subscriber Birth Date Member ID       BERENICE GILL 1965 [id number]                     Emergency Contacts        (Rel.) Home Phone Work Phone Mobile Phone    VIJAY GILL (Spouse) [phone] -- [phone]    Ernesto Gill (Son) -- [phone] --                "

## 2023-06-04 NOTE — PROGRESS NOTES
Pharmacokinetics by Pharmacy - Vancomycin    Jon Gill is a 58 y.o. male receiving vancomycin 1250 mg IV q12hr (day 5) for pneumonia.  Patient is also receiving cefepime.    Objective:    Temp Readings from Last 1 Encounters:   06/04/23 98 °F (36.7 °C) (Oral)     WBC   Date Value Ref Range Status   06/04/2023 18.55 (H) 3.40 - 10.80 10*3/mm3 Final   06/03/2023 13.97 (H) 3.40 - 10.80 10*3/mm3 Final   06/02/2023 18.76 (H) 3.40 - 10.80 10*3/mm3 Final      No results found for: CRP, LACTATE     Creatinine   Date Value Ref Range Status   06/04/2023 0.78 0.76 - 1.27 mg/dL Final   06/03/2023 1.15 0.76 - 1.27 mg/dL Final   06/02/2023 1.04 0.76 - 1.27 mg/dL Final       Vancomycin Peak   Date Value Ref Range Status   06/03/2023 47.10 (C) 20.00 - 40.00 mcg/mL Final     Vancomycin Trough   Date Value Ref Range Status   06/03/2023 26.40 (C) 5.00 - 20.00 mcg/mL Final     Vancomycin Random   Date Value Ref Range Status   06/02/2023 23.30 5.00 - 40.00 mcg/mL Final       Culture Results:  Microbiology Results (last 10 days)       Procedure Component Value - Date/Time    Respiratory Culture - Sputum, Cough [[id number]] Collected: 06/01/23 0955    Lab Status: Final result Specimen: Sputum from Cough Updated: 06/03/23 1042     Respiratory Culture No growth     Gram Stain Many (4+) WBCs per low power field      Rare (1+) Epithelial cells per low power field      Rare (1+) Mixed bacterial perla    MRSA Screen, PCR (Inpatient) - Swab, Nares [[id number]]  (Abnormal) Collected: 06/01/23 0925    Lab Status: Final result Specimen: Swab from Nares Updated: 06/01/23 1046     MRSA, PCR Positive    Narrative:      Performed by real-time polymerase chain reaction (qPCR).  The negative predictive value of this diagnostic test is high and should only be used to consider de-escalating anti-MRSA therapy. A positive result may indicate colonization with MRSA and must be correlated clinically.    Blood Culture - Blood, Hand, Right [[id number]]   "(Normal) Collected: 05/31/23 1248    Lab Status: Preliminary result Specimen: Blood from Hand, Right Updated: 06/03/23 1301     Blood Culture No growth at 3 days    Blood Culture - Blood, Hand, Left [[id number]]  (Normal) Collected: 05/31/23 1248    Lab Status: Preliminary result Specimen: Blood from Hand, Left Updated: 06/03/23 1301     Blood Culture No growth at 3 days    COVID-19 and FLU A/B PCR - Swab, Nasopharynx [[id number]]  (Normal) Collected: 05/31/23 1241    Lab Status: Final result Specimen: Swab from Nasopharynx Updated: 05/31/23 1315     COVID19 Not Detected     Influenza A PCR Not Detected     Influenza B PCR Not Detected    Narrative:      Fact sheet for providers: https://www.fda.gov/media/122724/download    Fact sheet for patients: https://www.fda.gov/media/661934/download    Test performed by PCR.          No results found for: RESPCX    [Ht: 175.3 cm (69.02\"); Wt: 134 kg (295 lb 14.4 oz)]   Body mass index is 43.68 kg/m².  Ideal body weight: 70.7 kg (155 lb 15.1 oz)  Adjusted ideal body weight: 96.1 kg (211 lb 14.8 oz)      Assessment:  WBCs elevated (receiving steroids), afebrile  Creatinine improving. UOP 1.3 mL/kg/hr in the past 24 hours   Cultures  5/31 blood culture NGD3  6/1 respiratory culture pending   6/1 MRSA PCR positive. Positive MRSA PCR has a low positive predictive value and may only indicate colonization. Correlate clinically. Follow respiratory culture if available.   Vancomycin levels adjusted on 6/3  AUC and trough goals are 400-600 and 10-20 mcg/mL, respectively.     Plan:  Continue Vancomycin 1250 mg IV q12hrs, new estimated AUC and trough of 527 and 15.1 mcg/mL, respectively.  Vancomycin trough check to occur on 6/5 @0830. Consulted until 6/7.  Pharmacy will monitor renal function and adjust dose accordingly.    Thank you for this consult.     [facility]   06/04/23 09:26 CDT    "

## 2023-06-04 NOTE — DISCHARGE SUMMARY
[facility] Medicine Services  DISCHARGE SUMMARY       Date of Admission: 5/31/2023  Date of Discharge:  6/4/2023  Primary Care Physician: Halle Baron MD    Presenting Problem/History of Present Illness:  Acute respiratory failure with hypoxia and hypercapnia [J96.01, J96.02]  Sepsis, due to unspecified organism, unspecified whether acute organ dysfunction present [A41.9]       Final Discharge Diagnoses:  Active Hospital Problems    Diagnosis     **Acute respiratory failure with hypoxia and hypercapnia        Consults:   Consults       No orders found from 5/2/2023 to 6/1/2023.            Procedures Performed:                 Pertinent Test Results:   Lab Results (most recent)       Procedure Component Value Units Date/Time    Blood Gas, Arterial - [[id number]]  (Abnormal) Collected: 06/04/23 1124    Specimen: Arterial Blood Updated: 06/04/23 1124     Site Left Radial     Jerome's Test N/A     pH, Arterial 7.418 pH units      pCO2, Arterial 59.5 mm Hg      Comment: 83 Value above reference range        pO2, Arterial 70.7 mm Hg      Comment: 84 Value below reference range        HCO3, Arterial 38.4 mmol/L      Comment: 83 Value above reference range        Base Excess, Arterial 11.0 mmol/L      Comment: 83 Value above reference range        O2 Saturation, Arterial 91.3 %      Comment: 84 Value below reference range        Barometric Pressure for Blood Gas 747 mmHg      Modality Ventilator     Flow Rate 24.0 lpm      Ventilator Mode AC     Collected by      Comment: Meter: [id number]     :  [id number]       Blood Gas, Arterial - [[id number]]  (Abnormal) Collected: 06/04/23 1100    Specimen: Arterial Blood Updated: 06/04/23 1100     Site Arterial Line     Jerome's Test N/A     pH, Arterial 7.381 pH units      pCO2, Arterial 67.9 mm Hg      Comment: 86 Value above critical limit        pO2, Arterial 40.2 mm Hg      Comment: 85 Value below critical limit         HCO3, Arterial 40.2 mmol/L      Comment: 83 Value above reference range        Base Excess, Arterial 11.5 mmol/L      Comment: 83 Value above reference range        O2 Saturation, Arterial 72.1 %      Comment: 84 Value below reference range        Barometric Pressure for Blood Gas 747 mmHg      Modality Room Air     Ventilator Mode NA     Comment: Meter: [id number]     :  [id number]       Manual Differential [[id number]]  (Abnormal) Collected: 06/04/23 0508    Specimen: Blood Updated: 06/04/23 0651     Neutrophil % 89.0 %      Lymphocyte % 3.0 %      Monocyte % 5.0 %      Metamyelocyte % 2.0 %      Myelocyte % 1.0 %      Atypical Lymphocyte % 0.0 %      Neutrophils Absolute 16.51 10*3/mm3      Lymphocytes Absolute 0.56 10*3/mm3      Monocytes Absolute 0.93 10*3/mm3      RBC Morphology Normal     Toxic Granulation Slight/1+     Platelet Morphology Normal    Comprehensive Metabolic Panel [[id number]]  (Abnormal) Collected: 06/04/23 0508    Specimen: Blood Updated: 06/04/23 0610     Glucose 242 mg/dL      BUN 41 mg/dL      Creatinine 0.78 mg/dL      Sodium 145 mmol/L      Potassium 4.2 mmol/L      Comment: Slight hemolysis detected by analyzer. Results may be affected.        Chloride 100 mmol/L      CO2 34.0 mmol/L      Calcium 9.1 mg/dL      Total Protein 7.4 g/dL      Albumin 3.6 g/dL      ALT (SGPT) 21 U/L      AST (SGOT) 25 U/L      Alkaline Phosphatase 75 U/L      Total Bilirubin 0.7 mg/dL      Globulin 3.8 gm/dL      A/G Ratio 0.9 g/dL      BUN/Creatinine Ratio 52.6     Anion Gap 11.0 mmol/L      eGFR 104.7 mL/min/1.73     Narrative:      GFR Normal >60  Chronic Kidney Disease <60  Kidney Failure <15      Magnesium [[id number]]  (Normal) Collected: 06/04/23 0508    Specimen: Blood Updated: 06/04/23 0610     Magnesium 2.6 mg/dL     Phosphorus [[id number]]  (Normal) Collected: 06/04/23 0508    Specimen: Blood Updated: 06/04/23 0606     Phosphorus 3.7 mg/dL     CBC Auto Differential [[id number]]   (Abnormal) Collected: 06/04/23 0508    Specimen: Blood Updated: 06/04/23 0604     WBC 18.55 10*3/mm3      RBC 5.22 10*6/mm3      Hemoglobin 15.1 g/dL      Hematocrit 49.0 %      MCV 93.9 fL      MCH 28.9 pg      MCHC 30.8 g/dL      RDW 14.0 %      RDW-SD 47.3 fl      MPV 10.3 fL      Platelets 289 10*3/mm3     POC Glucose Once [[id number]]  (Abnormal) Collected: 06/04/23 0505    Specimen: Blood Updated: 06/04/23 0538     Glucose 227 mg/dL      Comment: : [id number] DYKEMA PAMMeter ID: [id number]       POC Glucose Once [[id number]]  (Abnormal) Collected: 06/03/23 2240    Specimen: Blood Updated: 06/03/23 2254     Glucose 184 mg/dL      Comment: : [phone] DYKEMA PAMMeter ID: [id number]       Blood Culture - Blood, Hand, Right [[id number]]  (Normal) Collected: 05/31/23 1248    Specimen: Blood from Hand, Right Updated: 06/03/23 1301     Blood Culture No growth at 3 days    Blood Culture - Blood, Hand, Left [[id number]]  (Normal) Collected: 05/31/23 1248    Specimen: Blood from Hand, Left Updated: 06/03/23 1301     Blood Culture No growth at 3 days    Respiratory Culture - Sputum, Cough [[id number]] Collected: 06/01/23 0955    Specimen: Sputum from Cough Updated: 06/03/23 1042     Respiratory Culture No growth     Gram Stain Many (4+) WBCs per low power field      Rare (1+) Epithelial cells per low power field      Rare (1+) Mixed bacterial perla    Extra Tubes [[id number]] Collected: 06/03/23 0757    Specimen: Blood, Venous Line Updated: 06/03/23 0900    Narrative:      The following orders were created for panel order Extra Tubes.  Procedure                               Abnormality         Status                     ---------                               -----------         ------                     Lavender Top[[id number]]                                     Final result                 Please view results for these tests on the individual orders.    Lavender Top [[id number]] Collected: 06/03/23 0757     Specimen: Blood Updated: 06/03/23 0900     Extra Tube hold for add-on     Comment: Auto resulted       Vancomycin, Trough [[id number]]  (Abnormal) Collected: 06/03/23 0757    Specimen: Blood Updated: 06/03/23 0834     Vancomycin Trough 26.40 mcg/mL     Potassium [[id number]]  (Normal) Collected: 06/03/23 0757    Specimen: Blood Updated: 06/03/23 0823     Potassium 3.8 mmol/L     Comprehensive Metabolic Panel [[id number]]  (Abnormal) Collected: 06/03/23 0445    Specimen: Blood Updated: 06/03/23 0516     Glucose 190 mg/dL      BUN 39 mg/dL      Creatinine 1.15 mg/dL      Sodium 142 mmol/L      Potassium 3.9 mmol/L      Comment: Slight hemolysis detected by analyzer. Results may be affected.        Chloride 100 mmol/L      CO2 29.0 mmol/L      Calcium 8.3 mg/dL      Total Protein 6.7 g/dL      Albumin 3.2 g/dL      ALT (SGPT) 17 U/L      AST (SGOT) 23 U/L      Comment: Slight hemolysis detected by analyzer. Results may be affected.        Alkaline Phosphatase 65 U/L      Total Bilirubin 0.8 mg/dL      Globulin 3.5 gm/dL      A/G Ratio 0.9 g/dL      BUN/Creatinine Ratio 33.9     Anion Gap 13.0 mmol/L      eGFR 74.7 mL/min/1.73     Narrative:      GFR Normal >60  Chronic Kidney Disease <60  Kidney Failure <15      Phosphorus [[id number]]  (Normal) Collected: 06/03/23 0445    Specimen: Blood Updated: 06/03/23 0515     Phosphorus 4.4 mg/dL     Magnesium [[id number]]  (Normal) Collected: 06/03/23 0445    Specimen: Blood Updated: 06/03/23 0515     Magnesium 1.9 mg/dL     CBC Auto Differential [[id number]]  (Abnormal) Collected: 06/03/23 0445    Specimen: Blood Updated: 06/03/23 0453     WBC 13.97 10*3/mm3      RBC 4.46 10*6/mm3      Hemoglobin 13.1 g/dL      Hematocrit 40.0 %      MCV 89.7 fL      MCH 29.4 pg      MCHC 32.8 g/dL      RDW 13.6 %      RDW-SD 44.0 fl      MPV 10.4 fL      Platelets 197 10*3/mm3      Neutrophil % 87.2 %      Lymphocyte % 5.5 %      Monocyte % 5.5 %      Eosinophil % 0.1 %      Basophil % 0.6 %       Immature Grans % 1.1 %      Neutrophils, Absolute 12.18 10*3/mm3      Lymphocytes, Absolute 0.77 10*3/mm3      Monocytes, Absolute 0.77 10*3/mm3      Eosinophils, Absolute 0.01 10*3/mm3      Basophils, Absolute 0.09 10*3/mm3      Immature Grans, Absolute 0.15 10*3/mm3      nRBC 0.2 /100 WBC     Vancomycin, Peak [[id number]]  (Abnormal) Collected: 06/03/23 0025    Specimen: Blood Updated: 06/03/23 0050     Vancomycin Peak 47.10 mcg/mL     Potassium [[id number]]  (Abnormal) Collected: 06/02/23 2041    Specimen: Blood Updated: 06/02/23 2113     Potassium 3.4 mmol/L     Vancomycin, Random [[id number]]  (Normal) Collected: 06/02/23 0546    Specimen: Blood Updated: 06/02/23 1018     Vancomycin Random 23.30 mcg/mL     MRSA Screen, PCR (Inpatient) - Swab, Nares [[id number]]  (Abnormal) Collected: 06/01/23 0925    Specimen: Swab from Nares Updated: 06/01/23 1046     MRSA, PCR Positive    Narrative:      Performed by real-time polymerase chain reaction (qPCR).  The negative predictive value of this diagnostic test is high and should only be used to consider de-escalating anti-MRSA therapy. A positive result may indicate colonization with MRSA and must be correlated clinically.    Creatinine Serum (kidney function) GFR component [[id number]]  (Normal) Collected: 06/01/23 0908    Specimen: Blood Updated: 06/01/23 0935     Creatinine 0.97 mg/dL      eGFR 91.6 mL/min/1.73     Narrative:      GFR Normal >60  Chronic Kidney Disease <60  Kidney Failure <15      STAT Lactic Acid, Reflex [[id number]]  (Normal) Collected: 05/31/23 1711    Specimen: Blood Updated: 05/31/23 1738     Lactate 1.4 mmol/L     Extra Tubes [[id number]] Collected: 05/31/23 1127    Specimen: Blood Updated: 05/31/23 1532    Narrative:      The following orders were created for panel order Extra Tubes.  Procedure                               Abnormality         Status                     ---------                               -----------         ------                      Gold Top - [facility][[id number]]                                   Final result               Jean Top[[id number]]                                         Final result               Light Blue Top[[id number]]                                   Final result                 Please view results for these tests on the individual orders.    Jean Top [[id number]] Collected: 05/31/23 1127    Specimen: Blood Updated: 05/31/23 1532     Extra Tube Hold for add-ons.     Comment: Auto resulted.       STAT Lactic Acid, Reflex [[id number]]  (Abnormal) Collected: 05/31/23 1413    Specimen: Blood Updated: 05/31/23 1441     Lactate 3.0 mmol/L     Urinalysis With Microscopic If Indicated (No Culture) - Urine, Clean Catch [[id number]]  (Abnormal) Collected: 05/31/23 1413    Specimen: Urine, Clean Catch Updated: 05/31/23 1431     Color, UA Yellow     Appearance, UA Clear     pH, UA 5.5     Specific Gravity, UA 1.026     Glucose, UA Negative     Ketones, UA Trace     Bilirubin, UA Negative     Blood, UA Negative     Protein, UA Trace     Leuk Esterase, UA Negative     Nitrite, UA Negative     Urobilinogen, UA 1.0 E.U./dL    Narrative:      Urine microscopic not indicated.    Blood Gas, Arterial With Co-Ox [[id number]]  (Abnormal) Collected: 05/31/23 1425    Specimen: Arterial Blood Updated: 05/31/23 1424     Site Arterial Line     Jerome's Test N/A     pH, Arterial 7.286 pH units      Comment: 84 Value below reference range        pCO2, Arterial 58.2 mm Hg      Comment: 83 Value above reference range        pO2, Arterial 173.0 mm Hg      Comment: 83 Value above reference range        HCO3, Arterial 27.7 mmol/L      Comment: 83 Value above reference range        Base Excess, Arterial -0.2 mmol/L      Comment: 84 Value below reference range        O2 Saturation, Arterial 96.2 %      Hemoglobin, Blood Gas 13.6 g/dL      Comment: 84 Value below reference range        Hematocrit, Blood Gas 41.6 %      Oxyhemoglobin 95.8 %      Methemoglobin <1.00 %       Comment: 94 Value below reportable range < 1.0        Carboxyhemoglobin <1.0 %      Comment: 94 Value below reportable range < 1.0        A-a DO2 470.8 mmHg      Sodium, Arterial 143 mmol/L      Potassium, Arterial 4.5 mmol/L      Ionized Calcium 4.85 mg/dL      Glucose, Arterial 126 mg/dL      Barometric Pressure for Blood Gas 749 mmHg      Modality Ventilator     FIO2 100 %      Flow Rate 22.0 lpm      Ventilator Mode AVAP     Set Tidal Volume 450     PEEP 7.0     Comment: Meter: [id number]     :  [id number]        Note --     pH, Temp Corrected --     pCO2, Temperature Corrected --     pO2, Temperature Corrected --    COVID-19 and FLU A/B PCR - Swab, Nasopharynx [[id number]]  (Normal) Collected: 05/31/23 1241    Specimen: Swab from Nasopharynx Updated: 05/31/23 1315     COVID19 Not Detected     Influenza A PCR Not Detected     Influenza B PCR Not Detected    Narrative:      Fact sheet for providers: https://www.fda.gov/media/223121/download    Fact sheet for patients: https://www.fda.gov/media/778798/download    Test performed by PCR.    D-dimer, Quantitative [[id number]]  (Abnormal) Collected: 05/31/23 1127    Specimen: Blood Updated: 05/31/23 1254     D-Dimer, Quantitative 860 ng/mL (FEU)     Narrative:      According to the assay 's published package insert, a normal (<500 ng/mL (FEU)) D-dimer result in conjunction with a non-high clinical probability assessment, excludes deep vein thrombosis (DVT) and pulmonary embolism (PE) with high sensitivity.    D-dimer values increase with age and this can make VTE exclusion of an older population difficult. To address this, the [employer], based on best available evidence and recent guidelines, recommends that clinicians use age-adjusted D-dimer thresholds in patients greater than 50 years of age with: a) a low probability of PE who do not meet all Pulmonary Embolism Rule Out Criteria, or b) in those with intermediate  "probability of PE.   The formula for an age-adjusted D-dimer cut-off is \"age*10\".  For example, a 60 year old patient would have an age-adjusted cut-off of 600 ng/mL (FEU) and an 80 year old 800 ng/mL (FEU).      [facility] [[id number]] Collected: 05/31/23 1127    Specimen: Blood Updated: 05/31/23 1230     Extra Tube Hold for add-ons.     Comment: Auto resulted.       Light Blue Top [[id number]] Collected: 05/31/23 1127    Specimen: Blood Updated: 05/31/23 1230     Extra Tube Hold for add-ons.     Comment: Auto resulted       Blood Gas, Arterial With Co-Ox [[id number]]  (Abnormal) Collected: 05/31/23 1216    Specimen: Arterial Blood Updated: 05/31/23 1216     Site Arterial Line     Jerome's Test N/A     pH, Arterial 7.302 pH units      Comment: 84 Value below reference range        pCO2, Arterial 61.1 mm Hg      Comment: 83 Value above reference range        pO2, Arterial 36.3 mm Hg      Comment: 85 Value below critical limit        HCO3, Arterial 30.1 mmol/L      Comment: 83 Value above reference range        Base Excess, Arterial 2.2 mmol/L      Comment: 83 Value above reference range        O2 Saturation, Arterial 63.9 %      Comment: 84 Value below reference range        Hemoglobin, Blood Gas 13.1 g/dL      Comment: 84 Value below reference range        Hematocrit, Blood Gas 40.2 %      Oxyhemoglobin 62.9 %      Comment: 84 Value below reference range        Methemoglobin <1.00 %      Comment: 94 Value below reportable range < 1.0        Carboxyhemoglobin <1.0 %      Comment: 94 Value below reportable range < 1.0        A-a DO2 42.4 mmHg      Sodium, Arterial 140 mmol/L      Potassium, Arterial 4.8 mmol/L      Ionized Calcium 4.94 mg/dL      Glucose, Arterial 133 mg/dL      Barometric Pressure for Blood Gas 750 mmHg      Modality Nasal Cannula     Flow Rate 1.0 lpm      Ventilator Mode NA     Note --     Collected by GIL. RRT     Comment: Meter: [id number]     :  [id number]        pH, Temp Corrected " --     pCO2, Temperature Corrected --     pO2, Temperature Corrected --    Lipase [[id number]]  (Normal) Collected: 05/31/23 1124    Specimen: Blood Updated: 05/31/23 1152     Lipase 15 U/L     Single High Sensitivity Troponin T [[id number]]  (Abnormal) Collected: 05/31/23 1124    Specimen: Blood Updated: 05/31/23 1150     HS Troponin T 17 ng/L     Narrative:      High Sensitive Troponin T Reference Range:  <10.0 ng/L- Negative Female for AMI  <15.0 ng/L- Negative Male for AMI  >=10 - Abnormal Female indicating possible myocardial injury.  >=15 - Abnormal Male indicating possible myocardial injury.   Clinicians would have to utilize clinical acumen, EKG, Troponin, and serial changes to determine if it is an Acute Myocardial Infarction or myocardial injury due to an underlying chronic condition.         Lactic Acid, Plasma [[id number]]  (Abnormal) Collected: 05/31/23 1133    Specimen: Blood Updated: 05/31/23 1145     Lactate 2.4 mmol/L     CBC & Differential [[id number]]  (Abnormal) Collected: 05/31/23 1124    Specimen: Blood Updated: 05/31/23 1137    Narrative:      The following orders were created for panel order CBC & Differential.  Procedure                               Abnormality         Status                     ---------                               -----------         ------                     CBC Auto Differential[[id number]]        Abnormal            Final result                 Please view results for these tests on the individual orders.          Imaging Results (Most Recent)       Procedure Component Value Units Date/Time    XR Chest 1 View [[id number]] Collected: 06/04/23 1120     Updated: 06/04/23 1124    Narrative:      Single AP portable view of the chest, compared to study from 6/2/2023, shows an  unchanged endotracheal tube.  Cardiomegaly is again noted.  Lung volumes are  lower with new right lung and left basal changes from pulmonary edema or  pneumonia.    XR Abdomen KUB [[id number]] Collected:  06/03/23 1123     Updated: 06/03/23 1228    Narrative:      FINDINGS:  Nasogastric tube tip identified in the distal stomach.    XR Abdomen KUB [[id number]] Collected: 06/02/23 2258     Updated: 06/02/23 2302    Narrative:      INDICATION:  rigid abdomen, increased fiO2 demand    COMPARISON:  None.      Impression:      FINDINGS/IMPRESSION:  Bowel gas pattern is within normal limits.  No free intraperitoneal gas is appreciated.    XR Chest 1 View [[id number]] Collected: 06/02/23 2005     Updated: 06/02/23 2012    Narrative:      INDICATION:  Hypoxia.    FINDINGS:  ET tube in good position.  NG tube in the stomach.  Right-sided infiltrates seen  on yesterday's study have improved.  No new findings.    SUMMARY:  Improving right-sided infiltrate.  No other change.    XR Chest 1 View [[id number]] Collected: 06/01/23 0425     Updated: 06/01/23 0457    Narrative:      INDICATION:  Reevaluation    COMPARISON:  05/31/2023      Impression:      FINDINGS/IMPRESSION:  Lines/tubes project in approximately stable positions.  Right-sided lung opacities are worsened.    US Guided Vascular Access [[id number]] Collected: 05/31/23 1723     Updated: 05/31/23 1726    Narrative:      US GUIDANCE VASCULAR    HISTORY: access    COMPARISON: None      Impression:      Grayscale sonographic images were obtained during ultrasound guided vascular  access.    The accessed blood vessel appears to be patent. The vessel was accessed under  direct ultrasound guidance.    Please refer to the procedure note for additional details.      XR Chest 1 View [[id number]] Collected: 05/31/23 1640     Updated: 05/31/23 1703    Narrative:      FINDINGS:  Endotracheal tube 3 cm above the sonali.  The heart is enlarged. There is severe  bilateral diffuse infiltration.      Impression:      Impression:  1. In comparison with study done earlier today, there is worsening bilateral  diffuse infiltration.    2. Endotracheal tube appears to be in good position.    IR  Insert Midline Without Port Pump 5 Plus [[id number]] Resulted: 05/31/23 1657     Updated: 05/31/23 1657    Narrative:      This procedure was auto-finalized with no dictation required.    CT Angiogram Chest [[id number]] Collected: 05/31/23 1450     Updated: 05/31/23 1500    Narrative:      TECHNIQUE:  IV contrast was administered and axial images from the thoracic inlet through  the diaphragms were performed.  3D multiplanar reformats of the thoracic aorta  were completed on a separate workstation under concurrent supervision.  Intravenous contrast was used for this case.    FINDINGS:  No pathologically enlarged mediastinal, hilar, or axillary lymph nodes.  No  pulmonary embolus.  No aortic dissection.  There is severe consolidation  throughout the right lung with mild infiltration throughout the left lung.      Impression:        1.  No PE.  No aortic dissection.    2.  Severe consolidation throughout the right lung with mild infiltration  throughout the left lung.    XR Chest 1 View [[id number]] Collected: 05/31/23 1145     Updated: 05/31/23 1159    Narrative:      FINDINGS:  An AP view of the chest shows cardiomegaly.  There is vascular congestion.  There is diffuse right lung infiltrate.      Impression:      1.  Diffuse right lung infiltrate most likely represents pneumonia.  The heart  is enlarged and there also appears to be some mild vascular congestion.    2.  No other significant finding.  No prior studies for comparison.              Chief Complaint on Day of Discharge:   Debated and sedated on discharge  Hospital Course:  The patient is a 58 y.o. male who presented to [facility] with respiratory failure from pneumonia and COPD exacerbation with fluid overload.    The patient  is a 58-year-old male with history of Crohn's disease who is taking immunosuppressive medication at baseline who presents for nausea and shortness of breath.  Symptoms began 3 days ago with cough and minor hemoptysis  and have progressed to dyspnea with rest.  Cough has not been productive since then.  No chest pain.  No abdominal pain.  No back pain.  No rash or itching.  No recent change in medications.  No falls or injuries.  No headache.     ER assessment.  5/31/2023.  The patient is seen and evaluated in the ER.  The patient appears to be stable initially and currently on BiPAP.  ABG initial is reviewed and patient has been given 2 L bolus of fluids for sepsis.  Patient was feeling poorly for the past several days becoming progressively worse.  He has had fevers chills shortness of breath chest pain cough which has been productive of some hemoptysis.  Upon transfer from the ER to the CCU the patient's respiratory status decompensated and required emergent intubation.  Dr. Jean performed intubation with out any problems and patient is currently stable on the ventilator.  The patient was given Rocephin and azithromycin in ER due to patient's current decompensation patient was placed on cefepime and vancomycin and will continue to monitor.  Patient is having high peak pressures on the ventilator Mucomyst is added on we will follow response to treatment.    Impression: ICU management  Respiratory failure secondary to sepsis due to COPD exacerbation and right-sided pneumonia  Has been on ventilator for 5 days  Patient was placed on cefepime and vancomycin.  Solu-Medrol nebulizer treatments Singulair are also part of the patient's current treatments.  The patient was placed on propofol Versed fentanyl due to high peak pressures.  On day of transfer the patient is currently paralyzed due to high peak pressures.  The PEEP is now 10 FiO2 85%.    The patient was initially placed on sepsis protocol in the ER given IV fluid bolus patient had volume overload was placed on Lasix drip and then changed over to Lasix 40 mg IV every 12.    Hypertension patient is currently on Lopressor clonidine patch lisinopril and scheduled  "hydralazine.    Patient is on Protonix and Lovenox for prophylaxis.    The patient has been seen by high acuity due to problems with weaning of the ventilator and they are recommending possible transfer for [facility] to rule out fungal infection.  They did also add Bactrim to patient's treatment plan.  Long discussions with the family were held and they have agreed to transfer the [facility] for further treatment with pulmonary specialist.    On day of transfer ABG shows a pH of 7.4 the PCO2 is 59.5 the PO2 is 70 bicarb is 38.4 and O2 saturation 91.3.  Metabolic profile sodium 145 potassium 4.2 chloride 100 CO2 is 34 the BUN is 41 the creatinine is 0.78 .7.  Liver function test within normal limits.    White count is 18.5 H&H is 15.1 and 49 and the platelets are 289.  Neutrophils at 89.  Chest x-ray completed this morning  Narrative & Impression   Single AP portable view of the chest, compared to study from 6/2/2023, shows an  unchanged endotracheal tube.  Cardiomegaly is again noted.  Lung volumes are  lower with new right lung and left basal changes from pulmonary edema or  pneumonia.               Condition on Discharge: Guarded    Physical Exam on Discharge:  /90   Pulse 80   Temp 97.6 °F (36.4 °C) (Oral)   Resp 24   Ht 175.3 cm (69.02\")   Wt 134 kg (295 lb 14.4 oz)   SpO2 94%   BMI 43.68 kg/m²   Physical Exam  Vitals and nursing note reviewed.   Constitutional:       Appearance: He is ill-appearing.      Comments: Intubated and sedated   HENT:      Head: Normocephalic and atraumatic.      Right Ear: External ear normal.      Left Ear: External ear normal.      Nose: Nose normal.      Mouth/Throat:      Mouth: Mucous membranes are dry.      Pharynx: Oropharynx is clear.   Eyes:      General: No scleral icterus.     Conjunctiva/sclera: Conjunctivae normal.   Cardiovascular:      Rate and Rhythm: Normal rate and regular rhythm.      Pulses: Normal pulses.      Heart " sounds: Normal heart sounds.   Pulmonary:      Breath sounds: Wheezing and rhonchi present.   Abdominal:      General: Bowel sounds are normal. There is no distension.      Palpations: Abdomen is soft.      Tenderness: There is no abdominal tenderness.   Skin:     General: Skin is warm and dry.      Coloration: Skin is not jaundiced.   Neurological:      Motor: No weakness.      Comments: Intubated and sedated         Discharge Disposition:  Hospice/Medical Facility (DC - External)    Discharge Medications:     Discharge Medications        New Medications        Instructions Start Date   !VANCOMYCIN LEVEL DRAW NEEDED   Does not apply, Once   Start Date: June 5, 2023     acetylcysteine 10 % nebulizer solution  Commonly known as: MUCOMYST   400 mg, Nebulization, Every 4 Hours - RT      albuterol sulfate  (90 Base) MCG/ACT inhaler  Commonly known as: PROVENTIL HFA;VENTOLIN HFA;PROAIR HFA   2 puffs, Inhalation, 4 Times Daily - RT      cefepime 2 G/ ML solution  Commonly known as: MAXIPIME   2 g, Intravenous, Every 8 Hours      cloNIDine 0.2 MG/24HR patch  Commonly known as: CATAPRES-TTS   1 patch, Transdermal, Weekly   Start Date: June 8, 2023     Enoxaparin Sodium 40 MG/0.4ML solution prefilled syringe syringe  Commonly known as: LOVENOX   40 mg, Subcutaneous, Every 12 Hours Scheduled      FENTANYL PCA 20 MCG/ML 50 ML   Nurse Loading Dose: 0 mcg Patient Bolus Dose: 0 mcg Lockout Interval: 10 Minutes Basal Rate: 25 mcg/hr One Hour Dose Limit: 300 mcg      fentaNYL citrate (PF) 50 mcg/mL injection  Commonly known as: SUBLIMAZE   50 mcg, Intravenous, Every 1 Hour PRN      furosemide 10 MG/ML injection  Commonly known as: LASIX  Replaces: furosemide 40 MG tablet   40 mg, Intravenous, 2 Times Daily      glucagon HCl (Diagnostic) 1 MG injection   1 mg, Intramuscular, Every 15 Minutes PRN      methylPREDNISolone sodium succinate 125 MG injection  Commonly known as: SOLU-Medrol   80 mg, Intravenous, Every 8  Hours      metoprolol tartrate 50 MG tablet  Commonly known as: LOPRESSOR   50 mg, Oral, Every 12 Hours Scheduled      montelukast 10 MG tablet  Commonly known as: SINGULAIR   10 mg, Oral, Nightly      naloxone 4 MG/0.1ML nasal spray  Commonly known as: NARCAN   Call 911. Don't prime. [place] in 1 nostril for overdose. Repeat in 2-3 minutes in other nostril if no or minimal breathing/responsiveness.      pantoprazole 40 MG injection  Commonly known as: PROTONIX  Replaces: pantoprazole 40 MG EC tablet   40 mg, Intravenous, Every Early Morning   Start Date: June 5, 2023     propofol 10 mg/mL emulsion infusion  Commonly known as: DIPRIVAN   5-100 mcg/kg/min (680-13,600 mcg/min), Intravenous, Titrated      sulfamethoxazole-trimethoprim 360 mg of trimethoprim in dextrose 5 % 500 mL IVPB   360 mg of trimethoprim, Intravenous, Every 6 Hours      vancomycin   1,250 mg, Intravenous, Every 12 Hours      vecuronium 100 mg in sodium chloride 0.9 % 100 mL   0.6-1.7 mcg/kg/min (0.0816-0.2312 mg/min), Intravenous, Titrated             Stop These Medications      Adalimumab 40 MG/0.4ML Prefilled Syringe Kit injection  Commonly known as: HUMIRA     ALPRAZolam 1 MG tablet  Commonly known as: XANAX     dicyclomine 10 MG capsule  Commonly known as: BENTYL     furosemide 40 MG tablet  Commonly known as: Lasix  Replaced by: furosemide 10 MG/ML injection     gabapentin 300 MG capsule  Commonly known as: NEURONTIN     lisinopril 20 MG tablet  Commonly known as: PRINIVIL,ZESTRIL     metoprolol succinate XL 50 MG 24 hr tablet  Commonly known as: TOPROL-XL     ondansetron 8 MG tablet  Commonly known as: ZOFRAN     oxyCODONE-acetaminophen 7.5-325 MG per tablet  Commonly known as: PERCOCET     pantoprazole 40 MG EC tablet  Commonly known as: PROTONIX  Replaced by: pantoprazole 40 MG injection     predniSONE 10 MG (21) dose pack  Commonly known as: DELTASONE     promethazine 12.5 MG tablet  Commonly known as: PHENERGAN     sertraline 100 MG  tablet  Commonly known as: ZOLOFT     tiZANidine 4 MG tablet  Commonly known as: ZANAFLEX              Discharge Diet:   Diet Instructions       Diet: Tube Feeding; Continuous; Diet, Tube Feeding Peptamen 1.5 (Vital 1.5); Tube Feeding Type: Continuous; Continuous Tube Feeding Start Rate (mL/hr): 10; Then Advance Rate By (mL/hr): 10; Every __ Hours: 12; To Goal Rate of (mL/hr): 40  Diet Eff...      Discharge Diet: Tube Feeding    Feeding Type: Continuous    Formula & Rate: Diet, Tube Feeding Peptamen 1.5 (Vital 1.5); Tube Feeding Type: Continuous; Continuous Tube Feeding Start Rate (mL/hr): 10; Then Advance Rate By (mL/hr): 10; Every __ Hours: 12; To Goal Rate of (mL/hr): 40  Diet Effective Now            Activity at Discharge:   Activity Instructions       Other Activity Instructions      Activity Instructions: Bedrest            Discharge Care Plan/Instructions: Discharged to the care of Dr. Lea [facility]      Follow-up Appointments:   No future appointments.    Test Results Pending at Discharge:   Pending Labs       Order Current Status    Blood Culture - Blood, Hand, Left Preliminary result    Blood Culture - Blood, Hand, Right Preliminary result            Lamberto Mojica DO    Time: Discharge took 40 minutes to complete

## 2023-06-04 NOTE — NURSING NOTE
Dr. Mojica talked with patient's family and the decision has been made to transfer patient via air. Awaiting accepting physician. Dr. Mojica and Nathanael LYNCH made aware of patient's elevated BP and the use of PRN HTN medication. Patient remains paralyzed with vec and remains sedated with propofol, versed, and fentanyl gtt.

## 2023-06-04 NOTE — PROGRESS NOTES
[facility] Physician - Daily Progress Note  PERMANENT  06/04/2023 10:37    [email or web address] - CCU/SD - [facility] - [place] ([facility])    CHEVY AVERYDAKOTAKAREN DUNNE    Date of Service 06/04/2023 10:37    Current Problems & Interval Events [email or web address] Tele-ICU note  Hospital Day #: 5  ICU Day #: 5  Vent day 5  Reason for Admission to ICU:  57 yo M presented on 5-31 with cough and SOB.  Extensive right lung opacities.  He has a history of Chron's disease and back pain.  No other PMH reported.  Required intubation  in the ED.    VS: Afebrile, HR 79. /96, sats 95% on Fio2 0.85  Neuro:  sedated on Propofol, Fentanyl, Versed.  Paralytics started on 6-3.   Pulmonary:  Respiratory failure due to pneumonia with ARDS.  Hemoptysis reported on admission.  Chronic immunotherapy reported also. Continue steroids, antibiotics, paralytics.  He is on PC ventilation.  PEEP 10.  Fio2 85%.  Awaiting ABG to make   adjutments.  CXR pending also.  Continue lasix to maintain negative fluid balance.  He may need transfer from [facility] to eval for fungal etiology/PCP.      Cardiac:  HTN.  He is on Lopressor, Clonidine, Lisinopril.  Schedule Hydralazine and add Norvasc if still hypertensive.  Recent ECHO:  EF 60%    Nephro:  Cr 0.78.  Neg fluid balance with Lasix  Cumulative I/O:  neg 8 L    ID:  pneumonia.  Flu and Covid neg.  Ct shows NGTD.  He is on Vancomycin and Cefepime.    GI:  Tube feeds    PPx: Protonix, Lovenox  SSI    Physical Exam HR-81, BP-169/88, Temp-36.7(C), O2 Sat%-94    Weight (kg): admission-136, current-134.22; I&O: intake-0, output-4205, Urine-4205, dialysis net (0), Total net (-4205)      Labs Electrolyte Labs:                              CBC Labs:  Na- 145  Cl- 100    BUN- 41                              HGB- 15.1    --------+-----------+----------+- GLU- 242     WBC- 18.55 -+-----------+- PLT- 289  K- 4.2   CO2- 34.0  Cr- 0.78                             HCT- 49.0    Lab  Date/Time: 06/04/2023 05:08                Lab Date/Time: 06/04/2023 05:08 WBC and PLT = * 1,000    Ventilation Respiratory: FiO2 - 85, Mode - PC/AC, PEEP - 10. Date/Time -06/04/2023 10:17.        Electronically Signed by: Luiza Montilla) on 06/04/2023 10:55

## 2023-06-04 NOTE — PLAN OF CARE
Goal Outcome Evaluation:  Plan of Care Reviewed With: spouse        Progress: no change  Outcome Evaluation: Pt continues on paralytic with sedation via propofol, versed and fentanyl .  Pt HTN continues to be addressed with PRN meds that are poorly controlling BP.  VSS otherwise. UOP adequate.   Pt tolerating TF.  All needs met at this time.

## 2023-06-05 LAB
BACTERIA SPEC AEROBE CULT: NORMAL
BACTERIA SPEC AEROBE CULT: NORMAL
GLUCOSE BLDC GLUCOMTR-MCNC: 178 MG/DL (ref 70–130)

## 2023-06-05 NOTE — PAYOR COMM NOTE
"Shawna Zepeda  [facility]  Case Management Extender  [phone] phone  [phone] fax      Auth# [id number]     Berenice Gill (58 y.o. Male)       Date of Birth   1965    Social Security Number       Address   [address] [place] [address]    Home Phone   [phone]    MRN   [id number]       Congregational   Scientologist    Marital Status                               Admission Date   5/31/23    Admission Type   Emergency    Admitting Provider   Lamberto Mojica DO    Attending Provider       Department, Room/Bed   [facility] CRITICAL CARE STEPDOWN, 04/A       Discharge Date   6/4/2023    Discharge Disposition   Hospice/Medical Facility (DC - External)    Discharge Destination                                 Attending Provider: (none)   Allergies: Ketamine Hcl    Isolation: None   Infection: None   Code Status: Prior    Ht: 175.3 cm (69.02\")   Wt: 134 kg (295 lb 14.4 oz)    Admission Cmt: None   Principal Problem: Acute respiratory failure with hypoxia and hypercapnia [J96.01,J96.02]                   Active Insurance as of 5/31/2023       Primary Coverage       Payor Plan Insurance Group Employer/Plan Group    [employer] BLUE CROSS [employer] BLUE CROSS BLUE [employer]IELD PPO [id number]       Payor Plan Address Payor Plan Phone Number Payor Plan Fax Number Effective Dates    [address] [phone]  3/1/2013 - None Entered    [place] [address]         Subscriber Name Subscriber Birth Date Member ID       BERENICE GILL 1965 [id number]                     Emergency Contacts        (Rel.) Home Phone Work Phone Mobile Phone    VIJAY GILL (Spouse) [phone] -- [phone]    Ernesto Gill (Son) -- [phone] --                 Discharge Summary        Lamberto Mojica DO at 06/04/23 1229              [facility] Medicine Services  DISCHARGE SUMMARY "       Date of Admission: 5/31/2023  Date of Discharge:  6/4/2023  Primary Care Physician: Halle Baron MD    Presenting Problem/History of Present Illness:  Acute respiratory failure with hypoxia and hypercapnia [J96.01, J96.02]  Sepsis, due to unspecified organism, unspecified whether acute organ dysfunction present [A41.9]       Final Discharge Diagnoses:  Active Hospital Problems    Diagnosis     **Acute respiratory failure with hypoxia and hypercapnia        Consults:   Consults       No orders found from 5/2/2023 to 6/1/2023.            Procedures Performed:                 Pertinent Test Results:   Lab Results (most recent)       Procedure Component Value Units Date/Time    Blood Gas, Arterial - [[id number]]  (Abnormal) Collected: 06/04/23 1124    Specimen: Arterial Blood Updated: 06/04/23 1124     Site Left Radial     Jerome's Test N/A     pH, Arterial 7.418 pH units      pCO2, Arterial 59.5 mm Hg      Comment: 83 Value above reference range        pO2, Arterial 70.7 mm Hg      Comment: 84 Value below reference range        HCO3, Arterial 38.4 mmol/L      Comment: 83 Value above reference range        Base Excess, Arterial 11.0 mmol/L      Comment: 83 Value above reference range        O2 Saturation, Arterial 91.3 %      Comment: 84 Value below reference range        Barometric Pressure for Blood Gas 747 mmHg      Modality Ventilator     Flow Rate 24.0 lpm      Ventilator Mode AC     Collected by      Comment: Meter: [id number]     :  [id number]       Blood Gas, Arterial - [[id number]]  (Abnormal) Collected: 06/04/23 1100    Specimen: Arterial Blood Updated: 06/04/23 1100     Site Arterial Line     Jerome's Test N/A     pH, Arterial 7.381 pH units      pCO2, Arterial 67.9 mm Hg      Comment: 86 Value above critical limit        pO2, Arterial 40.2 mm Hg      Comment: 85 Value below critical limit        HCO3, Arterial 40.2 mmol/L      Comment: 83 Value above reference range        Base Excess,  Arterial 11.5 mmol/L      Comment: 83 Value above reference range        O2 Saturation, Arterial 72.1 %      Comment: 84 Value below reference range        Barometric Pressure for Blood Gas 747 mmHg      Modality Room Air     Ventilator Mode NA     Comment: Meter: [id number]     :  [id number]       Manual Differential [[id number]]  (Abnormal) Collected: 06/04/23 0508    Specimen: Blood Updated: 06/04/23 0651     Neutrophil % 89.0 %      Lymphocyte % 3.0 %      Monocyte % 5.0 %      Metamyelocyte % 2.0 %      Myelocyte % 1.0 %      Atypical Lymphocyte % 0.0 %      Neutrophils Absolute 16.51 10*3/mm3      Lymphocytes Absolute 0.56 10*3/mm3      Monocytes Absolute 0.93 10*3/mm3      RBC Morphology Normal     Toxic Granulation Slight/1+     Platelet Morphology Normal    Comprehensive Metabolic Panel [[id number]]  (Abnormal) Collected: 06/04/23 0508    Specimen: Blood Updated: 06/04/23 0610     Glucose 242 mg/dL      BUN 41 mg/dL      Creatinine 0.78 mg/dL      Sodium 145 mmol/L      Potassium 4.2 mmol/L      Comment: Slight hemolysis detected by analyzer. Results may be affected.        Chloride 100 mmol/L      CO2 34.0 mmol/L      Calcium 9.1 mg/dL      Total Protein 7.4 g/dL      Albumin 3.6 g/dL      ALT (SGPT) 21 U/L      AST (SGOT) 25 U/L      Alkaline Phosphatase 75 U/L      Total Bilirubin 0.7 mg/dL      Globulin 3.8 gm/dL      A/G Ratio 0.9 g/dL      BUN/Creatinine Ratio 52.6     Anion Gap 11.0 mmol/L      eGFR 104.7 mL/min/1.73     Narrative:      GFR Normal >60  Chronic Kidney Disease <60  Kidney Failure <15      Magnesium [[id number]]  (Normal) Collected: 06/04/23 0508    Specimen: Blood Updated: 06/04/23 0610     Magnesium 2.6 mg/dL     Phosphorus [[id number]]  (Normal) Collected: 06/04/23 0508    Specimen: Blood Updated: 06/04/23 0606     Phosphorus 3.7 mg/dL     CBC Auto Differential [[id number]]  (Abnormal) Collected: 06/04/23 0508    Specimen: Blood Updated: 06/04/23 0604     WBC 18.55 10*3/mm3       RBC 5.22 10*6/mm3      Hemoglobin 15.1 g/dL      Hematocrit 49.0 %      MCV 93.9 fL      MCH 28.9 pg      MCHC 30.8 g/dL      RDW 14.0 %      RDW-SD 47.3 fl      MPV 10.3 fL      Platelets 289 10*3/mm3     POC Glucose Once [[id number]]  (Abnormal) Collected: 06/04/23 0505    Specimen: Blood Updated: 06/04/23 0538     Glucose 227 mg/dL      Comment: : [id number] DYKEMA PAMMeter ID: [id number]       POC Glucose Once [[id number]]  (Abnormal) Collected: 06/03/23 2240    Specimen: Blood Updated: 06/03/23 2254     Glucose 184 mg/dL      Comment: : [phone] DYKEMA PAMMeter ID: [id number]       Blood Culture - Blood, Hand, Right [[id number]]  (Normal) Collected: 05/31/23 1248    Specimen: Blood from Hand, Right Updated: 06/03/23 1301     Blood Culture No growth at 3 days    Blood Culture - Blood, Hand, Left [[id number]]  (Normal) Collected: 05/31/23 1248    Specimen: Blood from Hand, Left Updated: 06/03/23 1301     Blood Culture No growth at 3 days    Respiratory Culture - Sputum, Cough [[id number]] Collected: 06/01/23 0955    Specimen: Sputum from Cough Updated: 06/03/23 1042     Respiratory Culture No growth     Gram Stain Many (4+) WBCs per low power field      Rare (1+) Epithelial cells per low power field      Rare (1+) Mixed bacterial perla    Extra Tubes [[id number]] Collected: 06/03/23 0757    Specimen: Blood, Venous Line Updated: 06/03/23 0900    Narrative:      The following orders were created for panel order Extra Tubes.  Procedure                               Abnormality         Status                     ---------                               -----------         ------                     Lavender Top[[id number]]                                     Final result                 Please view results for these tests on the individual orders.    Lavender Top [[id number]] Collected: 06/03/23 0757    Specimen: Blood Updated: 06/03/23 0900     Extra Tube hold for add-on     Comment: Auto resulted        Vancomycin, Trough [[id number]]  (Abnormal) Collected: 06/03/23 0757    Specimen: Blood Updated: 06/03/23 0834     Vancomycin Trough 26.40 mcg/mL     Potassium [[id number]]  (Normal) Collected: 06/03/23 0757    Specimen: Blood Updated: 06/03/23 0823     Potassium 3.8 mmol/L     Comprehensive Metabolic Panel [[id number]]  (Abnormal) Collected: 06/03/23 0445    Specimen: Blood Updated: 06/03/23 0516     Glucose 190 mg/dL      BUN 39 mg/dL      Creatinine 1.15 mg/dL      Sodium 142 mmol/L      Potassium 3.9 mmol/L      Comment: Slight hemolysis detected by analyzer. Results may be affected.        Chloride 100 mmol/L      CO2 29.0 mmol/L      Calcium 8.3 mg/dL      Total Protein 6.7 g/dL      Albumin 3.2 g/dL      ALT (SGPT) 17 U/L      AST (SGOT) 23 U/L      Comment: Slight hemolysis detected by analyzer. Results may be affected.        Alkaline Phosphatase 65 U/L      Total Bilirubin 0.8 mg/dL      Globulin 3.5 gm/dL      A/G Ratio 0.9 g/dL      BUN/Creatinine Ratio 33.9     Anion Gap 13.0 mmol/L      eGFR 74.7 mL/min/1.73     Narrative:      GFR Normal >60  Chronic Kidney Disease <60  Kidney Failure <15      Phosphorus [[id number]]  (Normal) Collected: 06/03/23 0445    Specimen: Blood Updated: 06/03/23 0515     Phosphorus 4.4 mg/dL     Magnesium [[id number]]  (Normal) Collected: 06/03/23 0445    Specimen: Blood Updated: 06/03/23 0515     Magnesium 1.9 mg/dL     CBC Auto Differential [[id number]]  (Abnormal) Collected: 06/03/23 0445    Specimen: Blood Updated: 06/03/23 0453     WBC 13.97 10*3/mm3      RBC 4.46 10*6/mm3      Hemoglobin 13.1 g/dL      Hematocrit 40.0 %      MCV 89.7 fL      MCH 29.4 pg      MCHC 32.8 g/dL      RDW 13.6 %      RDW-SD 44.0 fl      MPV 10.4 fL      Platelets 197 10*3/mm3      Neutrophil % 87.2 %      Lymphocyte % 5.5 %      Monocyte % 5.5 %      Eosinophil % 0.1 %      Basophil % 0.6 %      Immature Grans % 1.1 %      Neutrophils, Absolute 12.18 10*3/mm3      Lymphocytes, Absolute 0.77  10*3/mm3      Monocytes, Absolute 0.77 10*3/mm3      Eosinophils, Absolute 0.01 10*3/mm3      Basophils, Absolute 0.09 10*3/mm3      Immature Grans, Absolute 0.15 10*3/mm3      nRBC 0.2 /100 WBC     Vancomycin, Peak [[id number]]  (Abnormal) Collected: 06/03/23 0025    Specimen: Blood Updated: 06/03/23 0050     Vancomycin Peak 47.10 mcg/mL     Potassium [[id number]]  (Abnormal) Collected: 06/02/23 2041    Specimen: Blood Updated: 06/02/23 2113     Potassium 3.4 mmol/L     Vancomycin, Random [[id number]]  (Normal) Collected: 06/02/23 0546    Specimen: Blood Updated: 06/02/23 1018     Vancomycin Random 23.30 mcg/mL     MRSA Screen, PCR (Inpatient) - Swab, Nares [[id number]]  (Abnormal) Collected: 06/01/23 0925    Specimen: Swab from Nares Updated: 06/01/23 1046     MRSA, PCR Positive    Narrative:      Performed by real-time polymerase chain reaction (qPCR).  The negative predictive value of this diagnostic test is high and should only be used to consider de-escalating anti-MRSA therapy. A positive result may indicate colonization with MRSA and must be correlated clinically.    Creatinine Serum (kidney function) GFR component [[id number]]  (Normal) Collected: 06/01/23 0908    Specimen: Blood Updated: 06/01/23 0935     Creatinine 0.97 mg/dL      eGFR 91.6 mL/min/1.73     Narrative:      GFR Normal >60  Chronic Kidney Disease <60  Kidney Failure <15      STAT Lactic Acid, Reflex [[id number]]  (Normal) Collected: 05/31/23 1711    Specimen: Blood Updated: 05/31/23 1738     Lactate 1.4 mmol/L     Extra Tubes [[id number]] Collected: 05/31/23 1127    Specimen: Blood Updated: 05/31/23 1532    Narrative:      The following orders were created for panel order Extra Tubes.  Procedure                               Abnormality         Status                     ---------                               -----------         ------                     Gold Top - [facility][[id number]]                                   Final result               Jean  Top[[id number]]                                         Final result               Light Blue Top[[id number]]                                   Final result                 Please view results for these tests on the individual orders.    Jean Top [[id number]] Collected: 05/31/23 1127    Specimen: Blood Updated: 05/31/23 1532     Extra Tube Hold for add-ons.     Comment: Auto resulted.       STAT Lactic Acid, Reflex [[id number]]  (Abnormal) Collected: 05/31/23 1413    Specimen: Blood Updated: 05/31/23 1441     Lactate 3.0 mmol/L     Urinalysis With Microscopic If Indicated (No Culture) - Urine, Clean Catch [[id number]]  (Abnormal) Collected: 05/31/23 1413    Specimen: Urine, Clean Catch Updated: 05/31/23 1431     Color, UA Yellow     Appearance, UA Clear     pH, UA 5.5     Specific Gravity, UA 1.026     Glucose, UA Negative     Ketones, UA Trace     Bilirubin, UA Negative     Blood, UA Negative     Protein, UA Trace     Leuk Esterase, UA Negative     Nitrite, UA Negative     Urobilinogen, UA 1.0 E.U./dL    Narrative:      Urine microscopic not indicated.    Blood Gas, Arterial With Co-Ox [[id number]]  (Abnormal) Collected: 05/31/23 1425    Specimen: Arterial Blood Updated: 05/31/23 1424     Site Arterial Line     Jerome's Test N/A     pH, Arterial 7.286 pH units      Comment: 84 Value below reference range        pCO2, Arterial 58.2 mm Hg      Comment: 83 Value above reference range        pO2, Arterial 173.0 mm Hg      Comment: 83 Value above reference range        HCO3, Arterial 27.7 mmol/L      Comment: 83 Value above reference range        Base Excess, Arterial -0.2 mmol/L      Comment: 84 Value below reference range        O2 Saturation, Arterial 96.2 %      Hemoglobin, Blood Gas 13.6 g/dL      Comment: 84 Value below reference range        Hematocrit, Blood Gas 41.6 %      Oxyhemoglobin 95.8 %      Methemoglobin <1.00 %      Comment: 94 Value below reportable range < 1.0        Carboxyhemoglobin <1.0 %      Comment:  "94 Value below reportable range < 1.0        A-a DO2 470.8 mmHg      Sodium, Arterial 143 mmol/L      Potassium, Arterial 4.5 mmol/L      Ionized Calcium 4.85 mg/dL      Glucose, Arterial 126 mg/dL      Barometric Pressure for Blood Gas 749 mmHg      Modality Ventilator     FIO2 100 %      Flow Rate 22.0 lpm      Ventilator Mode AVAP     Set Tidal Volume 450     PEEP 7.0     Comment: Meter: [id number]     :  [id number]        Note --     pH, Temp Corrected --     pCO2, Temperature Corrected --     pO2, Temperature Corrected --    COVID-19 and FLU A/B PCR - Swab, Nasopharynx [[id number]]  (Normal) Collected: 05/31/23 1241    Specimen: Swab from Nasopharynx Updated: 05/31/23 1315     COVID19 Not Detected     Influenza A PCR Not Detected     Influenza B PCR Not Detected    Narrative:      Fact sheet for providers: https://www.fda.gov/media/321069/download    Fact sheet for patients: https://www.fda.gov/media/615333/download    Test performed by PCR.    D-dimer, Quantitative [[id number]]  (Abnormal) Collected: 05/31/23 1127    Specimen: Blood Updated: 05/31/23 1254     D-Dimer, Quantitative 860 ng/mL (FEU)     Narrative:      According to the assay 's published package insert, a normal (<500 ng/mL (FEU)) D-dimer result in conjunction with a non-high clinical probability assessment, excludes deep vein thrombosis (DVT) and pulmonary embolism (PE) with high sensitivity.    D-dimer values increase with age and this can make VTE exclusion of an older population difficult. To address this, the [employer], based on best available evidence and recent guidelines, recommends that clinicians use age-adjusted D-dimer thresholds in patients greater than 50 years of age with: a) a low probability of PE who do not meet all Pulmonary Embolism Rule Out Criteria, or b) in those with intermediate probability of PE.   The formula for an age-adjusted D-dimer cut-off is \"age*10\".  For example, a 60 " year old patient would have an age-adjusted cut-off of 600 ng/mL (FEU) and an 80 year old 800 ng/mL (FEU).      Gold Top - SST [[id number]] Collected: 05/31/23 1127    Specimen: Blood Updated: 05/31/23 1230     Extra Tube Hold for add-ons.     Comment: Auto resulted.       Light Blue Top [[id number]] Collected: 05/31/23 1127    Specimen: Blood Updated: 05/31/23 1230     Extra Tube Hold for add-ons.     Comment: Auto resulted       Blood Gas, Arterial With Co-Ox [[id number]]  (Abnormal) Collected: 05/31/23 1216    Specimen: Arterial Blood Updated: 05/31/23 1216     Site Arterial Line     Jerome's Test N/A     pH, Arterial 7.302 pH units      Comment: 84 Value below reference range        pCO2, Arterial 61.1 mm Hg      Comment: 83 Value above reference range        pO2, Arterial 36.3 mm Hg      Comment: 85 Value below critical limit        HCO3, Arterial 30.1 mmol/L      Comment: 83 Value above reference range        Base Excess, Arterial 2.2 mmol/L      Comment: 83 Value above reference range        O2 Saturation, Arterial 63.9 %      Comment: 84 Value below reference range        Hemoglobin, Blood Gas 13.1 g/dL      Comment: 84 Value below reference range        Hematocrit, Blood Gas 40.2 %      Oxyhemoglobin 62.9 %      Comment: 84 Value below reference range        Methemoglobin <1.00 %      Comment: 94 Value below reportable range < 1.0        Carboxyhemoglobin <1.0 %      Comment: 94 Value below reportable range < 1.0        A-a DO2 42.4 mmHg      Sodium, Arterial 140 mmol/L      Potassium, Arterial 4.8 mmol/L      Ionized Calcium 4.94 mg/dL      Glucose, Arterial 133 mg/dL      Barometric Pressure for Blood Gas 750 mmHg      Modality Nasal Cannula     Flow Rate 1.0 lpm      Ventilator Mode NA     Note --     Collected by GIL. RRT     Comment: Meter: [id number]     :  [id number]        pH, Temp Corrected --     pCO2, Temperature Corrected --     pO2, Temperature Corrected --    Lipase [[id number]]   (Normal) Collected: 05/31/23 1124    Specimen: Blood Updated: 05/31/23 1152     Lipase 15 U/L     Single High Sensitivity Troponin T [[id number]]  (Abnormal) Collected: 05/31/23 1124    Specimen: Blood Updated: 05/31/23 1150     HS Troponin T 17 ng/L     Narrative:      High Sensitive Troponin T Reference Range:  <10.0 ng/L- Negative Female for AMI  <15.0 ng/L- Negative Male for AMI  >=10 - Abnormal Female indicating possible myocardial injury.  >=15 - Abnormal Male indicating possible myocardial injury.   Clinicians would have to utilize clinical acumen, EKG, Troponin, and serial changes to determine if it is an Acute Myocardial Infarction or myocardial injury due to an underlying chronic condition.         Lactic Acid, Plasma [[id number]]  (Abnormal) Collected: 05/31/23 1133    Specimen: Blood Updated: 05/31/23 1145     Lactate 2.4 mmol/L     CBC & Differential [[id number]]  (Abnormal) Collected: 05/31/23 1124    Specimen: Blood Updated: 05/31/23 1137    Narrative:      The following orders were created for panel order CBC & Differential.  Procedure                               Abnormality         Status                     ---------                               -----------         ------                     CBC Auto Differential[[id number]]        Abnormal            Final result                 Please view results for these tests on the individual orders.          Imaging Results (Most Recent)       Procedure Component Value Units Date/Time    XR Chest 1 View [[id number]] Collected: 06/04/23 1120     Updated: 06/04/23 1124    Narrative:      Single AP portable view of the chest, compared to study from 6/2/2023, shows an  unchanged endotracheal tube.  Cardiomegaly is again noted.  Lung volumes are  lower with new right lung and left basal changes from pulmonary edema or  pneumonia.    XR Abdomen KUB [[id number]] Collected: 06/03/23 1123     Updated: 06/03/23 1228    Narrative:      FINDINGS:  Nasogastric tube tip  identified in the distal stomach.    XR Abdomen KUB [[id number]] Collected: 06/02/23 2258     Updated: 06/02/23 2302    Narrative:      INDICATION:  rigid abdomen, increased fiO2 demand    COMPARISON:  None.      Impression:      FINDINGS/IMPRESSION:  Bowel gas pattern is within normal limits.  No free intraperitoneal gas is appreciated.    XR Chest 1 View [[id number]] Collected: 06/02/23 2005     Updated: 06/02/23 2012    Narrative:      INDICATION:  Hypoxia.    FINDINGS:  ET tube in good position.  NG tube in the stomach.  Right-sided infiltrates seen  on yesterday's study have improved.  No new findings.    SUMMARY:  Improving right-sided infiltrate.  No other change.    XR Chest 1 View [[id number]] Collected: 06/01/23 0425     Updated: 06/01/23 0457    Narrative:      INDICATION:  Reevaluation    COMPARISON:  05/31/2023      Impression:      FINDINGS/IMPRESSION:  Lines/tubes project in approximately stable positions.  Right-sided lung opacities are worsened.    US Guided Vascular Access [[id number]] Collected: 05/31/23 1723     Updated: 05/31/23 1726    Narrative:      US GUIDANCE VASCULAR    HISTORY: access    COMPARISON: None      Impression:      Grayscale sonographic images were obtained during ultrasound guided vascular  access.    The accessed blood vessel appears to be patent. The vessel was accessed under  direct ultrasound guidance.    Please refer to the procedure note for additional details.      XR Chest 1 View [[id number]] Collected: 05/31/23 1640     Updated: 05/31/23 1703    Narrative:      FINDINGS:  Endotracheal tube 3 cm above the sonali.  The heart is enlarged. There is severe  bilateral diffuse infiltration.      Impression:      Impression:  1. In comparison with study done earlier today, there is worsening bilateral  diffuse infiltration.    2. Endotracheal tube appears to be in good position.    IR Insert Midline Without Port Pump 5 Plus [[id number]] Resulted: 05/31/23 1657     Updated: 05/31/23  1657    Narrative:      This procedure was auto-finalized with no dictation required.    CT Angiogram Chest [[id number]] Collected: 05/31/23 1450     Updated: 05/31/23 1500    Narrative:      TECHNIQUE:  IV contrast was administered and axial images from the thoracic inlet through  the diaphragms were performed.  3D multiplanar reformats of the thoracic aorta  were completed on a separate workstation under concurrent supervision.  Intravenous contrast was used for this case.    FINDINGS:  No pathologically enlarged mediastinal, hilar, or axillary lymph nodes.  No  pulmonary embolus.  No aortic dissection.  There is severe consolidation  throughout the right lung with mild infiltration throughout the left lung.      Impression:        1.  No PE.  No aortic dissection.    2.  Severe consolidation throughout the right lung with mild infiltration  throughout the left lung.    XR Chest 1 View [[id number]] Collected: 05/31/23 1145     Updated: 05/31/23 1159    Narrative:      FINDINGS:  An AP view of the chest shows cardiomegaly.  There is vascular congestion.  There is diffuse right lung infiltrate.      Impression:      1.  Diffuse right lung infiltrate most likely represents pneumonia.  The heart  is enlarged and there also appears to be some mild vascular congestion.    2.  No other significant finding.  No prior studies for comparison.              Chief Complaint on Day of Discharge:   Debated and sedated on discharge  Hospital Course:  The patient is a 58 y.o. male who presented to [facility] with respiratory failure from pneumonia and COPD exacerbation with fluid overload.    The patient  is a 58-year-old male with history of Crohn's disease who is taking immunosuppressive medication at baseline who presents for nausea and shortness of breath.  Symptoms began 3 days ago with cough and minor hemoptysis and have progressed to dyspnea with rest.  Cough has not been productive since then.  No chest  pain.  No abdominal pain.  No back pain.  No rash or itching.  No recent change in medications.  No falls or injuries.  No headache.     ER assessment.  5/31/2023.  The patient is seen and evaluated in the ER.  The patient appears to be stable initially and currently on BiPAP.  ABG initial is reviewed and patient has been given 2 L bolus of fluids for sepsis.  Patient was feeling poorly for the past several days becoming progressively worse.  He has had fevers chills shortness of breath chest pain cough which has been productive of some hemoptysis.  Upon transfer from the ER to the CCU the patient's respiratory status decompensated and required emergent intubation.  Dr. Jean performed intubation with out any problems and patient is currently stable on the ventilator.  The patient was given Rocephin and azithromycin in ER due to patient's current decompensation patient was placed on cefepime and vancomycin and will continue to monitor.  Patient is having high peak pressures on the ventilator Mucomyst is added on we will follow response to treatment.    Impression: ICU management  Respiratory failure secondary to sepsis due to COPD exacerbation and right-sided pneumonia  Has been on ventilator for 5 days  Patient was placed on cefepime and vancomycin.  Solu-Medrol nebulizer treatments Singulair are also part of the patient's current treatments.  The patient was placed on propofol Versed fentanyl due to high peak pressures.  On day of transfer the patient is currently paralyzed due to high peak pressures.  The PEEP is now 10 FiO2 85%.    The patient was initially placed on sepsis protocol in the ER given IV fluid bolus patient had volume overload was placed on Lasix drip and then changed over to Lasix 40 mg IV every 12.    Hypertension patient is currently on Lopressor clonidine patch lisinopril and scheduled hydralazine.    Patient is on Protonix and Lovenox for prophylaxis.    The patient has been seen by high acuity  "due to problems with weaning of the ventilator and they are recommending possible transfer for [facility] to rule out fungal infection.  They did also add Bactrim to patient's treatment plan.  Long discussions with the family were held and they have agreed to transfer the patient to [facility] in [facility] for further treatment with pulmonary specialist.    On day of transfer ABG shows a pH of 7.4 the PCO2 is 59.5 the PO2 is 70 bicarb is 38.4 and O2 saturation 91.3.  Metabolic profile sodium 145 potassium 4.2 chloride 100 CO2 is 34 the BUN is 41 the creatinine is 0.78 .7.  Liver function test within normal limits.    White count is 18.5 H&H is 15.1 and 49 and the platelets are 289.  Neutrophils at 89.  Chest x-ray completed this morning  Narrative & Impression   Single AP portable view of the chest, compared to study from 6/2/2023, shows an  unchanged endotracheal tube.  Cardiomegaly is again noted.  Lung volumes are  lower with new right lung and left basal changes from pulmonary edema or  pneumonia.               Condition on Discharge: Guarded    Physical Exam on Discharge:  /90   Pulse 80   Temp 97.6 °F (36.4 °C) (Oral)   Resp 24   Ht 175.3 cm (69.02\")   Wt 134 kg (295 lb 14.4 oz)   SpO2 94%   BMI 43.68 kg/m²   Physical Exam  Vitals and nursing note reviewed.   Constitutional:       Appearance: He is ill-appearing.      Comments: Intubated and sedated   HENT:      Head: Normocephalic and atraumatic.      Right Ear: External ear normal.      Left Ear: External ear normal.      Nose: Nose normal.      Mouth/Throat:      Mouth: Mucous membranes are dry.      Pharynx: Oropharynx is clear.   Eyes:      General: No scleral icterus.     Conjunctiva/sclera: Conjunctivae normal.   Cardiovascular:      Rate and Rhythm: Normal rate and regular rhythm.      Pulses: Normal pulses.      Heart sounds: Normal heart sounds.   Pulmonary:      Breath sounds: Wheezing and rhonchi present.   Abdominal:      " General: Bowel sounds are normal. There is no distension.      Palpations: Abdomen is soft.      Tenderness: There is no abdominal tenderness.   Skin:     General: Skin is warm and dry.      Coloration: Skin is not jaundiced.   Neurological:      Motor: No weakness.      Comments: Intubated and sedated         Discharge Disposition:  Hospice/Medical Facility (DC - External)    Discharge Medications:     Discharge Medications        New Medications        Instructions Start Date   !VANCOMYCIN LEVEL DRAW NEEDED   Does not apply, Once   Start Date: June 5, 2023     acetylcysteine 10 % nebulizer solution  Commonly known as: MUCOMYST   400 mg, Nebulization, Every 4 Hours - RT      albuterol sulfate  (90 Base) MCG/ACT inhaler  Commonly known as: PROVENTIL HFA;VENTOLIN HFA;PROAIR HFA   2 puffs, Inhalation, 4 Times Daily - RT      cefepime 2 G/ ML solution  Commonly known as: MAXIPIME   2 g, Intravenous, Every 8 Hours      cloNIDine 0.2 MG/24HR patch  Commonly known as: CATAPRES-TTS   1 patch, Transdermal, Weekly   Start Date: June 8, 2023     Enoxaparin Sodium 40 MG/0.4ML solution prefilled syringe syringe  Commonly known as: LOVENOX   40 mg, Subcutaneous, Every 12 Hours Scheduled      FENTANYL PCA 20 MCG/ML 50 ML   Nurse Loading Dose: 0 mcg Patient Bolus Dose: 0 mcg Lockout Interval: 10 Minutes Basal Rate: 25 mcg/hr One Hour Dose Limit: 300 mcg      fentaNYL citrate (PF) 50 mcg/mL injection  Commonly known as: SUBLIMAZE   50 mcg, Intravenous, Every 1 Hour PRN      furosemide 10 MG/ML injection  Commonly known as: LASIX  Replaces: furosemide 40 MG tablet   40 mg, Intravenous, 2 Times Daily      glucagon HCl (Diagnostic) 1 MG injection   1 mg, Intramuscular, Every 15 Minutes PRN      methylPREDNISolone sodium succinate 125 MG injection  Commonly known as: SOLU-Medrol   80 mg, Intravenous, Every 8 Hours      metoprolol tartrate 50 MG tablet  Commonly known as: LOPRESSOR   50 mg, Oral, Every 12 Hours Scheduled       montelukast 10 MG tablet  Commonly known as: SINGULAIR   10 mg, Oral, Nightly      naloxone 4 MG/0.1ML nasal spray  Commonly known as: NARCAN   Call 911. Don't prime. [place] in 1 nostril for overdose. Repeat in 2-3 minutes in other nostril if no or minimal breathing/responsiveness.      pantoprazole 40 MG injection  Commonly known as: PROTONIX  Replaces: pantoprazole 40 MG EC tablet   40 mg, Intravenous, Every Early Morning   Start Date: June 5, 2023     propofol 10 mg/mL emulsion infusion  Commonly known as: DIPRIVAN   5-100 mcg/kg/min (680-13,600 mcg/min), Intravenous, Titrated      sulfamethoxazole-trimethoprim 360 mg of trimethoprim in dextrose 5 % 500 mL IVPB   360 mg of trimethoprim, Intravenous, Every 6 Hours      vancomycin   1,250 mg, Intravenous, Every 12 Hours      vecuronium 100 mg in sodium chloride 0.9 % 100 mL   0.6-1.7 mcg/kg/min (0.0816-0.2312 mg/min), Intravenous, Titrated             Stop These Medications      Adalimumab 40 MG/0.4ML Prefilled Syringe Kit injection  Commonly known as: HUMIRA     ALPRAZolam 1 MG tablet  Commonly known as: XANAX     dicyclomine 10 MG capsule  Commonly known as: BENTYL     furosemide 40 MG tablet  Commonly known as: Lasix  Replaced by: furosemide 10 MG/ML injection     gabapentin 300 MG capsule  Commonly known as: NEURONTIN     lisinopril 20 MG tablet  Commonly known as: PRINIVIL,ZESTRIL     metoprolol succinate XL 50 MG 24 hr tablet  Commonly known as: TOPROL-XL     ondansetron 8 MG tablet  Commonly known as: ZOFRAN     oxyCODONE-acetaminophen 7.5-325 MG per tablet  Commonly known as: PERCOCET     pantoprazole 40 MG EC tablet  Commonly known as: PROTONIX  Replaced by: pantoprazole 40 MG injection     predniSONE 10 MG (21) dose pack  Commonly known as: DELTASONE     promethazine 12.5 MG tablet  Commonly known as: PHENERGAN     sertraline 100 MG tablet  Commonly known as: ZOLOFT     tiZANidine 4 MG tablet  Commonly known as: ZANAFLEX              Discharge Diet:    Diet Instructions       Diet: Tube Feeding; Continuous; Diet, Tube Feeding Peptamen 1.5 (Vital 1.5); Tube Feeding Type: Continuous; Continuous Tube Feeding Start Rate (mL/hr): 10; Then Advance Rate By (mL/hr): 10; Every __ Hours: 12; To Goal Rate of (mL/hr): 40  Diet Eff...      Discharge Diet: Tube Feeding    Feeding Type: Continuous    Formula & Rate: Diet, Tube Feeding Peptamen 1.5 (Vital 1.5); Tube Feeding Type: Continuous; Continuous Tube Feeding Start Rate (mL/hr): 10; Then Advance Rate By (mL/hr): 10; Every __ Hours: 12; To Goal Rate of (mL/hr): 40  Diet Effective Now            Activity at Discharge:   Activity Instructions       Other Activity Instructions      Activity Instructions: Bedrest            Discharge Care Plan/Instructions: Discharged to the care of [facility]      Follow-up Appointments:   No future appointments.    Test Results Pending at Discharge:   Pending Labs       Order Current Status    Blood Culture - Blood, Hand, Left Preliminary result    Blood Culture - Blood, Hand, Right Preliminary result            Jeff Gong DO    Time: Discharge took 40 minutes to complete    Electronically signed by Jeff Gong DO at 06/04/23 1244       Discharge Order (From admission, onward)       Start     Ordered    06/04/23 1217  Discharge patient  Once        Expected Discharge Date: 06/04/23    Discharge Disposition: Hospice/Medical Facility (DC - External)    Physician of Record for Attribution - Please select from Treatment Team: JEFF GONG [[id number]]    Review needed by CMO to determine Physician of Record: No       Question Answer Comment   Physician of Record for Attribution - Please select from Treatment Team JEFF GONG    Review needed by CMO to determine Physician of Record No        06/04/23 6338

## 2023-07-05 LAB
QT INTERVAL: 396 MS
QTC INTERVAL: 481 MS

## 2023-08-01 RX ORDER — HYDROXYZINE HYDROCHLORIDE 25 MG/1
25 TABLET, FILM COATED ORAL 3 TIMES DAILY PRN
COMMUNITY

## 2023-08-01 RX ORDER — CLONAZEPAM 0.5 MG/1
0.5 TABLET ORAL 2 TIMES DAILY
COMMUNITY

## 2023-08-01 RX ORDER — ONDANSETRON 4 MG/1
4 TABLET, ORALLY DISINTEGRATING ORAL EVERY 8 HOURS PRN
COMMUNITY

## 2023-08-07 ENCOUNTER — OFFICE VISIT (OUTPATIENT)
Dept: SLEEP MEDICINE | Facility: HOSPITAL | Age: 58
End: 2023-08-07
Payer: COMMERCIAL

## 2023-08-07 VITALS — HEART RATE: 69 BPM | SYSTOLIC BLOOD PRESSURE: 134 MMHG | DIASTOLIC BLOOD PRESSURE: 83 MMHG | OXYGEN SATURATION: 95 %

## 2023-08-07 DIAGNOSIS — G47.33 OSA (OBSTRUCTIVE SLEEP APNEA): ICD-10-CM

## 2023-08-07 DIAGNOSIS — G47.34 NOCTURNAL HYPOXIA: Primary | ICD-10-CM

## 2023-08-07 PROCEDURE — 99203 OFFICE O/P NEW LOW 30 MIN: CPT | Performed by: NURSE PRACTITIONER

## 2023-08-07 RX ORDER — LISINOPRIL 40 MG/1
40 TABLET ORAL DAILY
COMMUNITY

## 2023-08-07 RX ORDER — LOPERAMIDE HYDROCHLORIDE 2 MG/1
2 CAPSULE ORAL
COMMUNITY

## 2023-08-07 RX ORDER — METOPROLOL SUCCINATE 100 MG/1
100 TABLET, EXTENDED RELEASE ORAL DAILY
COMMUNITY

## 2023-08-07 RX ORDER — TIZANIDINE HYDROCHLORIDE 4 MG/1
CAPSULE, GELATIN COATED ORAL EVERY 6 HOURS
COMMUNITY

## 2023-08-07 RX ORDER — AMLODIPINE BESYLATE 10 MG/1
10 TABLET ORAL DAILY
COMMUNITY

## 2023-08-07 RX ORDER — SULINDAC 150 MG/1
150 TABLET ORAL
COMMUNITY

## 2023-08-07 RX ORDER — FUROSEMIDE 40 MG/1
40 TABLET ORAL DAILY
COMMUNITY

## 2023-08-07 RX ORDER — SERTRALINE HYDROCHLORIDE 100 MG/1
1 TABLET, FILM COATED ORAL 2 TIMES DAILY
COMMUNITY

## 2023-08-07 RX ORDER — OXYCODONE AND ACETAMINOPHEN 7.5; 325 MG/1; MG/1
1 TABLET ORAL EVERY 8 HOURS PRN
COMMUNITY

## 2023-08-07 NOTE — PROGRESS NOTES
New Patient Sleep Medicine Consultation    Encounter Date: 8/7/2023         Patient's PCP: Halle Baron MD  Referring provider: SELF   Reason for consultation chief complaint:  ELIO on trilogy here to establish care     Jon Gill is a 58 y.o. male whose bedtime is ~ 2200. He  falls asleep after 30 minutes, and is up 1 times per night. He wakes up ~ 0500. Has been sleeping in reclincer since dischagred from hospital. Cannot lay flat due to difficulty breathing.  He endorses 6-7 hours of sleep. He drinks 1 cups of coffee,   3 teas, and 0 sodas per day. He drinks 0-1 alcoholic beverages per week.      Jon Gill admits to decreased libido, memory loss, and Up to the bathroom at night for >10 years. He denies cataplexy, sleep paralysis, or hypnagogic hallucinations. He takes Zanaflex, oxycodone, clonazepam, gabapentin before bed.  He has no sleepiness with driving. He naps some days. He has had a sleep study. States more than 10 years ago.  Has been on trilogy for past several years. Put on triology after discharged from hospital several years ago. Having no difficulty with machine.  Does not use oxygen with machine.  He sleeps in a bed with wife.     [facility] has been hispitalized several times for respiratory failure. [facility] recently had pneumonia was on vent for 21 days.     Sleep study history:   1.  PSG >10 years ago in [place]   2.  Currently on Trilogy     PAP Data:  No data   Mask type: Full face mask  DME: [facility]    He used to smoke, but has not smoked for years. Smoking history: smoked 1 ppds from age 19 until 48      Marital status:    Occupation: vision assistant specialist   FH of sleep disorders: father has sleep apnea       Past Medical History:   Diagnosis Date    Anxiety     Arthritis     Crohn's disease     Depression     Hypertension      Social History     Socioeconomic History    Marital status:    Tobacco Use    Smoking status: Former    Smokeless tobacco:  Never   Vaping Use    Vaping Use: Former   Substance and Sexual Activity    Alcohol use: Yes     Comment: very rarely    Drug use: No     No family history on file.      Review of Systems:  Constitutional: negative  Eyes: negative  Ears, nose, mouth, throat, and face: negative  Respiratory: negative  Cardiovascular: negative  Gastrointestinal: negative  Genitourinary:negative  Integument/breast: negative  Hematologic/lymphatic: negative  Musculoskeletal:negative  Neurological: negative  Behavioral/Psych: negative  Endocrine: negative  Allergic/Immunologic: negative Patient advised to discuss any positive ROS with PCP.        Powell Sleepiness Scale Score: 6    How likely are you to doze off or fall asleep in the following situation, in contrast to feeling just tired?     Use the following scale to choose the most appropriate number for each situation:    0 = would never doze  1 = slight chance of dozing   2 = moderate chance of dozing   3 = high chance of dozing    It is important that you answer each question as best you can.      Situation       Chance of Dozing (0-3)    Sitting and reading            ___2____    Watching TV          ___2____    Sitting, inactive in a public place (e.g. a theatre or meeting)   ___0____    As a passenger in a car for an hour without break    ___0____    Lying down to rest in the afternoon, when circumstances permit  ___1____    Sitting and talking to someone      ___1____    Sitting quietly after a lunch without alcohol     ___0____    In a car, while stopped for a few minutes in traffic    ___0____         Vitals:    08/07/23 1305   BP: 134/83   Pulse: 69   SpO2: 95%           08/07/23  1305   Weight: (278.3lb)       Body mass index is 43.68 kg/mý (pended). Class 3 Severe Obesity (BMI >=40). Obesity-related health conditions include the following: obstructive sleep apnea. Obesity is unchanged. BMI is is above average; BMI management plan is completed. Recommend portion control  and increasing exercise.              General: Alert. Cooperative. Well developed. No acute distress.             Head:  Normocephalic. Symmetrical. Atraumatic.              Eyes: Sclera clear. No icterus. Normal EOM.             Ears: No deformities. Normal hearing.             Nose: No septal deviation. No drainage.          Throat: No oral lesions. No thrush. Moist mucous membranes. Trachea midline    Tongue is normal    Dentition is fair       Pharynx: Posterior pharyngeal pillars are narrow    Mallampati score of IV (only hard palate visible)    Pharynx is nonerythematous   Chest Wall:  Normal shape. Symmetric expansion with respiration. No tenderness.          Lungs:  Clear to auscultation bilaterally. No wheezes. No rhonchi. No rales. Respirations regular, even and unlabored.            Heart:  Regular rhythm and normal rate. Normal S1 and S2. No murmur.  Extremities:  Moves all extremities well. No edema.               Skin: Dry. Intact. No bleeding. No rash.           Neuro: Moves all 4 extremities and cranial nerves grossly intact.  Psychiatric: Normal mood and affect.      Current Outpatient Medications:     naloxone (NARCAN) 4 MG/0.1ML nasal spray, Call 911. Don't prime. [place] in 1 nostril for overdose. Repeat in 2-3 minutes in other nostril if no or minimal breathing/responsiveness., Disp: 2 each, Rfl: 0    pantoprazole (PROTONIX) 40 MG injection, Infuse 10 mL into a venous catheter Every Morning. Indications: Treatment to Prevent Stress Ulcers, Disp: , Rfl:     amLODIPine (NORVASC) 10 MG tablet, Take 1 tablet by mouth Daily., Disp: , Rfl:     furosemide (LASIX) 40 MG tablet, Take 1 tablet by mouth Daily., Disp: , Rfl:     lisinopril (PRINIVIL,ZESTRIL) 40 MG tablet, Take 1 tablet by mouth Daily., Disp: , Rfl:     loperamide (IMODIUM) 2 MG capsule, Take 1 capsule by mouth., Disp: , Rfl:     metoprolol succinate XL (TOPROL-XL) 100 MG 24 hr tablet, Take 1 tablet by mouth Daily., Disp: , Rfl:      oxyCODONE-acetaminophen (PERCOCET) 7.5-325 MG per tablet, Take 1 tablet by mouth Every 8 (Eight) Hours As Needed., Disp: , Rfl:     sertraline (ZOLOFT) 100 MG tablet, Take 1 tablet by mouth 2 (Two) Times a Day., Disp: , Rfl:     sulindac (CLINORIL) 150 MG tablet, Take 1 tablet by mouth., Disp: , Rfl:     TiZANidine (ZANAFLEX) 4 MG capsule, Every 6 (Six) Hours., Disp: , Rfl:     Lab Results   Component Value Date    WBC 13.2 (H) 06/08/2023    HGB 12.1 (L) 06/08/2023    HCT 40.1 06/08/2023    MCV 95.7 (H) 06/08/2023     06/08/2023     Lab Results   Component Value Date    GLUCOSE 242 (H) 06/04/2023    CALCIUM 9.1 06/04/2023     06/04/2023    K 5.5 (H) 06/07/2023    CO2 34.0 (H) 06/04/2023     06/04/2023    BUN 41 (H) 06/04/2023    CREATININE 0.78 06/04/2023    EGFRIFAFRI  10/04/2019      Comment:      <15 Indicative of kidney failure.    EGFRIFNONA 79 11/06/2021    BCR 52.6 (H) 06/04/2023    ANIONGAP 11.0 06/04/2023     Lab Results   Component Value Date    INR 1.18 03/04/2022    PROTIME 15.2 (H) 03/04/2022     Lab Results   Component Value Date    TROPONINT 17 (H) 05/31/2023       pH, Arterial   Date Value Ref Range Status   06/04/2023 7.418 7.350 - 7.450 pH units Final     pCO2, Arterial   Date Value Ref Range Status   06/04/2023 59.5 (H) 35.0 - 45.0 mm Hg Final     Comment:     83 Value above reference range     pO2, Arterial   Date Value Ref Range Status   06/04/2023 70.7 (L) 83.0 - 108.0 mm Hg Final     Comment:     84 Value below reference range   ]        Assessment and Plan:    Obstructive sleep apnea- New to me, no additional work-up planned at this time   Obtain sleep study to review   Obtain adherence report from trilogy machine   Check overnight oximetry on trilogy   Script for PAP supplies  Good sleep hygiene   Pertinent labs reviewed   Drowsy driving tips- do not drive if feeling sleepy   RTC in 6 weeks or sooner if needed   2.   Morbid obesity    1. As above   3.   Nocturnal  hypoxia  Check overnight oximetry on PAP      I obtained a brief history from the patient, reviewed the medical problems and current medications, and made medical decisions regarding treatment based on that information.   I spent 30 minutes caring for Jon on this date of service. This time includes time spent by me in the following activities: preparing for the visit, reviewing tests, obtaining and/or reviewing a separately obtained history, performing a medically appropriate examination and/or evaluation, counseling and educating the patient/family/caregiver, ordering medications, tests, or procedures, and documenting information in the medical record. Educated on PAP management, health impact of sleep apnea, overnight oximetry and follow-up.           RTC 2 weeks after study for results.             This document has been electronically signed by NANI Oneal on August 7, 2023 14:43 CDT          This document has been electronically signed by NANI Oneal on August 7, 2023         CC: Halle Baron MD          No ref. provider found

## 2023-08-09 ENCOUNTER — DOCUMENTATION (OUTPATIENT)
Dept: SLEEP MEDICINE | Facility: HOSPITAL | Age: 58
End: 2023-08-09
Payer: COMMERCIAL

## 2023-08-10 ENCOUNTER — OFFICE VISIT (OUTPATIENT)
Dept: GASTROENTEROLOGY | Age: 58
End: 2023-08-10
Payer: COMMERCIAL

## 2023-08-10 VITALS
HEART RATE: 65 BPM | WEIGHT: 279.4 LBS | HEIGHT: 69 IN | OXYGEN SATURATION: 97 % | BODY MASS INDEX: 41.38 KG/M2 | SYSTOLIC BLOOD PRESSURE: 120 MMHG | DIASTOLIC BLOOD PRESSURE: 80 MMHG

## 2023-08-10 DIAGNOSIS — R19.7 DIARRHEA, UNSPECIFIED TYPE: ICD-10-CM

## 2023-08-10 DIAGNOSIS — K21.9 GASTROESOPHAGEAL REFLUX DISEASE, UNSPECIFIED WHETHER ESOPHAGITIS PRESENT: Primary | ICD-10-CM

## 2023-08-10 DIAGNOSIS — R13.10 DYSPHAGIA, UNSPECIFIED TYPE: ICD-10-CM

## 2023-08-10 DIAGNOSIS — R11.0 NAUSEA: ICD-10-CM

## 2023-08-10 PROCEDURE — 99204 OFFICE O/P NEW MOD 45 MIN: CPT | Performed by: NURSE PRACTITIONER

## 2023-08-10 RX ORDER — PROMETHAZINE HYDROCHLORIDE 12.5 MG/1
12.5 TABLET ORAL EVERY 8 HOURS PRN
Qty: 30 TABLET | Refills: 1 | Status: SHIPPED | OUTPATIENT
Start: 2023-08-10 | End: 2023-09-09

## 2023-08-10 RX ORDER — DICYCLOMINE HYDROCHLORIDE 10 MG/1
10 CAPSULE ORAL 4 TIMES DAILY
Qty: 360 CAPSULE | Refills: 11 | Status: SHIPPED | OUTPATIENT
Start: 2023-08-10

## 2023-08-11 ENCOUNTER — DOCUMENTATION (OUTPATIENT)
Dept: SLEEP MEDICINE | Facility: HOSPITAL | Age: 58
End: 2023-08-11
Payer: COMMERCIAL

## 2023-08-11 NOTE — PROGRESS NOTES
02/24/2023-05/24/2023    Mode: AVAPS  Tidal volume: 400  EPAP max 12  EPAP min 8  Breath rate auto   Pressure support: 5-15 cm H2O  AVAPS rate 5      Avg IPAP: 16.5 cm H2O   Avg VTE: 603 ml  Avg breath/min: 12.6  Avg EPAP: 10.9 cm HO  Avg pt triggered breaths: 60%  Leak: 66.8 L/min  Days used: 49  % days used: 54  Min usage 2.2 hr, max usage 8.3 hrs

## 2023-08-13 ASSESSMENT — ENCOUNTER SYMPTOMS
DIARRHEA: 1
ABDOMINAL DISTENTION: 0
TROUBLE SWALLOWING: 1
COUGH: 0
NAUSEA: 1
BLOOD IN STOOL: 0
CHOKING: 0
SHORTNESS OF BREATH: 0
CONSTIPATION: 0
RECTAL PAIN: 0
ABDOMINAL PAIN: 0
VOMITING: 0
ANAL BLEEDING: 0

## 2023-08-15 ENCOUNTER — TELEPHONE (OUTPATIENT)
Dept: GASTROENTEROLOGY | Age: 58
End: 2023-08-15

## 2023-08-15 DIAGNOSIS — G47.34 NOCTURNAL HYPOXIA: Primary | ICD-10-CM

## 2023-08-15 DIAGNOSIS — G47.33 OSA (OBSTRUCTIVE SLEEP APNEA): ICD-10-CM

## 2023-08-15 NOTE — TELEPHONE ENCOUNTER
Called patient to remind them of their procedure with Dr. Jessica Gee  at [facility]  on 8/18/23 to arrive at 7:30AM     CONFIRMED

## 2023-08-17 ENCOUNTER — ANESTHESIA EVENT (OUTPATIENT)
Dept: ENDOSCOPY | Age: 58
End: 2023-08-17
Payer: COMMERCIAL

## 2023-08-17 NOTE — ANESTHESIA PRE PROCEDURE
Department of Anesthesiology  Preprocedure Note       Name:  Nevaeh Koenig   Age:  66 y.o.  :  1965                                          MRN:  [id number]         Date:  2023      Surgeon: Greta Ford):  Rashaad Salinas MD    Procedure: Procedure(s):  EGD BIOPSY    Medications prior to admission:   Prior to Admission medications    Medication Sig Start Date End Date Taking? Authorizing Provider   adalimumab (HUMIRA) 40 MG/0.8ML injection Inject 40 mg into the skin every 14 days    Historical Provider, MD   dicyclomine (BENTYL) 10 MG capsule Take 1 capsule by mouth 4 times daily 8/10/23   REYNALDO Santamaria   esomeprazole (NEXIUM) 20 MG delayed release capsule Take 1 capsule by mouth daily 8/10/23   REYNALDO Santamaria   promethazine (PHENERGAN) 12.5 MG tablet Take 1 tablet by mouth every 8 hours as needed for Nausea 8/10/23 9/9/23  REYNALDO Santamaria   clonazePAM (KLONOPIN) 0.5 MG tablet Take 1 tablet by mouth in the morning and at bedtime. Historical Provider, MD   hydrOXYzine HCl (ATARAX) 25 MG tablet Take 1 tablet by mouth 3 times daily as needed for Itching or Anxiety    Historical Provider, MD   ondansetron (ZOFRAN-ODT) 4 MG disintegrating tablet Take 1 tablet by mouth every 8 hours as needed for Nausea or Vomiting    Historical Provider, MD   oxyCODONE-acetaminophen (PERCOCET) 7.5-325 MG per tablet Take 1 tablet by mouth in the morning and 1 tablet at noon and 1 tablet in the evening.  3/29/22   Historical Provider, MD   tiZANidine (ZANAFLEX) 4 MG tablet Take 1 tablet by mouth every 6 hours as needed    Historical Provider, MD   sulindac (CLINORIL) 150 MG tablet Take 1 tablet by mouth 2 times daily as needed (FOR PAIN)    Historical Provider, MD   pantoprazole (PROTONIX) 40 MG tablet Take 1 tablet by mouth daily    Historical Provider, MD   loperamide (IMODIUM) 2 MG capsule Take 1 capsule by mouth in the morning and at bedtime 6 tablets bid    Historical Provider, MD   sertraline

## 2023-08-18 ENCOUNTER — HOSPITAL ENCOUNTER (OUTPATIENT)
Age: 58
Setting detail: OUTPATIENT SURGERY
Discharge: HOME | End: 2023-08-18
Attending: INTERNAL MEDICINE | Admitting: INTERNAL MEDICINE
Payer: COMMERCIAL

## 2023-08-18 ENCOUNTER — ANESTHESIA (OUTPATIENT)
Dept: ENDOSCOPY | Age: 58
End: 2023-08-18
Payer: COMMERCIAL

## 2023-08-18 VITALS
OXYGEN SATURATION: 99 % | BODY MASS INDEX: 40.58 KG/M2 | SYSTOLIC BLOOD PRESSURE: 122 MMHG | TEMPERATURE: 97 F | WEIGHT: 274 LBS | HEART RATE: 75 BPM | RESPIRATION RATE: 18 BRPM | DIASTOLIC BLOOD PRESSURE: 64 MMHG | HEIGHT: 69 IN

## 2023-08-18 DIAGNOSIS — R13.10 DYSPHAGIA, UNSPECIFIED TYPE: ICD-10-CM

## 2023-08-18 DIAGNOSIS — R11.0 NAUSEA: ICD-10-CM

## 2023-08-18 PROCEDURE — 88342 IMHCHEM/IMCYTCHM 1ST ANTB: CPT

## 2023-08-18 PROCEDURE — 88305 TISSUE EXAM BY PATHOLOGIST: CPT

## 2023-08-18 PROCEDURE — 3609012400 HC EGD TRANSORAL BIOPSY SINGLE/MULTIPLE: Performed by: INTERNAL MEDICINE

## 2023-08-18 PROCEDURE — 2580000003 HC RX 258: Performed by: INTERNAL MEDICINE

## 2023-08-18 PROCEDURE — 2709999900 HC NON-CHARGEABLE SUPPLY: Performed by: INTERNAL MEDICINE

## 2023-08-18 PROCEDURE — 3700000001 HC ADD 15 MINUTES (ANESTHESIA): Performed by: INTERNAL MEDICINE

## 2023-08-18 PROCEDURE — 6360000002 HC RX W HCPCS: Performed by: NURSE ANESTHETIST, CERTIFIED REGISTERED

## 2023-08-18 PROCEDURE — 7100000011 HC PHASE II RECOVERY - ADDTL 15 MIN: Performed by: INTERNAL MEDICINE

## 2023-08-18 PROCEDURE — 3700000000 HC ANESTHESIA ATTENDED CARE: Performed by: INTERNAL MEDICINE

## 2023-08-18 PROCEDURE — 43239 EGD BIOPSY SINGLE/MULTIPLE: CPT | Performed by: INTERNAL MEDICINE

## 2023-08-18 PROCEDURE — 2500000003 HC RX 250 WO HCPCS: Performed by: NURSE ANESTHETIST, CERTIFIED REGISTERED

## 2023-08-18 PROCEDURE — 3609012700 HC EGD DILATION SAVORY: Performed by: INTERNAL MEDICINE

## 2023-08-18 PROCEDURE — 43248 EGD GUIDE WIRE INSERTION: CPT | Performed by: INTERNAL MEDICINE

## 2023-08-18 PROCEDURE — 7100000010 HC PHASE II RECOVERY - FIRST 15 MIN: Performed by: INTERNAL MEDICINE

## 2023-08-18 RX ORDER — METOPROLOL SUCCINATE 100 MG/1
100 TABLET, EXTENDED RELEASE ORAL DAILY
COMMUNITY

## 2023-08-18 RX ORDER — ACETAMINOPHEN 500 MG
500 TABLET ORAL EVERY 6 HOURS PRN
COMMUNITY

## 2023-08-18 RX ORDER — FUROSEMIDE 40 MG/1
40 TABLET ORAL 2 TIMES DAILY
COMMUNITY

## 2023-08-18 RX ORDER — SODIUM CHLORIDE, SODIUM LACTATE, POTASSIUM CHLORIDE, CALCIUM CHLORIDE 600; 310; 30; 20 MG/100ML; MG/100ML; MG/100ML; MG/100ML
INJECTION, SOLUTION INTRAVENOUS CONTINUOUS
Status: DISCONTINUED | OUTPATIENT
Start: 2023-08-18 | End: 2023-08-18 | Stop reason: HOSPADM

## 2023-08-18 RX ORDER — ONDANSETRON 2 MG/ML
INJECTION INTRAMUSCULAR; INTRAVENOUS PRN
Status: DISCONTINUED | OUTPATIENT
Start: 2023-08-18 | End: 2023-08-18 | Stop reason: SDUPTHER

## 2023-08-18 RX ORDER — LISINOPRIL 40 MG/1
40 TABLET ORAL DAILY
COMMUNITY

## 2023-08-18 RX ORDER — GABAPENTIN 600 MG/1
600 TABLET ORAL 3 TIMES DAILY
COMMUNITY

## 2023-08-18 RX ORDER — PROPOFOL 10 MG/ML
INJECTION, EMULSION INTRAVENOUS PRN
Status: DISCONTINUED | OUTPATIENT
Start: 2023-08-18 | End: 2023-08-18 | Stop reason: SDUPTHER

## 2023-08-18 RX ORDER — AMLODIPINE BESYLATE 10 MG/1
10 TABLET ORAL DAILY
COMMUNITY

## 2023-08-18 RX ORDER — MIDAZOLAM HYDROCHLORIDE 1 MG/ML
INJECTION INTRAMUSCULAR; INTRAVENOUS PRN
Status: DISCONTINUED | OUTPATIENT
Start: 2023-08-18 | End: 2023-08-18 | Stop reason: SDUPTHER

## 2023-08-18 RX ORDER — LIDOCAINE HYDROCHLORIDE 10 MG/ML
INJECTION, SOLUTION INFILTRATION; PERINEURAL PRN
Status: DISCONTINUED | OUTPATIENT
Start: 2023-08-18 | End: 2023-08-18 | Stop reason: SDUPTHER

## 2023-08-18 RX ADMIN — SODIUM CHLORIDE, POTASSIUM CHLORIDE, SODIUM LACTATE AND CALCIUM CHLORIDE: 600; 310; 30; 20 INJECTION, SOLUTION INTRAVENOUS at 08:21

## 2023-08-18 RX ADMIN — ONDANSETRON 4 MG: 2 INJECTION INTRAMUSCULAR; INTRAVENOUS at 08:42

## 2023-08-18 RX ADMIN — PROPOFOL 300 MG: 10 INJECTION, EMULSION INTRAVENOUS at 08:46

## 2023-08-18 RX ADMIN — LIDOCAINE HYDROCHLORIDE 40 MG: 10 INJECTION, SOLUTION INFILTRATION; PERINEURAL at 08:46

## 2023-08-18 RX ADMIN — MIDAZOLAM 2 MG: 1 INJECTION INTRAMUSCULAR; INTRAVENOUS at 08:42

## 2023-08-18 ASSESSMENT — PAIN - FUNCTIONAL ASSESSMENT: PAIN_FUNCTIONAL_ASSESSMENT: 0-10

## 2023-08-18 NOTE — OP NOTE
Endoscopic Procedure Note    Patient: Jeannine Gurrola : 1965  [facility] Rec#: [id number] Acc#: [id number]     Primary Care Provider Shiv Salcedo MD  Referring Provider: Brendon JACOBS    Endoscopist: Mauro Simpson MD    Date of Procedure:  2023    Procedure:   1. EGD with biopsy  2. EGD with wire guided dilation. Indications:   1. Dysphagia - symptoms noted after prolonged intubation/ventilator early  - now slightly improved  2. Dyspepsia     Anesthesia:  Sedation was administered by anesthesia who monitored the patient during the procedure. Estimated Blood Loss: minimal    Procedure:   After reviewing the patient's chart and obtaining informed consent, the patient was placed in the left lateral decubitus position. A forward-viewing Olympus endoscope was lubricated and inserted through the mouth into the oropharynx. Under direct visualization, the upper esophagus was intubated. The scope was advanced to the level of the third portion of duodenum. Scope was slowly withdrawn with careful inspection of the mucosal surfaces. The scope was retroflexed for inspection of the gastric fundus and incisura. Findings and maneuvers are listed in impression below. The patient tolerated the procedure well. The scope was removed. There were no immediate complications. Findings:   Esophagus: abnormal: no obvious mucosal disease. There is a mild benign appearing stricture in the distal esophagus - dilated. A guidewire was placed through the endoscope and the endoscope was removed. A 54 Turkish savory dilator was advanced down the length of the esophagus used a fixed-point wire technique. The dilator and guidewire were removed. The patient tolerated the procedure well. There is no hiatal hernia present. Stomach:  abnormal: mild mucosal changes suggestive of gastritis noted -  Gastric biopsies were taken from the antrum and body to rule out Helicobacter pylori infection.       Duodenum: normal      IMPRESSION:  1. Esophageal dysphagia - dilated. 2. S/p gastric biopsies as above. RECOMMENDATIONS:    1. Await path results  2. Repeat EGD with dilation as needed for return of dysphagia  3. PPI daily. The results were discussed with the patient and family. A copy of the images obtained were given to the patient.      Daquan Deal MD  8/18/2023  8:54 AM

## 2023-08-18 NOTE — H&P
Patient Name: Radha Mccarty  : 1965  MRN: [id number]  DATE: 23    Allergies: Allergies   Allergen Reactions    Ketamine Hcl Anaphylaxis        ENDOSCOPY  History and Physical    Procedure:    [] Diagnostic Colonoscopy       [] Screening Colonoscopy  [x] EGD      [] ERCP      [] EUS       [] Other    [x] Previous office notes/History and Physical reviewed from the patients chart. Please see EMR for further details of HPI. I have examined the patient's status immediately prior to the procedure and:      Indications/HPI:    []Abdominal Pain   []Barretts  []Screening/Surveillance   []History of Polyps  [x]Dysphagia            [] +Cologard/DNA testing  []Abnormal Imaging              []EOE Hx              [] Family Hx of CRC/Polyps  []Anemia                            []Food Impaction       []Recent Poor Prep  []GI Bleed             []Lymphadenopathy  []History of Polyps  []Change in bowel habits []Heartburn/Reflux  []Cancer- GI/Lung  []Chest Pain - Non Cardiac []Heme (+) Stool []Ulcers  []Constipation  []Hemoptysis  []Incontinence    []Diarrhea  []Hypoxemia  []Rectal Bleed (BRBPR)  []Nausea/Vomiting   [] Varices  []Crohns/Colitis  []Pancreatic Cyst   [] Cirrhosis   []Pancreatitis    []Abnormal MRCP  []Elevated LFT [] Stent Removal, Previous ERCP  []Other:     Anesthesia:   [x] MAC [] Moderate Sedation   [] General   [] None     ROS: 12 pt Review of Symptoms was negative unless mentioned above    Medications:   Prior to Admission medications    Medication Sig Start Date End Date Taking? Authorizing Provider   lisinopril (PRINIVIL;ZESTRIL) 40 MG tablet Take 1 tablet by mouth daily   Yes Historical Provider, MD   furosemide (LASIX) 40 MG tablet Take 1 tablet by mouth in the morning and 1 tablet in the evening.    Yes Historical Provider, MD   metoprolol succinate (TOPROL XL) 100 MG extended release tablet Take 1 tablet by mouth daily   Yes Historical Provider, MD   gabapentin (NEURONTIN) 600 MG tablet Physical Exam:  Cardiac:  [x]WNL  []Comments:  Pulmonary:  [x]WNL   []Comments:  Neuro/Mental Status:  [x]WNL  []Comments:  Abdominal:  [x]WNL    []Comments:  Other:   []WNL  []Comments:    Informed Consent:  The risks and benefits of the procedure have been discussed with either the patient or if they cannot consent, their representative. Assessment:  Patient examined and appropriate for planned sedation and procedure. Plan:  Proceed with planned sedation and procedure as above.          Morena Interiano MD

## 2023-08-18 NOTE — ANESTHESIA POSTPROCEDURE EVALUATION
Department of Anesthesiology  Postprocedure Note    Patient: Shahid Ovalle  MRN: [id number]  YOB: 1965  Date of evaluation: 8/18/2023      Procedure Summary     Date: 08/18/23 Room / Location: [address]    Anesthesia Start: 8962 Anesthesia Stop:     Procedures:       EGD BIOPSY      EGD DILATION SAVORY- 49 F Diagnosis:       Dysphagia, unspecified type      Nausea      (Dysphagia, unspecified type [R13.10])      (Nausea [R11.0])    Surgeons: Ilana Kay MD Responsible Provider: REYNALDO Conner CRNA    Anesthesia Type: general, TIVA ASA Status: 4          Anesthesia Type: No value filed.     Sean Phase I: Sean Score: 10    Sean Phase II:        Anesthesia Post Evaluation    Patient location during evaluation: bedside  Patient participation: complete - patient participated  Level of consciousness: sleepy but conscious  Pain score: 0  Airway patency: patent  Nausea & Vomiting: no nausea and no vomiting  Complications: no  Cardiovascular status: hemodynamically stable and blood pressure returned to baseline  Respiratory status: acceptable and nasal cannula  Hydration status: stable  Pain management: adequate

## 2023-08-18 NOTE — DISCHARGE INSTRUCTIONS
RECOMMENDATIONS:    1. Await path results  2. Repeat EGD with dilation as needed for return of dysphagia  3. Proton pump inhibitor (PPI) daily.

## 2023-08-22 ENCOUNTER — DOCUMENTATION (OUTPATIENT)
Dept: SLEEP MEDICINE | Facility: HOSPITAL | Age: 58
End: 2023-08-22
Payer: COMMERCIAL

## 2023-08-22 NOTE — PROGRESS NOTES
Overnight oximetry on [facility]   Date of study: 08/17/2023-08/18/2023    Start time: 21:19:36   End time: 03:48:28  Test duration: 6 hrs 28 minutes    Time </= 88%: 00:00:04    Highest SpO2: 99%  Lowest SpO2: 87%  Average SpO2: 95.6%    Time consecutive </= 88%: 00:00:00  Total desaturations: 88  SRUTHI: 13    Highest pulse: 93 BPM  Lowest pulse: 72 BPM   Average pulse: 82 BPM     Patient does not quality for nocturnal oxygen. Follow-up as scheduled.

## 2023-09-19 ENCOUNTER — OFFICE VISIT (OUTPATIENT)
Dept: SLEEP MEDICINE | Facility: HOSPITAL | Age: 58
End: 2023-09-19
Payer: COMMERCIAL

## 2023-09-19 VITALS
HEART RATE: 83 BPM | SYSTOLIC BLOOD PRESSURE: 155 MMHG | BODY MASS INDEX: 42.76 KG/M2 | OXYGEN SATURATION: 97 % | DIASTOLIC BLOOD PRESSURE: 87 MMHG | HEIGHT: 69 IN | WEIGHT: 288.7 LBS

## 2023-09-19 DIAGNOSIS — G47.33 OSA (OBSTRUCTIVE SLEEP APNEA): Primary | ICD-10-CM

## 2023-09-19 DIAGNOSIS — J96.10 CHRONIC RESPIRATORY FAILURE, UNSPECIFIED WHETHER WITH HYPOXIA OR HYPERCAPNIA: ICD-10-CM

## 2023-09-19 PROCEDURE — 99213 OFFICE O/P EST LOW 20 MIN: CPT | Performed by: NURSE PRACTITIONER

## 2023-09-19 RX ORDER — BUMETANIDE 2 MG/1
1 TABLET ORAL DAILY
COMMUNITY
Start: 2023-08-31

## 2023-09-19 NOTE — PROGRESS NOTES
Sleep Clinic Follow Up    Date: 9/19/2023  Primary Care Provider: Halle Baron MD    Last office visit: 08/7/2023 (I reviewed this note)    CC: Follow up: sleep apnea, chronic respiratory failure on trilogy       Interim History:  Since the last visit:    1)  Jon Gill has reportedly remained compliant with AVAPS. He denies mask and machine issues, dry mouth, headaches, or pressures intolerance. He denies abnormal dreams, sleep paralysis, nasal congestion, URI sx.    He had overnight oximetry on 08/17/2023. Did not qualify for oxygen.   Recently finished physical therapy since last hospitalization. Has no complaints. Breathing is fine. Uses machine every night. Does not typically nap.       2) Patient denies RLS symptoms.     Sleep Testing:    PSG > 10 years ago in [place]  PSG 02/21/2017 AHI of 32.7  PAP titration on 03/7/2017 recommended BIPP  Currently on AVAPS cm H2O    PAP Data:  No data available   Machine: AVAPS  Mask type: Full face mask  DME: [facility] in [place]     Bed time: 2200  Sleep latency: 30 minutes  Number of times awakens during the night: 2-3  Wake time: 0500  Estimated total sleep time at night: 6 hours  Caffeine intake: 1 cups of coffee, 1 cups of tea, and 2-3 sodas per day  Alcohol intake: occasional drinks per week  Nap time: denies    Sleepiness with Driving: denies        [place] Sleepiness Scale Score: 6    How likely are you to doze off or fall asleep in the following situation, in contrast to feeling just tired?     Use the following scale to choose the most appropriate number for each situation:    0 = would never doze  1 = slight chance of dozing   2 = moderate chance of dozing   3 = high chance of dozing    It is important that you answer each question as best you can.      Situation       Chance of Dozing (0-3)    Sitting and reading            ___1____    Watching TV          ___2____    Sitting, inactive in a public place (e.g. a theatre or meeting)   ___0____    As  a passenger in a car for an hour without break    ___0____    Lying down to rest in the afternoon, when circumstances permit  ___1____    Sitting and talking to someone      ___0____    Sitting quietly after a lunch without alcohol     ___2____    In a car, while stopped for a few minutes in traffic    ___0____         PMHx, FH, SH reviewed and pertinent changes are:  finished physical therapy, started [facility]       REVIEW OF SYSTEMS:   Negative for chest pain, SOA, fever, chills, cough, N/V/D, abdominal pain.    Smoking:none, former smoker          Exam:  Vitals:    09/19/23 1453   BP: 155/87   Pulse: 83   SpO2: 97%           09/19/23  1453   Weight: 131 kg (288 lb 11.2 oz)     Body mass index is 42.61 kg/m².  Class 3 Severe Obesity (BMI >=40). Obesity-related health conditions include the following: obstructive sleep apnea. Obesity is unchanged. BMI is is above average; BMI management plan is completed. Recommend portion control and increasing exercise.       Gen:                No distress, conversant, pleasant, appears stated age, alert, oriented  Eyes:               Anicteric sclera, moist conjunctiva, no lid lag                           EOMI   Lungs:             normal effort, non-labored breathing                          Clear to auscultation bilaterally          CV:                  Normal S1/S2, no murmur                          no lower extremity edema                 Psych:             Appropriate affect  Neuro:             CN 2-12 appear intact    Past Medical History:   Diagnosis Date    Anxiety     Arthritis     Crohn's disease     Depression     Hypertension        Current Outpatient Medications:     amLODIPine (NORVASC) 10 MG tablet, Take 1 tablet by mouth Daily., Disp: , Rfl:     bumetanide (BUMEX) 2 MG tablet, Take 1 tablet by mouth Daily., Disp: , Rfl:     lisinopril (PRINIVIL,ZESTRIL) 40 MG tablet, Take 1 tablet by mouth Daily., Disp: , Rfl:     loperamide (IMODIUM) 2 MG capsule, Take 1 capsule  by mouth., Disp: , Rfl:     metoprolol succinate XL (TOPROL-XL) 100 MG 24 hr tablet, Take 1 tablet by mouth Daily., Disp: , Rfl:     naloxone (NARCAN) 4 MG/0.1ML nasal spray, Call 911. Don't prime. [place] in 1 nostril for overdose. Repeat in 2-3 minutes in other nostril if no or minimal breathing/responsiveness., Disp: 2 each, Rfl: 0    oxyCODONE-acetaminophen (PERCOCET) 7.5-325 MG per tablet, Take 1 tablet by mouth Every 8 (Eight) Hours As Needed., Disp: , Rfl:     sertraline (ZOLOFT) 100 MG tablet, Take 1 tablet by mouth 2 (Two) Times a Day., Disp: , Rfl:     sulindac (CLINORIL) 150 MG tablet, Take 1 tablet by mouth., Disp: , Rfl:     TiZANidine (ZANAFLEX) 4 MG capsule, Every 6 (Six) Hours., Disp: , Rfl:     esomeprazole (nexIUM) 20 MG capsule, Take 1 capsule by mouth Daily., Disp: , Rfl:   WBC   Date Value Ref Range Status   07/06/2023 5.43 3.70 - 10.30 10*3/uL Final     RBC   Date Value Ref Range Status   07/06/2023 4.24 (L) 4.60 - 6.10 10*6/uL Final     Hemoglobin   Date Value Ref Range Status   07/06/2023 12.4 (L) 13.7 - 17.5 g/dL Final     Hematocrit   Date Value Ref Range Status   07/06/2023 37.3 (L) 40.0 - 51.0 % Final     MCV   Date Value Ref Range Status   07/06/2023 88 79 - 98 fL Final     MCH   Date Value Ref Range Status   07/06/2023 29.2 26.0 - 32.0 pg Final     MCHC   Date Value Ref Range Status   07/06/2023 33.2 30.7 - 35.5 g/dL Final     RDW   Date Value Ref Range Status   07/06/2023 14.3 11.5 - 14.5 % Final     RDW-SD   Date Value Ref Range Status   06/08/2023 46.2 (H) 35.0 - 42.0 FL Final     MPV   Date Value Ref Range Status   07/06/2023 9.7 8.8 - 12.5 fL Final     Platelets   Date Value Ref Range Status   07/06/2023 174 155 - 369 10*3/uL Final     Neutrophil Rel %   Date Value Ref Range Status   06/27/2023 59.0 % Final     Lymphocyte Rel %   Date Value Ref Range Status   06/27/2023 22.0 % Final   06/08/2023 3.7 (L) 20.5 - 51.1 % Final     Monocyte Rel %   Date Value Ref Range Status   06/27/2023  9.0 % Final     Eosinophil Rel %   Date Value Ref Range Status   03/04/2022 3.7 0.0 - 5.0 % Final     Eosinophil %   Date Value Ref Range Status   06/27/2023 8.0 % Final     Basophil Rel %   Date Value Ref Range Status   06/27/2023 1.0 % Final     Immature Grans %   Date Value Ref Range Status   06/27/2023 1.0 % Final   06/08/2023 3.1 (H) 0.0 - 0.4 % Final     Comment:     IG PARAMETER REFLECTS THE  COMBINATION OF METAMYELOCYTES,  MYELOCYTES, AND PROMYELOCYTES.     Neutrophils Absolute   Date Value Ref Range Status   06/27/2023 3.69 1.60 - 6.10 10*3/uL Final     Lymphocytes Absolute   Date Value Ref Range Status   06/27/2023 1.36 1.20 - 3.90 10*3/uL Final     Monocytes Absolute   Date Value Ref Range Status   06/27/2023 0.52 0.30 - 0.90 10*3/uL Final     Eosinophils Absolute   Date Value Ref Range Status   06/27/2023 0.51 (H) 0.00 - 0.50 10*3/uL Final     Basophils Absolute   Date Value Ref Range Status   06/27/2023 0.03 0.00 - 0.10 10*3/uL Final     Immature Grans, Absolute   Date Value Ref Range Status   06/27/2023 0.04 0.00 - 0.06 10*3/uL Final   06/08/2023 0.41 (H) 0.00 - 0.04 THOUS/uL Final     nRBC   Date Value Ref Range Status   07/06/2023 0.0 <=0.0 per 100 WBCs Final   06/08/2023 0.0 0 /100 WBC'S Final   06/03/2023 0.2 0.0 - 0.2 /100 WBC Final       Lab Results   Component Value Date    GLUCOSE 163 (H) 06/21/2023    BUN 41 (H) 06/04/2023    CREATININE 0.78 06/04/2023    EGFR 104.7 06/04/2023    BCR 52.6 (H) 06/04/2023    K 3.7 06/25/2023    CO2 34.0 (H) 06/04/2023    CALCIUM 9.1 06/04/2023    ALBUMIN 3.6 06/04/2023    BILITOT 0.7 06/04/2023    AST 25 06/04/2023    ALT 21 06/04/2023         Assessment and Plan:    Sleep apnea- Established, stable   Compliant with PAP therapy- called legacy. They are going to go get download from machine.   Continue PAP as prescribed  Script for PAP supplies  Pertinent labs reviewed   Drowsy driving tips- do not drive if feeling sleepy   Return to clinic in 12 months with  compliance report unless change in symptoms in interim period  Chronic respiratory failure   Morbid obesity          Educated on PAP management, maintenance, and compliance.           This document has been electronically signed by NANI Oneal on September 19, 2023 15:08 CDT            CC: Halle Baron MD          No ref. provider found

## 2023-09-22 ENCOUNTER — DOCUMENTATION (OUTPATIENT)
Dept: SLEEP MEDICINE | Facility: HOSPITAL | Age: 58
End: 2023-09-22
Payer: COMMERCIAL

## 2023-09-22 NOTE — PROGRESS NOTES
08/22/2023-09/20/2023      Mode: AVAPS  Tidal volume: 400  EPAP max 12  EPAP min 8  Breath rate auto   Pressure support: 5-15 cm H2O  AVAPS rate 5        Avg IPAP: 15.6 cm H2O   Avg VTE: 650 ml  Avg breath/min: 14  Avg EPAP: 10.2 cm HO  Avg pt triggered breaths: 59%  Leak: 60.8 L/min  Days used: 30  % days used: 90

## 2023-10-05 ENCOUNTER — TELEPHONE (OUTPATIENT)
Dept: GASTROENTEROLOGY | Age: 58
End: 2023-10-05

## 2023-10-05 NOTE — TELEPHONE ENCOUNTER
Pt was seen for an OV on 8/10/23 with CT, APRN. The checkout note said:    Need colonoscopy report from [address] [facility] at [facility] - [facility] done April 2023.    8/10: I called [phone], opt 3, and opt 4. .  I got Sarah  and she told me to fax a request to [phone]. I sent the request on 8/10 and 8/25.    10/3: I called Lena  again and she said that she sent it on 8/11, but will fax it to me again. 10/5: I called again, but got Lily. Lily said she will try faxing it again. I asked if it could be emailed to me, but she said she wasn't sure if she is allowed to do that.

## 2023-11-27 ENCOUNTER — APPOINTMENT (OUTPATIENT)
Dept: CT IMAGING | Age: 58
End: 2023-11-27
Payer: COMMERCIAL

## 2023-11-27 ENCOUNTER — HOSPITAL ENCOUNTER (EMERGENCY)
Age: 58
Discharge: HOME | End: 2023-11-27
Payer: COMMERCIAL

## 2023-11-27 ENCOUNTER — APPOINTMENT (OUTPATIENT)
Dept: GENERAL RADIOLOGY | Age: 58
End: 2023-11-27
Payer: COMMERCIAL

## 2023-11-27 VITALS
SYSTOLIC BLOOD PRESSURE: 151 MMHG | DIASTOLIC BLOOD PRESSURE: 77 MMHG | OXYGEN SATURATION: 97 % | TEMPERATURE: 97.9 F | HEART RATE: 66 BPM | RESPIRATION RATE: 16 BRPM

## 2023-11-27 DIAGNOSIS — R19.7 DIARRHEA, UNSPECIFIED TYPE: ICD-10-CM

## 2023-11-27 DIAGNOSIS — R11.0 NAUSEA: ICD-10-CM

## 2023-11-27 DIAGNOSIS — K21.9 GASTROESOPHAGEAL REFLUX DISEASE, UNSPECIFIED WHETHER ESOPHAGITIS PRESENT: ICD-10-CM

## 2023-11-27 DIAGNOSIS — R13.12 OROPHARYNGEAL DYSPHAGIA: Primary | ICD-10-CM

## 2023-11-27 LAB
ALBUMIN SERPL-MCNC: 4.4 G/DL (ref 3.5–5.2)
ALP SERPL-CCNC: 135 U/L (ref 40–130)
ALT SERPL-CCNC: 26 U/L (ref 5–41)
ANION GAP SERPL CALCULATED.3IONS-SCNC: 14 MMOL/L (ref 7–19)
AST SERPL-CCNC: 34 U/L (ref 5–40)
B PARAP IS1001 DNA NPH QL NAA+NON-PROBE: NOT DETECTED
B PERT.PT PRMT NPH QL NAA+NON-PROBE: NOT DETECTED
BASOPHILS # BLD: 0.1 K/UL (ref 0–0.2)
BASOPHILS NFR BLD: 0.6 % (ref 0–1)
BILIRUB SERPL-MCNC: 1 MG/DL (ref 0.2–1.2)
BUN SERPL-MCNC: 13 MG/DL (ref 6–20)
C PNEUM DNA NPH QL NAA+NON-PROBE: NOT DETECTED
CALCIUM SERPL-MCNC: 9.4 MG/DL (ref 8.6–10)
CHLORIDE SERPL-SCNC: 101 MMOL/L (ref 98–111)
CO2 SERPL-SCNC: 26 MMOL/L (ref 22–29)
CREAT SERPL-MCNC: 1 MG/DL (ref 0.5–1.2)
EOSINOPHIL # BLD: 0.6 K/UL (ref 0–0.6)
EOSINOPHIL NFR BLD: 3.6 % (ref 0–5)
ERYTHROCYTE [DISTWIDTH] IN BLOOD BY AUTOMATED COUNT: 15.6 % (ref 11.5–14.5)
FLUAV RNA NPH QL NAA+NON-PROBE: NOT DETECTED
FLUBV RNA NPH QL NAA+NON-PROBE: NOT DETECTED
GLUCOSE SERPL-MCNC: 93 MG/DL (ref 74–109)
HADV DNA NPH QL NAA+NON-PROBE: NOT DETECTED
HCOV 229E RNA NPH QL NAA+NON-PROBE: NOT DETECTED
HCOV HKU1 RNA NPH QL NAA+NON-PROBE: NOT DETECTED
HCOV NL63 RNA NPH QL NAA+NON-PROBE: NOT DETECTED
HCOV OC43 RNA NPH QL NAA+NON-PROBE: NOT DETECTED
HCT VFR BLD AUTO: 44.7 % (ref 42–52)
HGB BLD-MCNC: 14.3 G/DL (ref 14–18)
HMPV RNA NPH QL NAA+NON-PROBE: NOT DETECTED
HPIV1 RNA NPH QL NAA+NON-PROBE: NOT DETECTED
HPIV2 RNA NPH QL NAA+NON-PROBE: NOT DETECTED
HPIV3 RNA NPH QL NAA+NON-PROBE: NOT DETECTED
HPIV4 RNA NPH QL NAA+NON-PROBE: NOT DETECTED
IMM GRANULOCYTES # BLD: 0.1 K/UL
LYMPHOCYTES # BLD: 4.2 K/UL (ref 1.1–4.5)
LYMPHOCYTES NFR BLD: 24.2 % (ref 20–40)
M PNEUMO DNA NPH QL NAA+NON-PROBE: NOT DETECTED
MCH RBC QN AUTO: 27 PG (ref 27–31)
MCHC RBC AUTO-ENTMCNC: 32 G/DL (ref 33–37)
MCV RBC AUTO: 84.5 FL (ref 80–94)
MONOCYTES # BLD: 1.1 K/UL (ref 0–0.9)
MONOCYTES NFR BLD: 6.3 % (ref 0–10)
NEUTROPHILS # BLD: 11.2 K/UL (ref 1.5–7.5)
NEUTS SEG NFR BLD: 64.8 % (ref 50–65)
PLATELET # BLD AUTO: 266 K/UL (ref 130–400)
PMV BLD AUTO: 9.7 FL (ref 9.4–12.4)
POTASSIUM SERPL-SCNC: 3.6 MMOL/L (ref 3.5–5)
PROT SERPL-MCNC: 8.3 G/DL (ref 6.6–8.7)
RBC # BLD AUTO: 5.29 M/UL (ref 4.7–6.1)
RSV RNA NPH QL NAA+NON-PROBE: NOT DETECTED
RV+EV RNA NPH QL NAA+NON-PROBE: NOT DETECTED
SARS-COV-2 RNA NPH QL NAA+NON-PROBE: NOT DETECTED
SODIUM SERPL-SCNC: 141 MMOL/L (ref 136–145)
WBC # BLD AUTO: 17.3 K/UL (ref 4.8–10.8)

## 2023-11-27 PROCEDURE — 80053 COMPREHEN METABOLIC PANEL: CPT

## 2023-11-27 PROCEDURE — 71046 X-RAY EXAM CHEST 2 VIEWS: CPT

## 2023-11-27 PROCEDURE — 0202U NFCT DS 22 TRGT SARS-COV-2: CPT

## 2023-11-27 PROCEDURE — 99285 EMERGENCY DEPT VISIT HI MDM: CPT

## 2023-11-27 PROCEDURE — 70492 CT SFT TSUE NCK W/O & W/DYE: CPT

## 2023-11-27 PROCEDURE — 36415 COLL VENOUS BLD VENIPUNCTURE: CPT

## 2023-11-27 PROCEDURE — 85025 COMPLETE CBC W/AUTO DIFF WBC: CPT

## 2023-11-27 PROCEDURE — 6360000004 HC RX CONTRAST MEDICATION: Performed by: PHYSICIAN ASSISTANT

## 2023-11-27 RX ORDER — SUCRALFATE 1 G/1
1 TABLET ORAL 4 TIMES DAILY
Qty: 60 TABLET | Refills: 0 | Status: SHIPPED | OUTPATIENT
Start: 2023-11-27

## 2023-11-27 RX ORDER — DICYCLOMINE HYDROCHLORIDE 10 MG/1
20 CAPSULE ORAL 4 TIMES DAILY
Qty: 360 CAPSULE | Refills: 11 | Status: SHIPPED | OUTPATIENT
Start: 2023-11-27

## 2023-11-27 RX ADMIN — IOPAMIDOL 70 ML: 755 INJECTION, SOLUTION INTRAVENOUS at 21:23

## 2023-11-27 ASSESSMENT — ENCOUNTER SYMPTOMS
SHORTNESS OF BREATH: 0
TROUBLE SWALLOWING: 1
APNEA: 0
COUGH: 1
EYE ITCHING: 0
EYE DISCHARGE: 0
PHOTOPHOBIA: 0
COLOR CHANGE: 0
BACK PAIN: 0

## 2023-12-14 ENCOUNTER — APPOINTMENT (OUTPATIENT)
Dept: GENERAL RADIOLOGY | Age: 58
End: 2023-12-14
Payer: COMMERCIAL

## 2023-12-14 ENCOUNTER — HOSPITAL ENCOUNTER (EMERGENCY)
Age: 58
Discharge: HOME | End: 2023-12-14
Attending: STUDENT IN AN ORGANIZED HEALTH CARE EDUCATION/TRAINING PROGRAM
Payer: COMMERCIAL

## 2023-12-14 VITALS
HEART RATE: 68 BPM | RESPIRATION RATE: 22 BRPM | DIASTOLIC BLOOD PRESSURE: 74 MMHG | SYSTOLIC BLOOD PRESSURE: 136 MMHG | WEIGHT: 310 LBS | TEMPERATURE: 97.7 F | OXYGEN SATURATION: 93 % | BODY MASS INDEX: 45.78 KG/M2

## 2023-12-14 DIAGNOSIS — B34.8 RHINOVIRUS: ICD-10-CM

## 2023-12-14 DIAGNOSIS — R05.3 CHRONIC COUGH: Primary | ICD-10-CM

## 2023-12-14 LAB
ALBUMIN SERPL-MCNC: 3.7 G/DL (ref 3.5–5.2)
ALP SERPL-CCNC: 121 U/L (ref 40–130)
ALT SERPL-CCNC: 15 U/L (ref 5–41)
ANION GAP SERPL CALCULATED.3IONS-SCNC: 8 MMOL/L (ref 7–19)
AST SERPL-CCNC: 25 U/L (ref 5–40)
B PARAP IS1001 DNA NPH QL NAA+NON-PROBE: NOT DETECTED
B PERT.PT PRMT NPH QL NAA+NON-PROBE: NOT DETECTED
BASOPHILS # BLD: 0.1 K/UL (ref 0–0.2)
BASOPHILS NFR BLD: 0.5 % (ref 0–1)
BILIRUB SERPL-MCNC: 0.8 MG/DL (ref 0.2–1.2)
BUN SERPL-MCNC: 25 MG/DL (ref 6–20)
C PNEUM DNA NPH QL NAA+NON-PROBE: NOT DETECTED
CALCIUM SERPL-MCNC: 9 MG/DL (ref 8.6–10)
CHLORIDE SERPL-SCNC: 105 MMOL/L (ref 98–111)
CO2 SERPL-SCNC: 28 MMOL/L (ref 22–29)
CREAT SERPL-MCNC: 0.9 MG/DL (ref 0.5–1.2)
EOSINOPHIL # BLD: 0.4 K/UL (ref 0–0.6)
EOSINOPHIL NFR BLD: 4.5 % (ref 0–5)
ERYTHROCYTE [DISTWIDTH] IN BLOOD BY AUTOMATED COUNT: 15.8 % (ref 11.5–14.5)
FLUAV RNA NPH QL NAA+NON-PROBE: NOT DETECTED
FLUBV RNA NPH QL NAA+NON-PROBE: NOT DETECTED
GLUCOSE SERPL-MCNC: 87 MG/DL (ref 74–109)
HADV DNA NPH QL NAA+NON-PROBE: NOT DETECTED
HCOV 229E RNA NPH QL NAA+NON-PROBE: NOT DETECTED
HCOV HKU1 RNA NPH QL NAA+NON-PROBE: NOT DETECTED
HCOV NL63 RNA NPH QL NAA+NON-PROBE: NOT DETECTED
HCOV OC43 RNA NPH QL NAA+NON-PROBE: NOT DETECTED
HCT VFR BLD AUTO: 37.6 % (ref 42–52)
HGB BLD-MCNC: 11.8 G/DL (ref 14–18)
HMPV RNA NPH QL NAA+NON-PROBE: NOT DETECTED
HPIV1 RNA NPH QL NAA+NON-PROBE: NOT DETECTED
HPIV2 RNA NPH QL NAA+NON-PROBE: NOT DETECTED
HPIV3 RNA NPH QL NAA+NON-PROBE: NOT DETECTED
HPIV4 RNA NPH QL NAA+NON-PROBE: NOT DETECTED
IMM GRANULOCYTES # BLD: 0 K/UL
LYMPHOCYTES # BLD: 2.1 K/UL (ref 1.1–4.5)
LYMPHOCYTES NFR BLD: 21.7 % (ref 20–40)
M PNEUMO DNA NPH QL NAA+NON-PROBE: NOT DETECTED
MCH RBC QN AUTO: 27.1 PG (ref 27–31)
MCHC RBC AUTO-ENTMCNC: 31.4 G/DL (ref 33–37)
MCV RBC AUTO: 86.2 FL (ref 80–94)
MONOCYTES # BLD: 0.6 K/UL (ref 0–0.9)
MONOCYTES NFR BLD: 6.5 % (ref 0–10)
NEUTROPHILS # BLD: 6.3 K/UL (ref 1.5–7.5)
NEUTS SEG NFR BLD: 66.5 % (ref 50–65)
PLATELET # BLD AUTO: 177 K/UL (ref 130–400)
PMV BLD AUTO: 9.8 FL (ref 9.4–12.4)
POTASSIUM SERPL-SCNC: 4 MMOL/L (ref 3.5–5)
PROT SERPL-MCNC: 7.2 G/DL (ref 6.6–8.7)
RBC # BLD AUTO: 4.36 M/UL (ref 4.7–6.1)
RSV RNA NPH QL NAA+NON-PROBE: NOT DETECTED
RV+EV RNA NPH QL NAA+NON-PROBE: DETECTED
SARS-COV-2 RNA NPH QL NAA+NON-PROBE: NOT DETECTED
SODIUM SERPL-SCNC: 141 MMOL/L (ref 136–145)
WBC # BLD AUTO: 9.5 K/UL (ref 4.8–10.8)

## 2023-12-14 PROCEDURE — 80053 COMPREHEN METABOLIC PANEL: CPT

## 2023-12-14 PROCEDURE — 85025 COMPLETE CBC W/AUTO DIFF WBC: CPT

## 2023-12-14 PROCEDURE — 36415 COLL VENOUS BLD VENIPUNCTURE: CPT

## 2023-12-14 PROCEDURE — 6370000000 HC RX 637 (ALT 250 FOR IP): Performed by: STUDENT IN AN ORGANIZED HEALTH CARE EDUCATION/TRAINING PROGRAM

## 2023-12-14 PROCEDURE — 99284 EMERGENCY DEPT VISIT MOD MDM: CPT

## 2023-12-14 PROCEDURE — 0202U NFCT DS 22 TRGT SARS-COV-2: CPT

## 2023-12-14 PROCEDURE — 71045 X-RAY EXAM CHEST 1 VIEW: CPT

## 2023-12-14 RX ADMIN — ALUMINUM HYDROXIDE, MAGNESIUM HYDROXIDE, AND SIMETHICONE: 200; 200; 20 SUSPENSION ORAL at 20:17

## 2023-12-14 ASSESSMENT — PAIN - FUNCTIONAL ASSESSMENT: PAIN_FUNCTIONAL_ASSESSMENT: NONE - DENIES PAIN

## 2023-12-15 NOTE — DISCHARGE INSTRUCTIONS
It is important you follow up with GI at your scheduled appointment. Continue to take your medications as prescribed. You should discuss with your doctor about having a sleep study done.

## 2023-12-15 NOTE — ED PROVIDER NOTES
[facility] EMERGENCY DEPT  eMERGENCY dEPARTMENT eNCOUnter      Pt Name: Terra Garces  MRN: [id number]  [facility] 1965  Date of evaluation: 12/14/2023  Provider: Lopez Carrizales MD    [address]       Chief Complaint   Patient presents with    Cough     Fever     Shortness of Breath         HISTORY OF PRESENT ILLNESS   (Location/Symptom, Timing/Onset,Context/Setting, Quality, Duration, Modifying Factors, Severity)  Note limiting factors. HPI    Terra Garces is a 66 y.o. male with PMH of dysphagia with esophageal stricture (dilation August 2023), GERD, ANITA, psoriatic arthritis, Crohn's disease who presents to the emergency department with CC of cough and shortness of breath. Patient reports symptoms ongoing for at least 3 weeks, 2 prior ED visits. Reports ongoing issues with dysphagia, prior esophageal stricture with dilatation in August, and has follow-up with GI on 12/19/2023. Reports ongoing cough, dysphagia to both solids and liquids and shortness of breath which has progressed over the last 3 weeks. Previously he completed a course of azithromycin and Augmentin in November. Reports subjective fevers as well ongoing for 3 weeks. Patient had CT soft tissue neck during previous ED visit which was notable for some air in the upper esophagus and redundant soft tissue. He has had multiple barium swallows in the past as well. NursingNotes were reviewed. REVIEW OF SYSTEMS    (2-9 systems for level 4, 10 or more for level 5)     Review of Systems   Constitutional:  Positive for fever (subjective). Negative for appetite change, chills and fatigue. HENT:  Negative for congestion and sore throat. Eyes:  Negative for pain and redness. Respiratory:  Positive for cough, choking and shortness of breath. Negative for chest tightness. Cardiovascular:  Negative for chest pain and leg swelling. Gastrointestinal:  Negative for abdominal pain, blood in stool, diarrhea, nausea and vomiting.

## 2023-12-24 ENCOUNTER — HOSPITAL ENCOUNTER (INPATIENT)
Age: 58
LOS: 10 days | Discharge: HOME HEALTH | DRG: 177 | End: 2024-01-03
Attending: INTERNAL MEDICINE | Admitting: INTERNAL MEDICINE
Payer: COMMERCIAL

## 2023-12-24 DIAGNOSIS — R13.12 OROPHARYNGEAL DYSPHAGIA: Primary | ICD-10-CM

## 2023-12-24 DIAGNOSIS — J69.0 ASPIRATION PNEUMONIA DUE TO GASTRIC SECRETIONS, UNSPECIFIED LATERALITY, UNSPECIFIED PART OF LUNG (HCC): ICD-10-CM

## 2023-12-24 DIAGNOSIS — J69.0 ASPIRATION PNEUMONIA OF RIGHT LUNG DUE TO GASTRIC SECRETIONS, UNSPECIFIED PART OF LUNG (HCC): ICD-10-CM

## 2023-12-24 PROBLEM — I48.91 UNSPECIFIED ATRIAL FIBRILLATION (HCC): Status: ACTIVE | Noted: 2023-12-24

## 2023-12-24 PROBLEM — E66.01 MORBID OBESITY WITH BODY MASS INDEX (BMI) OF 40.0 OR HIGHER (HCC): Status: ACTIVE | Noted: 2023-06-08

## 2023-12-24 PROBLEM — E66.2 MORBID (SEVERE) OBESITY WITH ALVEOLAR HYPOVENTILATION (HCC): Status: ACTIVE | Noted: 2023-12-24

## 2023-12-24 PROBLEM — I10 ESSENTIAL HYPERTENSION: Chronic | Status: ACTIVE | Noted: 2019-10-04

## 2023-12-24 PROBLEM — K50.919 COMPLICATION DUE TO CROHN'S DISEASE (HCC): Status: ACTIVE | Noted: 2023-12-24

## 2023-12-24 LAB
ANION GAP SERPL CALCULATED.3IONS-SCNC: 12 MMOL/L (ref 7–19)
BASOPHILS # BLD: 0 K/UL (ref 0–0.2)
BASOPHILS NFR BLD: 0.1 % (ref 0–1)
BNP BLD-MCNC: 1343 PG/ML (ref 0–124)
BUN SERPL-MCNC: 12 MG/DL (ref 6–20)
CALCIUM SERPL-MCNC: 9 MG/DL (ref 8.6–10)
CHLORIDE SERPL-SCNC: 106 MMOL/L (ref 98–111)
CO2 SERPL-SCNC: 25 MMOL/L (ref 22–29)
CREAT SERPL-MCNC: 0.7 MG/DL (ref 0.5–1.2)
EOSINOPHIL # BLD: 0 K/UL (ref 0–0.6)
EOSINOPHIL NFR BLD: 0.1 % (ref 0–5)
ERYTHROCYTE [DISTWIDTH] IN BLOOD BY AUTOMATED COUNT: 16.3 % (ref 11.5–14.5)
GLUCOSE SERPL-MCNC: 175 MG/DL (ref 74–109)
HCT VFR BLD AUTO: 35.6 % (ref 42–52)
HGB BLD-MCNC: 11.4 G/DL (ref 14–18)
IMM GRANULOCYTES # BLD: 0.1 K/UL
LACTATE BLDV-SCNC: 1.7 MMOL/L (ref 0.5–1.9)
LYMPHOCYTES # BLD: 0.5 K/UL (ref 1.1–4.5)
LYMPHOCYTES NFR BLD: 5.5 % (ref 20–40)
MAGNESIUM SERPL-MCNC: 1.9 MG/DL (ref 1.6–2.6)
MCH RBC QN AUTO: 27.3 PG (ref 27–31)
MCHC RBC AUTO-ENTMCNC: 32 G/DL (ref 33–37)
MCV RBC AUTO: 85.4 FL (ref 80–94)
MONOCYTES # BLD: 0.1 K/UL (ref 0–0.9)
MONOCYTES NFR BLD: 0.9 % (ref 0–10)
NEUTROPHILS # BLD: 8.2 K/UL (ref 1.5–7.5)
NEUTS SEG NFR BLD: 92.4 % (ref 50–65)
PHOSPHATE SERPL-MCNC: 1.5 MG/DL (ref 2.5–4.5)
PLATELET # BLD AUTO: 217 K/UL (ref 130–400)
PMV BLD AUTO: 9.8 FL (ref 9.4–12.4)
POTASSIUM SERPL-SCNC: 3.5 MMOL/L (ref 3.5–5)
PROCALCITONIN: 0.06 NG/ML (ref 0–0.09)
RBC # BLD AUTO: 4.17 M/UL (ref 4.7–6.1)
SODIUM SERPL-SCNC: 143 MMOL/L (ref 136–145)
WBC # BLD AUTO: 8.9 K/UL (ref 4.8–10.8)

## 2023-12-24 PROCEDURE — 1210000000 HC MED SURG R&B

## 2023-12-24 PROCEDURE — 94660 CPAP INITIATION&MGMT: CPT

## 2023-12-24 PROCEDURE — 36415 COLL VENOUS BLD VENIPUNCTURE: CPT

## 2023-12-24 PROCEDURE — 2580000003 HC RX 258: Performed by: NURSE PRACTITIONER

## 2023-12-24 PROCEDURE — 2580000003 HC RX 258: Performed by: ANESTHESIOLOGY

## 2023-12-24 PROCEDURE — 0DJ08ZZ INSPECTION OF UPPER INTESTINAL TRACT, VIA NATURAL OR ARTIFICIAL OPENING ENDOSCOPIC: ICD-10-PCS | Performed by: INTERNAL MEDICINE

## 2023-12-24 PROCEDURE — 94640 AIRWAY INHALATION TREATMENT: CPT

## 2023-12-24 PROCEDURE — 94760 N-INVAS EAR/PLS OXIMETRY 1: CPT

## 2023-12-24 PROCEDURE — 87040 BLOOD CULTURE FOR BACTERIA: CPT

## 2023-12-24 PROCEDURE — 80048 BASIC METABOLIC PNL TOTAL CA: CPT

## 2023-12-24 PROCEDURE — 99223 1ST HOSP IP/OBS HIGH 75: CPT | Performed by: INTERNAL MEDICINE

## 2023-12-24 PROCEDURE — 87205 SMEAR GRAM STAIN: CPT

## 2023-12-24 PROCEDURE — 87070 CULTURE OTHR SPECIMN AEROBIC: CPT

## 2023-12-24 PROCEDURE — 6370000000 HC RX 637 (ALT 250 FOR IP): Performed by: NURSE PRACTITIONER

## 2023-12-24 PROCEDURE — 6360000002 HC RX W HCPCS: Performed by: NURSE PRACTITIONER

## 2023-12-24 PROCEDURE — 86403 PARTICLE AGGLUT ANTBDY SCRN: CPT

## 2023-12-24 PROCEDURE — 83880 ASSAY OF NATRIURETIC PEPTIDE: CPT

## 2023-12-24 PROCEDURE — 2709999900 HC NON-CHARGEABLE SUPPLY: Performed by: INTERNAL MEDICINE

## 2023-12-24 PROCEDURE — 3609012900 HC EGD FOREIGN BODY REMOVAL: Performed by: INTERNAL MEDICINE

## 2023-12-24 PROCEDURE — 93005 ELECTROCARDIOGRAM TRACING: CPT | Performed by: NURSE PRACTITIONER

## 2023-12-24 PROCEDURE — 3700000000 HC ANESTHESIA ATTENDED CARE: Performed by: INTERNAL MEDICINE

## 2023-12-24 PROCEDURE — 83735 ASSAY OF MAGNESIUM: CPT

## 2023-12-24 PROCEDURE — 2700000000 HC OXYGEN THERAPY PER DAY

## 2023-12-24 PROCEDURE — 3700000001 HC ADD 15 MINUTES (ANESTHESIA): Performed by: INTERNAL MEDICINE

## 2023-12-24 PROCEDURE — 87186 SC STD MICRODIL/AGAR DIL: CPT

## 2023-12-24 PROCEDURE — 94150 VITAL CAPACITY TEST: CPT

## 2023-12-24 PROCEDURE — 5A09357 ASSISTANCE WITH RESPIRATORY VENTILATION, LESS THAN 24 CONSECUTIVE HOURS, CONTINUOUS POSITIVE AIRWAY PRESSURE: ICD-10-PCS | Performed by: INTERNAL MEDICINE

## 2023-12-24 PROCEDURE — 84145 PROCALCITONIN (PCT): CPT

## 2023-12-24 PROCEDURE — 83605 ASSAY OF LACTIC ACID: CPT

## 2023-12-24 PROCEDURE — 3609017700 HC EGD DILATION GASTRIC/DUODENAL STRICTURE: Performed by: INTERNAL MEDICINE

## 2023-12-24 PROCEDURE — 94669 MECHANICAL CHEST WALL OSCILL: CPT

## 2023-12-24 PROCEDURE — 84100 ASSAY OF PHOSPHORUS: CPT

## 2023-12-24 PROCEDURE — 85025 COMPLETE CBC W/AUTO DIFF WBC: CPT

## 2023-12-24 PROCEDURE — 7100000000 HC PACU RECOVERY - FIRST 15 MIN: Performed by: INTERNAL MEDICINE

## 2023-12-24 PROCEDURE — 7100000001 HC PACU RECOVERY - ADDTL 15 MIN: Performed by: INTERNAL MEDICINE

## 2023-12-24 RX ORDER — ENOXAPARIN SODIUM 100 MG/ML
30 INJECTION SUBCUTANEOUS 2 TIMES DAILY
Status: DISCONTINUED | OUTPATIENT
Start: 2023-12-24 | End: 2024-01-03 | Stop reason: HOSPADM

## 2023-12-24 RX ORDER — SODIUM CHLORIDE 0.9 % (FLUSH) 0.9 %
5-40 SYRINGE (ML) INJECTION EVERY 12 HOURS SCHEDULED
Status: DISCONTINUED | OUTPATIENT
Start: 2023-12-24 | End: 2023-12-27

## 2023-12-24 RX ORDER — ARFORMOTEROL TARTRATE 15 UG/2ML
15 SOLUTION RESPIRATORY (INHALATION)
Status: DISCONTINUED | OUTPATIENT
Start: 2023-12-24 | End: 2023-12-27 | Stop reason: CLARIF

## 2023-12-24 RX ORDER — BUMETANIDE 1 MG/1
2 TABLET ORAL DAILY
Status: DISCONTINUED | OUTPATIENT
Start: 2023-12-24 | End: 2024-01-03 | Stop reason: HOSPADM

## 2023-12-24 RX ORDER — CLONAZEPAM 0.5 MG/1
0.5 TABLET ORAL 2 TIMES DAILY
Status: DISCONTINUED | OUTPATIENT
Start: 2023-12-24 | End: 2023-12-24

## 2023-12-24 RX ORDER — POLYETHYLENE GLYCOL 3350 17 G/17G
17 POWDER, FOR SOLUTION ORAL DAILY PRN
Status: DISCONTINUED | OUTPATIENT
Start: 2023-12-24 | End: 2024-01-03 | Stop reason: HOSPADM

## 2023-12-24 RX ORDER — SODIUM CHLORIDE, SODIUM LACTATE, POTASSIUM CHLORIDE, CALCIUM CHLORIDE 600; 310; 30; 20 MG/100ML; MG/100ML; MG/100ML; MG/100ML
INJECTION, SOLUTION INTRAVENOUS CONTINUOUS
Status: DISCONTINUED | OUTPATIENT
Start: 2023-12-24 | End: 2023-12-25

## 2023-12-24 RX ORDER — SODIUM CHLORIDE 0.9 % (FLUSH) 0.9 %
5-40 SYRINGE (ML) INJECTION EVERY 12 HOURS SCHEDULED
Status: DISCONTINUED | OUTPATIENT
Start: 2023-12-24 | End: 2023-12-26 | Stop reason: SDUPTHER

## 2023-12-24 RX ORDER — SODIUM CHLORIDE 9 MG/ML
INJECTION, SOLUTION INTRAVENOUS PRN
Status: DISCONTINUED | OUTPATIENT
Start: 2023-12-24 | End: 2024-01-03 | Stop reason: HOSPADM

## 2023-12-24 RX ORDER — OXYCODONE AND ACETAMINOPHEN 7.5; 325 MG/1; MG/1
1 TABLET ORAL EVERY 8 HOURS PRN
Status: DISCONTINUED | OUTPATIENT
Start: 2023-12-24 | End: 2024-01-03 | Stop reason: HOSPADM

## 2023-12-24 RX ORDER — SODIUM CHLORIDE 0.9 % (FLUSH) 0.9 %
5-40 SYRINGE (ML) INJECTION PRN
Status: DISCONTINUED | OUTPATIENT
Start: 2023-12-24 | End: 2024-01-03 | Stop reason: HOSPADM

## 2023-12-24 RX ORDER — LISINOPRIL 20 MG/1
40 TABLET ORAL DAILY
Status: DISCONTINUED | OUTPATIENT
Start: 2023-12-24 | End: 2024-01-03 | Stop reason: HOSPADM

## 2023-12-24 RX ORDER — METOCLOPRAMIDE HYDROCHLORIDE 5 MG/ML
10 INJECTION INTRAMUSCULAR; INTRAVENOUS
Status: DISCONTINUED | OUTPATIENT
Start: 2023-12-24 | End: 2023-12-24 | Stop reason: HOSPADM

## 2023-12-24 RX ORDER — DIPHENHYDRAMINE HYDROCHLORIDE 50 MG/ML
12.5 INJECTION INTRAMUSCULAR; INTRAVENOUS
Status: DISCONTINUED | OUTPATIENT
Start: 2023-12-24 | End: 2023-12-24 | Stop reason: HOSPADM

## 2023-12-24 RX ORDER — SODIUM CHLORIDE 9 MG/ML
25 INJECTION, SOLUTION INTRAVENOUS PRN
Status: DISCONTINUED | OUTPATIENT
Start: 2023-12-24 | End: 2023-12-26 | Stop reason: SDUPTHER

## 2023-12-24 RX ORDER — ACETAMINOPHEN 325 MG/1
650 TABLET ORAL EVERY 6 HOURS PRN
Status: DISCONTINUED | OUTPATIENT
Start: 2023-12-24 | End: 2023-12-27

## 2023-12-24 RX ORDER — ACETAMINOPHEN 650 MG/1
650 SUPPOSITORY RECTAL EVERY 6 HOURS PRN
Status: DISCONTINUED | OUTPATIENT
Start: 2023-12-24 | End: 2024-01-03 | Stop reason: HOSPADM

## 2023-12-24 RX ORDER — SODIUM CHLORIDE 0.9 % (FLUSH) 0.9 %
5-40 SYRINGE (ML) INJECTION PRN
Status: DISCONTINUED | OUTPATIENT
Start: 2023-12-24 | End: 2023-12-26 | Stop reason: SDUPTHER

## 2023-12-24 RX ORDER — SODIUM CHLORIDE 9 MG/ML
INJECTION, SOLUTION INTRAVENOUS PRN
Status: DISCONTINUED | OUTPATIENT
Start: 2023-12-24 | End: 2023-12-24 | Stop reason: HOSPADM

## 2023-12-24 RX ORDER — ONDANSETRON 2 MG/ML
4 INJECTION INTRAMUSCULAR; INTRAVENOUS EVERY 6 HOURS PRN
Status: DISCONTINUED | OUTPATIENT
Start: 2023-12-24 | End: 2024-01-03 | Stop reason: HOSPADM

## 2023-12-24 RX ORDER — SODIUM CHLORIDE 0.9 % (FLUSH) 0.9 %
5-40 SYRINGE (ML) INJECTION EVERY 12 HOURS SCHEDULED
Status: DISCONTINUED | OUTPATIENT
Start: 2023-12-24 | End: 2023-12-24 | Stop reason: HOSPADM

## 2023-12-24 RX ORDER — GABAPENTIN 600 MG/1
600 TABLET ORAL 2 TIMES DAILY
Status: DISCONTINUED | OUTPATIENT
Start: 2023-12-24 | End: 2024-01-03 | Stop reason: HOSPADM

## 2023-12-24 RX ORDER — METOPROLOL SUCCINATE 50 MG/1
100 TABLET, EXTENDED RELEASE ORAL DAILY
Status: DISCONTINUED | OUTPATIENT
Start: 2023-12-24 | End: 2024-01-03 | Stop reason: HOSPADM

## 2023-12-24 RX ORDER — BUMETANIDE 2 MG/1
2 TABLET ORAL DAILY
COMMUNITY
Start: 2023-11-10

## 2023-12-24 RX ORDER — HYDROMORPHONE HYDROCHLORIDE 1 MG/ML
0.25 INJECTION, SOLUTION INTRAMUSCULAR; INTRAVENOUS; SUBCUTANEOUS EVERY 5 MIN PRN
Status: DISCONTINUED | OUTPATIENT
Start: 2023-12-24 | End: 2023-12-24 | Stop reason: HOSPADM

## 2023-12-24 RX ORDER — AMLODIPINE BESYLATE 10 MG/1
10 TABLET ORAL DAILY
Status: DISCONTINUED | OUTPATIENT
Start: 2023-12-24 | End: 2024-01-03 | Stop reason: HOSPADM

## 2023-12-24 RX ORDER — HYDROMORPHONE HYDROCHLORIDE 1 MG/ML
0.5 INJECTION, SOLUTION INTRAMUSCULAR; INTRAVENOUS; SUBCUTANEOUS EVERY 5 MIN PRN
Status: DISCONTINUED | OUTPATIENT
Start: 2023-12-24 | End: 2023-12-24 | Stop reason: HOSPADM

## 2023-12-24 RX ORDER — SODIUM CHLORIDE 0.9 % (FLUSH) 0.9 %
5-40 SYRINGE (ML) INJECTION PRN
Status: DISCONTINUED | OUTPATIENT
Start: 2023-12-24 | End: 2023-12-24 | Stop reason: HOSPADM

## 2023-12-24 RX ORDER — CLONAZEPAM 0.5 MG/1
0.5 TABLET ORAL 2 TIMES DAILY
Status: DISCONTINUED | OUTPATIENT
Start: 2023-12-24 | End: 2024-01-03 | Stop reason: HOSPADM

## 2023-12-24 RX ORDER — OXYCODONE AND ACETAMINOPHEN 7.5; 325 MG/1; MG/1
1 TABLET ORAL EVERY 8 HOURS
Status: DISCONTINUED | OUTPATIENT
Start: 2023-12-24 | End: 2023-12-24

## 2023-12-24 RX ORDER — BUDESONIDE 0.25 MG/2ML
0.25 INHALANT ORAL
Status: DISCONTINUED | OUTPATIENT
Start: 2023-12-24 | End: 2023-12-27 | Stop reason: CLARIF

## 2023-12-24 RX ORDER — ONDANSETRON 4 MG/1
4 TABLET, ORALLY DISINTEGRATING ORAL EVERY 8 HOURS PRN
Status: DISCONTINUED | OUTPATIENT
Start: 2023-12-24 | End: 2024-01-03 | Stop reason: HOSPADM

## 2023-12-24 RX ADMIN — IPRATROPIUM BROMIDE 0.5 MG: 0.5 SOLUTION RESPIRATORY (INHALATION) at 16:15

## 2023-12-24 RX ADMIN — CLONAZEPAM 0.5 MG: 0.5 TABLET ORAL at 17:55

## 2023-12-24 RX ADMIN — Medication 10 ML: at 22:03

## 2023-12-24 RX ADMIN — PIPERACILLIN AND TAZOBACTAM 4500 MG: 4; .5 INJECTION, POWDER, FOR SOLUTION INTRAVENOUS; PARENTERAL at 17:18

## 2023-12-24 RX ADMIN — METOPROLOL SUCCINATE 100 MG: 50 TABLET, EXTENDED RELEASE ORAL at 17:08

## 2023-12-24 RX ADMIN — ONDANSETRON 4 MG: 2 INJECTION INTRAMUSCULAR; INTRAVENOUS at 21:15

## 2023-12-24 RX ADMIN — BUMETANIDE 2 MG: 1 TABLET ORAL at 17:08

## 2023-12-24 RX ADMIN — BUDESONIDE 250 MCG: 0.25 SUSPENSION RESPIRATORY (INHALATION) at 21:19

## 2023-12-24 RX ADMIN — LISINOPRIL 40 MG: 20 TABLET ORAL at 17:08

## 2023-12-24 RX ADMIN — AMLODIPINE BESYLATE 10 MG: 10 TABLET ORAL at 17:08

## 2023-12-24 RX ADMIN — IPRATROPIUM BROMIDE 0.5 MG: 0.5 SOLUTION RESPIRATORY (INHALATION) at 21:19

## 2023-12-24 RX ADMIN — ARFORMOTEROL TARTRATE 15 MCG: 15 SOLUTION RESPIRATORY (INHALATION) at 21:18

## 2023-12-24 RX ADMIN — SODIUM CHLORIDE, POTASSIUM CHLORIDE, SODIUM LACTATE AND CALCIUM CHLORIDE: 600; 310; 30; 20 INJECTION, SOLUTION INTRAVENOUS at 17:15

## 2023-12-24 NOTE — H&P (VIEW-ONLY)
Consult Note            Date:12/24/2023        Patient Name:Jose Sood     YOB: 1965     Age:58 y.o.    Inpatient consult to GI  Consult performed by: Kwame Saenz MD  Consult ordered by: Ellen Montgomery APRN - NAKUL          Chief Complaint   No chief complaint on file.          History Obtained From   patient, spouse    History of Present Illness   Pt w crons RA COPD and repeated episodes of aspiration pneumonia, here for dysphagia dna aspiration pneumonia. Had EGD in august 2023 dilated to 54Fr by Dr Martinez. Reports that solid and liquids come up in less than 1 minutes followed by cough. He doesn't know whether he may have swallowed something that gotten stuck.     Past Medical History     Past Medical History:   Diagnosis Date    Anxiety     Cardiopulmonary arrest (HCC) 12/14/2016    COPD (chronic obstructive pulmonary disease) (HCC)     Crohn's disease (HCC)     Depression     GERD (gastroesophageal reflux disease)     Hypertension     Kidney stone     Left bundle branch block     Nausea & vomiting     ANITA (obstructive sleep apnea)     c pap    Psoriasis     Psoriatic arthritis (HCC)     Rheumatoid arteritis (HCC)     Ventral hernia         Past Surgical History     Past Surgical History:   Procedure Laterality Date    CERVICAL FUSION      COLONOSCOPY  10/29/2015    Dr Baumann-FOCAL MILD MUCOSAL INFLAMMATION. NO ADENOMATOUS OR DYSPLASTIC CHANGES.    COLONOSCOPY  2021    Dr Devries, polyps per patient    COLONOSCOPY  04/06/2023    Dr Devries-Active colitis    COLONOSCOPY  09/27/2010    Dr Valladares[place]-Chronic colitis    KNEE CARTILAGE SURGERY Right 10/2022    KNEE SURGERY  2002    LUMBAR SPINE SURGERY N/A 03/08/2022    THORACIC LAMINECTOMY FOR SPINAL CORD STIMULATOR PLACEMENT WITH NEURO MONITORING performed by Kike Deleon MD at [facility] OR    MOUTH SURGERY      RADIOFREQUENCY ABLATION  04/07/2021    SMALL INTESTINE SURGERY  1988    Crohns    SPINAL CORD

## 2023-12-24 NOTE — H&P
(NEXIUM) 20 MG delayed release capsule Take 1 capsule by mouth 2 times daily 12/19/23   Garrett Manjarrez APRN   dicyclomine (BENTYL) 10 MG capsule Take 2 capsules by mouth 4 times daily 11/27/23   Javid Nickerson PA   lisinopril (PRINIVIL;ZESTRIL) 40 MG tablet Take 1 tablet by mouth daily    Jesus Gonzalez MD   metoprolol succinate (TOPROL XL) 100 MG extended release tablet Take 1 tablet by mouth daily    Jesus Gonzalez MD   gabapentin (NEURONTIN) 600 MG tablet Take 1 tablet by mouth 2 times daily.    Jesus Gonzalez MD   amLODIPine (NORVASC) 10 MG tablet Take 1 tablet by mouth daily    Jesus Gonzalez MD   clonazePAM (KLONOPIN) 0.5 MG tablet Take 1 tablet by mouth in the morning and at bedtime.    Jesus Gonzalez MD   oxyCODONE-acetaminophen (PERCOCET) 7.5-325 MG per tablet Take 1 tablet by mouth in the morning and 1 tablet at noon and 1 tablet in the evening. 3/29/22   Jesus Gonzalez MD   tiZANidine (ZANAFLEX) 4 MG tablet Take 1 tablet by mouth every 6 hours as needed    Jesus Gonzalez MD   sulindac (CLINORIL) 150 MG tablet Take 1 tablet by mouth 2 times daily as needed (FOR PAIN)    Jesus Gonzalez MD   loperamide (IMODIUM) 2 MG capsule Take 6 capsules by mouth in the morning and at bedtime 6 tablets bid    Jesus Gonzalez MD   sertraline (ZOLOFT) 100 MG tablet Take 1 tablet by mouth 2 times daily    Jesus Gonzalez MD       Allergies:    Ketamine hcl    Social History:    The patient currently lives at home  Tobacco:   reports that he quit smoking about 10 years ago. His smoking use included cigarettes. He started smoking about 41 years ago. He has a 30.0 pack-year smoking history. He has quit using smokeless tobacco.  His smokeless tobacco use included chew.  Alcohol:   reports current alcohol use.  Illicit Drugs: denies    Family History:      Problem Relation Age of Onset    Osteoarthritis Mother     Hypertension Father     Diabetes Father

## 2023-12-25 ENCOUNTER — APPOINTMENT (OUTPATIENT)
Dept: CT IMAGING | Age: 58
DRG: 177 | End: 2023-12-25
Attending: INTERNAL MEDICINE
Payer: COMMERCIAL

## 2023-12-25 LAB
ANION GAP SERPL CALCULATED.3IONS-SCNC: 12 MMOL/L (ref 7–19)
BASOPHILS # BLD: 0 K/UL (ref 0–0.2)
BASOPHILS NFR BLD: 0.1 % (ref 0–1)
BUN SERPL-MCNC: 14 MG/DL (ref 6–20)
CALCIUM SERPL-MCNC: 8.8 MG/DL (ref 8.6–10)
CHLORIDE SERPL-SCNC: 105 MMOL/L (ref 98–111)
CO2 SERPL-SCNC: 26 MMOL/L (ref 22–29)
CREAT SERPL-MCNC: 0.7 MG/DL (ref 0.5–1.2)
EOSINOPHIL # BLD: 0 K/UL (ref 0–0.6)
EOSINOPHIL NFR BLD: 0 % (ref 0–5)
ERYTHROCYTE [DISTWIDTH] IN BLOOD BY AUTOMATED COUNT: 16.7 % (ref 11.5–14.5)
GLUCOSE SERPL-MCNC: 119 MG/DL (ref 74–109)
HCT VFR BLD AUTO: 35.8 % (ref 42–52)
HGB BLD-MCNC: 11.1 G/DL (ref 14–18)
IMM GRANULOCYTES # BLD: 0.1 K/UL
LYMPHOCYTES # BLD: 1 K/UL (ref 1.1–4.5)
LYMPHOCYTES NFR BLD: 6.8 % (ref 20–40)
MCH RBC QN AUTO: 26.9 PG (ref 27–31)
MCHC RBC AUTO-ENTMCNC: 31 G/DL (ref 33–37)
MCV RBC AUTO: 86.9 FL (ref 80–94)
MONOCYTES # BLD: 0.9 K/UL (ref 0–0.9)
MONOCYTES NFR BLD: 5.9 % (ref 0–10)
NEUTROPHILS # BLD: 13.3 K/UL (ref 1.5–7.5)
NEUTS SEG NFR BLD: 86.5 % (ref 50–65)
PHOSPHATE SERPL-MCNC: 3.9 MG/DL (ref 2.5–4.5)
PLATELET # BLD AUTO: 227 K/UL (ref 130–400)
PMV BLD AUTO: 9.3 FL (ref 9.4–12.4)
POTASSIUM SERPL-SCNC: 3.9 MMOL/L (ref 3.5–5)
RBC # BLD AUTO: 4.12 M/UL (ref 4.7–6.1)
SODIUM SERPL-SCNC: 143 MMOL/L (ref 136–145)
WBC # BLD AUTO: 15.4 K/UL (ref 4.8–10.8)

## 2023-12-25 PROCEDURE — 80048 BASIC METABOLIC PNL TOTAL CA: CPT

## 2023-12-25 PROCEDURE — 2700000000 HC OXYGEN THERAPY PER DAY

## 2023-12-25 PROCEDURE — 6360000002 HC RX W HCPCS: Performed by: NURSE PRACTITIONER

## 2023-12-25 PROCEDURE — 6370000000 HC RX 637 (ALT 250 FOR IP): Performed by: NURSE PRACTITIONER

## 2023-12-25 PROCEDURE — 94640 AIRWAY INHALATION TREATMENT: CPT

## 2023-12-25 PROCEDURE — 94660 CPAP INITIATION&MGMT: CPT

## 2023-12-25 PROCEDURE — 94669 MECHANICAL CHEST WALL OSCILL: CPT

## 2023-12-25 PROCEDURE — 1210000000 HC MED SURG R&B

## 2023-12-25 PROCEDURE — 2580000003 HC RX 258: Performed by: NURSE PRACTITIONER

## 2023-12-25 PROCEDURE — 94760 N-INVAS EAR/PLS OXIMETRY 1: CPT

## 2023-12-25 PROCEDURE — 84100 ASSAY OF PHOSPHORUS: CPT

## 2023-12-25 PROCEDURE — 36415 COLL VENOUS BLD VENIPUNCTURE: CPT

## 2023-12-25 PROCEDURE — 6360000004 HC RX CONTRAST MEDICATION: Performed by: NURSE PRACTITIONER

## 2023-12-25 PROCEDURE — 94761 N-INVAS EAR/PLS OXIMETRY MLT: CPT

## 2023-12-25 PROCEDURE — 6370000000 HC RX 637 (ALT 250 FOR IP)

## 2023-12-25 PROCEDURE — 2580000003 HC RX 258: Performed by: INTERNAL MEDICINE

## 2023-12-25 PROCEDURE — 74178 CT ABD&PLV WO CNTR FLWD CNTR: CPT

## 2023-12-25 PROCEDURE — 99231 SBSQ HOSP IP/OBS SF/LOW 25: CPT | Performed by: INTERNAL MEDICINE

## 2023-12-25 PROCEDURE — C9113 INJ PANTOPRAZOLE SODIUM, VIA: HCPCS | Performed by: NURSE PRACTITIONER

## 2023-12-25 PROCEDURE — 71270 CT THORAX DX C-/C+: CPT

## 2023-12-25 PROCEDURE — 85025 COMPLETE CBC W/AUTO DIFF WBC: CPT

## 2023-12-25 RX ORDER — GUAIFENESIN/DEXTROMETHORPHAN 100-10MG/5
5 SYRUP ORAL EVERY 8 HOURS PRN
Status: DISCONTINUED | OUTPATIENT
Start: 2023-12-25 | End: 2024-01-03 | Stop reason: HOSPADM

## 2023-12-25 RX ADMIN — GABAPENTIN 600 MG: 600 TABLET, FILM COATED ORAL at 09:54

## 2023-12-25 RX ADMIN — ARFORMOTEROL TARTRATE 15 MCG: 15 SOLUTION RESPIRATORY (INHALATION) at 18:41

## 2023-12-25 RX ADMIN — SODIUM CHLORIDE, PRESERVATIVE FREE 10 ML: 5 INJECTION INTRAVENOUS at 20:38

## 2023-12-25 RX ADMIN — IPRATROPIUM BROMIDE 0.5 MG: 0.5 SOLUTION RESPIRATORY (INHALATION) at 18:41

## 2023-12-25 RX ADMIN — GUAIFENESIN SYRUP AND DEXTROMETHORPHAN 5 ML: 100; 10 SYRUP ORAL at 20:37

## 2023-12-25 RX ADMIN — IPRATROPIUM BROMIDE 0.5 MG: 0.5 SOLUTION RESPIRATORY (INHALATION) at 14:40

## 2023-12-25 RX ADMIN — SERTRALINE HYDROCHLORIDE 100 MG: 100 TABLET ORAL at 09:54

## 2023-12-25 RX ADMIN — PIPERACILLIN AND TAZOBACTAM 3375 MG: 3; .375 INJECTION, POWDER, LYOPHILIZED, FOR SOLUTION INTRAVENOUS at 06:43

## 2023-12-25 RX ADMIN — GABAPENTIN 600 MG: 600 TABLET, FILM COATED ORAL at 20:37

## 2023-12-25 RX ADMIN — PIPERACILLIN AND TAZOBACTAM 4500 MG: 4; .5 INJECTION, POWDER, FOR SOLUTION INTRAVENOUS; PARENTERAL at 21:39

## 2023-12-25 RX ADMIN — BUDESONIDE 250 MCG: 0.25 SUSPENSION RESPIRATORY (INHALATION) at 18:41

## 2023-12-25 RX ADMIN — PIPERACILLIN AND TAZOBACTAM 4500 MG: 4; .5 INJECTION, POWDER, FOR SOLUTION INTRAVENOUS; PARENTERAL at 14:34

## 2023-12-25 RX ADMIN — SODIUM CHLORIDE, PRESERVATIVE FREE 40 MG: 5 INJECTION INTRAVENOUS at 18:20

## 2023-12-25 RX ADMIN — SODIUM CHLORIDE, POTASSIUM CHLORIDE, SODIUM LACTATE AND CALCIUM CHLORIDE: 600; 310; 30; 20 INJECTION, SOLUTION INTRAVENOUS at 06:41

## 2023-12-25 RX ADMIN — CLONAZEPAM 0.5 MG: 0.5 TABLET ORAL at 20:37

## 2023-12-25 RX ADMIN — OXYCODONE HYDROCHLORIDE AND ACETAMINOPHEN 1 TABLET: 7.5; 325 TABLET ORAL at 17:40

## 2023-12-25 RX ADMIN — ENOXAPARIN SODIUM 30 MG: 100 INJECTION SUBCUTANEOUS at 09:55

## 2023-12-25 RX ADMIN — BUMETANIDE 2 MG: 1 TABLET ORAL at 09:55

## 2023-12-25 RX ADMIN — SERTRALINE HYDROCHLORIDE 100 MG: 100 TABLET ORAL at 20:37

## 2023-12-25 RX ADMIN — CLONAZEPAM 0.5 MG: 0.5 TABLET ORAL at 09:54

## 2023-12-25 RX ADMIN — IPRATROPIUM BROMIDE 0.5 MG: 0.5 SOLUTION RESPIRATORY (INHALATION) at 10:50

## 2023-12-25 RX ADMIN — LISINOPRIL 40 MG: 20 TABLET ORAL at 09:55

## 2023-12-25 RX ADMIN — AMLODIPINE BESYLATE 10 MG: 10 TABLET ORAL at 09:54

## 2023-12-25 RX ADMIN — SODIUM CHLORIDE, PRESERVATIVE FREE 10 ML: 5 INJECTION INTRAVENOUS at 03:28

## 2023-12-25 RX ADMIN — SODIUM CHLORIDE, PRESERVATIVE FREE 10 ML: 5 INJECTION INTRAVENOUS at 20:37

## 2023-12-25 RX ADMIN — IOPAMIDOL 70 ML: 755 INJECTION, SOLUTION INTRAVENOUS at 17:46

## 2023-12-25 RX ADMIN — ENOXAPARIN SODIUM 30 MG: 100 INJECTION SUBCUTANEOUS at 20:37

## 2023-12-25 RX ADMIN — METOPROLOL SUCCINATE 100 MG: 50 TABLET, EXTENDED RELEASE ORAL at 09:54

## 2023-12-25 RX ADMIN — SODIUM CHLORIDE, PRESERVATIVE FREE 10 ML: 5 INJECTION INTRAVENOUS at 09:55

## 2023-12-25 ASSESSMENT — PAIN DESCRIPTION - LOCATION: LOCATION: HEAD

## 2023-12-25 ASSESSMENT — PAIN DESCRIPTION - DESCRIPTORS: DESCRIPTORS: ACHING

## 2023-12-25 ASSESSMENT — PAIN SCALES - GENERAL: PAINLEVEL_OUTOF10: 6

## 2023-12-25 NOTE — PROCEDURES
Jose Sood is a 58 y.o. male patient.  No diagnosis found.  Past Medical History:   Diagnosis Date    Anxiety     Cardiopulmonary arrest (HCC) 12/14/2016    COPD (chronic obstructive pulmonary disease) (HCC)     Crohn's disease (HCC)     Depression     GERD (gastroesophageal reflux disease)     Hypertension     Kidney stone     Left bundle branch block     Nausea & vomiting     ANITA (obstructive sleep apnea)     c pap    Psoriasis     Psoriatic arthritis (HCC)     Rheumatoid arteritis (HCC)     Ventral hernia      Blood pressure (!) 168/114, pulse (!) 106, temperature 98.8 °F (37.1 °C), temperature source Temporal, resp. rate 24, height 1.753 m (5' 9\"), weight 130.2 kg (287 lb), SpO2 91 %.    ESOPHAGOSCOPY / EGD    Date/Time: 12/24/2023 8:19 PM    Performed by: Kwame Saenz MD  Authorized by: Kwame Saenz MD  Consent: Verbal consent obtained. Written consent obtained.  Risks and benefits: risks, benefits and alternatives were discussed  Consent given by: patient  Patient understanding: patient states understanding of the procedure being performed  Patient consent: the patient's understanding of the procedure matches consent given  Procedure consent: procedure consent matches procedure scheduled  Relevant documents: relevant documents present and verified  Test results: test results available and properly labeled  Site marked: the operative site was marked  Imaging studies: imaging studies available  Required items: required blood products, implants, devices, and special equipment available  Patient identity confirmed: verbally with patient  Time out: Immediately prior to procedure a \"time out\" was called to verify the correct patient, procedure, equipment, support staff and site/side marked as required.  Local anesthesia used: no    Anesthesia:  Local anesthesia used: no    Sedation:  Patient sedated: yes  Sedatives: see MAR for details  Analgesia: see MAR for details  Vitals: Vital signs were monitored

## 2023-12-25 NOTE — INTERVAL H&P NOTE
Update History & Physical    The patient's History and Physical of December 24, 2023 was reviewed with the patient and I examined the patient. There was no change. The surgical site was confirmed by the patient and me.     Plan: The risks, benefits, expected outcome, and alternative to the recommended procedure have been discussed with the patient. Patient understands and wants to proceed with the procedure.     Electronically signed by Kwame Saenz MD on 12/24/2023 at 7:43 PM

## 2023-12-26 PROBLEM — R93.5 ABNORMAL ABDOMINAL CT SCAN: Status: ACTIVE | Noted: 2023-12-26

## 2023-12-26 LAB
ALLENS TEST: ABNORMAL
ANION GAP SERPL CALCULATED.3IONS-SCNC: 9 MMOL/L (ref 7–19)
BASE EXCESS ARTERIAL: 8.9 MMOL/L (ref -2–2)
BASOPHILS # BLD: 0.1 K/UL (ref 0–0.2)
BASOPHILS NFR BLD: 0.3 % (ref 0–1)
BNP BLD-MCNC: 667 PG/ML (ref 0–124)
BUN SERPL-MCNC: 16 MG/DL (ref 6–20)
CALCIUM SERPL-MCNC: 9 MG/DL (ref 8.6–10)
CARBOXYHEMOGLOBIN ARTERIAL: 1.4 % (ref 0–5)
CHLORIDE SERPL-SCNC: 102 MMOL/L (ref 98–111)
CO2 SERPL-SCNC: 32 MMOL/L (ref 22–29)
CREAT SERPL-MCNC: 0.7 MG/DL (ref 0.5–1.2)
EKG P AXIS: 58 DEGREES
EKG P-R INTERVAL: 134 MS
EKG Q-T INTERVAL: 378 MS
EKG QRS DURATION: 162 MS
EKG QTC CALCULATION (BAZETT): 442 MS
EKG T AXIS: 118 DEGREES
EOSINOPHIL # BLD: 0.2 K/UL (ref 0–0.6)
EOSINOPHIL NFR BLD: 1.4 % (ref 0–5)
ERYTHROCYTE [DISTWIDTH] IN BLOOD BY AUTOMATED COUNT: 16.7 % (ref 11.5–14.5)
GLUCOSE SERPL-MCNC: 103 MG/DL (ref 74–109)
HCO3 ARTERIAL: 35.7 MMOL/L (ref 22–26)
HCT VFR BLD AUTO: 41.3 % (ref 42–52)
HEMOGLOBIN, ART, EXTENDED: 11.5 G/DL (ref 14–18)
HGB BLD-MCNC: 12.2 G/DL (ref 14–18)
IMM GRANULOCYTES # BLD: 0.1 K/UL
LEGIONELLA AG UR QL: NORMAL
LYMPHOCYTES # BLD: 2 K/UL (ref 1.1–4.5)
LYMPHOCYTES NFR BLD: 12.7 % (ref 20–40)
MCH RBC QN AUTO: 26.5 PG (ref 27–31)
MCHC RBC AUTO-ENTMCNC: 29.5 G/DL (ref 33–37)
MCV RBC AUTO: 89.6 FL (ref 80–94)
METHEMOGLOBIN ARTERIAL: 0.2 %
MONOCYTES # BLD: 1 K/UL (ref 0–0.9)
MONOCYTES NFR BLD: 6.7 % (ref 0–10)
NEUTROPHILS # BLD: 12.1 K/UL (ref 1.5–7.5)
NEUTS SEG NFR BLD: 78.3 % (ref 50–65)
O2 CONTENT ARTERIAL: 14.2 ML/DL
O2 DELIVERY DEVICE: ABNORMAL
O2 SAT, ARTERIAL: 87.5 %
O2 THERAPY: ABNORMAL
OXYGEN FLOW: 13
PCO2 ARTERIAL: 59 MMHG (ref 35–45)
PH ARTERIAL: 7.39 (ref 7.35–7.45)
PLATELET # BLD AUTO: 283 K/UL (ref 130–400)
PMV BLD AUTO: 9.5 FL (ref 9.4–12.4)
PO2 ARTERIAL: 53 MMHG (ref 80–100)
POTASSIUM BLD-SCNC: 4 MMOL/L
POTASSIUM SERPL-SCNC: 4 MMOL/L (ref 3.5–5)
PROCALCITONIN: 0.05 NG/ML (ref 0–0.09)
RBC # BLD AUTO: 4.61 M/UL (ref 4.7–6.1)
S PNEUM AG SPEC QL: NORMAL
SAMPLE SOURCE: ABNORMAL
SODIUM SERPL-SCNC: 143 MMOL/L (ref 136–145)
WBC # BLD AUTO: 15.4 K/UL (ref 4.8–10.8)

## 2023-12-26 PROCEDURE — 82803 BLOOD GASES ANY COMBINATION: CPT

## 2023-12-26 PROCEDURE — 6370000000 HC RX 637 (ALT 250 FOR IP)

## 2023-12-26 PROCEDURE — 94669 MECHANICAL CHEST WALL OSCILL: CPT

## 2023-12-26 PROCEDURE — 6360000002 HC RX W HCPCS: Performed by: NURSE PRACTITIONER

## 2023-12-26 PROCEDURE — 2580000003 HC RX 258: Performed by: NURSE PRACTITIONER

## 2023-12-26 PROCEDURE — 6370000000 HC RX 637 (ALT 250 FOR IP): Performed by: NURSE PRACTITIONER

## 2023-12-26 PROCEDURE — C9113 INJ PANTOPRAZOLE SODIUM, VIA: HCPCS | Performed by: NURSE PRACTITIONER

## 2023-12-26 PROCEDURE — 87449 NOS EACH ORGANISM AG IA: CPT

## 2023-12-26 PROCEDURE — 94640 AIRWAY INHALATION TREATMENT: CPT

## 2023-12-26 PROCEDURE — 93010 ELECTROCARDIOGRAM REPORT: CPT | Performed by: INTERNAL MEDICINE

## 2023-12-26 PROCEDURE — 2700000000 HC OXYGEN THERAPY PER DAY

## 2023-12-26 PROCEDURE — 80048 BASIC METABOLIC PNL TOTAL CA: CPT

## 2023-12-26 PROCEDURE — 85025 COMPLETE CBC W/AUTO DIFF WBC: CPT

## 2023-12-26 PROCEDURE — 94660 CPAP INITIATION&MGMT: CPT

## 2023-12-26 PROCEDURE — 36600 WITHDRAWAL OF ARTERIAL BLOOD: CPT

## 2023-12-26 PROCEDURE — 36415 COLL VENOUS BLD VENIPUNCTURE: CPT

## 2023-12-26 PROCEDURE — 84145 PROCALCITONIN (PCT): CPT

## 2023-12-26 PROCEDURE — 6370000000 HC RX 637 (ALT 250 FOR IP): Performed by: INTERNAL MEDICINE

## 2023-12-26 PROCEDURE — 83880 ASSAY OF NATRIURETIC PEPTIDE: CPT

## 2023-12-26 PROCEDURE — 2000000000 HC ICU R&B

## 2023-12-26 RX ORDER — BENZONATATE 100 MG/1
100 CAPSULE ORAL 3 TIMES DAILY PRN
Status: DISCONTINUED | OUTPATIENT
Start: 2023-12-26 | End: 2024-01-03 | Stop reason: HOSPADM

## 2023-12-26 RX ORDER — MELATONIN 10 MG
10 CAPSULE ORAL NIGHTLY
Status: DISCONTINUED | OUTPATIENT
Start: 2023-12-26 | End: 2024-01-03 | Stop reason: HOSPADM

## 2023-12-26 RX ORDER — BENZONATATE 100 MG/1
200 CAPSULE ORAL 3 TIMES DAILY
Status: COMPLETED | OUTPATIENT
Start: 2023-12-26 | End: 2023-12-29

## 2023-12-26 RX ADMIN — GABAPENTIN 600 MG: 600 TABLET, FILM COATED ORAL at 20:34

## 2023-12-26 RX ADMIN — METOPROLOL SUCCINATE 100 MG: 50 TABLET, EXTENDED RELEASE ORAL at 09:59

## 2023-12-26 RX ADMIN — GUAIFENESIN SYRUP AND DEXTROMETHORPHAN 5 ML: 100; 10 SYRUP ORAL at 10:03

## 2023-12-26 RX ADMIN — IPRATROPIUM BROMIDE 0.5 MG: 0.5 SOLUTION RESPIRATORY (INHALATION) at 19:57

## 2023-12-26 RX ADMIN — ENOXAPARIN SODIUM 30 MG: 100 INJECTION SUBCUTANEOUS at 20:34

## 2023-12-26 RX ADMIN — OXYCODONE HYDROCHLORIDE AND ACETAMINOPHEN 1 TABLET: 7.5; 325 TABLET ORAL at 10:03

## 2023-12-26 RX ADMIN — ENOXAPARIN SODIUM 30 MG: 100 INJECTION SUBCUTANEOUS at 09:59

## 2023-12-26 RX ADMIN — BUDESONIDE 250 MCG: 0.25 SUSPENSION RESPIRATORY (INHALATION) at 06:48

## 2023-12-26 RX ADMIN — VANCOMYCIN HYDROCHLORIDE 2500 MG: 10 INJECTION, POWDER, LYOPHILIZED, FOR SOLUTION INTRAVENOUS at 14:39

## 2023-12-26 RX ADMIN — SODIUM CHLORIDE, PRESERVATIVE FREE 10 ML: 5 INJECTION INTRAVENOUS at 20:34

## 2023-12-26 RX ADMIN — ACETAMINOPHEN 650 MG: 325 TABLET ORAL at 20:39

## 2023-12-26 RX ADMIN — PIPERACILLIN AND TAZOBACTAM 4500 MG: 4; .5 INJECTION, POWDER, FOR SOLUTION INTRAVENOUS; PARENTERAL at 05:57

## 2023-12-26 RX ADMIN — BUDESONIDE 250 MCG: 0.25 SUSPENSION RESPIRATORY (INHALATION) at 19:57

## 2023-12-26 RX ADMIN — IPRATROPIUM BROMIDE 0.5 MG: 0.5 SOLUTION RESPIRATORY (INHALATION) at 10:47

## 2023-12-26 RX ADMIN — ARFORMOTEROL TARTRATE 15 MCG: 15 SOLUTION RESPIRATORY (INHALATION) at 06:49

## 2023-12-26 RX ADMIN — GUAIFENESIN SYRUP AND DEXTROMETHORPHAN 5 ML: 100; 10 SYRUP ORAL at 19:03

## 2023-12-26 RX ADMIN — BENZONATATE 200 MG: 100 CAPSULE ORAL at 14:14

## 2023-12-26 RX ADMIN — LISINOPRIL 40 MG: 20 TABLET ORAL at 10:00

## 2023-12-26 RX ADMIN — MEROPENEM 1000 MG: 1 INJECTION, POWDER, FOR SOLUTION INTRAVENOUS at 20:38

## 2023-12-26 RX ADMIN — ACETAMINOPHEN 650 MG: 325 TABLET ORAL at 14:45

## 2023-12-26 RX ADMIN — SERTRALINE HYDROCHLORIDE 100 MG: 100 TABLET ORAL at 10:00

## 2023-12-26 RX ADMIN — AMLODIPINE BESYLATE 10 MG: 10 TABLET ORAL at 09:59

## 2023-12-26 RX ADMIN — BENZONATATE 200 MG: 100 CAPSULE ORAL at 20:34

## 2023-12-26 RX ADMIN — SODIUM CHLORIDE, PRESERVATIVE FREE 40 MG: 5 INJECTION INTRAVENOUS at 03:59

## 2023-12-26 RX ADMIN — ARFORMOTEROL TARTRATE 15 MCG: 15 SOLUTION RESPIRATORY (INHALATION) at 19:57

## 2023-12-26 RX ADMIN — Medication 10 MG: at 20:34

## 2023-12-26 RX ADMIN — CLONAZEPAM 0.5 MG: 0.5 TABLET ORAL at 20:34

## 2023-12-26 RX ADMIN — BUMETANIDE 2 MG: 1 TABLET ORAL at 10:00

## 2023-12-26 RX ADMIN — MEROPENEM 1000 MG: 1 INJECTION, POWDER, FOR SOLUTION INTRAVENOUS at 11:22

## 2023-12-26 RX ADMIN — IPRATROPIUM BROMIDE 0.5 MG: 0.5 SOLUTION RESPIRATORY (INHALATION) at 14:44

## 2023-12-26 RX ADMIN — CLONAZEPAM 0.5 MG: 0.5 TABLET ORAL at 10:00

## 2023-12-26 RX ADMIN — SODIUM CHLORIDE, PRESERVATIVE FREE 10 ML: 5 INJECTION INTRAVENOUS at 10:16

## 2023-12-26 RX ADMIN — IPRATROPIUM BROMIDE 0.5 MG: 0.5 SOLUTION RESPIRATORY (INHALATION) at 06:48

## 2023-12-26 RX ADMIN — ONDANSETRON 4 MG: 2 INJECTION INTRAMUSCULAR; INTRAVENOUS at 20:45

## 2023-12-26 RX ADMIN — SODIUM CHLORIDE, PRESERVATIVE FREE 10 ML: 5 INJECTION INTRAVENOUS at 10:00

## 2023-12-26 RX ADMIN — SODIUM CHLORIDE, PRESERVATIVE FREE 40 MG: 5 INJECTION INTRAVENOUS at 17:33

## 2023-12-26 RX ADMIN — GABAPENTIN 600 MG: 600 TABLET, FILM COATED ORAL at 09:59

## 2023-12-26 RX ADMIN — SERTRALINE HYDROCHLORIDE 100 MG: 100 TABLET ORAL at 20:34

## 2023-12-26 ASSESSMENT — PAIN SCALES - GENERAL
PAINLEVEL_OUTOF10: 4
PAINLEVEL_OUTOF10: 7
PAINLEVEL_OUTOF10: 0

## 2023-12-26 ASSESSMENT — PAIN DESCRIPTION - DESCRIPTORS: DESCRIPTORS: DISCOMFORT

## 2023-12-26 ASSESSMENT — PAIN DESCRIPTION - LOCATION: LOCATION: HEAD

## 2023-12-26 NOTE — PLAN OF CARE
Problem: Safety - Adult  Goal: Free from fall injury  12/26/2023 1104 by Johanny Up RN  Outcome: Progressing  Flowsheets (Taken 12/26/2023 1103)  Free From Fall Injury: Instruct family/caregiver on patient safety  12/25/2023 2152 by Malissa Russell RN  Outcome: Progressing  Flowsheets (Taken 12/25/2023 2151)  Free From Fall Injury: Instruct family/caregiver on patient safety     Problem: ABCDS Injury Assessment  Goal: Absence of physical injury  12/26/2023 1104 by Johanny Up RN  Outcome: Progressing  Flowsheets (Taken 12/26/2023 1103)  Absence of Physical Injury: Implement safety measures based on patient assessment  12/25/2023 2152 by Malissa Russell RN  Outcome: Progressing  Flowsheets (Taken 12/25/2023 2151)  Absence of Physical Injury: Implement safety measures based on patient assessment

## 2023-12-26 NOTE — PLAN OF CARE
Problem: Safety - Adult  Goal: Free from fall injury  Outcome: Progressing  Flowsheets (Taken 12/25/2023 2151)  Free From Fall Injury: Instruct family/caregiver on patient safety     Problem: ABCDS Injury Assessment  Goal: Absence of physical injury  Outcome: Progressing  Flowsheets (Taken 12/25/2023 2151)  Absence of Physical Injury: Implement safety measures based on patient assessment

## 2023-12-27 PROBLEM — Z51.5 PALLIATIVE CARE PATIENT: Status: ACTIVE | Noted: 2023-12-27

## 2023-12-27 LAB
ALLENS TEST: ABNORMAL
ANION GAP SERPL CALCULATED.3IONS-SCNC: 8 MMOL/L (ref 7–19)
B PARAP IS1001 DNA NPH QL NAA+NON-PROBE: NOT DETECTED
B PERT.PT PRMT NPH QL NAA+NON-PROBE: NOT DETECTED
BASE EXCESS ARTERIAL: 18.4 MMOL/L (ref -2–2)
BASOPHILS # BLD: 0.1 K/UL (ref 0–0.2)
BASOPHILS NFR BLD: 0.5 % (ref 0–1)
BUN SERPL-MCNC: 13 MG/DL (ref 6–20)
C PNEUM DNA NPH QL NAA+NON-PROBE: NOT DETECTED
CALCIUM SERPL-MCNC: 8.9 MG/DL (ref 8.6–10)
CARBOXYHEMOGLOBIN ARTERIAL: 1.4 % (ref 0–5)
CHLORIDE SERPL-SCNC: 99 MMOL/L (ref 98–111)
CO2 SERPL-SCNC: 35 MMOL/L (ref 22–29)
CREAT SERPL-MCNC: 0.6 MG/DL (ref 0.5–1.2)
CRP SERPL HS-MCNC: 14.47 MG/DL (ref 0–0.5)
D DIMER PPP FEU-MCNC: 1.05 UG/ML FEU (ref 0–0.48)
EOSINOPHIL # BLD: 0.2 K/UL (ref 0–0.6)
EOSINOPHIL NFR BLD: 1.9 % (ref 0–5)
ERYTHROCYTE [DISTWIDTH] IN BLOOD BY AUTOMATED COUNT: 16 % (ref 11.5–14.5)
FERRITIN SERPL-MCNC: 231.6 NG/ML (ref 30–400)
FLUAV RNA NPH QL NAA+NON-PROBE: NOT DETECTED
FLUBV RNA NPH QL NAA+NON-PROBE: NOT DETECTED
GLUCOSE SERPL-MCNC: 126 MG/DL (ref 74–109)
HADV DNA NPH QL NAA+NON-PROBE: NOT DETECTED
HCO3 ARTERIAL: 46.7 MMOL/L (ref 22–26)
HCOV 229E RNA NPH QL NAA+NON-PROBE: NOT DETECTED
HCOV HKU1 RNA NPH QL NAA+NON-PROBE: NOT DETECTED
HCOV NL63 RNA NPH QL NAA+NON-PROBE: NOT DETECTED
HCOV OC43 RNA NPH QL NAA+NON-PROBE: NOT DETECTED
HCT VFR BLD AUTO: 40.3 % (ref 42–52)
HEMOGLOBIN, ART, EXTENDED: 12.9 G/DL (ref 14–18)
HGB BLD-MCNC: 12.2 G/DL (ref 14–18)
HMPV RNA NPH QL NAA+NON-PROBE: NOT DETECTED
HPIV1 RNA NPH QL NAA+NON-PROBE: NOT DETECTED
HPIV2 RNA NPH QL NAA+NON-PROBE: NOT DETECTED
HPIV3 RNA NPH QL NAA+NON-PROBE: NOT DETECTED
HPIV4 RNA NPH QL NAA+NON-PROBE: NOT DETECTED
IMM GRANULOCYTES # BLD: 0.1 K/UL
LYMPHOCYTES # BLD: 0.6 K/UL (ref 1.1–4.5)
LYMPHOCYTES NFR BLD: 5.4 % (ref 20–40)
M PNEUMO DNA NPH QL NAA+NON-PROBE: NOT DETECTED
MCH RBC QN AUTO: 26.8 PG (ref 27–31)
MCHC RBC AUTO-ENTMCNC: 30.3 G/DL (ref 33–37)
MCV RBC AUTO: 88.6 FL (ref 80–94)
METHEMOGLOBIN ARTERIAL: 0.9 %
MONOCYTES # BLD: 0.8 K/UL (ref 0–0.9)
MONOCYTES NFR BLD: 7 % (ref 0–10)
MRSA DNA SPEC QL NAA+PROBE: DETECTED
NEUTROPHILS # BLD: 9.2 K/UL (ref 1.5–7.5)
NEUTS SEG NFR BLD: 84.6 % (ref 50–65)
O2 CONTENT ARTERIAL: 16 ML/DL
O2 DELIVERY DEVICE: ABNORMAL
O2 SAT, ARTERIAL: 88.1 %
O2 THERAPY: ABNORMAL
OXYGEN FLOW: 13
PCO2 ARTERIAL: 72 MMHG (ref 35–45)
PH ARTERIAL: 7.42 (ref 7.35–7.45)
PLATELET # BLD AUTO: 210 K/UL (ref 130–400)
PMV BLD AUTO: 9.5 FL (ref 9.4–12.4)
PO2 ARTERIAL: 61 MMHG (ref 80–100)
POTASSIUM BLD-SCNC: 4 MMOL/L
POTASSIUM SERPL-SCNC: 3.7 MMOL/L (ref 3.5–5)
RBC # BLD AUTO: 4.55 M/UL (ref 4.7–6.1)
RSV RNA NPH QL NAA+NON-PROBE: NOT DETECTED
RV+EV RNA NPH QL NAA+NON-PROBE: NOT DETECTED
SAMPLE SOURCE: ABNORMAL
SARS-COV-2 RNA NPH QL NAA+NON-PROBE: DETECTED
SODIUM SERPL-SCNC: 142 MMOL/L (ref 136–145)
WBC # BLD AUTO: 10.9 K/UL (ref 4.8–10.8)

## 2023-12-27 PROCEDURE — 80048 BASIC METABOLIC PNL TOTAL CA: CPT

## 2023-12-27 PROCEDURE — 6360000002 HC RX W HCPCS: Performed by: NURSE PRACTITIONER

## 2023-12-27 PROCEDURE — 6360000002 HC RX W HCPCS: Performed by: INTERNAL MEDICINE

## 2023-12-27 PROCEDURE — 6370000000 HC RX 637 (ALT 250 FOR IP): Performed by: INTERNAL MEDICINE

## 2023-12-27 PROCEDURE — 36415 COLL VENOUS BLD VENIPUNCTURE: CPT

## 2023-12-27 PROCEDURE — C9113 INJ PANTOPRAZOLE SODIUM, VIA: HCPCS | Performed by: NURSE PRACTITIONER

## 2023-12-27 PROCEDURE — 6370000000 HC RX 637 (ALT 250 FOR IP): Performed by: NURSE PRACTITIONER

## 2023-12-27 PROCEDURE — 94660 CPAP INITIATION&MGMT: CPT

## 2023-12-27 PROCEDURE — 92610 EVALUATE SWALLOWING FUNCTION: CPT

## 2023-12-27 PROCEDURE — 3E0333Z INTRODUCTION OF ANTI-INFLAMMATORY INTO PERIPHERAL VEIN, PERCUTANEOUS APPROACH: ICD-10-PCS | Performed by: INTERNAL MEDICINE

## 2023-12-27 PROCEDURE — 2000000000 HC ICU R&B

## 2023-12-27 PROCEDURE — 2580000003 HC RX 258: Performed by: INTERNAL MEDICINE

## 2023-12-27 PROCEDURE — 94760 N-INVAS EAR/PLS OXIMETRY 1: CPT

## 2023-12-27 PROCEDURE — 86140 C-REACTIVE PROTEIN: CPT

## 2023-12-27 PROCEDURE — 87641 MR-STAPH DNA AMP PROBE: CPT

## 2023-12-27 PROCEDURE — 85025 COMPLETE CBC W/AUTO DIFF WBC: CPT

## 2023-12-27 PROCEDURE — 85379 FIBRIN DEGRADATION QUANT: CPT

## 2023-12-27 PROCEDURE — 82728 ASSAY OF FERRITIN: CPT

## 2023-12-27 PROCEDURE — 2580000003 HC RX 258: Performed by: NURSE PRACTITIONER

## 2023-12-27 PROCEDURE — 82803 BLOOD GASES ANY COMBINATION: CPT

## 2023-12-27 PROCEDURE — 0202U NFCT DS 22 TRGT SARS-COV-2: CPT

## 2023-12-27 PROCEDURE — 36600 WITHDRAWAL OF ARTERIAL BLOOD: CPT

## 2023-12-27 PROCEDURE — 92522 EVALUATE SPEECH PRODUCTION: CPT

## 2023-12-27 PROCEDURE — 94640 AIRWAY INHALATION TREATMENT: CPT

## 2023-12-27 PROCEDURE — 2700000000 HC OXYGEN THERAPY PER DAY

## 2023-12-27 RX ORDER — VITAMIN B COMPLEX
2000 TABLET ORAL DAILY
Status: DISCONTINUED | OUTPATIENT
Start: 2023-12-27 | End: 2024-01-03 | Stop reason: HOSPADM

## 2023-12-27 RX ORDER — GUAIFENESIN 200 MG/1
400 TABLET ORAL EVERY 8 HOURS
Status: DISCONTINUED | OUTPATIENT
Start: 2023-12-27 | End: 2024-01-03 | Stop reason: HOSPADM

## 2023-12-27 RX ORDER — ZINC SULFATE 50(220)MG
50 CAPSULE ORAL DAILY
Status: DISCONTINUED | OUTPATIENT
Start: 2023-12-27 | End: 2024-01-03 | Stop reason: HOSPADM

## 2023-12-27 RX ORDER — ACETAMINOPHEN 325 MG/1
650 TABLET ORAL EVERY 6 HOURS PRN
Status: DISCONTINUED | OUTPATIENT
Start: 2023-12-27 | End: 2024-01-03 | Stop reason: HOSPADM

## 2023-12-27 RX ORDER — ASCORBIC ACID 500 MG
500 TABLET ORAL 2 TIMES DAILY
Status: DISCONTINUED | OUTPATIENT
Start: 2023-12-27 | End: 2024-01-03 | Stop reason: HOSPADM

## 2023-12-27 RX ORDER — BUDESONIDE AND FORMOTEROL FUMARATE DIHYDRATE 80; 4.5 UG/1; UG/1
1 AEROSOL RESPIRATORY (INHALATION)
Status: DISCONTINUED | OUTPATIENT
Start: 2023-12-27 | End: 2024-01-03 | Stop reason: HOSPADM

## 2023-12-27 RX ORDER — DEXAMETHASONE SODIUM PHOSPHATE 10 MG/ML
6 INJECTION, SOLUTION INTRAMUSCULAR; INTRAVENOUS EVERY 12 HOURS
Status: DISCONTINUED | OUTPATIENT
Start: 2023-12-27 | End: 2024-01-02

## 2023-12-27 RX ORDER — LEVOFLOXACIN 5 MG/ML
750 INJECTION, SOLUTION INTRAVENOUS EVERY 24 HOURS
Status: DISCONTINUED | OUTPATIENT
Start: 2023-12-27 | End: 2024-01-02

## 2023-12-27 RX ADMIN — Medication 10 MG: at 20:01

## 2023-12-27 RX ADMIN — GUAIFENESIN 400 MG: 200 TABLET ORAL at 20:00

## 2023-12-27 RX ADMIN — ONDANSETRON 4 MG: 2 INJECTION INTRAMUSCULAR; INTRAVENOUS at 05:33

## 2023-12-27 RX ADMIN — LISINOPRIL 40 MG: 20 TABLET ORAL at 09:26

## 2023-12-27 RX ADMIN — BENZONATATE 200 MG: 100 CAPSULE ORAL at 20:00

## 2023-12-27 RX ADMIN — SERTRALINE HYDROCHLORIDE 100 MG: 100 TABLET ORAL at 20:01

## 2023-12-27 RX ADMIN — IPRATROPIUM BROMIDE 0.5 MG: 0.5 SOLUTION RESPIRATORY (INHALATION) at 06:20

## 2023-12-27 RX ADMIN — GUAIFENESIN 400 MG: 200 TABLET ORAL at 13:14

## 2023-12-27 RX ADMIN — BUDESONIDE 250 MCG: 0.25 SUSPENSION RESPIRATORY (INHALATION) at 06:20

## 2023-12-27 RX ADMIN — CLONAZEPAM 0.5 MG: 0.5 TABLET ORAL at 20:00

## 2023-12-27 RX ADMIN — OXYCODONE HYDROCHLORIDE AND ACETAMINOPHEN 500 MG: 500 TABLET ORAL at 20:12

## 2023-12-27 RX ADMIN — Medication 2000 UNITS: at 13:14

## 2023-12-27 RX ADMIN — DEXAMETHASONE SODIUM PHOSPHATE 6 MG: 10 INJECTION, SOLUTION INTRAMUSCULAR; INTRAVENOUS at 13:15

## 2023-12-27 RX ADMIN — LEVOFLOXACIN 750 MG: 5 INJECTION, SOLUTION INTRAVENOUS at 17:48

## 2023-12-27 RX ADMIN — SERTRALINE HYDROCHLORIDE 100 MG: 100 TABLET ORAL at 08:36

## 2023-12-27 RX ADMIN — ACETAMINOPHEN 650 MG: 325 TABLET ORAL at 05:33

## 2023-12-27 RX ADMIN — IPRATROPIUM BROMIDE 0.5 MG: 0.5 SOLUTION RESPIRATORY (INHALATION) at 14:42

## 2023-12-27 RX ADMIN — ZINC SULFATE 220 MG (50 MG) CAPSULE 50 MG: CAPSULE at 13:15

## 2023-12-27 RX ADMIN — BUDESONIDE AND FORMOTEROL FUMARATE DIHYDRATE 1 PUFF: 80; 4.5 AEROSOL RESPIRATORY (INHALATION) at 20:06

## 2023-12-27 RX ADMIN — BUMETANIDE 2 MG: 1 TABLET ORAL at 08:36

## 2023-12-27 RX ADMIN — IPRATROPIUM BROMIDE 1 PUFF: 17 AEROSOL, METERED RESPIRATORY (INHALATION) at 20:06

## 2023-12-27 RX ADMIN — METOPROLOL SUCCINATE 100 MG: 50 TABLET, EXTENDED RELEASE ORAL at 08:35

## 2023-12-27 RX ADMIN — MEROPENEM 1000 MG: 1 INJECTION, POWDER, FOR SOLUTION INTRAVENOUS at 05:30

## 2023-12-27 RX ADMIN — WATER 10 MG: 1 INJECTION INTRAMUSCULAR; INTRAVENOUS; SUBCUTANEOUS at 18:04

## 2023-12-27 RX ADMIN — MEROPENEM 1000 MG: 1 INJECTION, POWDER, FOR SOLUTION INTRAVENOUS at 11:38

## 2023-12-27 RX ADMIN — SODIUM CHLORIDE, PRESERVATIVE FREE 10 ML: 5 INJECTION INTRAVENOUS at 08:36

## 2023-12-27 RX ADMIN — BENZONATATE 200 MG: 100 CAPSULE ORAL at 13:15

## 2023-12-27 RX ADMIN — IPRATROPIUM BROMIDE 0.5 MG: 0.5 SOLUTION RESPIRATORY (INHALATION) at 11:39

## 2023-12-27 RX ADMIN — GABAPENTIN 600 MG: 600 TABLET, FILM COATED ORAL at 08:35

## 2023-12-27 RX ADMIN — GABAPENTIN 600 MG: 600 TABLET, FILM COATED ORAL at 20:01

## 2023-12-27 RX ADMIN — Medication 1500 MG: at 02:37

## 2023-12-27 RX ADMIN — BENZONATATE 200 MG: 100 CAPSULE ORAL at 08:36

## 2023-12-27 RX ADMIN — ONDANSETRON 4 MG: 2 INJECTION INTRAMUSCULAR; INTRAVENOUS at 11:33

## 2023-12-27 RX ADMIN — TOCILIZUMAB 800 MG: 20 INJECTION, SOLUTION, CONCENTRATE INTRAVENOUS at 17:51

## 2023-12-27 RX ADMIN — AMLODIPINE BESYLATE 10 MG: 10 TABLET ORAL at 09:26

## 2023-12-27 RX ADMIN — OXYCODONE HYDROCHLORIDE AND ACETAMINOPHEN 500 MG: 500 TABLET ORAL at 13:15

## 2023-12-27 RX ADMIN — CLONAZEPAM 0.5 MG: 0.5 TABLET ORAL at 08:35

## 2023-12-27 RX ADMIN — ENOXAPARIN SODIUM 30 MG: 100 INJECTION SUBCUTANEOUS at 08:35

## 2023-12-27 RX ADMIN — ENOXAPARIN SODIUM 30 MG: 100 INJECTION SUBCUTANEOUS at 20:02

## 2023-12-27 RX ADMIN — ACETAMINOPHEN 650 MG: 325 TABLET ORAL at 11:33

## 2023-12-27 RX ADMIN — SODIUM CHLORIDE, PRESERVATIVE FREE 40 MG: 5 INJECTION INTRAVENOUS at 17:44

## 2023-12-27 RX ADMIN — ARFORMOTEROL TARTRATE 15 MCG: 15 SOLUTION RESPIRATORY (INHALATION) at 06:20

## 2023-12-27 RX ADMIN — Medication 1500 MG: at 13:20

## 2023-12-27 RX ADMIN — SODIUM CHLORIDE, PRESERVATIVE FREE 40 MG: 5 INJECTION INTRAVENOUS at 05:28

## 2023-12-27 ASSESSMENT — PAIN DESCRIPTION - DESCRIPTORS
DESCRIPTORS: DISCOMFORT
DESCRIPTORS: ACHING

## 2023-12-27 ASSESSMENT — PAIN DESCRIPTION - LOCATION
LOCATION: HEAD
LOCATION: HEAD

## 2023-12-27 ASSESSMENT — PAIN SCALES - GENERAL
PAINLEVEL_OUTOF10: 4
PAINLEVEL_OUTOF10: 4

## 2023-12-27 NOTE — ACP (ADVANCE CARE PLANNING)
Advance Care Planning     Advance Care Planning Activator (Inpatient)  Conversation Note      Date of ACP Conversation: 12/27/2023     Conversation Conducted with: Spouse, Zoila Sood.    ACP Activator: Reyna Chung RN      Health Care Decision Maker:     Current Designated Health Care Decision Maker:     Primary Decision Maker (Active): ZOILA SOOD - [phone]        Care Preferences    Ventilation:  \"If you were in your present state of health and suddenly became very ill and were unable to breathe on your own, what would your preference be about the use of a ventilator (breathing machine) if it were available to you?\"      Would the patient desire the use of ventilator (breathing machine)?: Yes      Resuscitation  \"CPR works best to restart the heart when there is a sudden event, like a heart attack, in someone who is otherwise healthy. Unfortunately, CPR does not typically restart the heart for people who have serious health conditions or who are very sick.\"    \"In the event your heart stopped as a result of an underlying serious health condition, would you want attempts to be made to restart your heart (answer \"yes\" for attempt to resuscitate) or would you prefer a natural death (answer \"no\" for do not attempt to resuscitate)?\" Yes         Conversation Outcomes:  ACP discussion completed    Follow-up plan:    [] Schedule follow-up conversation to continue planning  [] Referred individual to Provider for additional questions/concerns   [] Advised patient/agent/surrogate to review completed ACP document and update if needed with changes in condition, patient preferences or care setting    [] This note routed to one or more involved healthcare providers        Electronically signed by Reyna Chung RN on 12/27/2023 at 11:35 AM

## 2023-12-28 LAB
ANION GAP SERPL CALCULATED.3IONS-SCNC: 8 MMOL/L (ref 7–19)
BACTERIA SPEC RESP CULT: ABNORMAL
BASOPHILS # BLD: 0 K/UL (ref 0–0.2)
BASOPHILS NFR BLD: 0.4 % (ref 0–1)
BUN SERPL-MCNC: 14 MG/DL (ref 6–20)
CALCIUM SERPL-MCNC: 9.5 MG/DL (ref 8.6–10)
CHLORIDE SERPL-SCNC: 100 MMOL/L (ref 98–111)
CO2 SERPL-SCNC: 35 MMOL/L (ref 22–29)
CREAT SERPL-MCNC: 0.6 MG/DL (ref 0.5–1.2)
EOSINOPHIL # BLD: 0 K/UL (ref 0–0.6)
EOSINOPHIL NFR BLD: 0.2 % (ref 0–5)
ERYTHROCYTE [DISTWIDTH] IN BLOOD BY AUTOMATED COUNT: 15.4 % (ref 11.5–14.5)
GLUCOSE SERPL-MCNC: 112 MG/DL (ref 74–109)
GRAM STN SPEC: ABNORMAL
HCT VFR BLD AUTO: 41.4 % (ref 42–52)
HGB BLD-MCNC: 12.4 G/DL (ref 14–18)
IMM GRANULOCYTES # BLD: 0 K/UL
LYMPHOCYTES # BLD: 0.6 K/UL (ref 1.1–4.5)
LYMPHOCYTES NFR BLD: 10.9 % (ref 20–40)
MAGNESIUM SERPL-MCNC: 2.3 MG/DL (ref 1.6–2.6)
MCH RBC QN AUTO: 26.2 PG (ref 27–31)
MCHC RBC AUTO-ENTMCNC: 30 G/DL (ref 33–37)
MCV RBC AUTO: 87.5 FL (ref 80–94)
MONOCYTES # BLD: 0.4 K/UL (ref 0–0.9)
MONOCYTES NFR BLD: 7.4 % (ref 0–10)
NEUTROPHILS # BLD: 4.3 K/UL (ref 1.5–7.5)
NEUTS SEG NFR BLD: 80.5 % (ref 50–65)
ORGANISM: ABNORMAL
PLATELET # BLD AUTO: 219 K/UL (ref 130–400)
PMV BLD AUTO: 9.9 FL (ref 9.4–12.4)
POTASSIUM SERPL-SCNC: 4.2 MMOL/L (ref 3.5–5)
RBC # BLD AUTO: 4.73 M/UL (ref 4.7–6.1)
SODIUM SERPL-SCNC: 143 MMOL/L (ref 136–145)
VANCOMYCIN TROUGH SERPL-MCNC: 7.3 UG/ML (ref 10–20)
WBC # BLD AUTO: 5.3 K/UL (ref 4.8–10.8)

## 2023-12-28 PROCEDURE — 36415 COLL VENOUS BLD VENIPUNCTURE: CPT

## 2023-12-28 PROCEDURE — 6360000002 HC RX W HCPCS: Performed by: NURSE PRACTITIONER

## 2023-12-28 PROCEDURE — 83735 ASSAY OF MAGNESIUM: CPT

## 2023-12-28 PROCEDURE — 6360000002 HC RX W HCPCS: Performed by: HOSPITALIST

## 2023-12-28 PROCEDURE — 6370000000 HC RX 637 (ALT 250 FOR IP): Performed by: NURSE PRACTITIONER

## 2023-12-28 PROCEDURE — 94640 AIRWAY INHALATION TREATMENT: CPT

## 2023-12-28 PROCEDURE — 80202 ASSAY OF VANCOMYCIN: CPT

## 2023-12-28 PROCEDURE — 94660 CPAP INITIATION&MGMT: CPT

## 2023-12-28 PROCEDURE — 2580000003 HC RX 258: Performed by: NURSE PRACTITIONER

## 2023-12-28 PROCEDURE — C9113 INJ PANTOPRAZOLE SODIUM, VIA: HCPCS | Performed by: NURSE PRACTITIONER

## 2023-12-28 PROCEDURE — 94760 N-INVAS EAR/PLS OXIMETRY 1: CPT

## 2023-12-28 PROCEDURE — 2000000000 HC ICU R&B

## 2023-12-28 PROCEDURE — 80048 BASIC METABOLIC PNL TOTAL CA: CPT

## 2023-12-28 PROCEDURE — 85025 COMPLETE CBC W/AUTO DIFF WBC: CPT

## 2023-12-28 PROCEDURE — 6360000002 HC RX W HCPCS: Performed by: INTERNAL MEDICINE

## 2023-12-28 PROCEDURE — 6370000000 HC RX 637 (ALT 250 FOR IP): Performed by: INTERNAL MEDICINE

## 2023-12-28 PROCEDURE — 2700000000 HC OXYGEN THERAPY PER DAY

## 2023-12-28 PROCEDURE — 99222 1ST HOSP IP/OBS MODERATE 55: CPT

## 2023-12-28 RX ORDER — HYDRALAZINE HYDROCHLORIDE 20 MG/ML
5 INJECTION INTRAMUSCULAR; INTRAVENOUS EVERY 4 HOURS PRN
Status: DISCONTINUED | OUTPATIENT
Start: 2023-12-28 | End: 2024-01-03 | Stop reason: HOSPADM

## 2023-12-28 RX ADMIN — BUDESONIDE AND FORMOTEROL FUMARATE DIHYDRATE 1 PUFF: 80; 4.5 AEROSOL RESPIRATORY (INHALATION) at 18:28

## 2023-12-28 RX ADMIN — IPRATROPIUM BROMIDE 1 PUFF: 17 AEROSOL, METERED RESPIRATORY (INHALATION) at 10:26

## 2023-12-28 RX ADMIN — GABAPENTIN 600 MG: 600 TABLET, FILM COATED ORAL at 20:25

## 2023-12-28 RX ADMIN — DEXAMETHASONE SODIUM PHOSPHATE 6 MG: 10 INJECTION, SOLUTION INTRAMUSCULAR; INTRAVENOUS at 01:31

## 2023-12-28 RX ADMIN — ENOXAPARIN SODIUM 30 MG: 100 INJECTION SUBCUTANEOUS at 08:55

## 2023-12-28 RX ADMIN — ENOXAPARIN SODIUM 30 MG: 100 INJECTION SUBCUTANEOUS at 20:25

## 2023-12-28 RX ADMIN — AMLODIPINE BESYLATE 10 MG: 10 TABLET ORAL at 08:54

## 2023-12-28 RX ADMIN — OXYCODONE HYDROCHLORIDE AND ACETAMINOPHEN 500 MG: 500 TABLET ORAL at 20:24

## 2023-12-28 RX ADMIN — GUAIFENESIN 400 MG: 200 TABLET ORAL at 05:11

## 2023-12-28 RX ADMIN — CLONAZEPAM 0.5 MG: 0.5 TABLET ORAL at 08:54

## 2023-12-28 RX ADMIN — ZINC SULFATE 220 MG (50 MG) CAPSULE 50 MG: CAPSULE at 09:04

## 2023-12-28 RX ADMIN — SODIUM CHLORIDE, PRESERVATIVE FREE 40 MG: 5 INJECTION INTRAVENOUS at 17:27

## 2023-12-28 RX ADMIN — IPRATROPIUM BROMIDE 1 PUFF: 17 AEROSOL, METERED RESPIRATORY (INHALATION) at 14:31

## 2023-12-28 RX ADMIN — Medication 10 MG: at 20:24

## 2023-12-28 RX ADMIN — BUMETANIDE 2 MG: 1 TABLET ORAL at 08:54

## 2023-12-28 RX ADMIN — DEXAMETHASONE SODIUM PHOSPHATE 6 MG: 10 INJECTION, SOLUTION INTRAMUSCULAR; INTRAVENOUS at 12:27

## 2023-12-28 RX ADMIN — OXYCODONE HYDROCHLORIDE AND ACETAMINOPHEN 500 MG: 500 TABLET ORAL at 09:04

## 2023-12-28 RX ADMIN — GUAIFENESIN 400 MG: 200 TABLET ORAL at 21:57

## 2023-12-28 RX ADMIN — SERTRALINE HYDROCHLORIDE 100 MG: 100 TABLET ORAL at 20:25

## 2023-12-28 RX ADMIN — BUDESONIDE AND FORMOTEROL FUMARATE DIHYDRATE 1 PUFF: 80; 4.5 AEROSOL RESPIRATORY (INHALATION) at 06:52

## 2023-12-28 RX ADMIN — LISINOPRIL 40 MG: 20 TABLET ORAL at 08:54

## 2023-12-28 RX ADMIN — IPRATROPIUM BROMIDE 1 PUFF: 17 AEROSOL, METERED RESPIRATORY (INHALATION) at 18:28

## 2023-12-28 RX ADMIN — GUAIFENESIN 400 MG: 200 TABLET ORAL at 12:27

## 2023-12-28 RX ADMIN — CLONAZEPAM 0.5 MG: 0.5 TABLET ORAL at 20:25

## 2023-12-28 RX ADMIN — SODIUM CHLORIDE, PRESERVATIVE FREE 40 MG: 5 INJECTION INTRAVENOUS at 04:13

## 2023-12-28 RX ADMIN — BENZONATATE 200 MG: 100 CAPSULE ORAL at 14:48

## 2023-12-28 RX ADMIN — BENZONATATE 200 MG: 100 CAPSULE ORAL at 08:54

## 2023-12-28 RX ADMIN — GABAPENTIN 600 MG: 600 TABLET, FILM COATED ORAL at 08:54

## 2023-12-28 RX ADMIN — SERTRALINE HYDROCHLORIDE 100 MG: 100 TABLET ORAL at 08:54

## 2023-12-28 RX ADMIN — Medication 1500 MG: at 01:31

## 2023-12-28 RX ADMIN — Medication 2000 UNITS: at 09:04

## 2023-12-28 RX ADMIN — BENZONATATE 200 MG: 100 CAPSULE ORAL at 20:24

## 2023-12-28 RX ADMIN — HYDRALAZINE HYDROCHLORIDE 5 MG: 20 INJECTION, SOLUTION INTRAMUSCULAR; INTRAVENOUS at 04:13

## 2023-12-28 RX ADMIN — LEVOFLOXACIN 750 MG: 5 INJECTION, SOLUTION INTRAVENOUS at 17:27

## 2023-12-28 RX ADMIN — METOPROLOL SUCCINATE 100 MG: 50 TABLET, EXTENDED RELEASE ORAL at 08:54

## 2023-12-28 RX ADMIN — IPRATROPIUM BROMIDE 1 PUFF: 17 AEROSOL, METERED RESPIRATORY (INHALATION) at 06:52

## 2023-12-28 NOTE — PLAN OF CARE
Problem: Safety - Adult  Goal: Free from fall injury  12/28/2023 1543 by Indigo Mohamud RN  Outcome: Progressing  12/28/2023 1530 by Indigo Mohamud RN  Outcome: Progressing  12/28/2023 0405 by Scott Page RN  Outcome: Progressing     Problem: ABCDS Injury Assessment  Goal: Absence of physical injury  12/28/2023 1543 by Indigo Mohamud RN  Outcome: Progressing  12/28/2023 1530 by Indigo Mohamud RN  Outcome: Progressing  12/28/2023 0405 by Scott Page RN  Outcome: Progressing     Problem: Discharge Planning  Goal: Discharge to home or other facility with appropriate resources  12/28/2023 1543 by Indigo Mohamud RN  Outcome: Progressing  12/28/2023 1530 by Indigo Mohamud RN  Outcome: Progressing  12/28/2023 0405 by Scott Page RN  Outcome: Progressing

## 2023-12-28 NOTE — CONSULTS
Palliative Care:    Pt was transferred from [facility] to our facility d/t need of GI MD from [facility]. He presented to OSH with cough, fever, SOA. He also reports ongoing issues with dysphasia.   Pt was transferred from 4th floor to ICU last evening d/t not recovering O2 sats after coughing.  Today it was found that pt is COVID pos.  Spoke with pt spouse on the phone to obtain other information.  She was provided with contact numbers to talk with his RN for additional updates.            Past Medical History:        Past Medical History:   Diagnosis Date    Anxiety     Cardiopulmonary arrest (HCC) 12/14/2016    COPD (chronic obstructive pulmonary disease) (HCC)     Crohn's disease (HCC)     Depression     GERD (gastroesophageal reflux disease)     Hypertension     Kidney stone     Left bundle branch block     Nausea & vomiting     ANITA (obstructive sleep apnea)     c pap    Palliative care patient 12/27/2023    Psoriasis     Psoriatic arthritis (HCC)     Rheumatoid arteritis (HCC)     Ventral hernia        Advance Directives:     Full Code. Spouse would like to talk with pt about AD before pal care approaches.          Pain/Other Symptoms:  Pt has chronic back pain. Meds are listed.                   Psychological/Spiritual:   Limited support. Have not attended [employer] since COVID. Watch [employer] on television.                    Plan:  Medical management          Patient/family discussion r/t goals:           Spouse tells me that pt is still active, works full time.             Palliative Performance Scale:        90    Palliative Care team will continue to follow and support, with ongoing review of pt's goals of care.                Electronically signed by Reyna Chung RN on 12/27/2023 at 11:36 AM    
hcl    Social History     Social History       Tobacco History       Smoking Status  Former Smoking Start Date  1983 Quit Date  2013 Average Packs/Day  1 pack/day for 30.0 years (30.0 ttl pk-yrs) Smoking Tobacco Type  Cigarettes from 1983 to 2013   Pack Year History     Packs/Day From To Years    0 2013  11.0    1 1983 2013 30.0      Smokeless Tobacco Use  Former Smokeless Tobacco Type  Chew              Alcohol History       Alcohol Use Status  Yes Comment  rare              Drug Use       Drug Use Status  No              Sexual Activity       Sexually Active  Not Asked                    Family History     Family History   Problem Relation Age of Onset    Osteoarthritis Mother     Hypertension Father     Diabetes Father     Colon Cancer Maternal Grandfather     Colon Polyps Neg Hx     Esophageal Cancer Neg Hx     Liver Cancer Neg Hx     Liver Disease Neg Hx     Rectal Cancer Neg Hx     Stomach Cancer Neg Hx        Review of Systems   Review of Systems     Physical Exam   BP (!) 152/80   Temp 98.4 °F (36.9 °C) (Temporal)   Ht 1.753 m (5' 9\")   Wt 130.2 kg (287 lb)   SpO2 93%   BMI 42.38 kg/m²      Physical Exam   On exam NAD AOX3 non focal no asterixis; not jaundiced CTA tacos RR abd s NT BS+ no ascites no hernia no mass.  no LE edema.      Labs    CBC:  Recent Labs     12/24/23  1609   WBC 8.9   RBC 4.17*   HGB 11.4*   HCT 35.6*   MCV 85.4   RDW 16.3*        CHEMISTRIES:  Recent Labs     12/24/23  1609      K 3.5      CO2 25   BUN 12   CREATININE 0.7   GLUCOSE 175*   PHOS 1.5*   MG 1.9     PT/INR:No results for input(s): \"PROTIME\", \"INR\" in the last 72 hours.  APTT:No results for input(s): \"APTT\" in the last 72 hours.  LIVER PROFILE:No results for input(s): \"AST\", \"ALT\", \"BILIDIR\", \"BILITOT\", \"ALKPHOS\" in the last 72 hours.    Imaging/Diagnostics   No results found.    Assessment      Hospital Problems             Last Modified POA    * (Principal) Aspiration pneumonia due to gastric 
receiving adjuvant therapy.  Has been able to wean to 10 L HF NC O2.  Patient denies any ACP/POA documents.  He confirms that his spouse remains legal NOK decision maker.  We reviewed CODE STATUS and discussed meaning of \"full code\" versus \"DNR\".  Patient request to continue all current medical treatment/workup and to remain a full code in the event of cardiac or respiratory arrest.  ACP note completed this stay by PC RN.  Spouse and I reviewed the above and goals are unchanged at this time.  Opportunity for questions and emotional support provided.  Will continue to follow.    Candidate for SCOP: To be determined based on hospital course    Recommendations:     Palliative Care- GOC continue all current medical treatment/workup and monitor for improvement.  D/C planning based on hospital course. Code status- FULL CODE  Acute hypoxic and hypercapnic respiratory failure- mgmt per hospitalist.  Wean O2 as able.  COVID-19 PNA- PCR testing 12/27/23 positive.  Pharmacy to dose Tocilizumab.  Steroids and Atrovent therapy continued.  Empiric antibiotics for potential secondary bacterial infection with patient's known aspiration history.  Dysphagia with hx of aspiration- SLP following.  Advance diet as able.  Has been evaluated by GI.    Thank you for consulting palliative care and allowing us to participate in the care of the patient.      CounselingTopics: Goals of care, Code Status, Disease process education, pt/family support                                     Total Time Spent with patient assessment, interview of independent historian/HCS, workup/treatment review, discussion with medical team, review of current and home medications, review of care everywhere and placement of orders/preparation of this note: 72 minutes    Electronically signed by REYNALDO Srinivasan CNP on 12/28/2023 at 8:44 AM    (Please note that portions of this note were completed with a voice recognition program.  Efforts were made to edit the

## 2023-12-28 NOTE — PLAN OF CARE
Problem: Safety - Adult  Goal: Free from fall injury  Outcome: Progressing     Problem: ABCDS Injury Assessment  Goal: Absence of physical injury  Outcome: Progressing     Problem: Discharge Planning  Goal: Discharge to home or other facility with appropriate resources  Outcome: Progressing

## 2023-12-28 NOTE — PLAN OF CARE
Problem: Safety - Adult  Goal: Free from fall injury  12/28/2023 1530 by Indigo Mohamud RN  Outcome: Progressing  12/28/2023 0405 by Scott Page RN  Outcome: Progressing     Problem: ABCDS Injury Assessment  Goal: Absence of physical injury  12/28/2023 1530 by Indigo Mohamud RN  Outcome: Progressing  12/28/2023 0405 by Scott Page RN  Outcome: Progressing     Problem: Discharge Planning  Goal: Discharge to home or other facility with appropriate resources  12/28/2023 1530 by Indigo Mohamud RN  Outcome: Progressing  12/28/2023 0405 by Scott Page RN  Outcome: Progressing

## 2023-12-29 LAB
ALLENS TEST: ABNORMAL
ANION GAP SERPL CALCULATED.3IONS-SCNC: 9 MMOL/L (ref 7–19)
BACTERIA BLD CULT ORG #2: NORMAL
BACTERIA BLD CULT: NORMAL
BASE EXCESS ARTERIAL: 13.7 MMOL/L (ref -2–2)
BASOPHILS # BLD: 0 K/UL (ref 0–0.2)
BASOPHILS NFR BLD: 0.1 % (ref 0–1)
BUN SERPL-MCNC: 22 MG/DL (ref 6–20)
CALCIUM SERPL-MCNC: 9.1 MG/DL (ref 8.6–10)
CARBOXYHEMOGLOBIN ARTERIAL: 1.4 % (ref 0–5)
CHLORIDE SERPL-SCNC: 99 MMOL/L (ref 98–111)
CO2 SERPL-SCNC: 33 MMOL/L (ref 22–29)
CREAT SERPL-MCNC: 0.7 MG/DL (ref 0.5–1.2)
EOSINOPHIL # BLD: 0 K/UL (ref 0–0.6)
EOSINOPHIL NFR BLD: 0 % (ref 0–5)
ERYTHROCYTE [DISTWIDTH] IN BLOOD BY AUTOMATED COUNT: 15.8 % (ref 11.5–14.5)
GLUCOSE SERPL-MCNC: 115 MG/DL (ref 74–109)
HCO3 ARTERIAL: 39.5 MMOL/L (ref 22–26)
HCT VFR BLD AUTO: 41.4 % (ref 42–52)
HEMOGLOBIN, ART, EXTENDED: 13.2 G/DL (ref 14–18)
HGB BLD-MCNC: 12.7 G/DL (ref 14–18)
IMM GRANULOCYTES # BLD: 0.1 K/UL
LYMPHOCYTES # BLD: 1.2 K/UL (ref 1.1–4.5)
LYMPHOCYTES NFR BLD: 11 % (ref 20–40)
MAGNESIUM SERPL-MCNC: 2.3 MG/DL (ref 1.6–2.6)
MCH RBC QN AUTO: 26.6 PG (ref 27–31)
MCHC RBC AUTO-ENTMCNC: 30.7 G/DL (ref 33–37)
MCV RBC AUTO: 86.8 FL (ref 80–94)
METHEMOGLOBIN ARTERIAL: 1.1 %
MONOCYTES # BLD: 0.8 K/UL (ref 0–0.9)
MONOCYTES NFR BLD: 7.3 % (ref 0–10)
NEUTROPHILS # BLD: 8.7 K/UL (ref 1.5–7.5)
NEUTS SEG NFR BLD: 81.1 % (ref 50–65)
O2 CONTENT ARTERIAL: 17.3 ML/DL
O2 DELIVERY DEVICE: ABNORMAL
O2 SAT, ARTERIAL: 92.9 %
O2 THERAPY: ABNORMAL
OXYGEN FLOW: 9
PCO2 ARTERIAL: 53 MMHG (ref 35–45)
PH ARTERIAL: 7.48 (ref 7.35–7.45)
PLATELET # BLD AUTO: 290 K/UL (ref 130–400)
PMV BLD AUTO: 10.2 FL (ref 9.4–12.4)
PO2 ARTERIAL: 73 MMHG (ref 80–100)
POTASSIUM BLD-SCNC: 3.6 MMOL/L
POTASSIUM SERPL-SCNC: 3.9 MMOL/L (ref 3.5–5)
RBC # BLD AUTO: 4.77 M/UL (ref 4.7–6.1)
SAMPLE SOURCE: ABNORMAL
SODIUM SERPL-SCNC: 141 MMOL/L (ref 136–145)
SPONT RATE(BPM): 24
WBC # BLD AUTO: 10.8 K/UL (ref 4.8–10.8)

## 2023-12-29 PROCEDURE — 82803 BLOOD GASES ANY COMBINATION: CPT

## 2023-12-29 PROCEDURE — 6360000002 HC RX W HCPCS: Performed by: NURSE PRACTITIONER

## 2023-12-29 PROCEDURE — 6370000000 HC RX 637 (ALT 250 FOR IP): Performed by: INTERNAL MEDICINE

## 2023-12-29 PROCEDURE — 83735 ASSAY OF MAGNESIUM: CPT

## 2023-12-29 PROCEDURE — C9113 INJ PANTOPRAZOLE SODIUM, VIA: HCPCS | Performed by: NURSE PRACTITIONER

## 2023-12-29 PROCEDURE — 6370000000 HC RX 637 (ALT 250 FOR IP): Performed by: NURSE PRACTITIONER

## 2023-12-29 PROCEDURE — 94660 CPAP INITIATION&MGMT: CPT

## 2023-12-29 PROCEDURE — 2580000003 HC RX 258: Performed by: NURSE PRACTITIONER

## 2023-12-29 PROCEDURE — 85025 COMPLETE CBC W/AUTO DIFF WBC: CPT

## 2023-12-29 PROCEDURE — 99231 SBSQ HOSP IP/OBS SF/LOW 25: CPT

## 2023-12-29 PROCEDURE — 2700000000 HC OXYGEN THERAPY PER DAY

## 2023-12-29 PROCEDURE — 94760 N-INVAS EAR/PLS OXIMETRY 1: CPT

## 2023-12-29 PROCEDURE — 94640 AIRWAY INHALATION TREATMENT: CPT

## 2023-12-29 PROCEDURE — 1210000000 HC MED SURG R&B

## 2023-12-29 PROCEDURE — 6360000002 HC RX W HCPCS: Performed by: HOSPITALIST

## 2023-12-29 PROCEDURE — 36600 WITHDRAWAL OF ARTERIAL BLOOD: CPT

## 2023-12-29 PROCEDURE — 36415 COLL VENOUS BLD VENIPUNCTURE: CPT

## 2023-12-29 PROCEDURE — 6360000002 HC RX W HCPCS: Performed by: INTERNAL MEDICINE

## 2023-12-29 PROCEDURE — 80048 BASIC METABOLIC PNL TOTAL CA: CPT

## 2023-12-29 RX ADMIN — BUDESONIDE AND FORMOTEROL FUMARATE DIHYDRATE 1 PUFF: 80; 4.5 AEROSOL RESPIRATORY (INHALATION) at 06:50

## 2023-12-29 RX ADMIN — AMLODIPINE BESYLATE 10 MG: 10 TABLET ORAL at 08:06

## 2023-12-29 RX ADMIN — SODIUM CHLORIDE, PRESERVATIVE FREE 40 MG: 5 INJECTION INTRAVENOUS at 15:23

## 2023-12-29 RX ADMIN — GABAPENTIN 600 MG: 600 TABLET, FILM COATED ORAL at 08:05

## 2023-12-29 RX ADMIN — OXYCODONE HYDROCHLORIDE AND ACETAMINOPHEN 1 TABLET: 7.5; 325 TABLET ORAL at 13:41

## 2023-12-29 RX ADMIN — IPRATROPIUM BROMIDE 1 PUFF: 17 AEROSOL, METERED RESPIRATORY (INHALATION) at 19:19

## 2023-12-29 RX ADMIN — GUAIFENESIN 400 MG: 200 TABLET ORAL at 04:17

## 2023-12-29 RX ADMIN — ENOXAPARIN SODIUM 30 MG: 100 INJECTION SUBCUTANEOUS at 08:06

## 2023-12-29 RX ADMIN — BUMETANIDE 2 MG: 1 TABLET ORAL at 08:06

## 2023-12-29 RX ADMIN — DEXAMETHASONE SODIUM PHOSPHATE 6 MG: 10 INJECTION, SOLUTION INTRAMUSCULAR; INTRAVENOUS at 13:38

## 2023-12-29 RX ADMIN — LEVOFLOXACIN 750 MG: 5 INJECTION, SOLUTION INTRAVENOUS at 15:27

## 2023-12-29 RX ADMIN — CLONAZEPAM 0.5 MG: 0.5 TABLET ORAL at 08:05

## 2023-12-29 RX ADMIN — GUAIFENESIN 400 MG: 200 TABLET ORAL at 22:16

## 2023-12-29 RX ADMIN — DEXAMETHASONE SODIUM PHOSPHATE 6 MG: 10 INJECTION, SOLUTION INTRAMUSCULAR; INTRAVENOUS at 00:50

## 2023-12-29 RX ADMIN — IPRATROPIUM BROMIDE 1 PUFF: 17 AEROSOL, METERED RESPIRATORY (INHALATION) at 06:46

## 2023-12-29 RX ADMIN — Medication 10 MG: at 22:14

## 2023-12-29 RX ADMIN — IPRATROPIUM BROMIDE 1 PUFF: 17 AEROSOL, METERED RESPIRATORY (INHALATION) at 10:47

## 2023-12-29 RX ADMIN — BENZONATATE 200 MG: 100 CAPSULE ORAL at 08:11

## 2023-12-29 RX ADMIN — METOPROLOL SUCCINATE 100 MG: 50 TABLET, EXTENDED RELEASE ORAL at 08:05

## 2023-12-29 RX ADMIN — SERTRALINE HYDROCHLORIDE 100 MG: 100 TABLET ORAL at 22:14

## 2023-12-29 RX ADMIN — HYDRALAZINE HYDROCHLORIDE 5 MG: 20 INJECTION, SOLUTION INTRAMUSCULAR; INTRAVENOUS at 04:18

## 2023-12-29 RX ADMIN — OXYCODONE HYDROCHLORIDE AND ACETAMINOPHEN 500 MG: 500 TABLET ORAL at 08:05

## 2023-12-29 RX ADMIN — SERTRALINE HYDROCHLORIDE 100 MG: 100 TABLET ORAL at 08:05

## 2023-12-29 RX ADMIN — BUDESONIDE AND FORMOTEROL FUMARATE DIHYDRATE 1 PUFF: 80; 4.5 AEROSOL RESPIRATORY (INHALATION) at 19:19

## 2023-12-29 RX ADMIN — SODIUM CHLORIDE, PRESERVATIVE FREE 40 MG: 5 INJECTION INTRAVENOUS at 04:17

## 2023-12-29 RX ADMIN — GABAPENTIN 600 MG: 600 TABLET, FILM COATED ORAL at 22:17

## 2023-12-29 RX ADMIN — Medication 2000 UNITS: at 08:05

## 2023-12-29 RX ADMIN — LISINOPRIL 40 MG: 20 TABLET ORAL at 08:05

## 2023-12-29 RX ADMIN — IPRATROPIUM BROMIDE 1 PUFF: 17 AEROSOL, METERED RESPIRATORY (INHALATION) at 14:39

## 2023-12-29 RX ADMIN — OXYCODONE HYDROCHLORIDE AND ACETAMINOPHEN 500 MG: 500 TABLET ORAL at 22:14

## 2023-12-29 RX ADMIN — ZINC SULFATE 220 MG (50 MG) CAPSULE 50 MG: CAPSULE at 08:05

## 2023-12-29 RX ADMIN — GUAIFENESIN 400 MG: 200 TABLET ORAL at 13:38

## 2023-12-29 RX ADMIN — CLONAZEPAM 0.5 MG: 0.5 TABLET ORAL at 22:15

## 2023-12-29 RX ADMIN — ENOXAPARIN SODIUM 30 MG: 100 INJECTION SUBCUTANEOUS at 22:17

## 2023-12-29 ASSESSMENT — PAIN DESCRIPTION - LOCATION: LOCATION: BACK

## 2023-12-29 ASSESSMENT — PAIN SCALES - GENERAL
PAINLEVEL_OUTOF10: 0
PAINLEVEL_OUTOF10: 7

## 2023-12-29 ASSESSMENT — PAIN DESCRIPTION - ORIENTATION: ORIENTATION: LOWER

## 2023-12-29 ASSESSMENT — PAIN DESCRIPTION - DESCRIPTORS: DESCRIPTORS: ACHING

## 2023-12-29 NOTE — PLAN OF CARE
Nutrition Problem #1: Inadequate oral intake  Intervention: Food and/or Nutrient Delivery: Continue Current Diet, Start Oral Nutrition Supplement  Nutritional

## 2023-12-29 NOTE — PLAN OF CARE
Problem: Safety - Adult  Goal: Free from fall injury  12/29/2023 0932 by Jacquelyn Tirado RN  Outcome: Progressing  12/29/2023 0147 by Scott Page RN  Outcome: Progressing     Problem: ABCDS Injury Assessment  Goal: Absence of physical injury  12/29/2023 0932 by Jacquelyn Tirado RN  Outcome: Progressing  12/29/2023 0147 by Scott Page RN  Outcome: Progressing     Problem: Discharge Planning  Goal: Discharge to home or other facility with appropriate resources  12/29/2023 0932 by Jacquelyn Tirado RN  Outcome: Progressing  12/29/2023 0147 by Scott Page RN  Outcome: Progressing     Problem: Nutrition Deficit:  Goal: Optimize nutritional status  Outcome: Progressing     Problem: Pain  Goal: Verbalizes/displays adequate comfort level or baseline comfort level  Outcome: Progressing

## 2023-12-29 NOTE — PLAN OF CARE
Problem: Safety - Adult  Goal: Free from fall injury  12/29/2023 1415 by Katy Isidro, RN  Outcome: Progressing  12/29/2023 0932 by Jacquelyn Tirado RN  Outcome: Progressing  12/29/2023 0147 by Scott Page, RN  Outcome: Progressing

## 2023-12-29 NOTE — PLAN OF CARE
Problem: Safety - Adult  Goal: Free from fall injury  12/29/2023 0147 by Scott Page RN  Outcome: Progressing  12/28/2023 1543 by Indigo Mohamud RN  Outcome: Progressing  12/28/2023 1530 by Indigo Mohamud RN  Outcome: Progressing     Problem: ABCDS Injury Assessment  Goal: Absence of physical injury  12/29/2023 0147 by Scott Page RN  Outcome: Progressing  12/28/2023 1543 by Indigo Mohamud RN  Outcome: Progressing  12/28/2023 1530 by Indigo Mohamud RN  Outcome: Progressing     Problem: Discharge Planning  Goal: Discharge to home or other facility with appropriate resources  12/29/2023 0147 by Scott Page RN  Outcome: Progressing  12/28/2023 1543 by Indigo Mohamud RN  Outcome: Progressing  12/28/2023 1530 by Indigo Mohamud RN  Outcome: Progressing

## 2023-12-30 LAB
ANION GAP SERPL CALCULATED.3IONS-SCNC: 12 MMOL/L (ref 7–19)
BASOPHILS # BLD: 0 K/UL (ref 0–0.2)
BASOPHILS NFR BLD: 0.1 % (ref 0–1)
BUN SERPL-MCNC: 24 MG/DL (ref 6–20)
CALCIUM SERPL-MCNC: 9 MG/DL (ref 8.6–10)
CHLORIDE SERPL-SCNC: 98 MMOL/L (ref 98–111)
CO2 SERPL-SCNC: 29 MMOL/L (ref 22–29)
CREAT SERPL-MCNC: 0.6 MG/DL (ref 0.5–1.2)
EOSINOPHIL # BLD: 0 K/UL (ref 0–0.6)
EOSINOPHIL NFR BLD: 0 % (ref 0–5)
ERYTHROCYTE [DISTWIDTH] IN BLOOD BY AUTOMATED COUNT: 16 % (ref 11.5–14.5)
GLUCOSE SERPL-MCNC: 106 MG/DL (ref 74–109)
HCT VFR BLD AUTO: 42.4 % (ref 42–52)
HGB BLD-MCNC: 13.3 G/DL (ref 14–18)
IMM GRANULOCYTES # BLD: 0.1 K/UL
LYMPHOCYTES # BLD: 0.5 K/UL (ref 1.1–4.5)
LYMPHOCYTES NFR BLD: 6.2 % (ref 20–40)
MAGNESIUM SERPL-MCNC: 2.3 MG/DL (ref 1.6–2.6)
MCH RBC QN AUTO: 26.7 PG (ref 27–31)
MCHC RBC AUTO-ENTMCNC: 31.4 G/DL (ref 33–37)
MCV RBC AUTO: 85 FL (ref 80–94)
MONOCYTES # BLD: 0.7 K/UL (ref 0–0.9)
MONOCYTES NFR BLD: 9.2 % (ref 0–10)
NEUTROPHILS # BLD: 6.7 K/UL (ref 1.5–7.5)
NEUTS SEG NFR BLD: 83.8 % (ref 50–65)
PLATELET # BLD AUTO: 270 K/UL (ref 130–400)
PMV BLD AUTO: 9.4 FL (ref 9.4–12.4)
POTASSIUM SERPL-SCNC: 3.6 MMOL/L (ref 3.5–5)
RBC # BLD AUTO: 4.99 M/UL (ref 4.7–6.1)
SODIUM SERPL-SCNC: 139 MMOL/L (ref 136–145)
WBC # BLD AUTO: 8.1 K/UL (ref 4.8–10.8)

## 2023-12-30 PROCEDURE — 2700000000 HC OXYGEN THERAPY PER DAY

## 2023-12-30 PROCEDURE — 80048 BASIC METABOLIC PNL TOTAL CA: CPT

## 2023-12-30 PROCEDURE — 2580000003 HC RX 258: Performed by: NURSE PRACTITIONER

## 2023-12-30 PROCEDURE — 6360000002 HC RX W HCPCS: Performed by: NURSE PRACTITIONER

## 2023-12-30 PROCEDURE — C9113 INJ PANTOPRAZOLE SODIUM, VIA: HCPCS | Performed by: NURSE PRACTITIONER

## 2023-12-30 PROCEDURE — 36415 COLL VENOUS BLD VENIPUNCTURE: CPT

## 2023-12-30 PROCEDURE — 6360000002 HC RX W HCPCS: Performed by: INTERNAL MEDICINE

## 2023-12-30 PROCEDURE — 6370000000 HC RX 637 (ALT 250 FOR IP): Performed by: INTERNAL MEDICINE

## 2023-12-30 PROCEDURE — 83735 ASSAY OF MAGNESIUM: CPT

## 2023-12-30 PROCEDURE — 85025 COMPLETE CBC W/AUTO DIFF WBC: CPT

## 2023-12-30 PROCEDURE — 1210000000 HC MED SURG R&B

## 2023-12-30 PROCEDURE — 6370000000 HC RX 637 (ALT 250 FOR IP): Performed by: NURSE PRACTITIONER

## 2023-12-30 PROCEDURE — 94760 N-INVAS EAR/PLS OXIMETRY 1: CPT

## 2023-12-30 PROCEDURE — 94640 AIRWAY INHALATION TREATMENT: CPT

## 2023-12-30 RX ORDER — LOPERAMIDE HCL 1 MG/7.5ML
2 SOLUTION ORAL 4 TIMES DAILY PRN
Status: DISCONTINUED | OUTPATIENT
Start: 2023-12-30 | End: 2023-12-30

## 2023-12-30 RX ORDER — LOPERAMIDE HYDROCHLORIDE 2 MG/1
12 CAPSULE ORAL 2 TIMES DAILY
Status: DISCONTINUED | OUTPATIENT
Start: 2023-12-30 | End: 2024-01-03 | Stop reason: HOSPADM

## 2023-12-30 RX ORDER — TRAZODONE HYDROCHLORIDE 50 MG/1
50 TABLET ORAL NIGHTLY
Status: DISCONTINUED | OUTPATIENT
Start: 2023-12-30 | End: 2023-12-31

## 2023-12-30 RX ADMIN — IPRATROPIUM BROMIDE 1 PUFF: 17 AEROSOL, METERED RESPIRATORY (INHALATION) at 21:53

## 2023-12-30 RX ADMIN — TRAZODONE HYDROCHLORIDE 50 MG: 50 TABLET ORAL at 20:59

## 2023-12-30 RX ADMIN — ZINC SULFATE 220 MG (50 MG) CAPSULE 50 MG: CAPSULE at 09:55

## 2023-12-30 RX ADMIN — LEVOFLOXACIN 750 MG: 5 INJECTION, SOLUTION INTRAVENOUS at 16:43

## 2023-12-30 RX ADMIN — GUAIFENESIN 400 MG: 200 TABLET ORAL at 20:57

## 2023-12-30 RX ADMIN — IPRATROPIUM BROMIDE 1 PUFF: 17 AEROSOL, METERED RESPIRATORY (INHALATION) at 07:39

## 2023-12-30 RX ADMIN — Medication 10 MG: at 20:59

## 2023-12-30 RX ADMIN — OXYCODONE HYDROCHLORIDE AND ACETAMINOPHEN 500 MG: 500 TABLET ORAL at 20:57

## 2023-12-30 RX ADMIN — LISINOPRIL 40 MG: 20 TABLET ORAL at 09:55

## 2023-12-30 RX ADMIN — SODIUM CHLORIDE, PRESERVATIVE FREE 40 MG: 5 INJECTION INTRAVENOUS at 05:52

## 2023-12-30 RX ADMIN — BUDESONIDE AND FORMOTEROL FUMARATE DIHYDRATE 1 PUFF: 80; 4.5 AEROSOL RESPIRATORY (INHALATION) at 07:38

## 2023-12-30 RX ADMIN — GUAIFENESIN 400 MG: 200 TABLET ORAL at 13:28

## 2023-12-30 RX ADMIN — METOPROLOL SUCCINATE 100 MG: 50 TABLET, EXTENDED RELEASE ORAL at 09:55

## 2023-12-30 RX ADMIN — LOPERAMIDE HYDROCHLORIDE 12 MG: 2 CAPSULE ORAL at 20:56

## 2023-12-30 RX ADMIN — ENOXAPARIN SODIUM 30 MG: 100 INJECTION SUBCUTANEOUS at 21:00

## 2023-12-30 RX ADMIN — CLONAZEPAM 0.5 MG: 0.5 TABLET ORAL at 09:55

## 2023-12-30 RX ADMIN — IPRATROPIUM BROMIDE 1 PUFF: 17 AEROSOL, METERED RESPIRATORY (INHALATION) at 14:25

## 2023-12-30 RX ADMIN — LOPERAMIDE HYDROCHLORIDE 12 MG: 2 CAPSULE ORAL at 14:56

## 2023-12-30 RX ADMIN — GUAIFENESIN 400 MG: 200 TABLET ORAL at 05:52

## 2023-12-30 RX ADMIN — IPRATROPIUM BROMIDE 1 PUFF: 17 AEROSOL, METERED RESPIRATORY (INHALATION) at 11:09

## 2023-12-30 RX ADMIN — AMLODIPINE BESYLATE 10 MG: 10 TABLET ORAL at 09:55

## 2023-12-30 RX ADMIN — BUMETANIDE 2 MG: 1 TABLET ORAL at 09:55

## 2023-12-30 RX ADMIN — ENOXAPARIN SODIUM 30 MG: 100 INJECTION SUBCUTANEOUS at 09:54

## 2023-12-30 RX ADMIN — SODIUM CHLORIDE, PRESERVATIVE FREE 40 MG: 5 INJECTION INTRAVENOUS at 16:33

## 2023-12-30 RX ADMIN — OXYCODONE HYDROCHLORIDE AND ACETAMINOPHEN 500 MG: 500 TABLET ORAL at 09:55

## 2023-12-30 RX ADMIN — SERTRALINE HYDROCHLORIDE 100 MG: 100 TABLET ORAL at 20:59

## 2023-12-30 RX ADMIN — SODIUM CHLORIDE, PRESERVATIVE FREE 10 ML: 5 INJECTION INTRAVENOUS at 21:01

## 2023-12-30 RX ADMIN — DEXAMETHASONE SODIUM PHOSPHATE 6 MG: 10 INJECTION, SOLUTION INTRAMUSCULAR; INTRAVENOUS at 01:13

## 2023-12-30 RX ADMIN — BUDESONIDE AND FORMOTEROL FUMARATE DIHYDRATE 1 PUFF: 80; 4.5 AEROSOL RESPIRATORY (INHALATION) at 21:53

## 2023-12-30 RX ADMIN — Medication 2000 UNITS: at 09:54

## 2023-12-30 RX ADMIN — CLONAZEPAM 0.5 MG: 0.5 TABLET ORAL at 20:59

## 2023-12-30 RX ADMIN — GABAPENTIN 600 MG: 600 TABLET, FILM COATED ORAL at 09:55

## 2023-12-30 RX ADMIN — SERTRALINE HYDROCHLORIDE 100 MG: 100 TABLET ORAL at 09:55

## 2023-12-30 RX ADMIN — DEXAMETHASONE SODIUM PHOSPHATE 6 MG: 10 INJECTION, SOLUTION INTRAMUSCULAR; INTRAVENOUS at 13:28

## 2023-12-30 RX ADMIN — GABAPENTIN 600 MG: 600 TABLET, FILM COATED ORAL at 20:57

## 2023-12-31 PROCEDURE — 6370000000 HC RX 637 (ALT 250 FOR IP): Performed by: INTERNAL MEDICINE

## 2023-12-31 PROCEDURE — C9113 INJ PANTOPRAZOLE SODIUM, VIA: HCPCS | Performed by: NURSE PRACTITIONER

## 2023-12-31 PROCEDURE — 2700000000 HC OXYGEN THERAPY PER DAY

## 2023-12-31 PROCEDURE — 6370000000 HC RX 637 (ALT 250 FOR IP): Performed by: NURSE PRACTITIONER

## 2023-12-31 PROCEDURE — 94760 N-INVAS EAR/PLS OXIMETRY 1: CPT

## 2023-12-31 PROCEDURE — 94640 AIRWAY INHALATION TREATMENT: CPT

## 2023-12-31 PROCEDURE — 6360000002 HC RX W HCPCS: Performed by: NURSE PRACTITIONER

## 2023-12-31 PROCEDURE — 6360000002 HC RX W HCPCS: Performed by: INTERNAL MEDICINE

## 2023-12-31 PROCEDURE — 1210000000 HC MED SURG R&B

## 2023-12-31 PROCEDURE — 2580000003 HC RX 258: Performed by: NURSE PRACTITIONER

## 2023-12-31 RX ORDER — TRAZODONE HYDROCHLORIDE 100 MG/1
100 TABLET ORAL NIGHTLY
Status: DISCONTINUED | OUTPATIENT
Start: 2023-12-31 | End: 2024-01-03 | Stop reason: HOSPADM

## 2023-12-31 RX ADMIN — GUAIFENESIN 400 MG: 200 TABLET ORAL at 22:19

## 2023-12-31 RX ADMIN — GABAPENTIN 600 MG: 600 TABLET, FILM COATED ORAL at 22:20

## 2023-12-31 RX ADMIN — CLONAZEPAM 0.5 MG: 0.5 TABLET ORAL at 08:52

## 2023-12-31 RX ADMIN — IPRATROPIUM BROMIDE 1 PUFF: 17 AEROSOL, METERED RESPIRATORY (INHALATION) at 07:48

## 2023-12-31 RX ADMIN — AMLODIPINE BESYLATE 10 MG: 10 TABLET ORAL at 08:52

## 2023-12-31 RX ADMIN — ENOXAPARIN SODIUM 30 MG: 100 INJECTION SUBCUTANEOUS at 22:22

## 2023-12-31 RX ADMIN — SERTRALINE HYDROCHLORIDE 100 MG: 100 TABLET ORAL at 22:19

## 2023-12-31 RX ADMIN — Medication 10 MG: at 22:19

## 2023-12-31 RX ADMIN — SERTRALINE HYDROCHLORIDE 100 MG: 100 TABLET ORAL at 08:52

## 2023-12-31 RX ADMIN — BUDESONIDE AND FORMOTEROL FUMARATE DIHYDRATE 1 PUFF: 80; 4.5 AEROSOL RESPIRATORY (INHALATION) at 20:15

## 2023-12-31 RX ADMIN — METOPROLOL SUCCINATE 100 MG: 50 TABLET, EXTENDED RELEASE ORAL at 08:53

## 2023-12-31 RX ADMIN — IPRATROPIUM BROMIDE 1 PUFF: 17 AEROSOL, METERED RESPIRATORY (INHALATION) at 15:10

## 2023-12-31 RX ADMIN — OXYCODONE HYDROCHLORIDE AND ACETAMINOPHEN 500 MG: 500 TABLET ORAL at 08:53

## 2023-12-31 RX ADMIN — ENOXAPARIN SODIUM 30 MG: 100 INJECTION SUBCUTANEOUS at 08:52

## 2023-12-31 RX ADMIN — OXYCODONE HYDROCHLORIDE AND ACETAMINOPHEN 1 TABLET: 7.5; 325 TABLET ORAL at 00:35

## 2023-12-31 RX ADMIN — DEXAMETHASONE SODIUM PHOSPHATE 6 MG: 10 INJECTION, SOLUTION INTRAMUSCULAR; INTRAVENOUS at 00:27

## 2023-12-31 RX ADMIN — BUMETANIDE 2 MG: 1 TABLET ORAL at 08:53

## 2023-12-31 RX ADMIN — DEXAMETHASONE SODIUM PHOSPHATE 6 MG: 10 INJECTION, SOLUTION INTRAMUSCULAR; INTRAVENOUS at 14:42

## 2023-12-31 RX ADMIN — LOPERAMIDE HYDROCHLORIDE 12 MG: 2 CAPSULE ORAL at 08:52

## 2023-12-31 RX ADMIN — LEVOFLOXACIN 750 MG: 5 INJECTION, SOLUTION INTRAVENOUS at 16:13

## 2023-12-31 RX ADMIN — GUAIFENESIN 400 MG: 200 TABLET ORAL at 05:46

## 2023-12-31 RX ADMIN — SODIUM CHLORIDE, PRESERVATIVE FREE 40 MG: 5 INJECTION INTRAVENOUS at 05:46

## 2023-12-31 RX ADMIN — LISINOPRIL 40 MG: 20 TABLET ORAL at 08:53

## 2023-12-31 RX ADMIN — GUAIFENESIN 400 MG: 200 TABLET ORAL at 14:42

## 2023-12-31 RX ADMIN — BUDESONIDE AND FORMOTEROL FUMARATE DIHYDRATE 1 PUFF: 80; 4.5 AEROSOL RESPIRATORY (INHALATION) at 07:48

## 2023-12-31 RX ADMIN — OXYCODONE HYDROCHLORIDE AND ACETAMINOPHEN 500 MG: 500 TABLET ORAL at 22:21

## 2023-12-31 RX ADMIN — IPRATROPIUM BROMIDE 1 PUFF: 17 AEROSOL, METERED RESPIRATORY (INHALATION) at 20:15

## 2023-12-31 RX ADMIN — Medication 2000 UNITS: at 08:53

## 2023-12-31 RX ADMIN — IPRATROPIUM BROMIDE 1 PUFF: 17 AEROSOL, METERED RESPIRATORY (INHALATION) at 10:38

## 2023-12-31 RX ADMIN — ZINC SULFATE 220 MG (50 MG) CAPSULE 50 MG: CAPSULE at 08:53

## 2023-12-31 RX ADMIN — LOPERAMIDE HYDROCHLORIDE 12 MG: 2 CAPSULE ORAL at 22:18

## 2023-12-31 RX ADMIN — SODIUM CHLORIDE, PRESERVATIVE FREE 40 MG: 5 INJECTION INTRAVENOUS at 16:06

## 2023-12-31 RX ADMIN — CLONAZEPAM 0.5 MG: 0.5 TABLET ORAL at 22:20

## 2023-12-31 RX ADMIN — TRAZODONE HYDROCHLORIDE 100 MG: 100 TABLET ORAL at 22:20

## 2023-12-31 RX ADMIN — GABAPENTIN 600 MG: 600 TABLET, FILM COATED ORAL at 08:53

## 2023-12-31 ASSESSMENT — PAIN SCALES - GENERAL
PAINLEVEL_OUTOF10: 4
PAINLEVEL_OUTOF10: 7
PAINLEVEL_OUTOF10: 3
PAINLEVEL_OUTOF10: 3

## 2023-12-31 ASSESSMENT — PAIN DESCRIPTION - LOCATION
LOCATION: BACK
LOCATION: BACK

## 2023-12-31 ASSESSMENT — PAIN DESCRIPTION - ORIENTATION
ORIENTATION: LOWER
ORIENTATION: LOWER

## 2023-12-31 ASSESSMENT — PAIN DESCRIPTION - DESCRIPTORS
DESCRIPTORS: ACHING
DESCRIPTORS: ACHING

## 2023-12-31 NOTE — PLAN OF CARE
Problem: Safety - Adult  Goal: Free from fall injury  12/31/2023 1024 by Johanny Up RN  Outcome: Progressing  Flowsheets (Taken 12/31/2023 1024)  Free From Fall Injury: Instruct family/caregiver on patient safety  12/31/2023 0432 by Indigo Tellez RN  Outcome: Progressing  Flowsheets (Taken 12/30/2023 2009)  Free From Fall Injury: Instruct family/caregiver on patient safety     Problem: ABCDS Injury Assessment  Goal: Absence of physical injury  12/31/2023 1024 by Johanny Up RN  Outcome: Progressing  Flowsheets (Taken 12/31/2023 1024)  Absence of Physical Injury: Implement safety measures based on patient assessment  12/31/2023 0432 by Indigo Tellez RN  Outcome: Progressing     Problem: Discharge Planning  Goal: Discharge to home or other facility with appropriate resources  12/31/2023 1024 by Johanny Up RN  Outcome: Progressing  12/31/2023 0432 by Indigo Tellez RN  Outcome: Progressing     Problem: Nutrition Deficit:  Goal: Optimize nutritional status  12/31/2023 1024 by Johanny Up RN  Outcome: Progressing  12/31/2023 0432 by Indigo Tellez RN  Outcome: Progressing     Problem: Pain  Goal: Verbalizes/displays adequate comfort level or baseline comfort level  12/31/2023 1024 by Johanny Up RN  Outcome: Progressing  12/31/2023 0432 by Indigo Tellez RN  Outcome: Progressing     Problem: Respiratory - Adult  Goal: Achieves optimal ventilation and oxygenation  12/31/2023 1024 by Johanny Up RN  Outcome: Progressing  12/31/2023 0432 by Indigo Tellez RN  Outcome: Progressing  Flowsheets (Taken 12/30/2023 2009)  Achieves optimal ventilation and oxygenation:   Assess for changes in respiratory status   Assess for changes in mentation and behavior   Position to facilitate oxygenation and minimize respiratory effort     Problem: Musculoskeletal - Adult  Goal: Return mobility to safest level of function  12/31/2023 1024 by Johanny Up RN  Outcome:

## 2024-01-01 LAB
ANION GAP SERPL CALCULATED.3IONS-SCNC: 13 MMOL/L (ref 7–19)
BASOPHILS # BLD: 0 K/UL (ref 0–0.2)
BASOPHILS NFR BLD: 0.3 % (ref 0–1)
BUN SERPL-MCNC: 25 MG/DL (ref 6–20)
CALCIUM SERPL-MCNC: 8.7 MG/DL (ref 8.6–10)
CHLORIDE SERPL-SCNC: 98 MMOL/L (ref 98–111)
CO2 SERPL-SCNC: 26 MMOL/L (ref 22–29)
CREAT SERPL-MCNC: 0.8 MG/DL (ref 0.5–1.2)
EOSINOPHIL # BLD: 0 K/UL (ref 0–0.6)
EOSINOPHIL NFR BLD: 0.1 % (ref 0–5)
ERYTHROCYTE [DISTWIDTH] IN BLOOD BY AUTOMATED COUNT: 15.9 % (ref 11.5–14.5)
GLUCOSE SERPL-MCNC: 122 MG/DL (ref 74–109)
HCT VFR BLD AUTO: 46.6 % (ref 42–52)
HGB BLD-MCNC: 14.2 G/DL (ref 14–18)
IMM GRANULOCYTES # BLD: 0.1 K/UL
LYMPHOCYTES # BLD: 1.3 K/UL (ref 1.1–4.5)
LYMPHOCYTES NFR BLD: 17.6 % (ref 20–40)
MAGNESIUM SERPL-MCNC: 2.5 MG/DL (ref 1.6–2.6)
MCH RBC QN AUTO: 26.1 PG (ref 27–31)
MCHC RBC AUTO-ENTMCNC: 30.5 G/DL (ref 33–37)
MCV RBC AUTO: 85.5 FL (ref 80–94)
MONOCYTES # BLD: 0.8 K/UL (ref 0–0.9)
MONOCYTES NFR BLD: 10.1 % (ref 0–10)
NEUTROPHILS # BLD: 5.3 K/UL (ref 1.5–7.5)
NEUTS SEG NFR BLD: 71.1 % (ref 50–65)
PLATELET # BLD AUTO: 246 K/UL (ref 130–400)
PMV BLD AUTO: 11 FL (ref 9.4–12.4)
POTASSIUM SERPL-SCNC: 3.7 MMOL/L (ref 3.5–5)
RBC # BLD AUTO: 5.45 M/UL (ref 4.7–6.1)
SODIUM SERPL-SCNC: 137 MMOL/L (ref 136–145)
WBC # BLD AUTO: 7.4 K/UL (ref 4.8–10.8)

## 2024-01-01 PROCEDURE — 94760 N-INVAS EAR/PLS OXIMETRY 1: CPT

## 2024-01-01 PROCEDURE — C9113 INJ PANTOPRAZOLE SODIUM, VIA: HCPCS | Performed by: NURSE PRACTITIONER

## 2024-01-01 PROCEDURE — 6360000002 HC RX W HCPCS: Performed by: NURSE PRACTITIONER

## 2024-01-01 PROCEDURE — 2580000003 HC RX 258: Performed by: NURSE PRACTITIONER

## 2024-01-01 PROCEDURE — 85025 COMPLETE CBC W/AUTO DIFF WBC: CPT

## 2024-01-01 PROCEDURE — 83735 ASSAY OF MAGNESIUM: CPT

## 2024-01-01 PROCEDURE — 1210000000 HC MED SURG R&B

## 2024-01-01 PROCEDURE — 6370000000 HC RX 637 (ALT 250 FOR IP): Performed by: INTERNAL MEDICINE

## 2024-01-01 PROCEDURE — 6370000000 HC RX 637 (ALT 250 FOR IP): Performed by: NURSE PRACTITIONER

## 2024-01-01 PROCEDURE — 80048 BASIC METABOLIC PNL TOTAL CA: CPT

## 2024-01-01 PROCEDURE — 6360000002 HC RX W HCPCS: Performed by: INTERNAL MEDICINE

## 2024-01-01 PROCEDURE — 36415 COLL VENOUS BLD VENIPUNCTURE: CPT

## 2024-01-01 PROCEDURE — 2700000000 HC OXYGEN THERAPY PER DAY

## 2024-01-01 PROCEDURE — 94640 AIRWAY INHALATION TREATMENT: CPT

## 2024-01-01 RX ADMIN — CLONAZEPAM 0.5 MG: 0.5 TABLET ORAL at 09:31

## 2024-01-01 RX ADMIN — ENOXAPARIN SODIUM 30 MG: 100 INJECTION SUBCUTANEOUS at 09:32

## 2024-01-01 RX ADMIN — BUMETANIDE 2 MG: 1 TABLET ORAL at 09:31

## 2024-01-01 RX ADMIN — GUAIFENESIN 400 MG: 200 TABLET ORAL at 05:16

## 2024-01-01 RX ADMIN — IPRATROPIUM BROMIDE 1 PUFF: 17 AEROSOL, METERED RESPIRATORY (INHALATION) at 21:13

## 2024-01-01 RX ADMIN — GABAPENTIN 600 MG: 600 TABLET, FILM COATED ORAL at 21:24

## 2024-01-01 RX ADMIN — Medication 10 MG: at 21:24

## 2024-01-01 RX ADMIN — BUDESONIDE AND FORMOTEROL FUMARATE DIHYDRATE 1 PUFF: 80; 4.5 AEROSOL RESPIRATORY (INHALATION) at 21:13

## 2024-01-01 RX ADMIN — TRAZODONE HYDROCHLORIDE 100 MG: 100 TABLET ORAL at 21:24

## 2024-01-01 RX ADMIN — OXYCODONE HYDROCHLORIDE AND ACETAMINOPHEN 500 MG: 500 TABLET ORAL at 21:24

## 2024-01-01 RX ADMIN — LOPERAMIDE HYDROCHLORIDE 12 MG: 2 CAPSULE ORAL at 21:23

## 2024-01-01 RX ADMIN — IPRATROPIUM BROMIDE 1 PUFF: 17 AEROSOL, METERED RESPIRATORY (INHALATION) at 10:41

## 2024-01-01 RX ADMIN — CLONAZEPAM 0.5 MG: 0.5 TABLET ORAL at 21:24

## 2024-01-01 RX ADMIN — SODIUM CHLORIDE, PRESERVATIVE FREE 40 MG: 5 INJECTION INTRAVENOUS at 17:27

## 2024-01-01 RX ADMIN — BUDESONIDE AND FORMOTEROL FUMARATE DIHYDRATE 1 PUFF: 80; 4.5 AEROSOL RESPIRATORY (INHALATION) at 06:54

## 2024-01-01 RX ADMIN — DEXAMETHASONE SODIUM PHOSPHATE 6 MG: 10 INJECTION, SOLUTION INTRAMUSCULAR; INTRAVENOUS at 05:16

## 2024-01-01 RX ADMIN — AMLODIPINE BESYLATE 10 MG: 10 TABLET ORAL at 09:31

## 2024-01-01 RX ADMIN — OXYCODONE HYDROCHLORIDE AND ACETAMINOPHEN 500 MG: 500 TABLET ORAL at 09:31

## 2024-01-01 RX ADMIN — LEVOFLOXACIN 750 MG: 5 INJECTION, SOLUTION INTRAVENOUS at 17:32

## 2024-01-01 RX ADMIN — LISINOPRIL 40 MG: 20 TABLET ORAL at 09:32

## 2024-01-01 RX ADMIN — GUAIFENESIN 400 MG: 200 TABLET ORAL at 21:23

## 2024-01-01 RX ADMIN — SERTRALINE HYDROCHLORIDE 100 MG: 100 TABLET ORAL at 21:24

## 2024-01-01 RX ADMIN — Medication 2000 UNITS: at 09:32

## 2024-01-01 RX ADMIN — SERTRALINE HYDROCHLORIDE 100 MG: 100 TABLET ORAL at 09:31

## 2024-01-01 RX ADMIN — ZINC SULFATE 220 MG (50 MG) CAPSULE 50 MG: CAPSULE at 09:32

## 2024-01-01 RX ADMIN — METOPROLOL SUCCINATE 100 MG: 50 TABLET, EXTENDED RELEASE ORAL at 09:31

## 2024-01-01 RX ADMIN — DEXAMETHASONE SODIUM PHOSPHATE 6 MG: 10 INJECTION, SOLUTION INTRAMUSCULAR; INTRAVENOUS at 17:27

## 2024-01-01 RX ADMIN — LOPERAMIDE HYDROCHLORIDE 12 MG: 2 CAPSULE ORAL at 10:31

## 2024-01-01 RX ADMIN — IPRATROPIUM BROMIDE 1 PUFF: 17 AEROSOL, METERED RESPIRATORY (INHALATION) at 14:46

## 2024-01-01 RX ADMIN — GABAPENTIN 600 MG: 600 TABLET, FILM COATED ORAL at 09:32

## 2024-01-01 RX ADMIN — SODIUM CHLORIDE, PRESERVATIVE FREE 40 MG: 5 INJECTION INTRAVENOUS at 05:16

## 2024-01-01 RX ADMIN — ENOXAPARIN SODIUM 30 MG: 100 INJECTION SUBCUTANEOUS at 21:25

## 2024-01-01 RX ADMIN — IPRATROPIUM BROMIDE 1 PUFF: 17 AEROSOL, METERED RESPIRATORY (INHALATION) at 06:54

## 2024-01-01 NOTE — PLAN OF CARE
Problem: Safety - Adult  Goal: Free from fall injury  Outcome: Progressing     Problem: ABCDS Injury Assessment  Goal: Absence of physical injury  Outcome: Progressing     Problem: Discharge Planning  Goal: Discharge to home or other facility with appropriate resources  Outcome: Progressing     Problem: Pain  Goal: Verbalizes/displays adequate comfort level or baseline comfort level  Outcome: Progressing  Flowsheets (Taken 12/31/2023 2100)  Verbalizes/displays adequate comfort level or baseline comfort level:   Encourage patient to monitor pain and request assistance   Assess pain using appropriate pain scale

## 2024-01-02 PROCEDURE — 6370000000 HC RX 637 (ALT 250 FOR IP)

## 2024-01-02 PROCEDURE — C9113 INJ PANTOPRAZOLE SODIUM, VIA: HCPCS | Performed by: NURSE PRACTITIONER

## 2024-01-02 PROCEDURE — 2700000000 HC OXYGEN THERAPY PER DAY

## 2024-01-02 PROCEDURE — 6370000000 HC RX 637 (ALT 250 FOR IP): Performed by: INTERNAL MEDICINE

## 2024-01-02 PROCEDURE — 6370000000 HC RX 637 (ALT 250 FOR IP): Performed by: NURSE PRACTITIONER

## 2024-01-02 PROCEDURE — 6360000002 HC RX W HCPCS: Performed by: NURSE PRACTITIONER

## 2024-01-02 PROCEDURE — 1210000000 HC MED SURG R&B

## 2024-01-02 PROCEDURE — 94761 N-INVAS EAR/PLS OXIMETRY MLT: CPT

## 2024-01-02 PROCEDURE — 94640 AIRWAY INHALATION TREATMENT: CPT

## 2024-01-02 PROCEDURE — 6360000002 HC RX W HCPCS: Performed by: INTERNAL MEDICINE

## 2024-01-02 PROCEDURE — 94618 PULMONARY STRESS TESTING: CPT

## 2024-01-02 PROCEDURE — 94760 N-INVAS EAR/PLS OXIMETRY 1: CPT

## 2024-01-02 PROCEDURE — 2580000003 HC RX 258: Performed by: NURSE PRACTITIONER

## 2024-01-02 RX ORDER — LEVOFLOXACIN 500 MG/1
750 TABLET, FILM COATED ORAL DAILY
Status: DISCONTINUED | OUTPATIENT
Start: 2024-01-02 | End: 2024-01-03

## 2024-01-02 RX ORDER — DEXAMETHASONE SODIUM PHOSPHATE 10 MG/ML
6 INJECTION, SOLUTION INTRAMUSCULAR; INTRAVENOUS EVERY 24 HOURS
Status: DISCONTINUED | OUTPATIENT
Start: 2024-01-03 | End: 2024-01-03 | Stop reason: HOSPADM

## 2024-01-02 RX ADMIN — SODIUM CHLORIDE, PRESERVATIVE FREE 40 MG: 5 INJECTION INTRAVENOUS at 05:27

## 2024-01-02 RX ADMIN — OXYCODONE HYDROCHLORIDE AND ACETAMINOPHEN 500 MG: 500 TABLET ORAL at 20:44

## 2024-01-02 RX ADMIN — LOPERAMIDE HYDROCHLORIDE 12 MG: 2 CAPSULE ORAL at 20:44

## 2024-01-02 RX ADMIN — CLONAZEPAM 0.5 MG: 0.5 TABLET ORAL at 09:49

## 2024-01-02 RX ADMIN — Medication 10 MG: at 20:45

## 2024-01-02 RX ADMIN — TRAZODONE HYDROCHLORIDE 100 MG: 100 TABLET ORAL at 20:45

## 2024-01-02 RX ADMIN — IPRATROPIUM BROMIDE 1 PUFF: 17 AEROSOL, METERED RESPIRATORY (INHALATION) at 19:48

## 2024-01-02 RX ADMIN — GUAIFENESIN SYRUP AND DEXTROMETHORPHAN 5 ML: 100; 10 SYRUP ORAL at 20:50

## 2024-01-02 RX ADMIN — LEVOFLOXACIN 750 MG: 500 TABLET, FILM COATED ORAL at 18:21

## 2024-01-02 RX ADMIN — OXYCODONE HYDROCHLORIDE AND ACETAMINOPHEN 500 MG: 500 TABLET ORAL at 09:49

## 2024-01-02 RX ADMIN — LOPERAMIDE HYDROCHLORIDE 12 MG: 2 CAPSULE ORAL at 11:27

## 2024-01-02 RX ADMIN — DEXAMETHASONE SODIUM PHOSPHATE 6 MG: 10 INJECTION, SOLUTION INTRAMUSCULAR; INTRAVENOUS at 05:27

## 2024-01-02 RX ADMIN — ZINC SULFATE 220 MG (50 MG) CAPSULE 50 MG: CAPSULE at 09:50

## 2024-01-02 RX ADMIN — LISINOPRIL 40 MG: 20 TABLET ORAL at 09:49

## 2024-01-02 RX ADMIN — BUDESONIDE AND FORMOTEROL FUMARATE DIHYDRATE 1 PUFF: 80; 4.5 AEROSOL RESPIRATORY (INHALATION) at 06:42

## 2024-01-02 RX ADMIN — SERTRALINE HYDROCHLORIDE 100 MG: 100 TABLET ORAL at 20:45

## 2024-01-02 RX ADMIN — IPRATROPIUM BROMIDE 1 PUFF: 17 AEROSOL, METERED RESPIRATORY (INHALATION) at 14:49

## 2024-01-02 RX ADMIN — Medication 2000 UNITS: at 09:49

## 2024-01-02 RX ADMIN — METOPROLOL SUCCINATE 100 MG: 50 TABLET, EXTENDED RELEASE ORAL at 09:50

## 2024-01-02 RX ADMIN — GUAIFENESIN 400 MG: 200 TABLET ORAL at 18:13

## 2024-01-02 RX ADMIN — IPRATROPIUM BROMIDE 1 PUFF: 17 AEROSOL, METERED RESPIRATORY (INHALATION) at 10:42

## 2024-01-02 RX ADMIN — OXYCODONE HYDROCHLORIDE AND ACETAMINOPHEN 1 TABLET: 7.5; 325 TABLET ORAL at 20:50

## 2024-01-02 RX ADMIN — CLONAZEPAM 0.5 MG: 0.5 TABLET ORAL at 20:45

## 2024-01-02 RX ADMIN — BUMETANIDE 2 MG: 1 TABLET ORAL at 09:49

## 2024-01-02 RX ADMIN — GUAIFENESIN 400 MG: 200 TABLET ORAL at 05:26

## 2024-01-02 RX ADMIN — ENOXAPARIN SODIUM 30 MG: 100 INJECTION SUBCUTANEOUS at 20:45

## 2024-01-02 RX ADMIN — SERTRALINE HYDROCHLORIDE 100 MG: 100 TABLET ORAL at 09:49

## 2024-01-02 RX ADMIN — AMLODIPINE BESYLATE 10 MG: 10 TABLET ORAL at 09:50

## 2024-01-02 RX ADMIN — GUAIFENESIN 400 MG: 200 TABLET ORAL at 20:44

## 2024-01-02 RX ADMIN — GABAPENTIN 600 MG: 600 TABLET, FILM COATED ORAL at 20:45

## 2024-01-02 RX ADMIN — IPRATROPIUM BROMIDE 1 PUFF: 17 AEROSOL, METERED RESPIRATORY (INHALATION) at 06:42

## 2024-01-02 RX ADMIN — GABAPENTIN 600 MG: 600 TABLET, FILM COATED ORAL at 09:50

## 2024-01-02 RX ADMIN — SODIUM CHLORIDE, PRESERVATIVE FREE 40 MG: 5 INJECTION INTRAVENOUS at 18:13

## 2024-01-02 RX ADMIN — BUDESONIDE AND FORMOTEROL FUMARATE DIHYDRATE 1 PUFF: 80; 4.5 AEROSOL RESPIRATORY (INHALATION) at 19:49

## 2024-01-02 RX ADMIN — ENOXAPARIN SODIUM 30 MG: 100 INJECTION SUBCUTANEOUS at 09:49

## 2024-01-02 ASSESSMENT — PAIN SCALES - GENERAL: PAINLEVEL_OUTOF10: 7

## 2024-01-02 ASSESSMENT — PAIN DESCRIPTION - LOCATION: LOCATION: BACK

## 2024-01-02 ASSESSMENT — PAIN DESCRIPTION - ORIENTATION: ORIENTATION: LOWER

## 2024-01-02 NOTE — CARE COORDINATION
Case Management Assessment  Initial Evaluation    Date/Time of Evaluation: 1/2/2024 10:50 AM  Assessment Completed by: STEFANO Peng    If patient is discharged prior to next notation, then this note serves as note for discharge by case management.    Patient Name: Jose Sood                   YOB: 1965  Diagnosis: Aspiration pneumonia due to gastric secretions, unspecified laterality, unspecified part of lung (HCC) [J69.0]                   Date / Time: 12/24/2023  2:47 PM    Patient Admission Status: Inpatient   Readmission Risk (Low < 19, Mod (19-27), High > 27): Readmission Risk Score: 11.6    Current PCP: Nicki Babb MD  PCP verified by CM? Yes    Chart Reviewed: Yes      History Provided by: Patient  Patient Orientation: Alert and Oriented    Patient Cognition: Alert    Hospitalization in the last 30 days (Readmission):  No    If yes, Readmission Assessment in CM Navigator will be completed.    Advance Directives:      Code Status: Full Code   Patient's Primary Decision Maker is: Legal Next of Kin    Primary Decision Maker (Active): AUBREE SOOD - [phone]    Discharge Planning:    Patient lives with: Spouse/Significant Other Type of Home: House  Primary Care Giver: Self (lives with spouse)  Patient Support Systems include: Spouse/Significant Other, Children   Current Financial resources: None  Current community resources: None  Current services prior to admission: Other (Comment) (has triology from [facility])            Current DME:              Type of Home Care services:  Nursing Services, PT, OT, Skilled Therapy    ADLS  Prior functional level: Independent in ADLs/IADLs  Current functional level: Independent in ADLs/IADLs    PT AM-PAC:   /24  OT AM-PAC:   /24    Family can provide assistance at DC:    Would you like Case Management to discuss the discharge plan with any other family members/significant others, and if so, who? Yes  Plans to Return to Present Housing: Yes  Other

## 2024-01-02 NOTE — PROGRESS NOTES
01/02/24 1544   Resting (Room Air)   SpO2 86   Resting (On O2)   SpO2 90   O2 Device Nasal cannula   O2 Flow Rate (l/min) 2 l/min   During Walk (Room Air)   SpO2 83   Walk/Assistance Device Ambulation   Rate of Dyspnea 1   During Walk (On O2)   SpO2 90   O2 Device Nasal cannula   O2 Flow Rate (l/min) 4 l/min   Comments Had to increase pt to 4 LPM to get above 88%   After Walk   SpO2 90   O2 Device Nasal cannula   O2 Flow Rate (l/min) 4 l/min   Does the Patient Qualify for Home O2 Yes   Liter Flow at Rest 2   Does the Patient Need Portable Oxygen Tanks Yes       
  Palliative Care Progress Note  12/29/2023 8:01 AM    Patient:  Jose Sood  YOB: 1965  Primary Care Physician: Nicki Babb MD  Advance Directive: Full Code  Admit Date: 12/24/2023       Hospital Day: 5  Portions of this note have been copied forward, however, changed to reflect the most current clinical status of this patient.    CHIEF COMPLAINT/REASON FOR CONSULTATION Goals of care, family support, Code status, symptom management     SUBJECTIVE:  Mr. Sood is present comfortably in bed.  Complaining of difficulty sleeping and hospitalist plans to address.  Has continue to wean O2.  Plans to downgrade from ICU to medical floor today.    HPI: The patient is a 58 y.o. male with PMH cardiac arrest, COPD, Crohn's disease, hypertension, ANITA with CPAP use, psoriatic arthritis, morbid obesity, and dysphagia who presented to [facility] 12/24/2023 direct admit from [facility] for complaints of dysphagia and SOB. The patient reported that he aspirated chewing tobacco in June 2023 holding in a bronchoscopy and intubation during that hospitalization he had cardiac arrest was hospitalized for approximately 1 month.  Since this time the patient has had multiple episodes of pneumonia which are felt to be due to aspiration.  Patient reports that he had an EGD with dilatation.  Patient reports frequent episodes of nausea without vomiting.  He additionally reported that he sleeps in his chair as he aspirates anytime he lays flat.  He reported chronic cough that has worsened over the past 3 weeks prior to his admission.  Note he has been seen in the ED twice in the past month prior to his admission for similar complaints. Workup at OSH reported CTA of the chest without evidence of PE but evidence of multifocal pneumonia.  He was found to be hypoxic on room air and placed on a 4 L nasal cannula.  He was brought here for higher level of care.  He was admitted under hospitalist services to medical floor and 
  Pharmacy Adjustment per [facility] protocol    Jose oSod is a 58 y.o. male. Pharmacy has adjusted medications per [facility] protocol.    Recent Labs     12/24/23  1609 12/25/23  0236   BUN 12 14       Recent Labs     12/24/23  1609 12/25/23  0236   CREATININE 0.7 0.7       Estimated Creatinine Clearance: 157 mL/min (based on SCr of 0.7 mg/dL).    Height:   Ht Readings from Last 1 Encounters:   12/24/23 1.753 m (5' 9\")     Weight:  Wt Readings from Last 1 Encounters:   12/25/23 128.8 kg (284 lb)         Plan: Adjust the following medications based on [facility] protocol:          Piperacillin-tazobactam to 4500 mg IV every 8 hours extended infusion over 240 minutes        Electronically signed by Luc Reid RPH on 12/25/2023 at 12:05 PM    
  [facility]      Patient:  Jose Sood  YOB: 1965  Date of Service: 12/26/2023  MRN: [id number]   Acct: [id number]   Primary Care Physician: Nicki Babb MD  Advance Directive: Full Code  Admit Date: 12/24/2023       Hospital Day: 2  Portions of this note have been copied forward, however, changed to reflect the most current clinical status of this patient.    CHIEF COMPLAINT aspiration pneumonia    SUBJECTIVE:  He has had an increase in O2 demand overnight from 9-15L HFNC. He is tachypneic. He is having an increase in cough and having difficulty in recovering his oxygen after coughing. He states that he is tired and having difficulty resting. His appetite is poor as he states that he is unable to tolerate the pureed food. He has mild hypertension.     CUMULATIVE HOSPITAL STAY:  The patient is a 57 yo male with an extensive PMH to include cardiac arrest, COPD, Crohn's disease, hypertension, ANITA with CPAP use, psoriatic arthritis, morbid obesity, and dysphagia who presented to OSH on 12/24/23 with c/o dysphagia and shortness of breath. He was transferred to [facility] for a higher level of care due to recurrent aspiration pneumonia.  The patient reported that he aspirated chewing tobacco in June 2023 holding in a bronchoscopy and intubation during that hospitalization he had cardiac arrest was hospitalized for approximately 1 month.  Since this time the patient has had multiple episodes of pneumonia which are felt to be due to aspiration.  Patient reports that he had an EGD with dilatation.  Patient reports frequent episodes of nausea without vomiting.  He additionally reported that he sleeps in his chair as he aspirates anytime he lays flat.  He reported chronic cough that has worsened over the past 3 weeks prior to his admission.  Note he has been seen in the ED twice in the past month prior to his admission for shortness of breath and dysphagia.  Workup at OSH reported CTA of the chest without 
4 Eyes Skin Assessment     NAME:  Jose Sood  YOB: 1965  MEDICAL RECORD NUMBER:  [id number]    The patient is being assessed for  Transfer to New Unit    I agree that at least one RN has performed a thorough Head to Toe Skin Assessment on the patient. ALL assessment sites listed below have been assessed.      Areas assessed by both nurses:    Head, Face, Ears, Shoulders, Back, Chest, Arms, Elbows, Hands, Sacrum. Buttock, Coccyx, Ischium, Legs. Feet and Heels, and Under Medical Devices         Does the Patient have a Wound? No noted wound(s)       Remigio Prevention initiated by RN: No  Wound Care Orders initiated by RN: No    Pressure Injury (Stage 3,4, Unstageable, DTI, NWPT, and Complex wounds) if present, place Wound referral order by RN under : No    New Ostomies, if present place, Ostomy referral order under : No     Nurse 1 eSignature: Electronically signed by Linnea Tang RN on 12/26/23 at 3:16 PM CST    **SHARE this note so that the co-signing nurse can place an eSignature**    Nurse 2 eSignature: {Esignature:[id number]}    
4 Eyes Skin Assessment     NAME:  Jose Sood  YOB: 1965  MEDICAL RECORD NUMBER:  [id number]    The patient is being assessed for  Admission    I agree that at least one RN has performed a thorough Head to Toe Skin Assessment on the patient. ALL assessment sites listed below have been assessed.      Areas assessed by both nurses:    Head, Face, Ears, Shoulders, Back, Chest, Arms, Elbows, Hands, Sacrum. Buttock, Coccyx, Ischium, Legs. Feet and Heels, and Under Medical Devices         Does the Patient have a Wound? No noted wound(s)       Remigio Prevention initiated by RN: No  Wound Care Orders initiated by RN: No    Pressure Injury (Stage 3,4, Unstageable, DTI, NWPT, and Complex wounds) if present, place Wound referral order by RN under : No    New Ostomies, if present place, Ostomy referral order under : No     Nurse 1 eSignature: Electronically signed by Aurora Greene RN on 12/24/23 at 4:23 PM CST    **SHARE this note so that the co-signing nurse can place an eSignature**    Nurse 2 eSignature: {Esignature:[id number]}    
Comprehensive Nutrition Assessment    Type and Reason for Visit:  Initial, RD Nutrition Re-Screen/LOS    Nutrition Recommendations/Plan:   Start ONS     Malnutrition Assessment:  Malnutrition Status:  At risk for malnutrition (Comment) (12/29/23 0748)    Context:  Acute Illness     Findings of the 6 clinical characteristics of malnutrition:  Energy Intake:  Mild decrease in energy intake (Comment)  Weight Loss:  7.5% over 3 months     Body Fat Loss:  No significant body fat loss     Muscle Mass Loss:  No significant muscle mass loss    Fluid Accumulation:  No significant fluid accumulation Extremities   Strength:  Not Performed    Nutrition Assessment:    Following pateint for LOS x 5 days.  SLP working with patient.  Receiving dysphagia soft and bite sized diet with mildly thick liquids.  Po intake remains decreased.  +COVID.  Glucose 115 but is on Decadron. Start ing ONS to help with calorie and proteinintake.    Nutrition Related Findings:    pt noncompliant with dysphagia diet at home Wound Type: None       Current Nutrition Intake & Therapies:    Average Meal Intake: 1-25%  Average Supplements Intake: None Ordered  ADULT DIET; Dysphagia - Soft and Bite Sized; Mildly Thick ([place])  ADULT ORAL NUTRITION SUPPLEMENT; Lunch, Dinner; Frozen Oral Supplement    Anthropometric Measures:  Height: 175.3 cm (5' 9.02\")  Ideal Body Weight (IBW): 160 lbs (73 kg)    Admission Body Weight: 130.2 kg (287 lb)  Current Body Weight: 122.5 kg (270 lb 1 oz), 168.8 % IBW. Weight Source: Bed Scale  Current BMI (kg/m2): 39.9  Usual Body Weight: 131 kg (288 lb 12.8 oz) (9/19/2023)  % Weight Change (Calculated): -6.5  Weight Adjustment For: No Adjustment  BMI Categories: Obese Class 2 (BMI 35.0 -39.9)    Estimated Daily Nutrient Needs:  Energy Requirements Based On: Kcal/kg  Weight Used for Energy Requirements: Current  Energy (kcal/day): 9217-5752 kcals (11-14 kcals/kg)  Weight Used for Protein Requirements: Ideal  Protein (g/day): 
Hospitalist Progress Note  [facility]     Patient: Jose Sood  : 1965  MRN: [id number]  Code Status: Full Code    Hospital Day: 6   Date of Service: 2023    Subjective:   Patient seen in COVID-19 unit.  Continued clinical improvement.  No current complaints.    Past Medical History:   Diagnosis Date    Anxiety     Cardiopulmonary arrest (HCC) 2016    COPD (chronic obstructive pulmonary disease) (HCC)     Crohn's disease (HCC)     Depression     GERD (gastroesophageal reflux disease)     Hypertension     Kidney stone     Left bundle branch block     Nausea & vomiting     ANITA (obstructive sleep apnea)     c pap    Palliative care patient 2023    Psoriasis     Psoriatic arthritis (HCC)     Rheumatoid arteritis (HCC)     Ventral hernia        Past Surgical History:   Procedure Laterality Date    CERVICAL FUSION      COLONOSCOPY  10/29/2015    Dr Baumann-FOCAL MILD MUCOSAL INFLAMMATION. NO ADENOMATOUS OR DYSPLASTIC CHANGES.    COLONOSCOPY      Dr Devries, polyps per patient    COLONOSCOPY  2023    Dr Devries-Active colitis    COLONOSCOPY  2010    Dr Valladares-[place]-Chronic colitis    KNEE CARTILAGE SURGERY Right 10/2022    KNEE SURGERY  2002    LUMBAR SPINE SURGERY N/A 2022    THORACIC LAMINECTOMY FOR SPINAL CORD STIMULATOR PLACEMENT WITH NEURO MONITORING performed by Kike Deleon MD at [facility] [place] OR    MOUTH SURGERY      RADIOFREQUENCY ABLATION  2021    SMALL INTESTINE SURGERY  1988    Crohns    SPINAL CORD STIMULATOR IMPLANT  2022    TONSILLECTOMY AND ADENOIDECTOMY      UMBILICAL HERNIA REPAIR  2012    Dr Oralia Santiago    UPPER GASTROINTESTINAL ENDOSCOPY  2021    Nadine, Shaw Latham MD, normal per patient    UPPER GASTROINTESTINAL ENDOSCOPY N/A 2023    Dr ALLISON Martinez-[id number] benign appearing stricture in the distal esophagus, gastritis, no h pylori    UPPER GASTROINTESTINAL 
Hospitalist Progress Note  [facility]     Patient: Jose Sood  : 1965  MRN: [id number]  Code Status: Full Code    Hospital Day: 3   Date of Service: 2023    Subjective:   Patient seen and examined.  No current complaints.    Past Medical History:   Diagnosis Date    Anxiety     Cardiopulmonary arrest (HCC) 2016    COPD (chronic obstructive pulmonary disease) (HCC)     Crohn's disease (HCC)     Depression     GERD (gastroesophageal reflux disease)     Hypertension     Kidney stone     Left bundle branch block     Nausea & vomiting     ANITA (obstructive sleep apnea)     c pap    Palliative care patient 2023    Psoriasis     Psoriatic arthritis (HCC)     Rheumatoid arteritis (HCC)     Ventral hernia        Past Surgical History:   Procedure Laterality Date    CERVICAL FUSION      COLONOSCOPY  10/29/2015    Dr Baumann-FOCAL MILD MUCOSAL INFLAMMATION. NO ADENOMATOUS OR DYSPLASTIC CHANGES.    COLONOSCOPY      Dr Devries, polyps per patient    COLONOSCOPY  2023    Dr Devries-Active colitis    COLONOSCOPY  2010    Dr Valladares-[place]-Chronic colitis    KNEE CARTILAGE SURGERY Right 10/2022    KNEE SURGERY      LUMBAR SPINE SURGERY N/A 2022    THORACIC LAMINECTOMY FOR SPINAL CORD STIMULATOR PLACEMENT WITH NEURO MONITORING performed by Kike Deleon MD at [facility] OR    MOUTH SURGERY      RADIOFREQUENCY ABLATION  2021    SMALL INTESTINE SURGERY  1988    Crohns    SPINAL CORD STIMULATOR IMPLANT  2022    TONSILLECTOMY AND ADENOIDECTOMY      UMBILICAL HERNIA REPAIR  2012    Dr Oralia Santiago    UPPER GASTROINTESTINAL ENDOSCOPY  2021    AmishMercedes, Shaw Latham MD, normal per patient    UPPER GASTROINTESTINAL ENDOSCOPY N/A 2023    Dr ALLISON Martinez-[id number] benign appearing stricture in the distal esophagus, gastritis, no h pylori    UPPER GASTROINTESTINAL ENDOSCOPY  2023    Dr COOPER 
Hospitalist Progress Note  [facility]     Patient: Jose oSod  : 1965  MRN: [id number]  Code Status: Full Code    Hospital Day: 5   Date of Service: 2023    Subjective:   Patient seen in [place] [id number] critical care unit.  No current complaints.  Feeling better.    Past Medical History:   Diagnosis Date    Anxiety     Cardiopulmonary arrest (HCC) 2016    COPD (chronic obstructive pulmonary disease) (HCC)     Crohn's disease (HCC)     Depression     GERD (gastroesophageal reflux disease)     Hypertension     Kidney stone     Left bundle branch block     Nausea & vomiting     ANITA (obstructive sleep apnea)     c pap    Palliative care patient 2023    Psoriasis     Psoriatic arthritis (HCC)     Rheumatoid arteritis (HCC)     Ventral hernia        Past Surgical History:   Procedure Laterality Date    CERVICAL FUSION      COLONOSCOPY  10/29/2015    Dr Baumann-FOCAL MILD MUCOSAL INFLAMMATION. NO ADENOMATOUS OR DYSPLASTIC CHANGES.    COLONOSCOPY      Dr Devries, polyps per patient    COLONOSCOPY  2023    Dr Devries-Active colitis    COLONOSCOPY  2010    Dr Valladares-[place]-Chronic colitis    KNEE CARTILAGE SURGERY Right 10/2022    KNEE SURGERY  2002    LUMBAR SPINE SURGERY N/A 2022    THORACIC LAMINECTOMY FOR SPINAL CORD STIMULATOR PLACEMENT WITH NEURO MONITORING performed by Kike Deleon MD at [facility] OR    MOUTH SURGERY      RADIOFREQUENCY ABLATION  2021    SMALL INTESTINE SURGERY  1988    Crohns    SPINAL CORD STIMULATOR IMPLANT  2022    TONSILLECTOMY AND ADENOIDECTOMY      UMBILICAL HERNIA REPAIR  2012    Dr Oralia Santiago    UPPER GASTROINTESTINAL ENDOSCOPY  2021    Nadine, Shaw Latham MD, normal per patient    UPPER GASTROINTESTINAL ENDOSCOPY N/A 2023    Dr ALLISON Martinez-[id number] benign appearing stricture in the distal esophagus, gastritis, no h pylori    UPPER GASTROINTESTINAL 
Hospitalist Progress Note  [facility]     Patient: Jose Sood  : 1965  MRN: [id number]  Code Status: Full Code    Hospital Day: 9   Date of Service: 2024    Subjective:   Patient seen in COVID-19 unit.  Continued clinical improvement.  No current complaints.    Past Medical History:   Diagnosis Date    Anxiety     Cardiopulmonary arrest (HCC) 2016    COPD (chronic obstructive pulmonary disease) (HCC)     Crohn's disease (HCC)     Depression     GERD (gastroesophageal reflux disease)     Hypertension     Kidney stone     Left bundle branch block     Nausea & vomiting     ANITA (obstructive sleep apnea)     c pap    Palliative care patient 2023    Psoriasis     Psoriatic arthritis (HCC)     Rheumatoid arteritis (HCC)     Ventral hernia        Past Surgical History:   Procedure Laterality Date    CERVICAL FUSION      COLONOSCOPY  10/29/2015    Dr Baumann-FOCAL MILD MUCOSAL INFLAMMATION. NO ADENOMATOUS OR DYSPLASTIC CHANGES.    COLONOSCOPY      Dr Devries, polyps per patient    COLONOSCOPY  2023    Dr Devries-Active colitis    COLONOSCOPY  2010    Dr Valladares-[place]-Chronic colitis    KNEE CARTILAGE SURGERY Right 10/2022    KNEE SURGERY  2002    LUMBAR SPINE SURGERY N/A 2022    THORACIC LAMINECTOMY FOR SPINAL CORD STIMULATOR PLACEMENT WITH NEURO MONITORING performed by Kike Deleon MD at [facility] OR    MOUTH SURGERY      RADIOFREQUENCY ABLATION  2021    SMALL INTESTINE SURGERY  1988    Crohns    SPINAL CORD STIMULATOR IMPLANT  2022    TONSILLECTOMY AND ADENOIDECTOMY      UMBILICAL HERNIA REPAIR  2012    Dr Oralia Santiago    UPPER GASTROINTESTINAL ENDOSCOPY  2021    Nadine, Shaw Latham MD, normal per patient    UPPER GASTROINTESTINAL ENDOSCOPY N/A 2023    Dr ALLISON Martinez-[id number] benign appearing stricture in the distal esophagus, gastritis, no h pylori    UPPER GASTROINTESTINAL 
Improved swallowing post EGD. On hi flow O2. Exam unchanged Aox3 mild respiratory distress but better than yesterday.  Imp: Likely URI. Will repeat EGD once respiratory status improves. PPI, soft diet please obtain  Upper GI series and speech eval.   
Jose Sood received from 4th floor to room # 148 .  Mental Status: Patient is oriented, alert, and able to concentrate and follow conversation.   Vitals:    12/26/23 1500   BP: (!) 159/82   Pulse: 91   Resp: (!) 33   Temp:    SpO2: 96%     Placed on cardiac monitor: Yes. Bedside monitor.  Belongings:  with wife  with patient at bedside .  Family at bedside Yes.  Oriented Patient and Family to room.  Call light within reach. Yes.  Transfer was: Well tolerated by patient..    Electronically signed by Linnea Tang RN on 12/26/2023 at 3:14 PM     
Jose Sood received from ICU to room # 421 .  Mental Status: Patient is oriented and alert.   Vitals:    12/29/23 1047   BP:    Pulse: 90   Resp: 22   Temp:    SpO2: 94%     Placed on cardiac monitor: Yes. Box #   .  Belongings:  belongings  with patient at bedside .  Family at bedside No.  Oriented Patient to room.  Call light within reach. Yes.  Transfer was: Well tolerated by patient..    Electronically signed by Emily Tabares RN on 12/29/2023 at 11:25 AM     
Jose Sood transferred to 148 from [facility] via bed.    Explained reason for transfer to Patient and Family.   Telemetry box number N/A transferred with patient: N/A.  Report given to: Linnea, via telephone.      Electronically signed by Johanny Up RN on 12/26/2023 at 1:28 PM   
Nutrition Assessment     Type and Reason for Visit: Reassess    Nutrition Recommendations/Plan:   Continue Magic Cup BID.      Malnutrition Assessment:  Malnutrition Status: At risk for malnutrition (Comment)    Nutrition Assessment:  Pt continues to receive Soft and Bite Sized diet with Mildly Thick liquids. PO intake has not been documented over the past few days. However, his current wt indicates 5 lb increase over the past week. Continue ONS BID as appropriate for diet order. Pt may benefit SLP repeat eval to determine if diet upgrade is possible.    Estimated Daily Nutrient Needs:  Energy (kcal):  4580-1582 kcals (11-14 kcals/kg) Weight Used for Energy Requirements: Current     Protein (g):  145g Weight Used for Protein Requirements: Ideal        Fluid (ml/day):  2184-8722 ml Method Used for Fluid Requirements: 1 ml/kcal    Nutrition Related Findings:   pt noncompliant with dysphagia diet at home Wound Type: None    Current Nutrition Therapies:    ADULT DIET; Dysphagia - Soft and Bite Sized; Mildly Thick ([place])  ADULT ORAL NUTRITION SUPPLEMENT; Lunch, Dinner; Frozen Oral Supplement    Anthropometric Measures:  Height: 175.3 cm (5' 9.02\")  Current Body Wt: 124.8 kg (275 lb 2 oz)   BMI: 40.6    Nutrition Diagnosis:   Inadequate oral intake related to acute injury/trauma, impaired respiratory function, biting/chewing (masticatory) difficulty, swallowing difficulty as evidenced by intake 0-25%, poor intake prior to admission, swallow study results, weight loss, weight loss 7.5% in 3 months    Nutrition Interventions:   Food and/or Nutrient Delivery: Continue Current Diet, Continue Oral Nutrition Supplement  Nutrition Education/Counseling: No recommendation at this time  Coordination of Nutrition Care: Swallow Evaluation, Speech Therapy, Continue to monitor while inpatient       Goals:  Previous Goal Met: Progressing toward Goal(s)  Goals: Meet at least 75% of estimated needs, PO intake 50% or greater   
Patient safely transported to room LPN aware. Electronically signed by Zia Mccain RN on 12/24/2023 at 9:01 PM    
Placed pt on BIPAP 12/8 with O2 flow 10L in-line.  Pt tolerating well.  No distress noted.   
Pt does not want to go on BIPAP at this time. RN aware  
Pt has a home Trilogy for HS.  He does not have anyone to bring it for tonight and does not know the settings.  Notified nurse about a bipap.  Waiting on a order from the doctor.  
[facility]   Pharmacy Pharmacokinetic Monitoring Service - Vancomycin    Consulting Provider: Idania Faulkner   Indication: Pneumonia  Target Concentration: Goal AUC/ALLIE 400-600 mg*hr/L  Day of Therapy: 3  Additional Antimicrobials Levaquin     Pertinent Laboratory Values:   Wt Readings from Last 1 Encounters:   12/26/23 130.4 kg (287 lb 6 oz)     Temp Readings from Last 1 Encounters:   12/28/23 97.9 °F (36.6 °C) (Temporal)     Estimated Creatinine Clearance: 184 mL/min (based on SCr of 0.6 mg/dL).  Recent Labs     12/27/23  0316 12/28/23  0138   CREATININE 0.6 0.6   BUN 13 14   WBC 10.9* 5.3     Procalcitonin: 12/24= 0.06    Pertinent Cultures:  Culture Date Source Results   12/27/23 12/24/23 12/24/23 Resp Panel  Resp  Blood SARS CoV  Staph aureus  No growth   MRSA Nasal Swab: showed MRSA positive result on 12/27/23    Recent vancomycin administrations                     vancomycin (VANCOCIN) 1500 mg in sodium chloride 0.9 % 250 mL IVPB (mg) 1,500 mg New Bag 12/28/23 0131     1,500 mg New Bag 12/27/23 1320     1,500 mg New Bag  0237    vancomycin (VANCOCIN) 2,500 mg in sodium chloride 0.9 % 500 mL IVPB (mg) 2,500 mg New Bag 12/26/23 1439                    Assessment:  Date/Time Current Dose Concentration Timing of Concentration (h) AUC   12/28/23 1500mg Q12hr 7.3 While infusing    Note: Serum concentrations collected for AUC dosing may appear elevated if collected in close proximity to the dose administered, this is not necessarily an indication of toxicity    Plan:  Will re-draw level, not sure how accurate Insight is with predicting this dose  Repeat vancomycin concentration ordered for 12/29 @ 0100   Pharmacy will continue to monitor patient and adjust therapy as indicated    Thank you for the consult,  Hardy Mohamud RPH  12/28/2023 3:54 AM   
[facility]afael [facility]   Pharmacy Pharmacokinetic Monitoring Service - Vancomycin     Jose Sood is a 58 y.o. male starting on vancomycin therapy for Pneumonia (VAP). Pharmacy consulted by REYNALDO Bryson for monitoring and adjustment.    Target Concentration: Goal AUC/ALLIE 400-600 mg*hr/L    Additional Antimicrobials: Meropenem    Pertinent Laboratory Values:   Wt Readings from Last 1 Encounters:   12/26/23 130.4 kg (287 lb 6 oz)     Temp Readings from Last 1 Encounters:   12/26/23 97.5 °F (36.4 °C) (Temporal)     Estimated Creatinine Clearance: 158 mL/min (based on SCr of 0.7 mg/dL).  Recent Labs     12/25/23  0236 12/26/23  0239   CREATININE 0.7 0.7   BUN 14 16   WBC 15.4* 15.4*     Procalcitonin: 0.06 on 12/24    Pertinent Cultures:  Culture Date Source Results   12/24/23 Blood No growth to date   12/24/23 Respiratory Staph aureus  Enterobacter cloacae complex   MRSA Nasal Swab:  Respiratory culture already growing Staph aureus. Await sensitivities.    Plan:  Dosing recommendations based on Bayesian software  Start vancomycin 2500 mg IV once followed by 1500 mg IV every 12 hours  Anticipated AUC of 518 and trough concentration of 12.9 at steady state  Renal labs as indicated   Vancomycin concentration ordered for 12/28 @ 0100   Pharmacy will continue to monitor patient and adjust therapy as indicated    Thank you for the consult,  Luc Reid RPH  12/26/2023 12:03 PM    
Report called to Tim Tabares RN on 4th floor.   
ice chip trial, puree consistency trial, regular solid consistency trial, or nectar thick liquid trial presented during evaluation this date. Delayed coughs and belches were observed with thin H2O trials.     At this time, would trial soft and bite sized consistency with mildly thick/nectar liquids. Recommend meds whole in pudding/applesauce. If patient receives mouth care prior to intake, okay for ice chips and sips regular water IN BETWEEN MEALS for comfort. Will continue to follow. Thank you for this consult.     Treatment Plan  Requires SLP Intervention: Yes     Recommended Diet and Intervention  Diet Solids Recommendation: Soft and bite sized  Liquid Consistency Recommendation: Mildly thick (nectar)  Recommended Form of Meds: Meds whole in puree as able  Therapeutic Interventions: Patient/Family education;Diet tolerance monitoring;Therapeutic PO trials with SLP     Compensatory Swallowing Strategies  Compensatory Swallowing Strategies: Upright as possible for all oral intake;Small bites/sips;Eat/Feed slowly;Alternate solids and liquids;Remain upright for 30-45 minutes after meals     Treatment/Goals  Timeframe for Short-term Goals: 1-2x/day for 3 days   Goal 1: Patient will tolerate soft and bite sized consistency and mildly thick/nectar thick liquids with min S/S penetration/aspiration during PO intake.   Goal 2: Patient staff will follow swallow safety recommendations to decrease risk of penetration/aspiration during PO intake.  Goal 3: Patient will receive daily oral care, via staff, to decrease bacteria from the oral cavity.  Goal 4: Re-assessment of swallow function for potential diet upgrade.   Goal 5: Patient will complete breath support, oral motor, lingual, and pharyngeal exercises with provision of min cues/prompts.   Goal 6: Re-assessment of speech production.  Goal 7: Patient will utilize tools/strategies to promote increased clarity of speech at word, phrase, and sentence level with provision of 
Q12H Marcela Faulkner APRN   40 mg at 12/28/23 0413    guaiFENesin-dextromethorphan (ROBITUSSIN DM) 100-10 MG/5ML syrup 5 mL  5 mL Oral Q8H PRN Tim Flores APRN - CNP   5 mL at 12/26/23 1903    amLODIPine (NORVASC) tablet 10 mg  10 mg Oral Daily Marcela Faulkner APRN   10 mg at 12/28/23 0854    bumetanide (BUMEX) tablet 2 mg  2 mg Oral Daily Marcela Faulkner, APRN   2 mg at 12/28/23 0854    gabapentin (NEURONTIN) tablet 600 mg  600 mg Oral BID Marcela Faulkner APRN   600 mg at 12/28/23 0854    lisinopril (PRINIVIL;ZESTRIL) tablet 40 mg  40 mg Oral Daily Marcela Faulkner, APRN   40 mg at 12/28/23 0854    metoprolol succinate (TOPROL XL) extended release tablet 100 mg  100 mg Oral Daily Marcela Faulkner, APRN   100 mg at 12/28/23 0854    sertraline (ZOLOFT) tablet 100 mg  100 mg Oral BID Marcela Faulkner, APRN   100 mg at 12/28/23 0854    sodium chloride flush 0.9 % injection 5-40 mL  5-40 mL IntraVENous PRN Marcela Faulkner APRN        0.9 % sodium chloride infusion   IntraVENous PRN Marcela Faulkner APRN        enoxaparin Sodium (LOVENOX) injection 30 mg  30 mg SubCUTAneous BID Marcela Faulkner APRN   30 mg at 12/28/23 0855    ondansetron (ZOFRAN-ODT) disintegrating tablet 4 mg  4 mg Oral Q8H PRN Marcela Faulkner APRN        Or    ondansetron (ZOFRAN) injection 4 mg  4 mg IntraVENous Q6H PRN Marcela Faulkner, APRN   4 mg at 12/27/23 1133    polyethylene glycol (GLYCOLAX) packet 17 g  17 g Oral Daily PRN Marcela Faulkner APRN        acetaminophen (TYLENOL) suppository 650 mg  650 mg Rectal Q6H PRN Marcela Faulkner APRN        oxyCODONE-acetaminophen (PERCOCET) 7.5-325 MG per tablet 1 tablet  1 tablet Oral Q8H PRN Marcela Faulkner APRN   1 tablet at 12/26/23 1003    clonazePAM (KLONOPIN) tablet 0.5 mg  0.5 mg Oral BID Marcela Faulkner APRN   0.5 mg at 12/28/23 0854     Facility-Administered Medications Ordered in Other Encounters   Medication Dose Route Frequency 
      -Continue home regimen of Zoloft and Klonopin       S/P insertion of spinal cord stimulator              -Noted       Morbid obesity with body mass index (BMI) of 40.0 or higher (HCC) / Morbid (severe) obesity with alveolar hypoventilation (HCC)              -Complicates care     Crohn's disease (HCC) /diarrhea              -GI panel-pending              -Monitor intake and output       Essential hypertension              -Resume home lisinopril, Toprol       Unspecified atrial fibrillation (HCC)              -Patient denied blood thinner at home              -Continue metoprolol    Antibiotic: Zosyn     DVT Prophylaxis: Lovenox    GI prophylaxis:  Protonix    Disposition: LINDA Faulkner, REYNALDO, 12/25/2023 1:00 PM   
    Facility-Administered Medications Ordered in Other Encounters   Medication Dose Route Frequency Provider Last Rate Last Admin    sodium chloride flush 0.9 % injection 5-40 mL  5-40 mL IntraVENous 2 times per day Juan Pablo Chris APRN - CRNA        sodium chloride flush 0.9 % injection 5-40 mL  5-40 mL IntraVENous PRN Juan Pablo Chris APRN - CRNA        0.9 % sodium chloride infusion  25 mL IntraVENous PRN Juan Pablo Chris APRN - CRNA        meperidine (DEMEROL) injection 12.5 mg  12.5 mg IntraVENous Q5 Min PRN Juan Pablo Chris APRN - CRNA        HYDROmorphone HCl PF (DILAUDID) injection 0.25 mg  0.25 mg IntraVENous Q5 Min PRJuan Pablo Villanueva, REYNALDO - SANDI        HYDROmorphone HCl PF (DILAUDID) injection 0.5 mg  0.5 mg IntraVENous Q5 Min PRN Juan Pablo Chris APRN - SANDI             sodium chloride          Objective:   /69   Pulse 61   Temp 97.9 °F (36.6 °C) (Temporal)   Resp 20   Ht 1.753 m (5' 9.02\")   Wt 124.8 kg (275 lb 2 oz)   SpO2 92%   BMI 40.61 kg/m²     In an effort to reduce COVID-19 exposure, patient seen from doorway and case discussed in detail with unit staff    Recent Labs     12/30/23 0259 01/01/24  0339   WBC 8.1 7.4   RBC 4.99 5.45   HGB 13.3* 14.2   HCT 42.4 46.6   MCV 85.0 85.5   MCH 26.7* 26.1*   MCHC 31.4* 30.5*    246     Recent Labs     12/30/23 0259 01/01/24  0339    137   K 3.6 3.7   ANIONGAP 12 13   CL 98 98   CO2 29 26   BUN 24* 25*   CREATININE 0.6 0.8   GLUCOSE 106 122*   CALCIUM 9.0 8.7     Recent Labs     12/30/23 0259 01/01/24  0339   MG 2.3 2.5     No results for input(s): \"AST\", \"ALT\", \"ALB\", \"BILITOT\", \"ALKPHOS\" in the last 72 hours.  No results for input(s): \"PH\", \"PO2\", \"PCO2\", \"HCO3\", \"BE\", \"O2SAT\" in the last 72 hours.  No results for input(s): \"TROPONINI\" in the last 72 hours.  No results for input(s): \"INR\" in the last 72 hours.  No results for input(s): \"LACTA\" in the last 72 hours.      Intake/Output Summary (Last 24 
12/31/23 0852     Facility-Administered Medications Ordered in Other Encounters   Medication Dose Route Frequency Provider Last Rate Last Admin    sodium chloride flush 0.9 % injection 5-40 mL  5-40 mL IntraVENous 2 times per day Juan Pablo Chris APRN - CRNA        sodium chloride flush 0.9 % injection 5-40 mL  5-40 mL IntraVENous PRN Juan Pablo Chris APRN - CRNA        0.9 % sodium chloride infusion  25 mL IntraVENous PRN Juan Pablo Chris APRN - CRNA        meperidine (DEMEROL) injection 12.5 mg  12.5 mg IntraVENous Q5 Min PRN Juan Pablo Chris APRN - CRNA        HYDROmorphone HCl PF (DILAUDID) injection 0.25 mg  0.25 mg IntraVENous Q5 Min PRJuan Pablo Villanueva APRN - CRNA        HYDROmorphone HCl PF (DILAUDID) injection 0.5 mg  0.5 mg IntraVENous Q5 Min PRN Juan Pablo Chris APRN - SANDI             sodium chloride          Objective:   /83   Pulse 70   Temp 97.4 °F (36.3 °C) (Temporal)   Resp 18   Ht 1.753 m (5' 9.02\")   Wt 124.8 kg (275 lb 2 oz)   SpO2 95%   BMI 40.61 kg/m²     In an effort to reduce COVID-19 exposure, patient seen from doorway and case discussed in detail with unit staff    Recent Labs     12/29/23 0125 12/30/23  0259   WBC 10.8 8.1   RBC 4.77 4.99   HGB 12.7* 13.3*   HCT 41.4* 42.4   MCV 86.8 85.0   MCH 26.6* 26.7*   MCHC 30.7* 31.4*    270     Recent Labs     12/29/23  0125 12/29/23  0323 12/30/23  0259     --  139   K 3.9 3.6 3.6   ANIONGAP 9  --  12   CL 99  --  98   CO2 33*  --  29   BUN 22*  --  24*   CREATININE 0.7  --  0.6   GLUCOSE 115*  --  106   CALCIUM 9.1  --  9.0     Recent Labs     12/29/23 0125 12/30/23  0259   MG 2.3 2.3     No results for input(s): \"AST\", \"ALT\", \"ALB\", \"BILITOT\", \"ALKPHOS\" in the last 72 hours.  No results for input(s): \"PH\", \"PO2\", \"PCO2\", \"HCO3\", \"BE\", \"O2SAT\" in the last 72 hours.  No results for input(s): \"TROPONINI\" in the last 72 hours.  No results for input(s): \"INR\" in the last 72 hours.  No results for

## 2024-01-02 NOTE — PLAN OF CARE
Problem: Safety - Adult  Goal: Free from fall injury  Outcome: Progressing     Problem: ABCDS Injury Assessment  Goal: Absence of physical injury  Outcome: Progressing     Problem: Discharge Planning  Goal: Discharge to home or other facility with appropriate resources  Outcome: Progressing     Problem: Nutrition Deficit:  Goal: Optimize nutritional status  Outcome: Progressing     Problem: Pain  Goal: Verbalizes/displays adequate comfort level or baseline comfort level  Outcome: Progressing     Problem: Respiratory - Adult  Goal: Achieves optimal ventilation and oxygenation  Outcome: Progressing     Problem: Musculoskeletal - Adult  Goal: Return mobility to safest level of function  Outcome: Progressing  Goal: Maintain proper alignment of affected body part  Outcome: Progressing  Goal: Return ADL status to a safe level of function  Outcome: Progressing     Problem: Gastrointestinal - Adult  Goal: Minimal or absence of nausea and vomiting  Outcome: Progressing  Goal: Maintains or returns to baseline bowel function  Outcome: Progressing  Goal: Maintains adequate nutritional intake  Outcome: Progressing  Goal: Establish and maintain optimal ostomy function  Outcome: Progressing     Problem: Infection - Adult  Goal: Absence of infection at discharge  Outcome: Progressing  Goal: Absence of infection during hospitalization  Outcome: Progressing  Goal: Absence of fever/infection during anticipated neutropenic period  Outcome: Progressing

## 2024-01-03 VITALS
SYSTOLIC BLOOD PRESSURE: 131 MMHG | HEIGHT: 69 IN | WEIGHT: 275.13 LBS | OXYGEN SATURATION: 94 % | TEMPERATURE: 96.8 F | BODY MASS INDEX: 40.75 KG/M2 | HEART RATE: 74 BPM | RESPIRATION RATE: 18 BRPM | DIASTOLIC BLOOD PRESSURE: 86 MMHG

## 2024-01-03 LAB
ANION GAP SERPL CALCULATED.3IONS-SCNC: 10 MMOL/L (ref 7–19)
BASOPHILS # BLD: 0 K/UL (ref 0–0.2)
BASOPHILS NFR BLD: 0.2 % (ref 0–1)
BUN SERPL-MCNC: 25 MG/DL (ref 6–20)
CALCIUM SERPL-MCNC: 8.5 MG/DL (ref 8.6–10)
CHLORIDE SERPL-SCNC: 100 MMOL/L (ref 98–111)
CO2 SERPL-SCNC: 27 MMOL/L (ref 22–29)
CREAT SERPL-MCNC: 0.8 MG/DL (ref 0.5–1.2)
EOSINOPHIL # BLD: 0.2 K/UL (ref 0–0.6)
EOSINOPHIL NFR BLD: 1.3 % (ref 0–5)
ERYTHROCYTE [DISTWIDTH] IN BLOOD BY AUTOMATED COUNT: 15.8 % (ref 11.5–14.5)
GLUCOSE SERPL-MCNC: 94 MG/DL (ref 74–109)
HCT VFR BLD AUTO: 43 % (ref 42–52)
HGB BLD-MCNC: 13.4 G/DL (ref 14–18)
IMM GRANULOCYTES # BLD: 0.1 K/UL
LYMPHOCYTES # BLD: 3 K/UL (ref 1.1–4.5)
LYMPHOCYTES NFR BLD: 25.5 % (ref 20–40)
MAGNESIUM SERPL-MCNC: 2.5 MG/DL (ref 1.6–2.6)
MCH RBC QN AUTO: 26.2 PG (ref 27–31)
MCHC RBC AUTO-ENTMCNC: 31.2 G/DL (ref 33–37)
MCV RBC AUTO: 84.1 FL (ref 80–94)
MONOCYTES # BLD: 0.9 K/UL (ref 0–0.9)
MONOCYTES NFR BLD: 8 % (ref 0–10)
NEUTROPHILS # BLD: 7.5 K/UL (ref 1.5–7.5)
NEUTS SEG NFR BLD: 64 % (ref 50–65)
PLATELET # BLD AUTO: 269 K/UL (ref 130–400)
PMV BLD AUTO: 10.7 FL (ref 9.4–12.4)
POTASSIUM SERPL-SCNC: 3.7 MMOL/L (ref 3.5–5)
RBC # BLD AUTO: 5.11 M/UL (ref 4.7–6.1)
SODIUM SERPL-SCNC: 137 MMOL/L (ref 136–145)
WBC # BLD AUTO: 11.7 K/UL (ref 4.8–10.8)

## 2024-01-03 PROCEDURE — 6370000000 HC RX 637 (ALT 250 FOR IP): Performed by: INTERNAL MEDICINE

## 2024-01-03 PROCEDURE — 94640 AIRWAY INHALATION TREATMENT: CPT

## 2024-01-03 PROCEDURE — 6360000002 HC RX W HCPCS: Performed by: INTERNAL MEDICINE

## 2024-01-03 PROCEDURE — C9113 INJ PANTOPRAZOLE SODIUM, VIA: HCPCS | Performed by: NURSE PRACTITIONER

## 2024-01-03 PROCEDURE — 36415 COLL VENOUS BLD VENIPUNCTURE: CPT

## 2024-01-03 PROCEDURE — 6360000002 HC RX W HCPCS: Performed by: NURSE PRACTITIONER

## 2024-01-03 PROCEDURE — 80048 BASIC METABOLIC PNL TOTAL CA: CPT

## 2024-01-03 PROCEDURE — 6370000000 HC RX 637 (ALT 250 FOR IP): Performed by: NURSE PRACTITIONER

## 2024-01-03 PROCEDURE — 2700000000 HC OXYGEN THERAPY PER DAY

## 2024-01-03 PROCEDURE — 83735 ASSAY OF MAGNESIUM: CPT

## 2024-01-03 PROCEDURE — 2580000003 HC RX 258: Performed by: NURSE PRACTITIONER

## 2024-01-03 PROCEDURE — 85025 COMPLETE CBC W/AUTO DIFF WBC: CPT

## 2024-01-03 PROCEDURE — 94760 N-INVAS EAR/PLS OXIMETRY 1: CPT

## 2024-01-03 RX ORDER — SULFAMETHOXAZOLE AND TRIMETHOPRIM 800; 160 MG/1; MG/1
1 TABLET ORAL EVERY 12 HOURS SCHEDULED
Status: DISCONTINUED | OUTPATIENT
Start: 2024-01-03 | End: 2024-01-03 | Stop reason: HOSPADM

## 2024-01-03 RX ORDER — SULFAMETHOXAZOLE AND TRIMETHOPRIM 800; 160 MG/1; MG/1
1 TABLET ORAL EVERY 12 HOURS SCHEDULED
Qty: 20 TABLET | Refills: 0 | Status: SHIPPED | OUTPATIENT
Start: 2024-01-03 | End: 2024-01-13

## 2024-01-03 RX ADMIN — SODIUM CHLORIDE, PRESERVATIVE FREE 40 MG: 5 INJECTION INTRAVENOUS at 06:22

## 2024-01-03 RX ADMIN — BUMETANIDE 2 MG: 1 TABLET ORAL at 09:24

## 2024-01-03 RX ADMIN — METOPROLOL SUCCINATE 100 MG: 50 TABLET, EXTENDED RELEASE ORAL at 09:24

## 2024-01-03 RX ADMIN — GABAPENTIN 600 MG: 600 TABLET, FILM COATED ORAL at 09:24

## 2024-01-03 RX ADMIN — OXYCODONE HYDROCHLORIDE AND ACETAMINOPHEN 500 MG: 500 TABLET ORAL at 09:24

## 2024-01-03 RX ADMIN — IPRATROPIUM BROMIDE 1 PUFF: 17 AEROSOL, METERED RESPIRATORY (INHALATION) at 07:10

## 2024-01-03 RX ADMIN — SULFAMETHOXAZOLE AND TRIMETHOPRIM 1 TABLET: 800; 160 TABLET ORAL at 12:23

## 2024-01-03 RX ADMIN — LISINOPRIL 40 MG: 20 TABLET ORAL at 09:26

## 2024-01-03 RX ADMIN — GUAIFENESIN 400 MG: 200 TABLET ORAL at 06:22

## 2024-01-03 RX ADMIN — SERTRALINE HYDROCHLORIDE 100 MG: 100 TABLET ORAL at 09:24

## 2024-01-03 RX ADMIN — GUAIFENESIN 400 MG: 200 TABLET ORAL at 12:23

## 2024-01-03 RX ADMIN — ZINC SULFATE 220 MG (50 MG) CAPSULE 50 MG: CAPSULE at 09:24

## 2024-01-03 RX ADMIN — CLONAZEPAM 0.5 MG: 0.5 TABLET ORAL at 09:25

## 2024-01-03 RX ADMIN — BUDESONIDE AND FORMOTEROL FUMARATE DIHYDRATE 1 PUFF: 80; 4.5 AEROSOL RESPIRATORY (INHALATION) at 07:10

## 2024-01-03 RX ADMIN — AMLODIPINE BESYLATE 10 MG: 10 TABLET ORAL at 09:24

## 2024-01-03 RX ADMIN — LEVOFLOXACIN 750 MG: 500 TABLET, FILM COATED ORAL at 09:25

## 2024-01-03 RX ADMIN — Medication 2000 UNITS: at 09:24

## 2024-01-03 RX ADMIN — ENOXAPARIN SODIUM 30 MG: 100 INJECTION SUBCUTANEOUS at 09:24

## 2024-01-03 RX ADMIN — IPRATROPIUM BROMIDE 1 PUFF: 17 AEROSOL, METERED RESPIRATORY (INHALATION) at 10:12

## 2024-01-03 RX ADMIN — IPRATROPIUM BROMIDE 1 PUFF: 17 AEROSOL, METERED RESPIRATORY (INHALATION) at 14:30

## 2024-01-03 RX ADMIN — DEXAMETHASONE SODIUM PHOSPHATE 6 MG: 10 INJECTION, SOLUTION INTRAMUSCULAR; INTRAVENOUS at 06:22

## 2024-01-03 RX ADMIN — LOPERAMIDE HYDROCHLORIDE 12 MG: 2 CAPSULE ORAL at 09:24

## 2024-01-03 NOTE — CARE COORDINATION
[facility] unable to accept due to not being in network with insurance.  Referral sent to [facility].  Faxed and Accepted  Electronically signed by Araseli Wilburn on 1/4/24 at 1:43 PM CST    Home health referral received.  Referral sent to [facility].  Spoke with Maddy.  Referral faxed and PENDING  P. [phone]  F. [phone]  Electronically signed by Araseli Wilburn on 1/3/24 at 2:25 PM CST

## 2024-01-03 NOTE — DISCHARGE SUMMARY
Hospital Medicine Discharge Summary    Patient ID: Jose Sood      Patient's PCP: Nicki Babb MD    Admit Date: 12/24/2023     Discharge Date:   1/3/2024    Admitting Provider: No admitting provider for patient encounter.     Discharge Provider: Lyndesy Mullins MD     Discharge Diagnoses:       Active Hospital Problems    Diagnosis     Palliative care patient [Z51.5]     Abnormal abdominal CT scan [R93.5]     Aspiration pneumonia due to gastric secretions, unspecified laterality, unspecified part of lung (HCC) [J69.0]     Unspecified atrial fibrillation (HCC) [I48.91]     Morbid (severe) obesity with alveolar hypoventilation (HCC) [E66.2]     Complication due to Crohn's disease (HCC) [K50.919]     Morbid obesity with body mass index (BMI) of 40.0 or higher (HCC) [E66.01]     S/P insertion of spinal cord stimulator [Z96.89]     Essential hypertension [I10]     Psoriatic arthritis (HCC) [L40.50]     ANITA (obstructive sleep apnea) [G47.33]     Gastroesophageal reflux disease [K21.9]     Depression [F32.A]     Anxiety [F41.9]        The patient was seen and examined on day of discharge and this discharge summary is in conjunction with any daily progress note from day of discharge.    Hospital Course: 57yo man with hx of chewable tobacco aspiration Summer 2023 requiring bronchoscopy and prolonged intubation in [facility] at the time.   He recovered well and has been using overnight [facility] unit though reports has not been requiring oxygen at home.   He was transferred to [facility] from [facility] with concerns for aspiration PNA.       He was admitted with acute mixed hypox hypercapnic res failure requiring BiPAP support in ICU.   Was diagnosed with COVID PNA with secondary bacterial PNA.     He was evaluated for dysphagia with EGD which revealed gastritis but no acute findings to explain dysphagia.       His clinical and oxygenation status improved.   He is on 2l/min at rest presently and

## 2024-01-03 NOTE — CARE COORDINATION
Oxygen ordered placed with [facility], will deliver to room  [facility]   [facility]   [facility]  Electronically signed by STEFANO Peng on 1/3/2024 at 2:54 PM

## 2024-01-04 ENCOUNTER — TELEPHONE (OUTPATIENT)
Dept: GASTROENTEROLOGY | Age: 59
End: 2024-01-04

## 2024-01-04 NOTE — TELEPHONE ENCOUNTER
Pt. Called to confirm that EGD for tomorrow was canceled. Pt. Was admitted at [facility] and had done while admitted. Pt. Is needing to be advised on followup with gastro. Please advise pt.

## 2024-01-06 NOTE — ADT AUTH CERT
Patient Demographics    Name Patient ID SSN Gender Identity Birth Date   Debbie Sood [id number]  Male [birth year] (58 yrs)     Address Phone Email Employer    [address] [id number] [phone] (H)   [phone] (M) [email or web address] Race Occupation Emp Status    [employer] White (non-) -- Full Time      Reg Status PCP Date Last Verified Next Review Date    Verified Nicki Babb MD  [phone] 24      Admission Date Discharge Date Admitting Provider     23 Charly Jean MD       Marital Status Confucianist       Christianity        Emergency Contact 1   Zoila Sood (2)   [address] [id number]   [facility]   [phone] (H)     Subscriber Details  Hospital Account #[id number]  [employer] Subscriber Name/Sex/Relation Subscriber  Subscriber Address/Phone Subscriber Emp/Emp Phone   1. [facility]   [id number] DEBBIE SOOD - Male   (Self) 1965 [address] [place] [address]   [phone](H) [facility]      Utilization Reviews       1/2  by Marcela Romero RN  Last Updated by Marcela Romero RN on 2024 1215     Review Status Created By   In Primary Marcela Romero RN       Review Type   --      Criteria Review   DATE: 23           PERTINENT UPDATES:  Since weaned down to 4 L supplemental O2  Encourage self proning     VITALS:  Temp:97.3   HR: 69  RR:20   BP: 116/69   SAT:  94  Oxygen : 5lnc     ABNL/PERTINENT LABS/RADIOLOGY/DIAGNOSTIC STUDIES:  Bun 25 glu 122         MD CONSULTS/ASSESSMENT AND PLAN:  Assessment and Plan:   admitted to critical care unit for acute hypoxemic and hypercapnic respiratory failure secondary to COVID-19 pneumonia.     Dexamethasone, tapering  S/p Tocilizumab  Adjuvant therapy  Empiric broad-spectrum antibiotics to cover potential secondary acute bacterial infection  Encourage self proning  Since weaned down to 4 L supplemental O2  Home O2 eval  GI evaluated 
1/2  by Marcela Romero RN  Last Updated by Marcela Romero RN on 1/4/2024 1215     Review Status Created By   In Primary Marcela Romero RN       Review Type   --      Criteria Review   DATE: 1/2/23           PERTINENT UPDATES:  Since weaned down to 4 L supplemental O2  Encourage self proning     VITALS:  Temp:97.3   HR: 69  RR:20   BP: 116/69   SAT:  94  Oxygen : 5lnc     ABNL/PERTINENT LABS/RADIOLOGY/DIAGNOSTIC STUDIES:  Bun 25 glu 122         MD CONSULTS/ASSESSMENT AND PLAN:  Assessment and Plan:   admitted to critical care unit for acute hypoxemic and hypercapnic respiratory failure secondary to COVID-19 pneumonia.     Dexamethasone, tapering  S/p Tocilizumab  Adjuvant therapy  Empiric broad-spectrum antibiotics to cover potential secondary acute bacterial infection  Encourage self proning  Since weaned down to 4 L supplemental O2  Home O2 eval  GI evaluated dysphagia  Lovenox for DVT prophylaxis  Discussed treatment plan with patient/RN     MEDICATIONS:  Norvasc 10 mg po qd Vit c 500 mg po bid symbicort 1 puff in bid bumex 2 mg po qd klonopin 0.5 mg po bid decadron 6 mg iv q12 lovenox 30 mg sc bid neurontin 600 mg po bid atrovent 1 puff in qid levaquin 750 gm ipo qd lisinopril 40 mg po qd imdodium 12 mg po bid toprol 100 mg po qd protonix 40 mg iv q12 zoloft 100 mg po bid esyrel 100 mg po qhs zinc 50 mg po qd oxy 7.5 mg po q8 prn x1               12/31  by Marcela Romero RN  Last Updated by Marcela Romero RN on 1/4/2024 1210     Review Status Created By   In Primary Marcela Romero RN       Review Type   --      Criteria Review   DATE: 12/31/23           PERTINENT UPDATES:  Since weaned down to 5 L supplemental O2     VITALS:  Temp: 97.4  HR:70   RR: 18  BP:  132/83  SAT: 95  Oxygen : ra           MD CONSULTS/ASSESSMENT AND PLAN:  Assessment and Plan:   admitted to critical care unit for acute hypoxemic and hypercapnic respiratory failure secondary to COVID-19 pneumonia.     Dexamethasone  S/p 
complex cyst of the left kidney.  MRI abdomen renal mass protocol is recommended for better characterization.  5.  Right hemicolectomy.  6.  Mild splenomegaly.  7.  For findings in the chest, please refer to separate report.     Latest Reference Range & Units 12/24/23 16:09   WBC 4.8 - 10.8 K/uL 8.9   RBC 4.70 - 6.10 M/uL 4.17 (L)   Hemoglobin Quant 14.0 - 18.0 g/dL 11.4 (L)   Hematocrit 42.0 - 52.0 % 35.6 (L)   (     ED TREATMENT:  piperacillin-tazobactam (ZOSYN) 4,500 mg in sodium chloride 0.9 % 100 mL IVPB ([id number])  Dose: 4,500 mg  Freq: ONCE Route: IV given in the ED                    PERTINENT MEDICAL HISTORY:       Diagnosis Date    Anxiety      Cardiopulmonary arrest (HCC) 12/14/2016    COPD (chronic obstructive pulmonary disease) (HCC)      Crohn's disease (HCC)      Depression      GERD (gastroesophageal reflux disease)      Hypertension      Kidney stone      Left bundle branch block      Nausea & vomiting      ANITA (obstructive sleep apnea)       c pap    Psoriasis      Psoriatic arthritis (HCC)      Rheumatoid arteritis (HCC)      Ventral hernia           PHYSICAL EXAM:  Physical Exam   On exam NAD AOX3 non focal no asterixis; not jaundiced CTA tacos RR abd s NT BS+ no ascites no hernia no mass.  no LE edema.       MD CONSULTS/ASSESSMENTS & PLANS:  Assessment       Hospital Problems               Last Modified POA     * (Principal) Aspiration pneumonia due to gastric secretions, unspecified laterality, unspecified part of lung (HCC) 12/24/2023 Yes     Psoriatic arthritis (HCC) 12/24/2023 Yes     ANITA (obstructive sleep apnea) 12/24/2023 Yes     Overview Signed 12/18/2016  9:03 AM by Donis Tatum MD       Nocturnal Pulse ox 12/17-18/16 with 38 minutes desaturation overnight - to pursue further with his PCP, he has CPAP at home but has not been using.           Gastroesophageal reflux disease 12/24/2023 Yes     Depression 12/24/2023 Yes     Anxiety 12/24/2023 Yes     S/P insertion of spinal cord 
mildly complex cyst of the left kidney.  MRI abdomen renal mass protocol is recommended for better characterization.  5.  Right hemicolectomy.  6.  Mild splenomegaly.  7.  For findings in the chest, please refer to separate report.     Latest Reference Range & Units 12/24/23 16:09   WBC 4.8 - 10.8 K/uL 8.9   RBC 4.70 - 6.10 M/uL 4.17 (L)   Hemoglobin Quant 14.0 - 18.0 g/dL 11.4 (L)   Hematocrit 42.0 - 52.0 % 35.6 (L)   (     ED TREATMENT:  piperacillin-tazobactam (ZOSYN) 4,500 mg in sodium chloride 0.9 % 100 mL IVPB ([id number])  Dose: 4,500 mg  Freq: ONCE Route: IV given in the ED                    PERTINENT MEDICAL HISTORY:       Diagnosis Date    Anxiety      Cardiopulmonary arrest (HCC) 12/14/2016    COPD (chronic obstructive pulmonary disease) (HCC)      Crohn's disease (HCC)      Depression      GERD (gastroesophageal reflux disease)      Hypertension      Kidney stone      Left bundle branch block      Nausea & vomiting      ANITA (obstructive sleep apnea)       c pap    Psoriasis      Psoriatic arthritis (HCC)      Rheumatoid arteritis (HCC)      Ventral hernia           PHYSICAL EXAM:  Physical Exam   On exam NAD AOX3 non focal no asterixis; not jaundiced CTA tacos RR abd s NT BS+ no ascites no hernia no mass.  no LE edema.       MD CONSULTS/ASSESSMENTS & PLANS:  Assessment       Hospital Problems               Last Modified POA     * (Principal) Aspiration pneumonia due to gastric secretions, unspecified laterality, unspecified part of lung (HCC) 12/24/2023 Yes     Psoriatic arthritis (HCC) 12/24/2023 Yes     ANITA (obstructive sleep apnea) 12/24/2023 Yes     Overview Signed 12/18/2016  9:03 AM by Donis Tatum MD       Nocturnal Pulse ox 12/17-18/16 with 38 minutes desaturation overnight - to pursue further with his PCP, he has CPAP at home but has not been using.           Gastroesophageal reflux disease 12/24/2023 Yes     Depression 12/24/2023 Yes     Anxiety 12/24/2023 Yes     S/P insertion of spinal

## 2024-01-08 ENCOUNTER — HOSPITAL ENCOUNTER (INPATIENT)
Age: 59
LOS: 2 days | Discharge: ANOTHER HOSPITAL | DRG: 391 | End: 2024-01-10
Attending: INTERNAL MEDICINE | Admitting: INTERNAL MEDICINE
Payer: COMMERCIAL

## 2024-01-08 ENCOUNTER — HOSPITAL ENCOUNTER (OUTPATIENT)
Dept: GENERAL RADIOLOGY | Age: 59
Discharge: HOME | DRG: 391 | End: 2024-01-08
Attending: INTERNAL MEDICINE
Payer: COMMERCIAL

## 2024-01-08 ENCOUNTER — APPOINTMENT (OUTPATIENT)
Dept: GENERAL RADIOLOGY | Age: 59
DRG: 391 | End: 2024-01-08
Attending: INTERNAL MEDICINE
Payer: COMMERCIAL

## 2024-01-08 DIAGNOSIS — R13.10 DYSPHAGIA: ICD-10-CM

## 2024-01-08 DIAGNOSIS — J69.0 ASPIRATION PNEUMONIA OF RIGHT LUNG DUE TO GASTRIC SECRETIONS, UNSPECIFIED PART OF LUNG (HCC): ICD-10-CM

## 2024-01-08 PROBLEM — K22.2 ESOPHAGEAL OBSTRUCTION: Status: ACTIVE | Noted: 2024-01-08

## 2024-01-08 LAB
ALBUMIN SERPL-MCNC: 3.2 G/DL (ref 3.5–5.2)
ALP SERPL-CCNC: 117 U/L (ref 40–130)
ALT SERPL-CCNC: 23 U/L (ref 5–41)
ANION GAP SERPL CALCULATED.3IONS-SCNC: 10 MMOL/L (ref 7–19)
AST SERPL-CCNC: 23 U/L (ref 5–40)
B PARAP IS1001 DNA NPH QL NAA+NON-PROBE: NOT DETECTED
B PERT.PT PRMT NPH QL NAA+NON-PROBE: NOT DETECTED
BASOPHILS # BLD: 0 K/UL (ref 0–0.2)
BASOPHILS NFR BLD: 0.3 % (ref 0–1)
BILIRUB DIRECT SERPL-MCNC: 0.2 MG/DL (ref 0–0.3)
BILIRUB INDIRECT SERPL-MCNC: 0.5 MG/DL (ref 0.1–1)
BILIRUB SERPL-MCNC: 0.7 MG/DL (ref 0.2–1.2)
BUN SERPL-MCNC: 47 MG/DL (ref 6–20)
C PNEUM DNA NPH QL NAA+NON-PROBE: NOT DETECTED
CALCIUM SERPL-MCNC: 9 MG/DL (ref 8.6–10)
CHLORIDE SERPL-SCNC: 105 MMOL/L (ref 98–111)
CO2 SERPL-SCNC: 23 MMOL/L (ref 22–29)
CREAT SERPL-MCNC: 1.9 MG/DL (ref 0.5–1.2)
EOSINOPHIL # BLD: 0.4 K/UL (ref 0–0.6)
EOSINOPHIL NFR BLD: 4.1 % (ref 0–5)
ERYTHROCYTE [DISTWIDTH] IN BLOOD BY AUTOMATED COUNT: 16.3 % (ref 11.5–14.5)
FLUAV RNA NPH QL NAA+NON-PROBE: NOT DETECTED
FLUBV RNA NPH QL NAA+NON-PROBE: NOT DETECTED
GLUCOSE SERPL-MCNC: 89 MG/DL (ref 74–109)
HADV DNA NPH QL NAA+NON-PROBE: NOT DETECTED
HCOV 229E RNA NPH QL NAA+NON-PROBE: NOT DETECTED
HCOV HKU1 RNA NPH QL NAA+NON-PROBE: NOT DETECTED
HCOV NL63 RNA NPH QL NAA+NON-PROBE: NOT DETECTED
HCOV OC43 RNA NPH QL NAA+NON-PROBE: NOT DETECTED
HCT VFR BLD AUTO: 39.7 % (ref 42–52)
HGB BLD-MCNC: 12.8 G/DL (ref 14–18)
HMPV RNA NPH QL NAA+NON-PROBE: NOT DETECTED
HPIV1 RNA NPH QL NAA+NON-PROBE: NOT DETECTED
HPIV2 RNA NPH QL NAA+NON-PROBE: NOT DETECTED
HPIV3 RNA NPH QL NAA+NON-PROBE: NOT DETECTED
HPIV4 RNA NPH QL NAA+NON-PROBE: NOT DETECTED
IMM GRANULOCYTES # BLD: 0.1 K/UL
INR PPP: 1.19 (ref 0.88–1.18)
LYMPHOCYTES # BLD: 1.4 K/UL (ref 1.1–4.5)
LYMPHOCYTES NFR BLD: 15.2 % (ref 20–40)
M PNEUMO DNA NPH QL NAA+NON-PROBE: NOT DETECTED
MAGNESIUM SERPL-MCNC: 2.3 MG/DL (ref 1.6–2.6)
MCH RBC QN AUTO: 27.1 PG (ref 27–31)
MCHC RBC AUTO-ENTMCNC: 32.2 G/DL (ref 33–37)
MCV RBC AUTO: 84.1 FL (ref 80–94)
MONOCYTES # BLD: 0.5 K/UL (ref 0–0.9)
MONOCYTES NFR BLD: 5.9 % (ref 0–10)
MRSA DNA SPEC QL NAA+PROBE: NOT DETECTED
NEUTROPHILS # BLD: 6.7 K/UL (ref 1.5–7.5)
NEUTS SEG NFR BLD: 73.8 % (ref 50–65)
PHOSPHATE SERPL-MCNC: 4.1 MG/DL (ref 2.5–4.5)
PLATELET # BLD AUTO: 227 K/UL (ref 130–400)
PMV BLD AUTO: 9.8 FL (ref 9.4–12.4)
POTASSIUM SERPL-SCNC: 4.2 MMOL/L (ref 3.5–5)
PROT SERPL-MCNC: 6.2 G/DL (ref 6.6–8.7)
PROTHROMBIN TIME: 14.8 SEC (ref 12–14.6)
RBC # BLD AUTO: 4.72 M/UL (ref 4.7–6.1)
RSV RNA NPH QL NAA+NON-PROBE: NOT DETECTED
RV+EV RNA NPH QL NAA+NON-PROBE: NOT DETECTED
SARS-COV-2 RNA NPH QL NAA+NON-PROBE: DETECTED
SODIUM SERPL-SCNC: 138 MMOL/L (ref 136–145)
WBC # BLD AUTO: 9.1 K/UL (ref 4.8–10.8)

## 2024-01-08 PROCEDURE — C9113 INJ PANTOPRAZOLE SODIUM, VIA: HCPCS | Performed by: INTERNAL MEDICINE

## 2024-01-08 PROCEDURE — 6360000002 HC RX W HCPCS: Performed by: INTERNAL MEDICINE

## 2024-01-08 PROCEDURE — 74220 X-RAY XM ESOPHAGUS 1CNTRST: CPT

## 2024-01-08 PROCEDURE — 36415 COLL VENOUS BLD VENIPUNCTURE: CPT

## 2024-01-08 PROCEDURE — 84100 ASSAY OF PHOSPHORUS: CPT

## 2024-01-08 PROCEDURE — 0202U NFCT DS 22 TRGT SARS-COV-2: CPT

## 2024-01-08 PROCEDURE — 85610 PROTHROMBIN TIME: CPT

## 2024-01-08 PROCEDURE — 80048 BASIC METABOLIC PNL TOTAL CA: CPT

## 2024-01-08 PROCEDURE — 83735 ASSAY OF MAGNESIUM: CPT

## 2024-01-08 PROCEDURE — 1210000000 HC MED SURG R&B

## 2024-01-08 PROCEDURE — 85025 COMPLETE CBC W/AUTO DIFF WBC: CPT

## 2024-01-08 PROCEDURE — 71046 X-RAY EXAM CHEST 2 VIEWS: CPT

## 2024-01-08 PROCEDURE — 87641 MR-STAPH DNA AMP PROBE: CPT

## 2024-01-08 PROCEDURE — 87040 BLOOD CULTURE FOR BACTERIA: CPT

## 2024-01-08 PROCEDURE — 2580000003 HC RX 258: Performed by: INTERNAL MEDICINE

## 2024-01-08 PROCEDURE — 2500000003 HC RX 250 WO HCPCS: Performed by: INTERNAL MEDICINE

## 2024-01-08 PROCEDURE — 80076 HEPATIC FUNCTION PANEL: CPT

## 2024-01-08 RX ORDER — SODIUM CHLORIDE 9 MG/ML
INJECTION, SOLUTION INTRAVENOUS PRN
Status: DISCONTINUED | OUTPATIENT
Start: 2024-01-08 | End: 2024-01-10 | Stop reason: HOSPADM

## 2024-01-08 RX ORDER — SODIUM CHLORIDE 0.9 % (FLUSH) 0.9 %
5-40 SYRINGE (ML) INJECTION PRN
Status: DISCONTINUED | OUTPATIENT
Start: 2024-01-08 | End: 2024-01-10 | Stop reason: HOSPADM

## 2024-01-08 RX ORDER — POTASSIUM CHLORIDE 20 MEQ/1
40 TABLET, EXTENDED RELEASE ORAL PRN
Status: DISCONTINUED | OUTPATIENT
Start: 2024-01-08 | End: 2024-01-10 | Stop reason: HOSPADM

## 2024-01-08 RX ORDER — ENOXAPARIN SODIUM 100 MG/ML
30 INJECTION SUBCUTANEOUS 2 TIMES DAILY
Status: DISCONTINUED | OUTPATIENT
Start: 2024-01-08 | End: 2024-01-10 | Stop reason: HOSPADM

## 2024-01-08 RX ORDER — PANTOPRAZOLE SODIUM 40 MG/1
40 TABLET, DELAYED RELEASE ORAL 2 TIMES DAILY
COMMUNITY

## 2024-01-08 RX ORDER — LEVOFLOXACIN 5 MG/ML
750 INJECTION, SOLUTION INTRAVENOUS EVERY 24 HOURS
Status: DISCONTINUED | OUTPATIENT
Start: 2024-01-08 | End: 2024-01-10 | Stop reason: HOSPADM

## 2024-01-08 RX ORDER — ACETAMINOPHEN 650 MG/1
650 SUPPOSITORY RECTAL EVERY 6 HOURS PRN
Status: DISCONTINUED | OUTPATIENT
Start: 2024-01-08 | End: 2024-01-10 | Stop reason: HOSPADM

## 2024-01-08 RX ORDER — METOPROLOL TARTRATE 1 MG/ML
5 INJECTION, SOLUTION INTRAVENOUS EVERY 6 HOURS
Status: DISCONTINUED | OUTPATIENT
Start: 2024-01-08 | End: 2024-01-09

## 2024-01-08 RX ORDER — SODIUM CHLORIDE 0.9 % (FLUSH) 0.9 %
5-40 SYRINGE (ML) INJECTION EVERY 12 HOURS SCHEDULED
Status: DISCONTINUED | OUTPATIENT
Start: 2024-01-08 | End: 2024-01-10 | Stop reason: HOSPADM

## 2024-01-08 RX ORDER — ONDANSETRON 4 MG/1
4 TABLET, ORALLY DISINTEGRATING ORAL EVERY 8 HOURS PRN
Status: DISCONTINUED | OUTPATIENT
Start: 2024-01-08 | End: 2024-01-10 | Stop reason: HOSPADM

## 2024-01-08 RX ORDER — POLYETHYLENE GLYCOL 3350 17 G/17G
17 POWDER, FOR SOLUTION ORAL DAILY PRN
Status: DISCONTINUED | OUTPATIENT
Start: 2024-01-08 | End: 2024-01-10 | Stop reason: HOSPADM

## 2024-01-08 RX ORDER — ACETAMINOPHEN 325 MG/1
650 TABLET ORAL EVERY 6 HOURS PRN
Status: DISCONTINUED | OUTPATIENT
Start: 2024-01-08 | End: 2024-01-10 | Stop reason: HOSPADM

## 2024-01-08 RX ORDER — ONDANSETRON 2 MG/ML
4 INJECTION INTRAMUSCULAR; INTRAVENOUS EVERY 6 HOURS PRN
Status: DISCONTINUED | OUTPATIENT
Start: 2024-01-08 | End: 2024-01-10 | Stop reason: HOSPADM

## 2024-01-08 RX ORDER — DIPHENHYDRAMINE HYDROCHLORIDE 50 MG/ML
50 INJECTION INTRAMUSCULAR; INTRAVENOUS ONCE
Status: COMPLETED | OUTPATIENT
Start: 2024-01-09 | End: 2024-01-09

## 2024-01-08 RX ORDER — SODIUM CHLORIDE 9 MG/ML
INJECTION, SOLUTION INTRAVENOUS CONTINUOUS
Status: ACTIVE | OUTPATIENT
Start: 2024-01-08 | End: 2024-01-10

## 2024-01-08 RX ORDER — PROMETHAZINE HYDROCHLORIDE 12.5 MG/1
12.5 TABLET ORAL EVERY 8 HOURS PRN
COMMUNITY

## 2024-01-08 RX ORDER — MAGNESIUM SULFATE IN WATER 40 MG/ML
2000 INJECTION, SOLUTION INTRAVENOUS PRN
Status: DISCONTINUED | OUTPATIENT
Start: 2024-01-08 | End: 2024-01-10 | Stop reason: HOSPADM

## 2024-01-08 RX ORDER — POTASSIUM CHLORIDE 7.45 MG/ML
10 INJECTION INTRAVENOUS PRN
Status: DISCONTINUED | OUTPATIENT
Start: 2024-01-08 | End: 2024-01-10 | Stop reason: HOSPADM

## 2024-01-08 RX ADMIN — SODIUM CHLORIDE, PRESERVATIVE FREE 40 MG: 5 INJECTION INTRAVENOUS at 14:24

## 2024-01-08 RX ADMIN — VANCOMYCIN HYDROCHLORIDE 2500 MG: 10 INJECTION, POWDER, LYOPHILIZED, FOR SOLUTION INTRAVENOUS at 16:23

## 2024-01-08 RX ADMIN — ENOXAPARIN SODIUM 30 MG: 100 INJECTION SUBCUTANEOUS at 14:24

## 2024-01-08 RX ADMIN — LEVOFLOXACIN 750 MG: 5 INJECTION, SOLUTION INTRAVENOUS at 14:32

## 2024-01-08 RX ADMIN — ENOXAPARIN SODIUM 30 MG: 100 INJECTION SUBCUTANEOUS at 21:31

## 2024-01-08 RX ADMIN — METOPROLOL TARTRATE 5 MG: 5 INJECTION INTRAVENOUS at 21:31

## 2024-01-08 RX ADMIN — SODIUM CHLORIDE: 9 INJECTION, SOLUTION INTRAVENOUS at 14:23

## 2024-01-08 RX ADMIN — METOPROLOL TARTRATE 5 MG: 5 INJECTION INTRAVENOUS at 14:28

## 2024-01-08 NOTE — PROGRESS NOTES
4 Eyes Skin Assessment     NAME:  Jose Sood  YOB: 1965  MEDICAL RECORD NUMBER:  [id number]    The patient is being assessed for  Admission    I agree that at least one RN has performed a thorough Head to Toe Skin Assessment on the patient. ALL assessment sites listed below have been assessed.      Areas assessed by both nurses:    Head, Face, Ears, Shoulders, Back, Chest, Arms, Elbows, Hands, Sacrum. Buttock, Coccyx, Ischium, and Legs. Feet and Heels        Does the Patient have a Wound? No noted wound(s)       Remigio Prevention initiated by RN: No  Wound Care Orders initiated by RN: No    Pressure Injury (Stage 3,4, Unstageable, DTI, NWPT, and Complex wounds) if present, place Wound referral order by RN under : No    New Ostomies, if present place, Ostomy referral order under : No     Nurse 1 eSignature: Electronically signed by Leonard Childs RN on 1/8/24 at 11:49 AM CST    **SHARE this note so that the co-signing nurse can place an eSignature**    Nurse 2 eSignature: {Esignature:[id number]}

## 2024-01-08 NOTE — PROGRESS NOTES
[facility]   Pharmacy Pharmacokinetic Monitoring Service - Vancomycin     Jose Sood is a 58 y.o. male starting on vancomycin therapy for aspiration pneumonia/HAP. Pharmacy consulted by Dr Mullins for monitoring and adjustment.    Target Concentration:  Goal trough of 15-20 mg/L switch to -600 once renal function stable    Additional Antimicrobials: levofloxacin    Pertinent Laboratory Values:   Wt Readings from Last 1 Encounters:   01/08/24 123.4 kg (272 lb)     Temp Readings from Last 1 Encounters:   01/08/24 98.1 °F (36.7 °C) (Temporal)     Estimated Creatinine Clearance: 56 mL/min (A) (based on SCr of 1.9 mg/dL (H)).  Recent Labs     01/08/24  1327   CREATININE 1.9*   BUN 47*   WBC 9.1       Pertinent Cultures:  Culture Date Source Results   1/8/24 Blood x2 Sent    1/8/24 Sputum  Sent    MRSA Nasal Swab: showed MRSA positive result on 12/27/23    Plan:  Concentration-guided dosing due to renal impairment/insufficiency  Start vancomycin pulse dosing, give 2500mg IV today  Renal labs as indicated   Vancomycin concentration ordered for 1/9 @ 1500 (24 hours post-dose)   Pharmacy will continue to monitor patient and adjust therapy as indicated    Thank you for the consult,  Ruth Cruz RPH  1/8/2024 1:58 PM

## 2024-01-08 NOTE — H&P
Hospital Medicine History & Physical      PCP: Nicki Babb MD    Date of Admission: 1/8/2024    Date of Service: Pt seen/examined on 1/8/2024 and Admitted to Inpatient     Chief Complaint:  dysphagia.       History Of Present Illness:      The patient is a 58 y.o. male w hx of prior aspiration events (chewing tobacco aspiration in summer 2003 requiring bronchoscopy and prolonged intubation).   Just recently admitted here from [facility] with aspiration PNA.     During his last admission and with concerns for recurrent aspiration he has undergone EGD which revealed esophagitis, but did not suggest any obstruction or mass.   He was discharged home with oxygen and Bactrim and instructed to return for OP esophagram and manometry to evaluate his esophagus further. He was discharged on Jan 3rd.       He presented for esophagram today as instructed.   I received a call from radiology with concerns that this patient failed his esophagram study.     I discussed with Radiology Dr Salinas - he is concerned none of the attempted consistencies were passing through the esophagus and he actually had to terminate the study early due to this.     I went to see the patient in radiology. He has his portable O2 tank with him. He does not feel well He reports repeat episodes of emesis even with liquids. Wife reports he was able to tolerate some past yesterday, though some of it her regurgitated and threw up. He was still able to swallow and keep some of the liquids down. Pt reports he could not take any of his meds today due to emesis.       I discussed this with the patient and his wife recommended him to get readmitted to the hospital for IVF support and GI evaluation with repeat EGD to delineate the source of potential obstruction.         Past Medical History:        Diagnosis Date    Anxiety

## 2024-01-09 ENCOUNTER — ANESTHESIA EVENT (OUTPATIENT)
Dept: ENDOSCOPY | Age: 59
DRG: 391 | End: 2024-01-09
Payer: COMMERCIAL

## 2024-01-09 ENCOUNTER — ANESTHESIA (OUTPATIENT)
Dept: ENDOSCOPY | Age: 59
DRG: 391 | End: 2024-01-09
Payer: COMMERCIAL

## 2024-01-09 PROBLEM — R13.10 DYSPHAGIA: Status: ACTIVE | Noted: 2024-01-08

## 2024-01-09 PROBLEM — K22.0 ACHALASIA: Status: ACTIVE | Noted: 2024-01-09

## 2024-01-09 PROBLEM — T17.908A ASPIRATION INTO AIRWAY: Status: ACTIVE | Noted: 2024-01-09

## 2024-01-09 LAB
ALBUMIN SERPL-MCNC: 3.1 G/DL (ref 3.5–5.2)
ANION GAP SERPL CALCULATED.3IONS-SCNC: 12 MMOL/L (ref 7–19)
BASOPHILS # BLD: 0 K/UL (ref 0–0.2)
BASOPHILS NFR BLD: 0.4 % (ref 0–1)
BUN SERPL-MCNC: 26 MG/DL (ref 6–20)
CALCIUM SERPL-MCNC: 8.7 MG/DL (ref 8.6–10)
CHLORIDE SERPL-SCNC: 109 MMOL/L (ref 98–111)
CO2 SERPL-SCNC: 21 MMOL/L (ref 22–29)
CREAT SERPL-MCNC: 1 MG/DL (ref 0.5–1.2)
EOSINOPHIL # BLD: 0.3 K/UL (ref 0–0.6)
EOSINOPHIL NFR BLD: 4.6 % (ref 0–5)
ERYTHROCYTE [DISTWIDTH] IN BLOOD BY AUTOMATED COUNT: 16.3 % (ref 11.5–14.5)
GLUCOSE BLD-MCNC: 72 MG/DL (ref 70–99)
GLUCOSE BLD-MCNC: 74 MG/DL (ref 70–99)
GLUCOSE SERPL-MCNC: 71 MG/DL (ref 74–109)
HCT VFR BLD AUTO: 40.5 % (ref 42–52)
HGB BLD-MCNC: 12.5 G/DL (ref 14–18)
IMM GRANULOCYTES # BLD: 0.1 K/UL
LYMPHOCYTES # BLD: 1.1 K/UL (ref 1.1–4.5)
LYMPHOCYTES NFR BLD: 15 % (ref 20–40)
MAGNESIUM SERPL-MCNC: 2.4 MG/DL (ref 1.6–2.6)
MCH RBC QN AUTO: 26.5 PG (ref 27–31)
MCHC RBC AUTO-ENTMCNC: 30.9 G/DL (ref 33–37)
MCV RBC AUTO: 85.8 FL (ref 80–94)
MONOCYTES # BLD: 0.5 K/UL (ref 0–0.9)
MONOCYTES NFR BLD: 7.5 % (ref 0–10)
NEUTROPHILS # BLD: 5 K/UL (ref 1.5–7.5)
NEUTS SEG NFR BLD: 71.8 % (ref 50–65)
PERFORMED ON: NORMAL
PERFORMED ON: NORMAL
PHOSPHATE SERPL-MCNC: 2.9 MG/DL (ref 2.5–4.5)
PLATELET # BLD AUTO: 197 K/UL (ref 130–400)
PMV BLD AUTO: 10.2 FL (ref 9.4–12.4)
POTASSIUM SERPL-SCNC: 4.6 MMOL/L (ref 3.5–5)
RBC # BLD AUTO: 4.72 M/UL (ref 4.7–6.1)
SODIUM SERPL-SCNC: 142 MMOL/L (ref 136–145)
VANCOMYCIN SERPL-MCNC: 7.4 UG/ML
WBC # BLD AUTO: 7 K/UL (ref 4.8–10.8)

## 2024-01-09 PROCEDURE — 36415 COLL VENOUS BLD VENIPUNCTURE: CPT

## 2024-01-09 PROCEDURE — C9113 INJ PANTOPRAZOLE SODIUM, VIA: HCPCS | Performed by: INTERNAL MEDICINE

## 2024-01-09 PROCEDURE — 6360000002 HC RX W HCPCS: Performed by: INTERNAL MEDICINE

## 2024-01-09 PROCEDURE — 2709999900 HC NON-CHARGEABLE SUPPLY: Performed by: INTERNAL MEDICINE

## 2024-01-09 PROCEDURE — 7100000001 HC PACU RECOVERY - ADDTL 15 MIN: Performed by: INTERNAL MEDICINE

## 2024-01-09 PROCEDURE — 3609012400 HC EGD TRANSORAL BIOPSY SINGLE/MULTIPLE: Performed by: INTERNAL MEDICINE

## 2024-01-09 PROCEDURE — 85025 COMPLETE CBC W/AUTO DIFF WBC: CPT

## 2024-01-09 PROCEDURE — 3700000000 HC ANESTHESIA ATTENDED CARE: Performed by: INTERNAL MEDICINE

## 2024-01-09 PROCEDURE — 82962 GLUCOSE BLOOD TEST: CPT

## 2024-01-09 PROCEDURE — 2580000003 HC RX 258: Performed by: INTERNAL MEDICINE

## 2024-01-09 PROCEDURE — 0DB58ZX EXCISION OF ESOPHAGUS, VIA NATURAL OR ARTIFICIAL OPENING ENDOSCOPIC, DIAGNOSTIC: ICD-10-PCS | Performed by: INTERNAL MEDICINE

## 2024-01-09 PROCEDURE — 2500000003 HC RX 250 WO HCPCS: Performed by: INTERNAL MEDICINE

## 2024-01-09 PROCEDURE — 7100000000 HC PACU RECOVERY - FIRST 15 MIN: Performed by: INTERNAL MEDICINE

## 2024-01-09 PROCEDURE — 6360000002 HC RX W HCPCS: Performed by: HOSPITALIST

## 2024-01-09 PROCEDURE — 6360000002 HC RX W HCPCS: Performed by: NURSE ANESTHETIST, CERTIFIED REGISTERED

## 2024-01-09 PROCEDURE — 80202 ASSAY OF VANCOMYCIN: CPT

## 2024-01-09 PROCEDURE — 2700000000 HC OXYGEN THERAPY PER DAY

## 2024-01-09 PROCEDURE — 83735 ASSAY OF MAGNESIUM: CPT

## 2024-01-09 PROCEDURE — 1210000000 HC MED SURG R&B

## 2024-01-09 PROCEDURE — 80069 RENAL FUNCTION PANEL: CPT

## 2024-01-09 PROCEDURE — 2500000003 HC RX 250 WO HCPCS: Performed by: NURSE ANESTHETIST, CERTIFIED REGISTERED

## 2024-01-09 PROCEDURE — 88305 TISSUE EXAM BY PATHOLOGIST: CPT

## 2024-01-09 PROCEDURE — 94760 N-INVAS EAR/PLS OXIMETRY 1: CPT

## 2024-01-09 PROCEDURE — 3700000001 HC ADD 15 MINUTES (ANESTHESIA): Performed by: INTERNAL MEDICINE

## 2024-01-09 RX ORDER — METOPROLOL TARTRATE 1 MG/ML
10 INJECTION, SOLUTION INTRAVENOUS EVERY 6 HOURS
Status: DISCONTINUED | OUTPATIENT
Start: 2024-01-09 | End: 2024-01-10 | Stop reason: HOSPADM

## 2024-01-09 RX ORDER — LIDOCAINE HYDROCHLORIDE 10 MG/ML
INJECTION, SOLUTION EPIDURAL; INFILTRATION; INTRACAUDAL; PERINEURAL PRN
Status: DISCONTINUED | OUTPATIENT
Start: 2024-01-09 | End: 2024-01-09 | Stop reason: SDUPTHER

## 2024-01-09 RX ORDER — LORAZEPAM 2 MG/ML
1 INJECTION INTRAMUSCULAR EVERY 8 HOURS PRN
Status: DISCONTINUED | OUTPATIENT
Start: 2024-01-09 | End: 2024-01-10 | Stop reason: HOSPADM

## 2024-01-09 RX ORDER — FENTANYL CITRATE 50 UG/ML
INJECTION, SOLUTION INTRAMUSCULAR; INTRAVENOUS PRN
Status: DISCONTINUED | OUTPATIENT
Start: 2024-01-09 | End: 2024-01-09 | Stop reason: SDUPTHER

## 2024-01-09 RX ORDER — PROPOFOL 10 MG/ML
INJECTION, EMULSION INTRAVENOUS PRN
Status: DISCONTINUED | OUTPATIENT
Start: 2024-01-09 | End: 2024-01-09 | Stop reason: SDUPTHER

## 2024-01-09 RX ADMIN — METOPROLOL TARTRATE 5 MG: 5 INJECTION INTRAVENOUS at 03:16

## 2024-01-09 RX ADMIN — ONDANSETRON 4 MG: 2 INJECTION INTRAMUSCULAR; INTRAVENOUS at 16:39

## 2024-01-09 RX ADMIN — LORAZEPAM 1 MG: 2 INJECTION INTRAMUSCULAR; INTRAVENOUS at 20:50

## 2024-01-09 RX ADMIN — DIPHENHYDRAMINE HYDROCHLORIDE 50 MG: 50 INJECTION INTRAMUSCULAR; INTRAVENOUS at 00:04

## 2024-01-09 RX ADMIN — METOPROLOL TARTRATE 10 MG: 5 INJECTION INTRAVENOUS at 08:16

## 2024-01-09 RX ADMIN — LEVOFLOXACIN 750 MG: 5 INJECTION, SOLUTION INTRAVENOUS at 17:28

## 2024-01-09 RX ADMIN — FENTANYL CITRATE 100 MCG: 50 INJECTION INTRAMUSCULAR; INTRAVENOUS at 15:12

## 2024-01-09 RX ADMIN — LIDOCAINE HYDROCHLORIDE 100 MG: 10 INJECTION, SOLUTION EPIDURAL; INFILTRATION; INTRACAUDAL; PERINEURAL at 15:12

## 2024-01-09 RX ADMIN — SODIUM CHLORIDE: 9 INJECTION, SOLUTION INTRAVENOUS at 16:41

## 2024-01-09 RX ADMIN — LORAZEPAM 1 MG: 2 INJECTION INTRAMUSCULAR; INTRAVENOUS at 09:52

## 2024-01-09 RX ADMIN — METOPROLOL TARTRATE 10 MG: 5 INJECTION INTRAVENOUS at 17:35

## 2024-01-09 RX ADMIN — PROPOFOL 400 MG: 10 INJECTION, EMULSION INTRAVENOUS at 15:14

## 2024-01-09 RX ADMIN — SODIUM CHLORIDE, PRESERVATIVE FREE 40 MG: 5 INJECTION INTRAVENOUS at 17:25

## 2024-01-09 RX ADMIN — SODIUM CHLORIDE, PRESERVATIVE FREE 40 MG: 5 INJECTION INTRAVENOUS at 03:16

## 2024-01-09 RX ADMIN — Medication 1000 MG: at 20:09

## 2024-01-09 ASSESSMENT — PAIN - FUNCTIONAL ASSESSMENT
PAIN_FUNCTIONAL_ASSESSMENT: NONE - DENIES PAIN

## 2024-01-09 NOTE — PROGRESS NOTES
Hospitalist Progress Note        PCP: Nicki Babb MD    Date of Admission: 1/8/2024    Length of Stay: 1    Chief Complaint: emesis, dysphagia. Came for OP Esophagram - found with esophageal stricture vs obstruction. Direct admitted for GI evaluation.     Subjective:     Pt feels very anxious - normally takes klonopin and prozac.     No repeat emesis.   Denies abd pain.           Medications:  Reviewed    Infusion Medications    sodium chloride      sodium chloride 125 mL/hr at 01/08/24 1423     Scheduled Medications    metoprolol  10 mg IntraVENous Q6H    sodium chloride flush  5-40 mL IntraVENous 2 times per day    [Held by provider] enoxaparin  30 mg SubCUTAneous BID    pantoprazole (PROTONIX) 40 mg in sodium chloride (PF) 0.9 % 10 mL injection  40 mg IntraVENous Q12H    levofloxacin  750 mg IntraVENous Q24H    vancomycin (VANCOCIN) intermittent dosing (placeholder)   Other RX Placeholder     PRN Meds: LORazepam, sodium chloride flush, sodium chloride, potassium chloride **OR** potassium alternative oral replacement **OR** potassium chloride, magnesium sulfate, ondansetron **OR** ondansetron, polyethylene glycol, acetaminophen **OR** acetaminophen      Intake/Output Summary (Last 24 hours) at 1/9/2024 1244  Last data filed at 1/9/2024 1227  Gross per 24 hour   Intake 2751.44 ml   Output 2450 ml   Net 301.44 ml       Physical Exam Performed:    BP (!) 145/81   Pulse 84   Temp 98.2 °F (36.8 °C) (Oral)   Resp 22   Ht 1.753 m (5' 9\")   Wt 123.4 kg (272 lb)   SpO2 95%   BMI 40.17 kg/m²     General appearance: No apparent distress, appears stated age and cooperative.  HEENT: Pupils equal, round, and reactive to light. Conjunctivae/corneas clear.  Neck: Supple, with full range of motion. No jugular venous distention. Trachea midline.  Respiratory:  Normal respiratory effort. Clear to auscultation, bilaterally without Rales/Wheezes/Rhonchi.  Cardiovascular: Regular rate and rhythm with normal S1/S2 without

## 2024-01-09 NOTE — PROGRESS NOTES
Palliative Care/Spiritual Care: Met with pt to initiate palliative care. Pt says he has aspiration pneumonia. Doctor's note says pt has an esophageal obstruction, and dysphagia. Pt has other diagnoses in past medical history, and he is known to palliative care.         Advance Directives: Pt does not have an AD/LW. He says his wife Zoila Sood is his primary decision maker. This  discussed AD/LW and offered to assist him with completing. Pt is full code and wants CPR and Ventilator. Pt has a recent ACP note with no changes. SEE ACP NOTE.         Pain/other symptoms: Pt says he is not having pain.         Social/Spiritual: Pt says he attends a [employer].         Pt/family discussion r/t goals: Pt lives at home with his wife. He works, drives, and performs all daily living skills to care for himself at home. Pt's goal is to return home with previous DLS, and previous quality of life.     Provided spiritual care with sustaining presence, nurtured hope, and prayer. Pt expressed gratitude for spiritual care.     Electronically signed by Mary Behrens on 1/9/2024 at 11:37 AM

## 2024-01-09 NOTE — BRIEF OP NOTE
Brief Postoperative Note      Patient: Jose Sood  YOB: 1965  MRN: [id number]    Date of Procedure: 1/9/2024    Pre-Op Diagnosis Codes:     * Dysphagia [R13.10]    Post-Op Diagnosis: smooth tapered tight narrowing of GE junction consistent with Achalasia       Procedure(s):  EGD BIOPSY    Surgeon(s):  Sp Kaur MD    Assistant:  * No surgical staff found *    Anesthesia: Monitor Anesthesia Care    Estimated Blood Loss (mL): none    Complications: none    Specimens:   ID Type Source Tests Collected by Time Destination   A : random esophagus bx, r/o EoE Tissue Esophagus SURGICAL PATHOLOGY Sp Kaur MD 1/9/2024 1528        Implants:  * No implants in log *      Drains:   [REMOVED] External Urinary Catheter (Removed)   Site Assessment Clean,dry & intact 12/27/23 1000   Placement Initiated 12/27/23 1000   Securement Method Tape 12/27/23 1000   Catheter Care Catheter/Wick replaced;Suction Canister/Tubing changed 12/27/23 1000   Perineal Care Yes 12/27/23 1000   Suction 40 mmgHg continuous 12/27/23 1000   Urine Color Aurora 12/27/23 1000   Urine Appearance Clear 12/27/23 1000   Urine Odor Malodorous 12/27/23 1000   Output (mL) 350 mL 12/27/23 1600       Findings: dictated  Plan:  need transfer to [facility] for esophageal manometry to verify Achalasia then undergo Botox or balloon dilation    Electronically signed by Sp Kaur MD on 1/9/2024 at 4:16 PM

## 2024-01-09 NOTE — ANESTHESIA PRE PROCEDURE
[id number] 92.9 12/29/2023 03:23 AM        Type & Screen (If Applicable):  No results found for: \"LABABO\", \"LABRH\"    Drug/Infectious Status (If Applicable):  No results found for: \"HIV\", \"HEPCAB\"    COVID-19 Screening (If Applicable):   Lab Results   Component Value Date/Time    COVID19 DETECTED 01/08/2024 02:30 PM           Anesthesia Evaluation    Airway: Mallampati: III       Mouth opening: > = 3 FB   Dental:    (+) upper dentures and poor dentition      Pulmonary:   (+) pneumonia:  COPD:    sleep apnea:   decreased breath sounds                               Cardiovascular:    (+) hypertension:        Rhythm: regular                      Neuro/Psych:   (+) psychiatric history:            GI/Hepatic/Renal:   (+) GERD:, liver disease:          Endo/Other:    (+) : arthritis:..                 Abdominal:   (+) obese          Vascular:          Other Findings:       Anesthesia Plan      general     ASA 3       Induction: intravenous.      Anesthetic plan and risks discussed with patient.    Use of blood products discussed with patient whom.    Plan discussed with surgical team.                Maikol Bassett, APRN - CRNA   1/9/2024

## 2024-01-09 NOTE — PROGRESS NOTES
Pt arrives back to 5th floor via transportation without RN receiving any report or notification that pt was finished with procedure and on their way back. This RN called endo and updated by MERY Ribera.

## 2024-01-09 NOTE — ANESTHESIA POSTPROCEDURE EVALUATION
Department of Anesthesiology  Postprocedure Note    Patient: Jose Sood  MRN: [id number]  YOB: 1965  Date of evaluation: 1/9/2024    Procedure Summary       Date: 01/09/24 Room / Location: [place] [address] / [facility]    Anesthesia Start: 1510 Anesthesia Stop: 1531    Procedure: EGD BIOPSY Diagnosis:       Dysphagia      (Dysphagia [R13.10])    Surgeons: Sp Kaur MD Responsible Provider: Maikol Bassett APRN - CRNA    Anesthesia Type: general ASA Status: 3            Anesthesia Type: No value filed.    Sean Phase I:      Sean Phase II:      Anesthesia Post Evaluation    Patient location during evaluation: PACU  Patient participation: complete - patient participated  Level of consciousness: awake and alert  Pain score: 0  Airway patency: patent  Nausea & Vomiting: no nausea and no vomiting  Cardiovascular status: hemodynamically stable  Respiratory status: spontaneous ventilation and nonlabored ventilation  Hydration status: stable  Multimodal analgesia pain management approach    No notable events documented.

## 2024-01-10 VITALS
TEMPERATURE: 98.6 F | RESPIRATION RATE: 18 BRPM | SYSTOLIC BLOOD PRESSURE: 150 MMHG | WEIGHT: 272 LBS | OXYGEN SATURATION: 96 % | BODY MASS INDEX: 40.29 KG/M2 | HEIGHT: 69 IN | DIASTOLIC BLOOD PRESSURE: 89 MMHG | HEART RATE: 98 BPM

## 2024-01-10 LAB
ALBUMIN SERPL-MCNC: 3 G/DL (ref 3.5–5.2)
ANION GAP SERPL CALCULATED.3IONS-SCNC: 12 MMOL/L (ref 7–19)
BASOPHILS # BLD: 0 K/UL (ref 0–0.2)
BASOPHILS NFR BLD: 0.3 % (ref 0–1)
BUN SERPL-MCNC: 13 MG/DL (ref 6–20)
CALCIUM SERPL-MCNC: 8.8 MG/DL (ref 8.6–10)
CHLORIDE SERPL-SCNC: 106 MMOL/L (ref 98–111)
CO2 SERPL-SCNC: 23 MMOL/L (ref 22–29)
CREAT SERPL-MCNC: 0.8 MG/DL (ref 0.5–1.2)
EOSINOPHIL # BLD: 0.4 K/UL (ref 0–0.6)
EOSINOPHIL NFR BLD: 5.3 % (ref 0–5)
ERYTHROCYTE [DISTWIDTH] IN BLOOD BY AUTOMATED COUNT: 16.3 % (ref 11.5–14.5)
GLUCOSE SERPL-MCNC: 77 MG/DL (ref 74–109)
HCT VFR BLD AUTO: 39.7 % (ref 42–52)
HGB BLD-MCNC: 12.3 G/DL (ref 14–18)
IMM GRANULOCYTES # BLD: 0 K/UL
LYMPHOCYTES # BLD: 1.2 K/UL (ref 1.1–4.5)
LYMPHOCYTES NFR BLD: 17.8 % (ref 20–40)
MAGNESIUM SERPL-MCNC: 2 MG/DL (ref 1.6–2.6)
MCH RBC QN AUTO: 26.5 PG (ref 27–31)
MCHC RBC AUTO-ENTMCNC: 31 G/DL (ref 33–37)
MCV RBC AUTO: 85.4 FL (ref 80–94)
MONOCYTES # BLD: 0.7 K/UL (ref 0–0.9)
MONOCYTES NFR BLD: 10.9 % (ref 0–10)
NEUTROPHILS # BLD: 4.3 K/UL (ref 1.5–7.5)
NEUTS SEG NFR BLD: 65.2 % (ref 50–65)
PHOSPHATE SERPL-MCNC: 3.2 MG/DL (ref 2.5–4.5)
PLATELET # BLD AUTO: 201 K/UL (ref 130–400)
PMV BLD AUTO: 9.6 FL (ref 9.4–12.4)
POTASSIUM SERPL-SCNC: 4.5 MMOL/L (ref 3.5–5)
RBC # BLD AUTO: 4.65 M/UL (ref 4.7–6.1)
SODIUM SERPL-SCNC: 141 MMOL/L (ref 136–145)
WBC # BLD AUTO: 6.6 K/UL (ref 4.8–10.8)

## 2024-01-10 PROCEDURE — 6360000002 HC RX W HCPCS: Performed by: INTERNAL MEDICINE

## 2024-01-10 PROCEDURE — 2700000000 HC OXYGEN THERAPY PER DAY

## 2024-01-10 PROCEDURE — 83735 ASSAY OF MAGNESIUM: CPT

## 2024-01-10 PROCEDURE — 2580000003 HC RX 258: Performed by: INTERNAL MEDICINE

## 2024-01-10 PROCEDURE — 80069 RENAL FUNCTION PANEL: CPT

## 2024-01-10 PROCEDURE — C9113 INJ PANTOPRAZOLE SODIUM, VIA: HCPCS | Performed by: INTERNAL MEDICINE

## 2024-01-10 PROCEDURE — 36415 COLL VENOUS BLD VENIPUNCTURE: CPT

## 2024-01-10 PROCEDURE — 2500000003 HC RX 250 WO HCPCS: Performed by: INTERNAL MEDICINE

## 2024-01-10 PROCEDURE — 6370000000 HC RX 637 (ALT 250 FOR IP): Performed by: INTERNAL MEDICINE

## 2024-01-10 PROCEDURE — 85025 COMPLETE CBC W/AUTO DIFF WBC: CPT

## 2024-01-10 RX ADMIN — NITROGLYCERIN 0.5 INCH: 20 OINTMENT TOPICAL at 10:45

## 2024-01-10 RX ADMIN — METOPROLOL TARTRATE 10 MG: 5 INJECTION INTRAVENOUS at 10:44

## 2024-01-10 RX ADMIN — Medication 1000 MG: at 10:48

## 2024-01-10 RX ADMIN — NITROGLYCERIN 0.5 INCH: 20 OINTMENT TOPICAL at 16:54

## 2024-01-10 RX ADMIN — LORAZEPAM 1 MG: 2 INJECTION INTRAMUSCULAR; INTRAVENOUS at 11:02

## 2024-01-10 RX ADMIN — LEVOFLOXACIN 750 MG: 5 INJECTION, SOLUTION INTRAVENOUS at 16:53

## 2024-01-10 RX ADMIN — METOPROLOL TARTRATE 10 MG: 5 INJECTION INTRAVENOUS at 01:10

## 2024-01-10 RX ADMIN — METOPROLOL TARTRATE 10 MG: 5 INJECTION INTRAVENOUS at 16:53

## 2024-01-10 RX ADMIN — SODIUM CHLORIDE, PRESERVATIVE FREE 40 MG: 5 INJECTION INTRAVENOUS at 01:10

## 2024-01-10 RX ADMIN — SODIUM CHLORIDE, PRESERVATIVE FREE 40 MG: 5 INJECTION INTRAVENOUS at 16:53

## 2024-01-10 RX ADMIN — ENOXAPARIN SODIUM 30 MG: 100 INJECTION SUBCUTANEOUS at 10:44

## 2024-01-10 NOTE — OP NOTE
[address] [place] [address]                                OPERATIVE REPORT    PATIENT NAME: DEBBIE MUSA                     :        1965  MED REC NO:   [id number]                              ROOM:       [facility]  ACCOUNT NO:   [id number]                           ADMIT DATE: 2024  PROVIDER:     Sp Kaur MD    DATE OF PROCEDURE:  2024    PROCEDURE PERFORMED:  EGD with photos and random esophageal biopsies.    SURGEON:  Sp Kaur MD    INDICATIONS:  A 58-year-old male has experienced progressive problem of  dysphagia for solids and liquids.  He has had multiple aspiration  events.  He underwent previous EGD by Dr. Dez Martinez on 2023.  It  was thought that he may have a distal esophageal stricture for which he  performed an esophageal Savary dilatation, 54-Azeri.  The patient  seemingly continued to have difficulty swallowing and therefore  underwent a followup EGD on 2023 by Dr. Saenz, at which time he  thought the esophagus appeared normal.  A previous CAT scan on  2023 reported a thick wall of the esophagus in the thoracic  segment.  The patient again has been hospitalized for aspiration.   Barium swallow done yesterday demonstrates complete distal esophageal  obstruction to flow of barium to the stomach.  EGD is done to evaluate  for suspected achalasia versus peptic stricture, Schatzki's ring or  tumor.    PREMEDICATION:  Propofol per anesthetist.    PROCEDURE:  The Olympus video endoscope was passed by mouth.   Hypopharynx appeared normal.  No Zenker's diverticulum was encountered  as the scope was passed into the esophagus.  Peristaltic contractions  were observed to be present in the esophagus.  There was a smooth  tapered narrowing of the distal esophagus with no peptic stricture,  tumor, ulceration or Schatzki's ring.  The GE junction was located at 42  to 44 cm from the  incisor teeth with a tight impedance to passage of the  scope to the stomach.  Retroflexion of the scope in the stomach showed  no tumor.  The gastric lining was normal.  The pylorus was symmetrically  patent.  The duodenum was normal with no gastric outlet obstruction.   The findings were documented with photographs.  The esophagus was  biopsied randomly.  The scope was then removed from the patient.  The  patient tolerated the procedure well. EBL none.    FINDINGS:  Smooth tapered narrowing of the esophagus, suspicious for  achalasia.    PLAN:  Recommend transfer to [facility] for esophageal  manometry to document a hypertensive lower esophageal sphincter that  will relax with swallowing and then pursue treatment with Botox or  achalasia balloon dilatation versus surgery with myotomy.        SHRADDHA DAN MD    D: 01/09/2024 18:11:15      T: 01/09/2024 18:16:57     [email or web address]  Job#: [id number]     Doc#: [id number]    CC:

## 2024-01-10 NOTE — PROGRESS NOTES
[facility]   Pharmacy Pharmacokinetic Monitoring Service - Vancomycin    Consulting Provider: Dr Mullins   Indication: Aspiration pneumonia, HAP  Target Concentration: Goal AUC/ALLIE 400-600 mg*hr/L  Day of Therapy: 2  Additional Antimicrobials: levaquin    Pertinent Laboratory Values:   Wt Readings from Last 1 Encounters:   01/08/24 123.4 kg (272 lb)     Temp Readings from Last 1 Encounters:   01/09/24 98.4 °F (36.9 °C) (Temporal)     Estimated Creatinine Clearance: 107 mL/min (based on SCr of 1 mg/dL).  Recent Labs     01/08/24  1327 01/09/24  0232   CREATININE 1.9* 1.0   BUN 47* 26*   WBC 9.1 7.0     Procalcitonin:     Pertinent Cultures:  Culture Date Source Results   01/08/24 01/08/24 Blood    nasopharyngeal No growth to date    Sars-cov-2   MRSA Nasal Swab:       Recent vancomycin administrations                     vancomycin (VANCOCIN) 2,500 mg in sodium chloride 0.9 % 500 mL IVPB (mg) 2,500 mg New Bag 01/08/24 1623                    Assessment:  Date/Time Current Dose Concentration Timing of Concentration (h) AUC   01/09/24  1630 pulse 7.4 24 hours    Note: Serum concentrations collected for AUC dosing may appear elevated if collected in close proximity to the dose administered, this is not necessarily an indication of toxicity    Plan:  Current dosing regimen is sub-therapeutic  Begin 1000 mg iv every 12 hours  Repeat vancomycin concentration ordered for 01/11/24 @ 0830   Pharmacy will continue to monitor patient and adjust therapy as indicated    Thank you for the consult,  Kike Pascual RPH  1/9/2024 7:53 PM

## 2024-01-10 NOTE — DISCHARGE INSTR - DIET

## 2024-01-10 NOTE — DISCHARGE SUMMARY
Hospital Medicine Discharge Summary    Patient ID: Jose Sood      Patient's PCP: Nicki Babb MD    Admit Date: 1/8/2024     Discharge Date:   1/10/2024    Admitting Provider: Lyndsey Mullins MD     Discharge Provider: Lyndsey Mullins MD     Discharge Diagnoses:       Active Hospital Problems    Diagnosis     Aspiration into airway [T17.908A]     Achalasia [K22.0]     Esophageal obstruction [K22.2]        The patient was seen and examined on day of discharge and this discharge summary is in conjunction with any daily progress note from day of discharge.    Hospital Course: 59 yo man with hx of chewable tobacco aspiration Summer 2023 requiring bronchoscopy and prolonged intubation in [facility] at the time. (Suffered cardiac arrest that admission)  He recovered well and has been using overnight Trilogy unit though reports has not been requiring oxygen at home.   He was treated for aspiration PNA several times since summer of 2013.     In Aug of 2023 he had undergone EGD evaluation and esophageal balloon dilation - with some relief of dysphagia, though short lived.     He was again admitted on Dec 24th with SOB and hypoxia and dysphagia. EGD was attempted but very limited due to pt desaturating during the procedure. He was treated again for aspiration PNA that admission and discharged home on Jan 3rd, with instructions to return for Esophagram.     He came back on Jan 8th for OP esophagram and was noted to have complete esophageal obstruction and procedure was terminated early. He was direct admitted from radiology to [facility] floor.     He had undergone repeat EGD with Dr Kaur on Jan 9, 2024.   This revealed   \"FINDINGS:  Smooth tapered narrowing of the esophagus, suspicious for  achalasia.    PLAN:  Recommend transfer to [facility] for esophageal  manometry to document a hypertensive lower esophageal sphincter that  will relax with swallowing and then pursue treatment  distention. Trachea midline.  Respiratory:  Normal respiratory effort. Clear to auscultation, bilaterally without Rales/Wheezes/Rhonchi.  Cardiovascular:  Regular rate and rhythm with normal S1/S2 without murmurs, rubs or gallops.  Abdomen: Soft, non-tender, non-distended with normal bowel sounds.  Musculoskeletal:  No clubbing, cyanosis or edema bilaterally.  Full range of motion without deformity.  Skin: Skin color, texture, turgor normal.  No rashes or lesions.  Neurologic:  Neurovascularly intact without any focal sensory/motor deficits. Cranial nerves: II-XII intact, grossly non-focal.  Psychiatric:  Alert and oriented, thought content appropriate, normal insight  Capillary Refill: Brisk,< 3 seconds   Peripheral Pulses: +2 palpable, equal bilaterally       Labs: For convenience and continuity at follow-up the following most recent labs are provided:      CBC:    Lab Results   Component Value Date/Time    WBC 6.6 01/10/2024 02:18 AM    HGB 12.3 01/10/2024 02:18 AM    HCT 39.7 01/10/2024 02:18 AM     01/10/2024 02:18 AM       Renal:    Lab Results   Component Value Date/Time     01/10/2024 02:18 AM    K 4.5 01/10/2024 02:18 AM    K 3.7 12/27/2023 03:16 AM     01/10/2024 02:18 AM    CO2 23 01/10/2024 02:18 AM    BUN 13 01/10/2024 02:18 AM    CREATININE 0.8 01/10/2024 02:18 AM    CALCIUM 8.8 01/10/2024 02:18 AM    PHOS 3.2 01/10/2024 02:18 AM         Significant Diagnostic Studies    Radiology:   XR CHEST (2 VW)   Final Result   1.  Bronchovascular thickening and coarse interstitial markings.  Possible tiny right effusion.               ______________________________________    Electronically signed by: LAXMI RIVERA M.D.   Date:     01/08/2024   Time:    16:41              Consults:     IP CONSULT TO GI  PHARMACY TO DOSE VANCOMYCIN  PALLIATIVE CARE EVAL    Disposition:  [facility]    Condition at Discharge: Stable    Discharge Instructions/Follow-up:  PCP 1 week     Code Status:  Full Code

## 2024-01-10 NOTE — SIGNIFICANT EVENT
Spoke to transfer center:        - [facility] - accepted by GI service - bed wait quoted as 2 weeks.      - [facility] - discussed with Dr Smith () - accepted under Dr Singleton - pending bed availability - potentially in am.       Lyndsey Mullins MD  1/9/2024  7:16 PM

## 2024-01-11 NOTE — PROGRESS NOTES
Physician Progress Note      PATIENT:               DEBBIE MUSA  [facility] #:                  [id number]  :                       1965  ADMIT DATE:       2024 11:14 AM  DISCH DATE:        1/10/2024 6:45 PM  RESPONDING  PROVIDER #:        Lyndsey Mullins MD          QUERY TEXT:    Pt admitted with dysphagia and esophageal obstruction. Pt noted to have   initial creatinine of 1.9. If possible, please document in the progress notes   and discharge summary if you are evaluating and/or treating any of the   following:    The medical record reflects the following:  Risk Factors: Dysphagia, unable to tolerate oral intake. HTN  Clinical Indicators: Creatinine beginning @ admission, 1.9 1.0 0.8  Treatment: Serial chemistry panel, 0.9% NS @ 75 ml/hr.    Defined by Kidney Disease Improving Global Outcomes (KDIGO) clinical practice   guideline for acute kidney injury:  -Increase in SCr by greater than or equal to 0.3 mg/dl within 48 hours; or  -Increase or decrease in SCr to greater than or equal to 1.5 times baseline,   which is known or presumed to have occurred within the prior 7 days; or  -Urine volume < 0.5ml/kg/h for 6 hours  Options provided:  -- Acute kidney failure  -- Acute kidney injury  -- Other - I will add my own diagnosis  -- Disagree - Not applicable / Not valid  -- Disagree - Clinically unable to determine / Unknown  -- Refer to Clinical Documentation Reviewer    PROVIDER RESPONSE TEXT:    This patient has an Acute kidney injury.    Query created by: Marcela Ahmadi on 1/10/2024 12:54 PM      Electronically signed by:  Lyndsey Mullins MD 2024 5:12 PM

## 2024-01-12 NOTE — CONSULTS
[address] [place] [address]                                  CONSULTATION    PATIENT NAME: DEBBIE MUSA                     :        1965  MED REC NO:   [id number]                              ROOM:       [facility]  ACCOUNT NO:   [id number]                           ADMIT DATE: 2024  PROVIDER:     Sp Kaur MD    CONSULT DATE:  2024    GI CONSULTATION    ASSESSMENT:  Progressive dysphagia for both solids and liquids so that  the patient cannot swallow anything at this point.  Esophagram  immediately prior to admission on this date showed complete obstruction  of the distal esophagus.    RECOMMENDATIONS:  1.  IV fluid hydration.  2.  EGD to evaluate the distal esophagus and provide biopsies as needed.    HISTORY OF PRESENT ILLNESS:  This 58-year-old white male has had a  progressive problem of dysphagia for solids and liquids over the last  year or more.  There has been a history of multiple aspiration events.   He originally underwent GI consultation with Dr. Dez Martinez who  performed EGD on 2023, with finding of a benign esophageal  stricture or stenosis for which he performed an esophageal Savary  dilatation 54-French.  Despite the esophageal dilatation, he went on to  have increasing difficulty swallowing and therefore underwent a followup  EGD by Dr. Saenz on 2023, which he thought at that time had no  abnormality of the distal esophagus.  CAT scan of the abdomen on  2023, reported some thickened wall of the esophagus.  He had been  under treatment empirically with proton pump inhibitor, acid suppression  therapy.  He had not been taking any GI irritants.  There is no history  of symptoms of GI bleeding.  He does not take any NSAIDs.  Outpatient  barium swallow now demonstrates complete distal esophageal obstruction  of flow of barium to the stomach.  EGD is needed to be repeated  to  differentiate between peptic stricture, Schatzki's ring, distal  esophageal tumor or suspected achalasia.    PAST MEDICAL HISTORY:  The patient had an aspiration event last summer  of 2023, with acute respiratory failure requiring a prolonged intubation  at [facility].  Bronchoscopy was done at that  time as well.  He actually suffered a cardiac arrest on that admission.   He has additional background history of moderate hypertension for which  he is maintained on metoprolol and lisinopril.  He has been on chronic  acid suppression therapy with pantoprazole.  Nausea has been treated  with promethazine as needed.    SURGICAL HISTORY:  See electronic medical records.    HOME MEDICATIONS:  Reviewed in the admission medication reconciliation  list.    SOCIAL HISTORY:  No current habits of alcohol or tobacco.    FAMILY HISTORY:  Noncontributory.  There is no family history of  esophageal cancer.    PHYSICAL EXAMINATION:  GENERAL:  No fever.  Adequate nutritional status.  VITAL SIGNS:  Stable.  HEENT:  Sclerae white.  Conjunctivae pink.  Buccal mucosa moist.  NECK:  Supple.  No thyromegaly.  LUNGS:  Clear.  CARDIOVASCULAR:  No S3 or murmur.  ABDOMEN:  Nontender abdomen.  Normal bowel sounds.  No  hepatosplenomegaly or abnormal mass in the epigastrium.  RECTUM:  Empty.        SHRADDHA DAN MD    D: 01/11/2024 19:50:20      T: 01/11/2024 19:55:40     [id number]  Job#: [id number]     Doc#: [id number]    CC:

## 2024-01-13 LAB
BACTERIA BLD CULT ORG #2: NORMAL
BACTERIA BLD CULT: NORMAL

## 2024-09-03 RX ORDER — PANTOPRAZOLE SODIUM 40 MG/1
40 TABLET, DELAYED RELEASE ORAL
Qty: 60 TABLET | Refills: 0 | Status: SHIPPED | OUTPATIENT
Start: 2024-09-03

## 2024-09-03 NOTE — TELEPHONE ENCOUNTER
09- Called patient and LVM for patient to call the office as that the pharmacy has requested a refill on your current medication.  If regular medications are being prescribed our Providers prefers that patients have a yearly follow up apt.     Your last apt was on 12-    Chart is showing Nexium bid   Last EGD Dr Kaur-Pantoprazole bid           Patient called and stated that he is on the Pantoprazole bid.  He is also seeing a Dr in [place]/Gastroenterologist as that he has been getting aspiration pneumonia. They done another scope, barium swallow, Manometry, CT w contrast- back in January 2024.      Questioned if he is needing a refill on his Pantoprazole 40mg bid [facility]     He stated yes Pantoprazole to [facility]     Routed to Garrett JACOBS

## 2024-09-18 ENCOUNTER — OFFICE VISIT (OUTPATIENT)
Dept: PULMONOLOGY | Facility: CLINIC | Age: 59
End: 2024-09-18
Payer: COMMERCIAL

## 2024-09-18 VITALS
WEIGHT: 315 LBS | OXYGEN SATURATION: 98 % | DIASTOLIC BLOOD PRESSURE: 80 MMHG | HEIGHT: 69 IN | BODY MASS INDEX: 46.65 KG/M2 | SYSTOLIC BLOOD PRESSURE: 138 MMHG | HEART RATE: 72 BPM

## 2024-09-18 DIAGNOSIS — Z72.0 TOBACCO USE: ICD-10-CM

## 2024-09-18 DIAGNOSIS — G47.33 OSA (OBSTRUCTIVE SLEEP APNEA): ICD-10-CM

## 2024-09-18 DIAGNOSIS — K22.2 ESOPHAGEAL STRICTURE: ICD-10-CM

## 2024-09-18 DIAGNOSIS — R06.09 CHRONIC DYSPNEA: ICD-10-CM

## 2024-09-18 DIAGNOSIS — Z87.891 PERSONAL HISTORY OF TOBACCO USE, PRESENTING HAZARDS TO HEALTH: ICD-10-CM

## 2024-09-18 DIAGNOSIS — R13.10 DYSPHAGIA, UNSPECIFIED TYPE: ICD-10-CM

## 2024-09-18 DIAGNOSIS — R05.3 CHRONIC COUGH: ICD-10-CM

## 2024-09-18 DIAGNOSIS — J96.11 CHRONIC RESPIRATORY FAILURE WITH HYPOXIA: Primary | ICD-10-CM

## 2024-09-18 DIAGNOSIS — E66.01 MORBID (SEVERE) OBESITY DUE TO EXCESS CALORIES: ICD-10-CM

## 2024-09-18 PROCEDURE — 99204 OFFICE O/P NEW MOD 45 MIN: CPT | Performed by: INTERNAL MEDICINE

## 2024-09-18 RX ORDER — HYDRALAZINE HYDROCHLORIDE 25 MG/1
TABLET, FILM COATED ORAL
COMMUNITY
Start: 2024-03-01

## 2024-09-18 RX ORDER — PANTOPRAZOLE SODIUM 40 MG/1
40 TABLET, DELAYED RELEASE ORAL
COMMUNITY
Start: 2023-06-01

## 2024-09-18 RX ORDER — LOSARTAN POTASSIUM 100 MG/1
TABLET ORAL
COMMUNITY
Start: 2024-01-02

## 2024-09-18 RX ORDER — CLONAZEPAM 0.5 MG/1
0.5 TABLET ORAL 2 TIMES DAILY
COMMUNITY

## 2024-09-18 RX ORDER — DICYCLOMINE HYDROCHLORIDE 10 MG/1
20 CAPSULE ORAL 4 TIMES DAILY
COMMUNITY

## 2024-09-18 RX ORDER — ALBUTEROL SULFATE 90 UG/1
2 AEROSOL, METERED RESPIRATORY (INHALATION) EVERY 4 HOURS PRN
Qty: 6.7 G | Refills: 5 | Status: SHIPPED | OUTPATIENT
Start: 2024-09-18

## 2024-10-14 ENCOUNTER — HOSPITAL ENCOUNTER (OUTPATIENT)
Dept: CT IMAGING | Facility: HOSPITAL | Age: 59
Discharge: HOME | End: 2024-10-14
Admitting: INTERNAL MEDICINE
Payer: COMMERCIAL

## 2024-10-14 DIAGNOSIS — J96.11 CHRONIC RESPIRATORY FAILURE WITH HYPOXIA: ICD-10-CM

## 2024-10-14 DIAGNOSIS — Z87.891 PERSONAL HISTORY OF TOBACCO USE, PRESENTING HAZARDS TO HEALTH: ICD-10-CM

## 2024-10-14 PROCEDURE — 71271 CT THORAX LUNG CANCER SCR C-: CPT

## 2024-10-16 ENCOUNTER — TELEPHONE (OUTPATIENT)
Dept: PULMONOLOGY | Facility: CLINIC | Age: 59
End: 2024-10-16
Payer: COMMERCIAL

## 2024-10-16 DIAGNOSIS — R91.1 PULMONARY NODULE: Primary | ICD-10-CM

## 2024-10-16 NOTE — TELEPHONE ENCOUNTER
----- Message from Gayle Medina sent at 10/16/2024 10:33 AM CDT -----  Please let the pt know his low dose CT showed a very small nodule in the RLL. We will repeat the CT in 1y to ensure it is not growing.  Thanks

## 2024-10-21 ENCOUNTER — OFFICE VISIT (OUTPATIENT)
Dept: PULMONOLOGY | Facility: CLINIC | Age: 59
End: 2024-10-21
Payer: COMMERCIAL

## 2024-10-21 VITALS
OXYGEN SATURATION: 97 % | HEIGHT: 69 IN | WEIGHT: 315 LBS | DIASTOLIC BLOOD PRESSURE: 80 MMHG | BODY MASS INDEX: 46.65 KG/M2 | HEART RATE: 61 BPM | SYSTOLIC BLOOD PRESSURE: 134 MMHG

## 2024-10-21 DIAGNOSIS — R05.3 CHRONIC COUGH: ICD-10-CM

## 2024-10-21 DIAGNOSIS — Z87.891 PERSONAL HISTORY OF TOBACCO USE, PRESENTING HAZARDS TO HEALTH: ICD-10-CM

## 2024-10-21 DIAGNOSIS — R13.10 DYSPHAGIA, UNSPECIFIED TYPE: ICD-10-CM

## 2024-10-21 DIAGNOSIS — E66.01 MORBID (SEVERE) OBESITY DUE TO EXCESS CALORIES: ICD-10-CM

## 2024-10-21 DIAGNOSIS — G47.33 OSA (OBSTRUCTIVE SLEEP APNEA): ICD-10-CM

## 2024-10-21 DIAGNOSIS — J96.11 CHRONIC RESPIRATORY FAILURE WITH HYPOXIA: ICD-10-CM

## 2024-10-21 DIAGNOSIS — R91.1 PULMONARY NODULE: Primary | ICD-10-CM

## 2024-10-21 DIAGNOSIS — R06.09 CHRONIC DYSPNEA: ICD-10-CM

## 2024-10-21 RX ORDER — BENZONATATE 100 MG/1
1 CAPSULE ORAL 3 TIMES DAILY PRN
COMMUNITY

## 2024-10-21 RX ORDER — ADALIMUMAB 40MG/0.8ML
KIT SUBCUTANEOUS
COMMUNITY

## 2024-10-21 RX ORDER — FUROSEMIDE 40 MG
1 TABLET ORAL DAILY
COMMUNITY

## 2024-10-21 RX ORDER — HYDROXYZINE HYDROCHLORIDE 25 MG/1
TABLET, FILM COATED ORAL
COMMUNITY

## 2024-10-21 RX ORDER — ALPRAZOLAM 0.5 MG
1 TABLET ORAL 2 TIMES DAILY
COMMUNITY

## 2024-10-21 RX ORDER — HYDROCODONE BITARTRATE AND ACETAMINOPHEN 7.5; 325 MG/1; MG/1
1 TABLET ORAL EVERY 6 HOURS PRN
COMMUNITY

## 2024-10-21 NOTE — LETTER
2024     Halle Baron MD  [facility] [address]    Patient: Jon Gill   YOB: 1965   Date of Visit: 10/21/2024     Dear Dr. Tod MD:    Thank you for referring Jon Gill to me for evaluation. Below are the relevant portions of my assessment and plan of care.    If you have questions, please do not hesitate to call me. I look forward to following Jon along with you.         Sincerely,        Pasquale Medina,         CC: No Recipients      Progress Notes:    Clinic Note - Follow Up            NAME: Jon Gill  MRN: [id number]  AGE: 59 y.o.            : 1965  PRIMARY CARE PROVIDER: Halle Baron MD               Reason for Visit:  Jon Gill presents to clinic today for follow up for the evaluation of chronic hypoxic respiratory failure.    History of Present Illness:  Mr. Gill is a 59-year-old male who presents to clinic today as a follow-up for chronic hypoxic respiratory failure.  Past medical history includes chronic hypoxic respiratory failure, chronic dyspnea, foreign body aspiration with severe pneumonia, ELIO on home trilogy, GERD, former tobacco use.    Patient was last seen in clinic on 2024 as a new patient.  He has a history of chronic hypoxic respiratory failure requiring 2 L of supplemental oxygen at night.  He also uses a trilogy at night for ELIO.  He was started on oxygen after a prolonged ICU stay in May of last year due to difficult to treat pneumonia.  The pneumonia was secondary to aspiration of chewing tobacco pouches.  Since then he has had difficulty with chronic dyspnea and cough.  He continues to use his albuterol inhaler multiple times daily.  Last visit we discussed starting maintenance inhaler with Stiolto.  He reports he was not informed by his pharmacy that this inhaler was ready for pickup.  He has not yet started the Stiolto.    He continues to follow with GI for history of  achalasia.  He does note increased difficulty swallowing.  He has a follow-up with GI later for what he describes as a sphincter myomotomy.    Review of Systems:  A full 12-point review of systems was performed and was negative except as documented in the HPI.            Physical Exam:  General: No acute distress, cooperative.  Head: Atraumatic, normocephalic, normal inspection.  Eyes: EOMI, normal inspection.  ENT: Mucous membranes moist, normal inspection. No thrush.  Heart: RRR, no murmur  Lungs: Clear to auscultation bilaterally, no wheezing  MSK: No edema or clubbing  GI/abdominal: Soft, nontender.  Neurologic: Alert, oriented.  Cranial nerves II through XII grossly intact.      Imaging Review:  I personally reviewed the low-dose CT done 10/14/2024:  CT showed normal airways and lung parenchyma other than a very small 5 mm subpleural right lower lobe pulmonary nodule.      Pulmonary Functions Testing Results:              Problem List:  Problem List Items Addressed This Visit          Endocrine and Metabolic    Morbid (severe) obesity due to excess calories       Gastrointestinal Abdominal     Dysphagia    Overview     Added automatically from request for surgery [id number]            Pulmonary and Pneumonias    Pulmonary nodule - Primary    Relevant Medications    benzonatate (TESSALON) 100 MG capsule    tiotropium bromide-olodaterol (STIOLTO RESPIMAT) 2.5-2.5 MCG/ACT aerosol solution inhaler    Chronic respiratory failure with hypoxia    Chronic cough       Sleep    ELIO (obstructive sleep apnea)       Symptoms and Signs    Chronic dyspnea       Tobacco    Personal history of tobacco use, presenting hazards to health         Pulmonary Assessment:  Patient is a 59-year-old male with history of chronic hypoxic respiratory failure following a severe pneumonia requiring prolonged intubation in the ICU.  He continues to note chronic dyspnea and cough.  He did not yet fill the Stiolto which was started last visit, I  have resent this prescription to his pharmacy.  He will continue his as needed albuterol.  We will obtain a follow-up CT in 6 months for his right lower lobe pulmonary nodule.      Plan:   -Resend the Stiolto prescription  -Continue as needed albuterol  -Follow-up chest CT in 6 months (rather than 12)  -Continue Trelegy and supplemental oxygen at night  -Declines all vaccines           Follow Up:  6 months         Thank you Halle Baron MD for allowing me to participate in this patient's care.           Past Medical History:   Past Medical History:   Diagnosis Date   • Anxiety    • Arthritis    • Crohn's disease    • Depression    • Hypertension          Past Surgical History:   Past Surgical History:   Procedure Laterality Date   • ABDOMINAL HERNIA REPAIR     • COLON RESECTION  1988   • COLONOSCOPY  10/26/2015    internal hemorrhoids,otherwise normal postoperative colonoscopy with the evaluation of his ti   • ENDOSCOPY N/A 11/1/2021    Procedure: ESOPHAGOGASTRODUODENOSCOPY;  Surgeon: Kain Delgadillo MD;  Location: [facility] ENDOSCOPY;  Service: Gastroenterology;  Laterality: N/A;         Family History:   No family history on file.      Medications:   Prior to Admission medications    Medication Sig Start Date End Date Taking? Authorizing Provider   Adalimumab (Humira, 2 Pen,) 40 MG/0.8ML Pen-injector Kit Inject 0.8 mL every 2 weeks by subcutaneous route.   Yes Waldo Benjamin MD   albuterol sulfate  (90 Base) MCG/ACT inhaler Inhale 2 puffs Every 4 (Four) Hours As Needed for Wheezing. 9/18/24  Yes Gayle Medina,    amLODIPine (NORVASC) 10 MG tablet Take 1 tablet by mouth Daily.   Yes Waldo Benjamin MD   bumetanide (BUMEX) 2 MG tablet Take 1 tablet by mouth Daily. 8/31/23  Yes Waldo Benjamin MD   clonazePAM (KlonoPIN) 0.5 MG tablet Take 1 tablet by mouth 2 (Two) Times a Day.   Yes Waldo Benjamin MD   dicyclomine (BENTYL) 10 MG capsule Take 2 capsules by mouth 4  (Four) Times a Day.   Yes Waldo Benjamin MD   hydrALAZINE (APRESOLINE) 25 MG tablet  3/1/24  Yes Waldo Benjamin MD   loperamide (IMODIUM) 2 MG capsule Take 1 capsule by mouth.   Yes Waldo Benjamin MD   losartan (COZAAR) 100 MG tablet  1/2/24  Yes Waldo Benjamin MD   metoprolol succinate XL (TOPROL-XL) 100 MG 24 hr tablet Take 1 tablet by mouth Daily.   Yes Waldo Benjamin MD   naloxone (NARCAN) 4 MG/0.1ML nasal spray Call 911. Don't prime. [place] in 1 nostril for overdose. Repeat in 2-3 minutes in other nostril if no or minimal breathing/responsiveness. 6/4/23  Yes Lamberto Mojica DO   oxyCODONE-acetaminophen (PERCOCET) 7.5-325 MG per tablet Take 1 tablet by mouth Every 8 (Eight) Hours As Needed.   Yes Waldo Benjamin MD   pantoprazole (PROTONIX) 40 MG EC tablet Take 1 tablet by mouth. 6/1/23  Yes Waldo Benjamin MD   sertraline (ZOLOFT) 100 MG tablet Take 1 tablet by mouth 2 (Two) Times a Day.   Yes Waldo Benjamin MD   sulindac (CLINORIL) 150 MG tablet Take 1 tablet by mouth.   Yes Waldo Benjamin MD   TiZANidine (ZANAFLEX) 4 MG capsule Every 6 (Six) Hours.   Yes Waldo Benjamin MD   ALPRAZolam (XANAX) 0.5 MG tablet Take 1 tablet by mouth 2 (Two) Times a Day.  Patient not taking: Reported on 10/21/2024    Waldo Benjamin MD   benzonatate (TESSALON) 100 MG capsule Take 1 capsule by mouth 3 (Three) Times a Day As Needed.  Patient not taking: Reported on 10/21/2024    Waldo Benjamin MD   esomeprazole (nexIUM) 20 MG capsule Take 1 capsule by mouth Daily.  Patient not taking: Reported on 10/21/2024    Waldo Benjamin MD   furosemide (LASIX) 40 MG tablet Take 1 tablet by mouth Daily.  Patient not taking: Reported on 10/21/2024    Waldo Benjamin MD   HYDROcodone-acetaminophen (NORCO) 7.5-325 MG per tablet Take 1 tablet by mouth Every 6 (Six) Hours As Needed.  Patient not taking: Reported on 10/21/2024    Provider, MD Waldo  "  hydrOXYzine (ATARAX) 25 MG tablet TAKE ONE TABLET EVERY 8 HOURS AS NEEDED FOR ANXIETY  Patient not taking: Reported on 10/21/2024    Provider, MD Waldo   lisinopril (PRINIVIL,ZESTRIL) 40 MG tablet Take 1 tablet by mouth Daily.  Patient not taking: Reported on 10/21/2024    Provider, MD Waldo   tiotropium bromide-olodaterol (STIOLTO RESPIMAT) 2.5-2.5 MCG/ACT aerosol solution inhaler Inhale 2 puffs Daily.  Patient not taking: Reported on 10/21/2024 9/18/24   Gayle Medina DO         Allergies:   Ketamine hcl      Results Review:             Visit Vitals  Ht 175.3 cm (69.02\")   Wt (!) 144 kg (316 lb 6.4 oz)   BMI 46.70 kg/m²           Social History:     Social History     Socioeconomic History   • Marital status:    Tobacco Use   • Smoking status: Former   • Smokeless tobacco: Never   Vaping Use   • Vaping status: Never Used   Substance and Sexual Activity   • Alcohol use: Yes     Comment: very rarely   • Drug use: No                Gayle Medina DO  Pulmonary/Critical Care  10/21/2024  14:48 CDT  "

## 2024-10-21 NOTE — PROCEDURES
Spirometry with Diffusion Capacity & Lung Volumes    Performed by: Amy Tobin, RRT  Authorized by: Gayle Medina DO     Pre Drug % Predicted    FVC: 67%   FEV1: 67%   FEF 25-75%: 67%   FEV1/FVC: 78%   T%   RV: 89%   DLCO: 78%   D/VAsb: 107%    Interpretation   Spirometry   Spirometry shows mild restriction. midflow is normal.  Review of FVL curve   Patient's effort is normal.   Lung Volume Measurements  Measurements show reduced lung volumes consistent with restriction.   Diffusion Capacity  The patient's diffusion capacity is normal.  Diffusion capacity is normal when corrected for alveolar volume.

## 2024-10-21 NOTE — PROGRESS NOTES
Clinic Note - Follow Up            NAME: Jon Gill  MRN: [id number]  AGE: 59 y.o.            : 1965  PRIMARY CARE PROVIDER: Halle Baron MD               Reason for Visit:  Jon Gill presents to clinic today for follow up for the evaluation of chronic hypoxic respiratory failure.    History of Present Illness:  Mr. Gill is a 59-year-old male who presents to clinic today as a follow-up for chronic hypoxic respiratory failure.  Past medical history includes chronic hypoxic respiratory failure, chronic dyspnea, foreign body aspiration with severe pneumonia, ELIO on home trilogy, GERD, former tobacco use.    Patient was last seen in clinic on 2024 as a new patient.  He has a history of chronic hypoxic respiratory failure requiring 2 L of supplemental oxygen at night.  He also uses a trilogy at night for ELIO.  He was started on oxygen after a prolonged ICU stay in May of last year due to difficult to treat pneumonia.  The pneumonia was secondary to aspiration of chewing tobacco pouches.  Since then he has had difficulty with chronic dyspnea and cough.  He continues to use his albuterol inhaler multiple times daily.  Last visit we discussed starting maintenance inhaler with Stiolto.  He reports he was not informed by his pharmacy that this inhaler was ready for pickup.  He has not yet started the Stiolto.    He continues to follow with GI for history of achalasia.  He does note increased difficulty swallowing.  He has a follow-up with GI later for what he describes as a sphincter myomotomy.    Review of Systems:  A full 12-point review of systems was performed and was negative except as documented in the HPI.            Physical Exam:  General: No acute distress, cooperative.  Head: Atraumatic, normocephalic, normal inspection.  Eyes: EOMI, normal inspection.  ENT: Mucous membranes moist, normal inspection. No thrush.  Heart: RRR, no murmur  Lungs: Clear to auscultation  bilaterally, no wheezing  MSK: No edema or clubbing  GI/abdominal: Soft, nontender.  Neurologic: Alert, oriented.  Cranial nerves II through XII grossly intact.      Imaging Review:  I personally reviewed the low-dose CT done 10/14/2024:  CT showed normal airways and lung parenchyma other than a very small 5 mm subpleural right lower lobe pulmonary nodule.      Pulmonary Functions Testing Results:              Problem List:  Problem List Items Addressed This Visit          Endocrine and Metabolic    Morbid (severe) obesity due to excess calories       Gastrointestinal Abdominal     Dysphagia    Overview     Added automatically from request for surgery [id number]            Pulmonary and Pneumonias    Pulmonary nodule - Primary    Relevant Medications    benzonatate (TESSALON) 100 MG capsule    tiotropium bromide-olodaterol (STIOLTO RESPIMAT) 2.5-2.5 MCG/ACT aerosol solution inhaler    Chronic respiratory failure with hypoxia    Chronic cough       Sleep    ELIO (obstructive sleep apnea)       Symptoms and Signs    Chronic dyspnea       Tobacco    Personal history of tobacco use, presenting hazards to health         Pulmonary Assessment:  Patient is a 59-year-old male with history of chronic hypoxic respiratory failure following a severe pneumonia requiring prolonged intubation in the ICU.  He continues to note chronic dyspnea and cough.  He did not yet fill the Stiolto which was started last visit, I have resent this prescription to his pharmacy.  He will continue his as needed albuterol.  We will obtain a follow-up CT in 6 months for his right lower lobe pulmonary nodule.      Plan:   -Resend the Stiolto prescription  -Continue as needed albuterol  -Follow-up chest CT in 6 months (rather than 12)  -Continue Trelegy and supplemental oxygen at night  -Declines all vaccines           Follow Up:  6 months         Thank you Halle Baron MD for allowing me to participate in this patient's care.           Past Medical  History:   Past Medical History:   Diagnosis Date    Anxiety     Arthritis     Crohn's disease     Depression     Hypertension          Past Surgical History:   Past Surgical History:   Procedure Laterality Date    ABDOMINAL HERNIA REPAIR      COLON RESECTION  1988    COLONOSCOPY  10/26/2015    internal hemorrhoids,otherwise normal postoperative colonoscopy with the evaluation of his ti    ENDOSCOPY N/A 11/1/2021    Procedure: ESOPHAGOGASTRODUODENOSCOPY;  Surgeon: Kain Delgadillo MD;  Location: [facility] ENDOSCOPY;  Service: Gastroenterology;  Laterality: N/A;         Family History:   No family history on file.      Medications:   Prior to Admission medications    Medication Sig Start Date End Date Taking? Authorizing Provider   Adalimumab (Humira, 2 Pen,) 40 MG/0.8ML Pen-injector Kit Inject 0.8 mL every 2 weeks by subcutaneous route.   Yes Waldo Benjamin MD   albuterol sulfate  (90 Base) MCG/ACT inhaler Inhale 2 puffs Every 4 (Four) Hours As Needed for Wheezing. 9/18/24  Yes Gayle Medina,    amLODIPine (NORVASC) 10 MG tablet Take 1 tablet by mouth Daily.   Yes Waldo Benjamin MD   bumetanide (BUMEX) 2 MG tablet Take 1 tablet by mouth Daily. 8/31/23  Yes Waldo Benjamin MD   clonazePAM (KlonoPIN) 0.5 MG tablet Take 1 tablet by mouth 2 (Two) Times a Day.   Yes Waldo Benjamin MD   dicyclomine (BENTYL) 10 MG capsule Take 2 capsules by mouth 4 (Four) Times a Day.   Yes Waldo Benjamin MD   hydrALAZINE (APRESOLINE) 25 MG tablet  3/1/24  Yes Waldo Benjamin MD   loperamide (IMODIUM) 2 MG capsule Take 1 capsule by mouth.   Yes Waldo Benjamin MD   losartan (COZAAR) 100 MG tablet  1/2/24  Yes Waldo Benjamin MD   metoprolol succinate XL (TOPROL-XL) 100 MG 24 hr tablet Take 1 tablet by mouth Daily.   Yes Waldo Benjamin MD   naloxone (NARCAN) 4 MG/0.1ML nasal spray Call 911. Don't prime. [place] in 1 nostril for overdose. Repeat in 2-3 minutes in  other nostril if no or minimal breathing/responsiveness. 6/4/23  Yes Lamberto Mojica DO   oxyCODONE-acetaminophen (PERCOCET) 7.5-325 MG per tablet Take 1 tablet by mouth Every 8 (Eight) Hours As Needed.   Yes Waldo Benjamin MD   pantoprazole (PROTONIX) 40 MG EC tablet Take 1 tablet by mouth. 6/1/23  Yes Waldo Benjamin MD   sertraline (ZOLOFT) 100 MG tablet Take 1 tablet by mouth 2 (Two) Times a Day.   Yes Waldo Benjamin MD   sulindac (CLINORIL) 150 MG tablet Take 1 tablet by mouth.   Yes Waldo Benjamin MD   TiZANidine (ZANAFLEX) 4 MG capsule Every 6 (Six) Hours.   Yes Waldo Benjamin MD   ALPRAZolam (XANAX) 0.5 MG tablet Take 1 tablet by mouth 2 (Two) Times a Day.  Patient not taking: Reported on 10/21/2024    Waldo Benjamin MD   benzonatate (TESSALON) 100 MG capsule Take 1 capsule by mouth 3 (Three) Times a Day As Needed.  Patient not taking: Reported on 10/21/2024    Waldo Benjamin MD   esomeprazole (nexIUM) 20 MG capsule Take 1 capsule by mouth Daily.  Patient not taking: Reported on 10/21/2024    Waldo Benjamin MD   furosemide (LASIX) 40 MG tablet Take 1 tablet by mouth Daily.  Patient not taking: Reported on 10/21/2024    Waldo Benjamin MD   HYDROcodone-acetaminophen (NORCO) 7.5-325 MG per tablet Take 1 tablet by mouth Every 6 (Six) Hours As Needed.  Patient not taking: Reported on 10/21/2024    Waldo Benjamin MD   hydrOXYzine (ATARAX) 25 MG tablet TAKE ONE TABLET EVERY 8 HOURS AS NEEDED FOR ANXIETY  Patient not taking: Reported on 10/21/2024    Waldo Benjamin MD   lisinopril (PRINIVIL,ZESTRIL) 40 MG tablet Take 1 tablet by mouth Daily.  Patient not taking: Reported on 10/21/2024    Waldo Benjamin MD   tiotropium bromide-olodaterol (STIOLTO RESPIMAT) 2.5-2.5 MCG/ACT aerosol solution inhaler Inhale 2 puffs Daily.  Patient not taking: Reported on 10/21/2024 9/18/24   Gayle Medina DO         Allergies:   Ketamine  "hcl      Results Review:             Visit Vitals  Ht 175.3 cm (69.02\")   Wt (!) 144 kg (316 lb 6.4 oz)   BMI 46.70 kg/m²           Social History:     Social History     Socioeconomic History    Marital status:    Tobacco Use    Smoking status: Former    Smokeless tobacco: Never   Vaping Use    Vaping status: Never Used   Substance and Sexual Activity    Alcohol use: Yes     Comment: very rarely    Drug use: No                Gayle Medina DO  Pulmonary/Critical Care  10/21/2024  14:48 CDT  "

## 2025-01-15 ENCOUNTER — OFFICE VISIT (OUTPATIENT)
Dept: GASTROENTEROLOGY | Age: 60
End: 2025-01-15
Payer: COMMERCIAL

## 2025-01-15 VITALS
HEIGHT: 69 IN | DIASTOLIC BLOOD PRESSURE: 65 MMHG | SYSTOLIC BLOOD PRESSURE: 139 MMHG | WEIGHT: 315 LBS | BODY MASS INDEX: 46.65 KG/M2 | OXYGEN SATURATION: 93 % | HEART RATE: 82 BPM

## 2025-01-15 DIAGNOSIS — D64.9 ANEMIA, UNSPECIFIED TYPE: ICD-10-CM

## 2025-01-15 DIAGNOSIS — R19.7 DIARRHEA, UNSPECIFIED TYPE: ICD-10-CM

## 2025-01-15 DIAGNOSIS — K50.119 CROHN'S DISEASE OF COLON WITH COMPLICATION (HCC): Primary | ICD-10-CM

## 2025-01-15 DIAGNOSIS — K62.5 RECTAL BLEEDING: ICD-10-CM

## 2025-01-15 PROCEDURE — 3075F SYST BP GE 130 - 139MM HG: CPT | Performed by: NURSE PRACTITIONER

## 2025-01-15 PROCEDURE — 3078F DIAST BP <80 MM HG: CPT | Performed by: NURSE PRACTITIONER

## 2025-01-15 PROCEDURE — 99214 OFFICE O/P EST MOD 30 MIN: CPT | Performed by: NURSE PRACTITIONER

## 2025-01-15 RX ORDER — HYDRALAZINE HYDROCHLORIDE 25 MG/1
25 TABLET, FILM COATED ORAL 2 TIMES DAILY
COMMUNITY

## 2025-01-15 RX ORDER — LOSARTAN POTASSIUM 100 MG/1
100 TABLET ORAL DAILY
COMMUNITY

## 2025-01-15 RX ORDER — ALBUTEROL SULFATE 90 UG/1
2 INHALANT RESPIRATORY (INHALATION) EVERY 6 HOURS PRN
COMMUNITY

## 2025-01-15 RX ORDER — ACETAMINOPHEN 325 MG/1
650 TABLET ORAL EVERY 6 HOURS PRN
COMMUNITY

## 2025-01-15 RX ORDER — PANTOPRAZOLE SODIUM 40 MG/1
40 TABLET, DELAYED RELEASE ORAL
Qty: 60 TABLET | Refills: 5 | Status: SHIPPED | OUTPATIENT
Start: 2025-01-15

## 2025-01-15 NOTE — PROGRESS NOTES
Subjective:     Patient ID: Jose Sood is a 60 y.o. male  PCP: Nicki Babb MD  Referring Provider: Nicki Babb MD    HPI  Patient presents to the office today with the following complaints: Anemia      Patient seen in the office today with complaints of anemia and seeing blood in his stool  Reports the blood is bright red and was seen in the toilet and lasted 1-2 days   Diagnosed with crohn's disease in his 20's and had a colon resection due to a stricture from crohn's states he is not currently on treatment for his crohn's     Surgery for esophageal achalasia 11/2024  Had follow up in December per video and was told no further follow up needed in [place]    H&H 10.3 & 33.5 1/3/2025    He has been off his Humira which he is taking for psoriatic arthritis, I suspect the Humira was not only for his psoriatic arthritis but also for his crohn's disease which could be the reason for his recent rectal bleeding and diarrhea  He is currently working with rheumatology for financial assistance for his Humira   He most recently had a colonoscopy by Dr. Martino 4/2023 in [place]     Rheumatology in [place] Dr. Salmeron   Rheumatology is monitoring labs for Humira     Assessment:     1. Crohn's disease of colon with complication (HCC)  -     Calprotectin Stool; Future  2. Diarrhea, unspecified type  3. Rectal bleeding  4. Anemia, unspecified type       Review of Systems   Constitutional:  Negative for activity change, appetite change, fatigue, fever and unexpected weight change.   HENT:  Negative for trouble swallowing.    Respiratory:  Negative for cough, choking and shortness of breath.    Cardiovascular:  Negative for chest pain.   Gastrointestinal:  Positive for anal bleeding and diarrhea. Negative for abdominal distention, abdominal pain, blood in stool, constipation, nausea, rectal pain and vomiting.   Allergic/Immunologic: Negative for food allergies.   All other systems reviewed and are negative.      Plan:

## 2025-01-16 ASSESSMENT — ENCOUNTER SYMPTOMS
TROUBLE SWALLOWING: 0
VOMITING: 0
COUGH: 0
NAUSEA: 0
BLOOD IN STOOL: 0
ABDOMINAL DISTENTION: 0
DIARRHEA: 1
SHORTNESS OF BREATH: 0
CONSTIPATION: 0
CHOKING: 0
RECTAL PAIN: 0
ANAL BLEEDING: 1
ABDOMINAL PAIN: 0

## 2025-02-19 ENCOUNTER — ANCILLARY PROCEDURE (OUTPATIENT)
Dept: ENDOSCOPY | Age: 60
End: 2025-02-19
Attending: INTERNAL MEDICINE
Payer: COMMERCIAL

## 2025-02-19 ENCOUNTER — HOSPITAL ENCOUNTER (OUTPATIENT)
Age: 60
Setting detail: OUTPATIENT SURGERY
Discharge: HOME | End: 2025-02-19
Attending: INTERNAL MEDICINE | Admitting: INTERNAL MEDICINE
Payer: COMMERCIAL

## 2025-02-19 ENCOUNTER — ANESTHESIA EVENT (OUTPATIENT)
Dept: ENDOSCOPY | Age: 60
End: 2025-02-19
Payer: COMMERCIAL

## 2025-02-19 ENCOUNTER — ANESTHESIA (OUTPATIENT)
Dept: ENDOSCOPY | Age: 60
End: 2025-02-19
Payer: COMMERCIAL

## 2025-02-19 VITALS
SYSTOLIC BLOOD PRESSURE: 139 MMHG | HEIGHT: 69 IN | TEMPERATURE: 98.4 F | WEIGHT: 315 LBS | HEART RATE: 79 BPM | DIASTOLIC BLOOD PRESSURE: 87 MMHG | BODY MASS INDEX: 46.65 KG/M2 | OXYGEN SATURATION: 98 % | RESPIRATION RATE: 18 BRPM

## 2025-02-19 DIAGNOSIS — D64.9 ANEMIA, UNSPECIFIED TYPE: ICD-10-CM

## 2025-02-19 DIAGNOSIS — R19.7 DIARRHEA, UNSPECIFIED TYPE: ICD-10-CM

## 2025-02-19 DIAGNOSIS — Z87.19 HX OF CROHN'S DISEASE: ICD-10-CM

## 2025-02-19 DIAGNOSIS — K62.5 RECTAL BLEEDING: ICD-10-CM

## 2025-02-19 PROCEDURE — 3700000001 HC ADD 15 MINUTES (ANESTHESIA): Performed by: INTERNAL MEDICINE

## 2025-02-19 PROCEDURE — 2709999900 HC NON-CHARGEABLE SUPPLY: Performed by: INTERNAL MEDICINE

## 2025-02-19 PROCEDURE — 3609010300 HC COLONOSCOPY W/BIOPSY SINGLE/MULTIPLE: Performed by: INTERNAL MEDICINE

## 2025-02-19 PROCEDURE — 7100000010 HC PHASE II RECOVERY - FIRST 15 MIN: Performed by: INTERNAL MEDICINE

## 2025-02-19 PROCEDURE — 88305 TISSUE EXAM BY PATHOLOGIST: CPT

## 2025-02-19 PROCEDURE — 6360000002 HC RX W HCPCS

## 2025-02-19 PROCEDURE — 3700000000 HC ANESTHESIA ATTENDED CARE: Performed by: INTERNAL MEDICINE

## 2025-02-19 PROCEDURE — 2580000003 HC RX 258: Performed by: INTERNAL MEDICINE

## 2025-02-19 PROCEDURE — 7100000011 HC PHASE II RECOVERY - ADDTL 15 MIN: Performed by: INTERNAL MEDICINE

## 2025-02-19 RX ORDER — LIDOCAINE HYDROCHLORIDE 10 MG/ML
INJECTION, SOLUTION INFILTRATION; PERINEURAL
Status: DISCONTINUED | OUTPATIENT
Start: 2025-02-19 | End: 2025-02-19 | Stop reason: SDUPTHER

## 2025-02-19 RX ORDER — SODIUM CHLORIDE, SODIUM LACTATE, POTASSIUM CHLORIDE, CALCIUM CHLORIDE 600; 310; 30; 20 MG/100ML; MG/100ML; MG/100ML; MG/100ML
INJECTION, SOLUTION INTRAVENOUS CONTINUOUS
Status: DISCONTINUED | OUTPATIENT
Start: 2025-02-19 | End: 2025-02-19 | Stop reason: HOSPADM

## 2025-02-19 RX ORDER — PROPOFOL 10 MG/ML
INJECTION, EMULSION INTRAVENOUS
Status: DISCONTINUED | OUTPATIENT
Start: 2025-02-19 | End: 2025-02-19 | Stop reason: SDUPTHER

## 2025-02-19 RX ADMIN — LIDOCAINE HYDROCHLORIDE 100 MG: 10 INJECTION, SOLUTION INFILTRATION; PERINEURAL at 09:59

## 2025-02-19 RX ADMIN — PROPOFOL 600 MG: 10 INJECTION, EMULSION INTRAVENOUS at 09:59

## 2025-02-19 RX ADMIN — SODIUM CHLORIDE, POTASSIUM CHLORIDE, SODIUM LACTATE AND CALCIUM CHLORIDE: 600; 310; 30; 20 INJECTION, SOLUTION INTRAVENOUS at 09:16

## 2025-02-19 ASSESSMENT — PAIN - FUNCTIONAL ASSESSMENT: PAIN_FUNCTIONAL_ASSESSMENT: 0-10

## 2025-02-19 NOTE — DISCHARGE INSTRUCTIONS
Recommendations:  1. Repeat colonoscopy: pending pathology - max of 1-2 yrs for screening  2. Await biopsy results.   3. I would follow up in the office in the next 3-4 weeks    Colonoscopy: What to Expect at Home  Your Recovery    After the test, you may be bloated or have gas pains. You may need to pass gas. If a biopsy was done or a polyp was removed, you may have streaks of blood in your stool (feces) for a few days. Problems such as heavy rectal bleeding may not occur until several weeks after the test. This isn't common. But it can happen after polyps are removed.  This care sheet gives you a general idea about how long it will take for you to recover. But each person recovers at a different pace. Follow the steps below to get better as quickly as possible.    How can you care for yourself at home?  Activity    Rest when you feel tired.     You can do your normal activities when it feels okay to do so.   Diet    You may resume your regular diet.     Drink plenty of fluids. This helps to replace the fluids that were lost during the colon prep.     Do not drink alcohol.   Medicines    Your doctor will tell you if and when you can restart your medicines. You will also be given instructions about taking any new medicines.     If you stopped taking aspirin or some other blood thinner, your doctor will tell you when to start taking it again.     If polyps were removed or a biopsy was done during the test, your doctor may tell you not to take aspirin or other anti-inflammatory medicines for a few days. These include ibuprofen (Advil, Motrin) and naproxen (Aleve).   Other instructions    For your safety, do not drive or operate machinery for 24 hours.     Do not sign legal documents or make major decisions until the medicine wears off and you can think clearly. The anesthesia can make it hard for you to fully understand what you are agreeing to, and cause impaired judgement.     When should you call for help?   Call

## 2025-02-19 NOTE — H&P
Patient Name: Jose Sood  : 1965  MRN: [id number]  DATE: 25    Allergies:   Allergies   Allergen Reactions    Ketamine Hcl Anaphylaxis        ENDOSCOPY  History and Physical    Procedure:    [x] Diagnostic Colonoscopy       [] Screening Colonoscopy  [] EGD      [] ERCP      [] EUS       [] Other    [x] Previous office notes/History and Physical reviewed from the patients chart. Please see EMR for further details of HPI. I have examined the patient's status immediately prior to the procedure and:      Indications/HPI:    []Abdominal Pain   []Barretts  []Screening/Surveillance   []History of Polyps  []Dysphagia            [] +Cologard/DNA testing  []Abnormal Imaging              []EOE Hx              [] Family Hx of CRC/Polyps  []Anemia                            []Food Impaction       []Recent Poor Prep  []GI Bleed             []Lymphadenopathy  []History of Polyps  []Change in bowel habits []Heartburn/Reflux  []Cancer- GI/Lung  []Chest Pain - Non Cardiac []Heme (+) Stool []Ulcers  []Constipation  []Hemoptysis  []Incontinence    []Diarrhea  []Hypoxemia  []Rectal Bleed (BRBPR)  []Nausea/Vomiting   [] Varices  [x]Crohns/Colitis  []Pancreatic Cyst   [] Cirrhosis   []Pancreatitis    []Abnormal MRCP  []Elevated LFT [] Stent Removal, Previous ERCP  []Other:     Anesthesia:   [x] MAC [] Moderate Sedation   [] General   [] None     ROS: 12 pt Review of Symptoms was negative unless mentioned above    Medications:   Prior to Admission medications    Medication Sig Start Date End Date Taking? Authorizing Provider   NONFORMULARY Take 10 mg by mouth daily [place]  10 mg po daily   Yes ProviderJesus MD   acetaminophen (TYLENOL) 325 MG tablet Take 2 tablets by mouth every 6 hours as needed for Pain   Yes ProviderJesus MD   hydrALAZINE (APRESOLINE) 25 MG tablet Take 1 tablet by mouth in the morning and at bedtime   Yes Jesus Gonzalez MD   losartan (COZAAR) 100 MG tablet Take 1 tablet by

## 2025-02-19 NOTE — OP NOTE
Patient: Jose Sood : 1965  [facility] Rec#: [id number] Acc#: [id number]   Primary Care Provider Nicki Babb MD    Date of Procedure:  2025    Endoscopist: Dez Martinez MD    Referring Provider: Nicki Babb MD, C Thornton APRN, D Desia (Rheumatology in [place])    Operation Performed: Colonoscopy with biopsy    Indications: h/o IBD with previous R partial colectomy. Off Humira currently.     Anesthesia:  Sedation was administered by anesthesia who monitored the patient during the procedure.    I met with Jose Sood prior to procedure. We discussed the procedure itself, and I have discussed the risks of endoscopy (including-- but not limited to-- pain, discomfort, bleeding potentially requiring second endoscopic procedure and/or blood transfusion, organ perforation requiring operative repair, damage to organs near the colon, infection, aspiration, cardiopulmonary/allergic reaction), benefits, indications to endoscopy. Additionally, we discussed options other than colonoscopy. The patient expressed understanding. All questions answered. The patient decided to proceed with the procedure.  Signed informed consent was placed on the chart.    Blood Loss: minimal    Withdrawal time: n/a  Bowel Prep: adequate     Complications: no immediate complications    DESCRIPTION OF PROCEDURE:     A time out was performed. After written informed consent was obtained, the patient was placed in the left lateral position.     The perianal area was inspected, and a digital rectal exam was performed. A rectal exam was performed: normal tone, no palpable lesions. At this point, a forward viewing Olympus colonoscope was inserted into the anus and carefully advanced to the right sided anastomosis.  The colonoscope was then slowly withdrawn with careful inspection of the mucosa in a linear and circumferential fashion. The scope was retroflexed in the rectum. Suction was utilized during the procedure to remove as much

## 2025-02-19 NOTE — ANESTHESIA POSTPROCEDURE EVALUATION
Department of Anesthesiology  Postprocedure Note    Patient: Jose Sood  MRN: [id number]  YOB: 1965  Date of evaluation: 2/19/2025    Procedure Summary       Date: 02/19/25 Room / Location: [place] [address] / [facility]    Anesthesia Start: 0955 Anesthesia Stop: 1016    Procedure: COLONOSCOPY BIOPSY Diagnosis:       Hx of Crohn's disease      Rectal bleeding      Diarrhea, unspecified type      Anemia, unspecified type      (Hx of Crohn's disease [Z87.19])      (Rectal bleeding [K62.5])      (Diarrhea, unspecified type [R19.7])      (Anemia, unspecified type [D64.9])    Surgeons: Dez Martinez MD Responsible Provider: Ashanti Pascual APRN - CRNA    Anesthesia Type: general, TIVA ASA Status: 3            Anesthesia Type: No value filed.    Sean Phase I: Sean Score: 10    Sean Phase II:      Anesthesia Post Evaluation    Patient location during evaluation: bedside  Patient participation: complete - patient participated  Level of consciousness: awake  Pain score: 0  Airway patency: patent  Nausea & Vomiting: no nausea and no vomiting  Cardiovascular status: hemodynamically stable  Respiratory status: acceptable and spontaneous ventilation  Hydration status: stable  Pain management: adequate    No notable events documented.

## 2025-03-11 ENCOUNTER — TELEPHONE (OUTPATIENT)
Dept: PULMONOLOGY | Facility: CLINIC | Age: 60
End: 2025-03-11
Payer: COMMERCIAL

## 2025-03-11 NOTE — TELEPHONE ENCOUNTER
Received fax from [facility] pharmacy for prior authorization for Stiolto inhaler. PA was started and pending in cover my meds.

## 2025-03-21 ENCOUNTER — TELEPHONE (OUTPATIENT)
Dept: PULMONOLOGY | Facility: CLINIC | Age: 60
End: 2025-03-21
Payer: COMMERCIAL

## 2025-03-21 NOTE — TELEPHONE ENCOUNTER
Tried to call patient regarding pharmacy if he would like script sent to local pharmacy in [place] or send it through specialty [facility] located in [place] per Prior authorization through cover my meds. Not able to reach patient left a voicemail to return call to office.

## 2025-04-14 DIAGNOSIS — R05.3 CHRONIC COUGH: Primary | ICD-10-CM

## 2025-04-14 NOTE — TELEPHONE ENCOUNTER
"Voicemail received from [facility] pharmacy out of [place]. The message said the \"patient was expecting a prescription to be sent to them\".    Tried to call the patient back to verify that he needs a refill and to verify the pharmacy.  "

## 2025-04-15 NOTE — TELEPHONE ENCOUNTER
Per patient he does not want the prescription for Stiolto to be sent in at this time. He said he will talk to Dr. Medina at his appointment on 5/5/25 to discuss.

## 2025-04-15 NOTE — TELEPHONE ENCOUNTER
NAM AT [facility] WANTS TO MAKE SURE THAT YOU HAVE THEIR FAX NUMBER FOR THE REFILL REQUEST    FAX: [phone]

## 2025-04-16 ENCOUNTER — TELEPHONE (OUTPATIENT)
Dept: PULMONOLOGY | Facility: CLINIC | Age: 60
End: 2025-04-16

## 2025-04-16 NOTE — TELEPHONE ENCOUNTER
Spoke with Dorothy with [facility] service stating patient is needing a refill on Stiolto medication. Per telephone encounter on 4/15/25 :  Per patient he does not want the prescription for Stiolto to be sent in at this time. He said he will talk to Dr. Medina at his appointment on 5/5/25 to discuss.    Pharmacy has noted their chart in system and stated once patient has discussed with provider on 5/5/25 then have patient to call us back to fill medication.

## 2025-04-21 ENCOUNTER — HOSPITAL ENCOUNTER (OUTPATIENT)
Dept: CT IMAGING | Facility: HOSPITAL | Age: 60
Discharge: HOME | End: 2025-04-21

## 2025-05-05 ENCOUNTER — HOSPITAL ENCOUNTER (OUTPATIENT)
Dept: CT IMAGING | Facility: HOSPITAL | Age: 60
Discharge: HOME | End: 2025-05-05
Admitting: INTERNAL MEDICINE
Payer: COMMERCIAL

## 2025-05-05 ENCOUNTER — OFFICE VISIT (OUTPATIENT)
Dept: PULMONOLOGY | Facility: CLINIC | Age: 60
End: 2025-05-05
Payer: COMMERCIAL

## 2025-05-05 VITALS
OXYGEN SATURATION: 98 % | HEIGHT: 69 IN | HEART RATE: 72 BPM | DIASTOLIC BLOOD PRESSURE: 86 MMHG | BODY MASS INDEX: 46.65 KG/M2 | SYSTOLIC BLOOD PRESSURE: 144 MMHG | WEIGHT: 315 LBS

## 2025-05-05 DIAGNOSIS — Z87.891 PERSONAL HISTORY OF TOBACCO USE, PRESENTING HAZARDS TO HEALTH: ICD-10-CM

## 2025-05-05 DIAGNOSIS — R91.1 PULMONARY NODULE: ICD-10-CM

## 2025-05-05 DIAGNOSIS — J96.11 CHRONIC RESPIRATORY FAILURE WITH HYPOXIA: ICD-10-CM

## 2025-05-05 DIAGNOSIS — G47.33 OSA (OBSTRUCTIVE SLEEP APNEA): ICD-10-CM

## 2025-05-05 DIAGNOSIS — J98.4 RESTRICTIVE LUNG DISEASE: Primary | ICD-10-CM

## 2025-05-05 DIAGNOSIS — E66.01 MORBID (SEVERE) OBESITY DUE TO EXCESS CALORIES: ICD-10-CM

## 2025-05-05 DIAGNOSIS — R06.09 CHRONIC DYSPNEA: ICD-10-CM

## 2025-05-05 PROCEDURE — 71250 CT THORAX DX C-: CPT

## 2025-05-05 NOTE — PROGRESS NOTES
Clinic Note - Follow Up            NAME: Jon Gill  MRN: [id number]  AGE: 60 y.o.            : 1965  PRIMARY CARE PROVIDER: Halle Baron MD               Reason for Visit:  Jon Gill presents to clinic today for follow up for the evaluation of chronic dyspnea and hypoxic respiratory failure.    History of Present Illness:  Mr. Gill is a 59-year-old male who presents to clinic today as a follow-up for chronic hypoxic respiratory failure. Past medical history includes chronic hypoxic respiratory failure, chronic dyspnea, foreign body aspiration with severe pneumonia, ELIO on home trilogy, GERD, former tobacco use.     The patient was last seen in clinic on follow-up on 10/21/2024.  The visit prior to that he was started on Stiolto which she had not yet started.  Today the patient states he did fill the Stiolto for 1 month.  He noted no difference in his breathing therefore did not refill it again.  He continues to use albuterol 2-3 days out of the week.  Still using supplemental oxygen at night through his trilogy.  He does note desaturations during the day briefly while walking longer distances.  He will sit to rest and his oxygen level recovers.  Has not started using his oxygen during the day.  Low-dose CT chest completed today which showed a stable small 5 mm right upper lobe nodule, no new pulmonary nodules.    Review of Systems:  A full 12-point review of systems was performed and was negative except as documented in the HPI.            Physical Exam:  General: No acute distress, cooperative.  Head: Atraumatic, normocephalic, normal inspection.  Eyes: EOMI, normal inspection.  ENT: Mucous membranes moist, normal inspection. No thrush.  Heart: RRR, no murmur  Lungs: Diminished, otherwise clear to auscultation  MSK: No edema or clubbing  GI/abdominal: Soft, nontender.  Neurologic: Alert, oriented.  Cranial nerves II through XII grossly intact.      Imaging Review:  I personally  reviewed the chest CT done today:  Stable right upper lobe subpleural pulmonary nodule measuring 5 mm.  No focal consolidation or infiltrate.      Pulmonary Functions Testing Results:  10/2024: Restrictive pattern with a TLC of 73, DLCO 78            Problem List:  Problem List Items Addressed This Visit       Pulmonary nodule    Relevant Medications    Fluticasone-Umeclidin-Vilant (TRELEGY ELLIPTA) 200-62.5-25 MCG/ACT inhaler    Chronic respiratory failure with hypoxia    ELIO (obstructive sleep apnea)    Chronic dyspnea    Morbid (severe) obesity due to excess calories    Restrictive lung disease - Primary    Relevant Medications    Fluticasone-Umeclidin-Vilant (TRELEGY ELLIPTA) 200-62.5-25 MCG/ACT inhaler         Pulmonary Assessment:  Patient is a 60-year-old male being evaluated for chronic dyspnea and pulmonary nodules.  Explained etiologies of his dyspnea in clinic today.  Given his history of smoking and PFTs we will start him on triple therapy to ensure we have no obstruction going untreated as he does get benefit from the albuterol.  We also discussed his PFTs do have a restrictive pattern consistent with obesity.  Discussed weight loss.  CT shows stable right upper lobe pulmonary nodule, repeat low-dose CT in 1 year.      Plan:   -Stop Stiolto and start Trelegy 200  - Continue as needed albuterol  - Low-dose CT in 1 year           Follow Up:  3 months         Thank you Halle Baron MD for allowing me to participate in this patient's care.           Past Medical History:   Past Medical History:   Diagnosis Date    Anxiety     Arthritis     Crohn's disease     Depression     Hypertension          Past Surgical History:   Past Surgical History:   Procedure Laterality Date    ABDOMINAL HERNIA REPAIR      COLON RESECTION  1988    COLONOSCOPY  10/26/2015    internal hemorrhoids,otherwise normal postoperative colonoscopy with the evaluation of his ti    ENDOSCOPY N/A 11/1/2021    Procedure:  ESOPHAGOGASTRODUODENOSCOPY;  Surgeon: Kain Delgadillo MD;  Location: [facility] ENDOSCOPY;  Service: Gastroenterology;  Laterality: N/A;         Family History:   History reviewed. No pertinent family history.      Medications:   Prior to Admission medications    Medication Sig Start Date End Date Taking? Authorizing Provider   albuterol sulfate  (90 Base) MCG/ACT inhaler Inhale 2 puffs Every 4 (Four) Hours As Needed for Wheezing. 9/18/24  Yes Gayle Medina DO   amLODIPine (NORVASC) 10 MG tablet Take 1 tablet by mouth Daily.   Yes ProviderWaldo MD   bumetanide (BUMEX) 2 MG tablet Take 1 tablet by mouth Daily. 8/31/23  Yes Waldo Benjamin MD   clonazePAM (KlonoPIN) 0.5 MG tablet Take 1 tablet by mouth 2 (Two) Times a Day.   Yes Waldo Benjamin MD   dicyclomine (BENTYL) 10 MG capsule Take 2 capsules by mouth 4 (Four) Times a Day.   Yes Waldo Benjamin MD   hydrALAZINE (APRESOLINE) 25 MG tablet  3/1/24  Yes ProviderWaldo MD   loperamide (IMODIUM) 2 MG capsule Take 1 capsule by mouth.   Yes ProviderWaldo MD   losartan (COZAAR) 100 MG tablet  1/2/24  Yes Waldo Benjamin MD   metoprolol succinate XL (TOPROL-XL) 100 MG 24 hr tablet Take 1 tablet by mouth Daily.   Yes ProviderWaldo MD   naloxone (NARCAN) 4 MG/0.1ML nasal spray Call 911. Don't prime. [place] in 1 nostril for overdose. Repeat in 2-3 minutes in other nostril if no or minimal breathing/responsiveness. 6/4/23  Yes Lamberto Mojica DO   oxyCODONE-acetaminophen (PERCOCET) 7.5-325 MG per tablet Take 1 tablet by mouth Every 8 (Eight) Hours As Needed.   Yes Waldo Benjamin MD   pantoprazole (PROTONIX) 40 MG EC tablet Take 1 tablet by mouth. 6/1/23  Yes Waldo Benjamin MD   sertraline (ZOLOFT) 100 MG tablet Take 1 tablet by mouth 2 (Two) Times a Day.   Yes Waldo Benjamin MD   sulindac (CLINORIL) 150 MG tablet Take 1 tablet by mouth.   Yes Waldo Benjamin MD  "  TiZANidine (ZANAFLEX) 4 MG capsule Every 6 (Six) Hours.   Yes Waldo Benjamin MD   Adalimumab (Humira, 2 Pen,) 40 MG/0.8ML Pen-injector Kit Inject 0.8 mL every 2 weeks by subcutaneous route.  Patient not taking: Reported on 5/5/2025    Waldo Benjamin MD   ALPRAZolam (XANAX) 0.5 MG tablet Take 1 tablet by mouth 2 (Two) Times a Day.  Patient not taking: Reported on 5/5/2025    Waldo Benjamin MD   benzonatate (TESSALON) 100 MG capsule Take 1 capsule by mouth 3 (Three) Times a Day As Needed.  Patient not taking: Reported on 5/5/2025    Waldo Benjamin MD   esomeprazole (nexIUM) 20 MG capsule Take 1 capsule by mouth Daily.  Patient not taking: Reported on 9/18/2024    Waldo Benjamin MD   furosemide (LASIX) 40 MG tablet Take 1 tablet by mouth Daily.  Patient not taking: Reported on 5/5/2025    Waldo Benjamin MD   HYDROcodone-acetaminophen (NORCO) 7.5-325 MG per tablet Take 1 tablet by mouth Every 6 (Six) Hours As Needed.  Patient not taking: Reported on 5/5/2025    Waldo Benjamin MD   hydrOXYzine (ATARAX) 25 MG tablet TAKE ONE TABLET EVERY 8 HOURS AS NEEDED FOR ANXIETY  Patient not taking: Reported on 5/5/2025    Waldo Benjamin MD   lisinopril (PRINIVIL,ZESTRIL) 40 MG tablet Take 1 tablet by mouth Daily.  Patient not taking: Reported on 9/18/2024    Waldo Benjamin MD   tiotropium bromide-olodaterol (STIOLTO RESPIMAT) 2.5-2.5 MCG/ACT aerosol solution inhaler Inhale 2 puffs Daily.  Patient not taking: Reported on 5/5/2025 10/21/24   Gayle Medina DO         Allergies:   Ketamine hcl      Results Review:             Visit Vitals  /86   Pulse 72   Ht 175.3 cm (69.02\")   Wt (!) 149 kg (329 lb)   SpO2 98% Comment: RA   BMI 48.56 kg/m²           Social History:     Social History     Socioeconomic History    Marital status:    Tobacco Use    Smoking status: Former     Current packs/day: 0.00     Average packs/day: 0.5 packs/day for 32.0 " years (16.0 ttl pk-yrs)     Types: Cigarettes     Start date: 1983     Quit date: 2015     Years since quitting: 10.3     Passive exposure: Never    Smokeless tobacco: Never   Vaping Use    Vaping status: Never Used   Substance and Sexual Activity    Alcohol use: Yes     Comment: very rarely    Drug use: No                Gayle Medina DO  Pulmonary/Critical Care  5/5/2025  13:22 CDT

## 2025-05-08 ENCOUNTER — RESULTS FOLLOW-UP (OUTPATIENT)
Dept: PULMONOLOGY | Facility: CLINIC | Age: 60
End: 2025-05-08
Payer: COMMERCIAL

## 2025-05-12 ENCOUNTER — HOSPITAL ENCOUNTER (OUTPATIENT)
Dept: PREADMISSION TESTING | Age: 60
Setting detail: OUTPATIENT SURGERY
Discharge: HOME | End: 2025-05-16
Payer: COMMERCIAL

## 2025-05-12 ENCOUNTER — PREP FOR PROCEDURE (OUTPATIENT)
Dept: NEUROSURGERY | Age: 60
End: 2025-05-12

## 2025-05-12 ENCOUNTER — OFFICE VISIT (OUTPATIENT)
Dept: NEUROSURGERY | Age: 60
End: 2025-05-12
Payer: COMMERCIAL

## 2025-05-12 ENCOUNTER — TELEPHONE (OUTPATIENT)
Dept: NEUROSURGERY | Age: 60
End: 2025-05-12

## 2025-05-12 VITALS — BODY MASS INDEX: 46.65 KG/M2 | HEIGHT: 69 IN | WEIGHT: 315 LBS

## 2025-05-12 VITALS
DIASTOLIC BLOOD PRESSURE: 72 MMHG | BODY MASS INDEX: 46.65 KG/M2 | WEIGHT: 315 LBS | HEIGHT: 69 IN | HEART RATE: 70 BPM | SYSTOLIC BLOOD PRESSURE: 118 MMHG

## 2025-05-12 DIAGNOSIS — T85.192A MALFUNCTION OF SPINAL CORD STIMULATOR, INITIAL ENCOUNTER: ICD-10-CM

## 2025-05-12 DIAGNOSIS — Z96.89 S/P INSERTION OF SPINAL CORD STIMULATOR: Primary | ICD-10-CM

## 2025-05-12 DIAGNOSIS — T85.733A INFECTION OF SPINAL CORD STIMULATOR, INITIAL ENCOUNTER: Primary | ICD-10-CM

## 2025-05-12 LAB
ANION GAP SERPL CALCULATED.3IONS-SCNC: 14 MMOL/L (ref 8–16)
BUN SERPL-MCNC: 16 MG/DL (ref 6–20)
CALCIUM SERPL-MCNC: 9 MG/DL (ref 8.6–10)
CHLORIDE SERPL-SCNC: 103 MMOL/L (ref 98–107)
CO2 SERPL-SCNC: 23 MMOL/L (ref 22–29)
CREAT SERPL-MCNC: 1 MG/DL (ref 0.7–1.2)
ERYTHROCYTE [DISTWIDTH] IN BLOOD BY AUTOMATED COUNT: 16.4 % (ref 11.5–14.5)
GLUCOSE SERPL-MCNC: 99 MG/DL (ref 70–99)
HCT VFR BLD AUTO: 41.9 % (ref 42–52)
HGB BLD-MCNC: 13.4 G/DL (ref 14–18)
MCH RBC QN AUTO: 28.7 PG (ref 27–31)
MCHC RBC AUTO-ENTMCNC: 32 G/DL (ref 33–37)
MCV RBC AUTO: 89.7 FL (ref 80–94)
PLATELET # BLD AUTO: 170 K/UL (ref 130–400)
PMV BLD AUTO: 10.4 FL (ref 9.4–12.4)
POTASSIUM SERPL-SCNC: 4 MMOL/L (ref 3.5–5.1)
RBC # BLD AUTO: 4.67 M/UL (ref 4.7–6.1)
SODIUM SERPL-SCNC: 140 MMOL/L (ref 136–145)
WBC # BLD AUTO: 10.3 K/UL (ref 4.8–10.8)

## 2025-05-12 PROCEDURE — 85027 COMPLETE CBC AUTOMATED: CPT

## 2025-05-12 PROCEDURE — 3078F DIAST BP <80 MM HG: CPT | Performed by: NEUROLOGICAL SURGERY

## 2025-05-12 PROCEDURE — 80048 BASIC METABOLIC PNL TOTAL CA: CPT

## 2025-05-12 PROCEDURE — 3074F SYST BP LT 130 MM HG: CPT | Performed by: NEUROLOGICAL SURGERY

## 2025-05-12 PROCEDURE — 99215 OFFICE O/P EST HI 40 MIN: CPT | Performed by: NEUROLOGICAL SURGERY

## 2025-05-12 RX ORDER — GUAIFENESIN 600 MG/1
600 TABLET, EXTENDED RELEASE ORAL 2 TIMES DAILY
COMMUNITY

## 2025-05-12 RX ORDER — FERROUS SULFATE 325(65) MG
325 TABLET ORAL
COMMUNITY
Start: 2025-04-23

## 2025-05-12 RX ORDER — VONOPRAZAN FUMARATE 13.36 MG/1
TABLET ORAL
COMMUNITY
Start: 2025-02-01

## 2025-05-12 RX ORDER — FLUTICASONE FUROATE, UMECLIDINIUM BROMIDE AND VILANTEROL TRIFENATATE 200; 62.5; 25 UG/1; UG/1; UG/1
1 POWDER RESPIRATORY (INHALATION) DAILY
COMMUNITY
Start: 2025-05-05

## 2025-05-12 RX ORDER — ALPRAZOLAM 0.5 MG
1 TABLET ORAL 2 TIMES DAILY
COMMUNITY

## 2025-05-12 RX ORDER — FLUTICASONE PROPIONATE 50 MCG
SPRAY, SUSPENSION (ML) NASAL
COMMUNITY

## 2025-05-12 RX ORDER — MECOBALAMIN 5000 MCG
TABLET,DISINTEGRATING ORAL
COMMUNITY
End: 2025-05-12

## 2025-05-12 RX ORDER — ONDANSETRON 8 MG/1
8 TABLET, FILM COATED ORAL EVERY 8 HOURS PRN
COMMUNITY

## 2025-05-12 RX ORDER — FUROSEMIDE 40 MG/1
1 TABLET ORAL DAILY
COMMUNITY
End: 2025-05-12

## 2025-05-12 ASSESSMENT — ENCOUNTER SYMPTOMS
GASTROINTESTINAL NEGATIVE: 1
RESPIRATORY NEGATIVE: 1
BACK PAIN: 1
EYES NEGATIVE: 1

## 2025-05-12 NOTE — PROGRESS NOTES
[facility] Neurosurgery  Office Visit        Chief Complaint   Patient presents with    Consultation     Patient is here due to post surgery complications, add on per Dr Deleon.       HISTORY OF PRESENT ILLNESS:      Date of Surgery: 3/24/2022     Procedure Performed: Thoracic laminectomy for implantation of spinal cord stimulator        Interval Update:  Patient returns for an unplanned visit due to breakdown of his generator incision with drainage.  He does not recall any fall, injury or event that cause this.  The generator is visible.  There is some turbid drainage.  He denies any fevers, chills or new neurologic symptoms.  He states he has stopped using the stimulator because it was not providing the expected relief.        HPI: The patient is a 57 y.o. male who presents for neurosurgical evaluation of chronic lower back pain and to discuss implantation of a spinal cord stimulator.  He states his pain began in 9/2005 when he was moving into a new home but cannot recall a specific injury.  Since 2005 he has been in pain management and tried multiple rounds of injections without adequate pain relief.  He is currently taking gabapentin and percocet for pain control.  He complains only of axial lower back pain.  He has seen surgeons in the past who have reviewed his imaging and told him he would not benefit from surgery.  He has not had any back surgery in the past.  He denies radicular pain, numbness or weakness in his legs.  He recently underwent a trial of spinal cord stimulation with Dr. Edwards and reports that this relieved ~80% of his lower back pain.      MEDICAL HISTORY:             Past Medical History:   Diagnosis Date    Anxiety     Cardiopulmonary arrest ([facility]) 12/14/2016    COPD (chronic obstructive pulmonary disease) ([facility])     Crohn's disease ([facility]) 1988    Depression     GERD (gastroesophageal reflux disease)     Hypertension     Kidney stone     Left bundle branch block     Nausea & vomiting     ANITA

## 2025-05-12 NOTE — TELEPHONE ENCOUNTER
SURGERY APPROVAL IN MEDIA    Submitted  PA on 5/12/25 through [employer]  portal  Ref#: [id number]  Status: AUTHORIZED  Auth#: [id number]  Determination received on: 5/12/25  Valid Dates: 5/15/25-5/29/25  CPT Code Requested: 46859

## 2025-05-12 NOTE — DISCHARGE INSTRUCTIONS
dentures will need to be removed before your surgery. If you wear dentures, do not glue them in the morning of surgery.     Do not wear any jewelry, including body jewelry.  All jewelry will need to be removed prior to your surgery. This includes wedding rings. Any metal touching your body can cause a burn or may have to be cut off due to swelling or injury.    Do not wear fingernail polish or make-up.    It is best not to bring any valuables with you.    If you are to stay in the hospital overnight, bring your robe, slippers and personal toiletries that you may need.    POSTOPERATIVE GUIDELINES AFTER RECEIVING ANESTHESIA    If you are to go home after your surgery, you will need a responsible adult to drive you home.     You will not be able to take public transportation after your discharge from the Operative Care Unit unless you are accompanied by a        responsible adult.    On returning home, be sure to follow your physician's orders regarding diet, activity and medications.    Remember, surgery with general anesthesia or sedation may leave you sleepy, very tired and with a decreased appetite for 12 to 24 hours.    If you develop any post-surgical complications or problems, call your surgeon or [facility] Emergency Department ([phone]).    [facility] Visitor Policy for Surgery Patients-Revised 6-    Visitors for surgery patients are essential for the patient's emotional well-being and care       post operatively.    2.   Visitor Expectations and Limitations    3.  One visitor allowed with patients in the preop/postop rooms.    4.  A second visitor may sit in the waiting area.    5.  No children under 13 allowed in the pre-post op areas unless they are the patient.    6.  Two people may be with an underage surgical/procedural patient in preop/postop        room.      7.  If you are admitted to the hospital post operatively, there are NO RESTRICTIONS on       the floor at this time.      8.

## 2025-05-12 NOTE — H&P (VIEW-ONLY)
[facility]  Office Visit        Chief Complaint   Patient presents with    Consultation     Patient is here due to post surgery complications, add on per Dr Deleon.       HISTORY OF PRESENT ILLNESS:      Date of Surgery: 3/24/2022     Procedure Performed: Thoracic laminectomy for implantation of spinal cord stimulator        Interval Update:  Patient returns for an unplanned visit due to breakdown of his generator incision with drainage.  He does not recall any fall, injury or event that cause this.  The generator is visible.  There is some turbid drainage.  He denies any fevers, chills or new neurologic symptoms.  He states he has stopped using the stimulator because it was not providing the expected relief.        HPI: The patient is a 57 y.o. male who presents for neurosurgical evaluation of chronic lower back pain and to discuss implantation of a spinal cord stimulator.  He states his pain began in 9/2005 when he was moving into a new home but cannot recall a specific injury.  Since 2005 he has been in pain management and tried multiple rounds of injections without adequate pain relief.  He is currently taking gabapentin and percocet for pain control.  He complains only of axial lower back pain.  He has seen surgeons in the past who have reviewed his imaging and told him he would not benefit from surgery.  He has not had any back surgery in the past.  He denies radicular pain, numbness or weakness in his legs.  He recently underwent a trial of spinal cord stimulation with Dr. Edwards and reports that this relieved ~80% of his lower back pain.      MEDICAL HISTORY:             Past Medical History:   Diagnosis Date    Anxiety     Cardiopulmonary arrest ([facility]) 12/14/2016    COPD (chronic obstructive pulmonary disease) ([facility])     Crohn's disease ([facility]) 1988    Depression     GERD (gastroesophageal reflux disease)     Hypertension     Kidney stone     Left bundle branch block     Nausea & vomiting     ANITA  amLODIPine (NORVASC) 10 MG tablet, Take 1 tablet by mouth daily, Disp: , Rfl:     clonazePAM (KLONOPIN) 0.5 MG tablet, Take 1 tablet by mouth in the morning and at bedtime., Disp: , Rfl:     oxyCODONE-acetaminophen (PERCOCET) 7.5-325 MG per tablet, Take 1 tablet by mouth every 8 hours as needed., Disp: , Rfl:     tiZANidine (ZANAFLEX) 4 MG tablet, Take 1 tablet by mouth every 8 hours as needed, Disp: , Rfl:     sulindac (CLINORIL) 150 MG tablet, Take 1 tablet by mouth 2 times daily, Disp: , Rfl:     loperamide (IMODIUM) 2 MG capsule, Take 6 capsules by mouth in the morning and at bedtime 6 tablets bid, Disp: , Rfl:     sertraline (ZOLOFT) 100 MG tablet, Take 1 tablet by mouth 2 times daily, Disp: , Rfl:     ALPRAZolam (XANAX) 0.5 MG tablet, Take 1 tablet by mouth 2 times daily. (Patient not taking: Reported on 2025), Disp: , Rfl:     furosemide (LASIX) 40 MG tablet, Take 1 tablet by mouth daily (Patient not taking: Reported on 2025), Disp: , Rfl:     adalimumab (HUMIRA, 2 PEN,) 40 MG/0.8ML injection, Inject into the skin every 14 days (Patient not taking: Reported on 2025), Disp: , Rfl:     pantoprazole (PROTONIX) 40 MG tablet, Take 1 tablet by mouth 2 times daily (before meals) (Patient not taking: Reported on 2025), Disp: 60 tablet, Rfl: 5    Allergies:  Ketamine hcl    Social History:   Social History     Tobacco Use   Smoking Status Former    Current packs/day: 0.00    Average packs/day: 1 pack/day for 30.0 years (30.0 ttl pk-yrs)    Types: Cigarettes    Start date:     Quit date:     Years since quittin.3   Smokeless Tobacco Former    Types: Chew     Social History     Substance and Sexual Activity   Alcohol Use Yes    Comment: rare         Family History:   Family History   Problem Relation Age of Onset    Osteoarthritis Mother     Colon Polyps Mother     Hypertension Father     Diabetes Father     Colon Polyps Father     Colon Cancer Maternal Grandfather     Esophageal Cancer

## 2025-05-14 ENCOUNTER — HOSPITAL ENCOUNTER (OUTPATIENT)
Dept: MRI IMAGING | Age: 60
Discharge: HOME | End: 2025-05-14
Attending: NEUROLOGICAL SURGERY
Payer: COMMERCIAL

## 2025-05-14 ENCOUNTER — TELEPHONE (OUTPATIENT)
Dept: NEUROSURGERY | Age: 60
End: 2025-05-14

## 2025-05-14 DIAGNOSIS — T85.192A MALFUNCTION OF SPINAL CORD STIMULATOR, INITIAL ENCOUNTER: ICD-10-CM

## 2025-05-14 DIAGNOSIS — T85.733A INFECTION OF SPINAL CORD STIMULATOR, INITIAL ENCOUNTER: ICD-10-CM

## 2025-05-14 PROCEDURE — 72157 MRI CHEST SPINE W/O & W/DYE: CPT

## 2025-05-14 PROCEDURE — 6360000004 HC RX CONTRAST MEDICATION: Performed by: NEUROLOGICAL SURGERY

## 2025-05-14 PROCEDURE — A9577 INJ MULTIHANCE: HCPCS | Performed by: NEUROLOGICAL SURGERY

## 2025-05-14 RX ADMIN — GADOBENATE DIMEGLUMINE 20 ML: 529 INJECTION, SOLUTION INTRAVENOUS at 09:10

## 2025-05-14 NOTE — TELEPHONE ENCOUNTER
[facility] and per her the patient's stimulator is MRI conditional and there is a time limit on which he can be in the MRI machine so they are only able to perform one test. Per Dr. Deleon he would like pt to have MRI Thoracic. I voiced this to Jenni and she voiced understanding.

## 2025-05-15 ENCOUNTER — HOSPITAL ENCOUNTER (INPATIENT)
Age: 60
LOS: 2 days | Discharge: HOME | End: 2025-05-17
Attending: NEUROLOGICAL SURGERY | Admitting: NEUROLOGICAL SURGERY
Payer: COMMERCIAL

## 2025-05-15 ENCOUNTER — ANESTHESIA (OUTPATIENT)
Dept: OPERATING ROOM | Age: 60
End: 2025-05-15
Payer: COMMERCIAL

## 2025-05-15 ENCOUNTER — ANESTHESIA EVENT (OUTPATIENT)
Dept: OPERATING ROOM | Age: 60
End: 2025-05-15
Payer: COMMERCIAL

## 2025-05-15 DIAGNOSIS — T85.192A MALFUNCTION OF SPINAL CORD STIMULATOR: ICD-10-CM

## 2025-05-15 PROBLEM — T85.733D: Status: ACTIVE | Noted: 2025-05-15

## 2025-05-15 PROCEDURE — 00PV0MZ REMOVAL OF NEUROSTIMULATOR LEAD FROM SPINAL CORD, OPEN APPROACH: ICD-10-PCS | Performed by: NEUROLOGICAL SURGERY

## 2025-05-15 PROCEDURE — 3600000004 HC SURGERY LEVEL 4 BASE: Performed by: NEUROLOGICAL SURGERY

## 2025-05-15 PROCEDURE — 2500000003 HC RX 250 WO HCPCS: Performed by: NURSE ANESTHETIST, CERTIFIED REGISTERED

## 2025-05-15 PROCEDURE — 94150 VITAL CAPACITY TEST: CPT

## 2025-05-15 PROCEDURE — 87205 SMEAR GRAM STAIN: CPT

## 2025-05-15 PROCEDURE — 87176 TISSUE HOMOGENIZATION CULTR: CPT

## 2025-05-15 PROCEDURE — 0JPT0MZ REMOVAL OF STIMULATOR GENERATOR FROM TRUNK SUBCUTANEOUS TISSUE AND FASCIA, OPEN APPROACH: ICD-10-PCS | Performed by: NEUROLOGICAL SURGERY

## 2025-05-15 PROCEDURE — 3600000014 HC SURGERY LEVEL 4 ADDTL 15MIN: Performed by: NEUROLOGICAL SURGERY

## 2025-05-15 PROCEDURE — 94760 N-INVAS EAR/PLS OXIMETRY 1: CPT

## 2025-05-15 PROCEDURE — 6370000000 HC RX 637 (ALT 250 FOR IP): Performed by: NEUROLOGICAL SURGERY

## 2025-05-15 PROCEDURE — 94640 AIRWAY INHALATION TREATMENT: CPT

## 2025-05-15 PROCEDURE — 6360000002 HC RX W HCPCS: Performed by: NURSE ANESTHETIST, CERTIFIED REGISTERED

## 2025-05-15 PROCEDURE — 6360000002 HC RX W HCPCS: Performed by: ANESTHESIOLOGY

## 2025-05-15 PROCEDURE — 7100000001 HC PACU RECOVERY - ADDTL 15 MIN: Performed by: NEUROLOGICAL SURGERY

## 2025-05-15 PROCEDURE — 87186 SC STD MICRODIL/AGAR DIL: CPT

## 2025-05-15 PROCEDURE — 87077 CULTURE AEROBIC IDENTIFY: CPT

## 2025-05-15 PROCEDURE — 2709999900 HC NON-CHARGEABLE SUPPLY: Performed by: NEUROLOGICAL SURGERY

## 2025-05-15 PROCEDURE — 3700000000 HC ANESTHESIA ATTENDED CARE: Performed by: NEUROLOGICAL SURGERY

## 2025-05-15 PROCEDURE — 3700000001 HC ADD 15 MINUTES (ANESTHESIA): Performed by: NEUROLOGICAL SURGERY

## 2025-05-15 PROCEDURE — 2700000000 HC OXYGEN THERAPY PER DAY

## 2025-05-15 PROCEDURE — 87070 CULTURE OTHR SPECIMN AEROBIC: CPT

## 2025-05-15 PROCEDURE — 2580000003 HC RX 258: Performed by: ANESTHESIOLOGY

## 2025-05-15 PROCEDURE — 7100000000 HC PACU RECOVERY - FIRST 15 MIN: Performed by: NEUROLOGICAL SURGERY

## 2025-05-15 PROCEDURE — 6360000002 HC RX W HCPCS: Performed by: NEUROLOGICAL SURGERY

## 2025-05-15 PROCEDURE — 86403 PARTICLE AGGLUT ANTBDY SCRN: CPT

## 2025-05-15 PROCEDURE — 63662 REMOVE SPINE ELTRD PLATE: CPT | Performed by: NEUROLOGICAL SURGERY

## 2025-05-15 PROCEDURE — 2500000003 HC RX 250 WO HCPCS: Performed by: NEUROLOGICAL SURGERY

## 2025-05-15 PROCEDURE — 2720000010 HC SURG SUPPLY STERILE: Performed by: NEUROLOGICAL SURGERY

## 2025-05-15 PROCEDURE — 63688 REV/RMV IMP SP NPG/R DTCH CN: CPT | Performed by: NEUROLOGICAL SURGERY

## 2025-05-15 PROCEDURE — 1200000000 HC SEMI PRIVATE

## 2025-05-15 PROCEDURE — 87075 CULTR BACTERIA EXCEPT BLOOD: CPT

## 2025-05-15 PROCEDURE — 0JB70ZZ EXCISION OF BACK SUBCUTANEOUS TISSUE AND FASCIA, OPEN APPROACH: ICD-10-PCS | Performed by: NEUROLOGICAL SURGERY

## 2025-05-15 RX ORDER — METOPROLOL TARTRATE 1 MG/ML
5 INJECTION, SOLUTION INTRAVENOUS
Status: DISCONTINUED | OUTPATIENT
Start: 2025-05-15 | End: 2025-05-15 | Stop reason: HOSPADM

## 2025-05-15 RX ORDER — AMLODIPINE BESYLATE 10 MG/1
10 TABLET ORAL DAILY
Status: DISCONTINUED | OUTPATIENT
Start: 2025-05-15 | End: 2025-05-17 | Stop reason: HOSPADM

## 2025-05-15 RX ORDER — DICYCLOMINE HYDROCHLORIDE 10 MG/1
20 CAPSULE ORAL 4 TIMES DAILY
Status: DISCONTINUED | OUTPATIENT
Start: 2025-05-15 | End: 2025-05-17 | Stop reason: HOSPADM

## 2025-05-15 RX ORDER — IBUPROFEN 400 MG/1
800 TABLET, FILM COATED ORAL
Status: DISCONTINUED | OUTPATIENT
Start: 2025-05-15 | End: 2025-05-17 | Stop reason: HOSPADM

## 2025-05-15 RX ORDER — PANTOPRAZOLE SODIUM 40 MG/1
40 TABLET, DELAYED RELEASE ORAL
Status: DISCONTINUED | OUTPATIENT
Start: 2025-05-16 | End: 2025-05-17 | Stop reason: HOSPADM

## 2025-05-15 RX ORDER — BUDESONIDE AND FORMOTEROL FUMARATE DIHYDRATE 160; 4.5 UG/1; UG/1
2 AEROSOL RESPIRATORY (INHALATION)
Status: DISCONTINUED | OUTPATIENT
Start: 2025-05-15 | End: 2025-05-17 | Stop reason: HOSPADM

## 2025-05-15 RX ORDER — CEFAZOLIN SODIUM 1 G/3ML
INJECTION, POWDER, FOR SOLUTION INTRAMUSCULAR; INTRAVENOUS
Status: DISCONTINUED | OUTPATIENT
Start: 2025-05-15 | End: 2025-05-15 | Stop reason: SDUPTHER

## 2025-05-15 RX ORDER — DIPHENHYDRAMINE HYDROCHLORIDE 50 MG/ML
12.5 INJECTION, SOLUTION INTRAMUSCULAR; INTRAVENOUS
Status: DISCONTINUED | OUTPATIENT
Start: 2025-05-15 | End: 2025-05-17 | Stop reason: HOSPADM

## 2025-05-15 RX ORDER — DEXAMETHASONE SODIUM PHOSPHATE 10 MG/ML
INJECTION, SOLUTION INTRAMUSCULAR; INTRAVENOUS
Status: DISCONTINUED | OUTPATIENT
Start: 2025-05-15 | End: 2025-05-15 | Stop reason: SDUPTHER

## 2025-05-15 RX ORDER — MIDAZOLAM HYDROCHLORIDE 2 MG/2ML
2 INJECTION, SOLUTION INTRAMUSCULAR; INTRAVENOUS
Status: DISCONTINUED | OUTPATIENT
Start: 2025-05-15 | End: 2025-05-15 | Stop reason: HOSPADM

## 2025-05-15 RX ORDER — FENTANYL CITRATE 50 UG/ML
50 INJECTION, SOLUTION INTRAMUSCULAR; INTRAVENOUS EVERY 5 MIN PRN
Status: DISCONTINUED | OUTPATIENT
Start: 2025-05-15 | End: 2025-05-15 | Stop reason: HOSPADM

## 2025-05-15 RX ORDER — LOPERAMIDE HYDROCHLORIDE 2 MG/1
12 CAPSULE ORAL 2 TIMES DAILY
Status: DISCONTINUED | OUTPATIENT
Start: 2025-05-15 | End: 2025-05-17 | Stop reason: HOSPADM

## 2025-05-15 RX ORDER — ALBUTEROL SULFATE 90 UG/1
2 INHALANT RESPIRATORY (INHALATION) EVERY 6 HOURS PRN
Status: DISCONTINUED | OUTPATIENT
Start: 2025-05-15 | End: 2025-05-17 | Stop reason: HOSPADM

## 2025-05-15 RX ORDER — PROPOFOL 10 MG/ML
INJECTION, EMULSION INTRAVENOUS
Status: DISCONTINUED | OUTPATIENT
Start: 2025-05-15 | End: 2025-05-15 | Stop reason: SDUPTHER

## 2025-05-15 RX ORDER — SODIUM CHLORIDE 0.9 % (FLUSH) 0.9 %
5-40 SYRINGE (ML) INJECTION EVERY 12 HOURS SCHEDULED
Status: DISCONTINUED | OUTPATIENT
Start: 2025-05-15 | End: 2025-05-17 | Stop reason: HOSPADM

## 2025-05-15 RX ORDER — ACETAMINOPHEN 325 MG/1
650 TABLET ORAL EVERY 6 HOURS
Status: DISCONTINUED | OUTPATIENT
Start: 2025-05-15 | End: 2025-05-17 | Stop reason: HOSPADM

## 2025-05-15 RX ORDER — LIDOCAINE HYDROCHLORIDE 10 MG/ML
1 INJECTION, SOLUTION EPIDURAL; INFILTRATION; INTRACAUDAL; PERINEURAL
Status: DISCONTINUED | OUTPATIENT
Start: 2025-05-15 | End: 2025-05-15 | Stop reason: HOSPADM

## 2025-05-15 RX ORDER — ONDANSETRON 4 MG/1
8 TABLET, FILM COATED ORAL EVERY 8 HOURS PRN
Status: DISCONTINUED | OUTPATIENT
Start: 2025-05-15 | End: 2025-05-17 | Stop reason: HOSPADM

## 2025-05-15 RX ORDER — SODIUM CHLORIDE 9 MG/ML
INJECTION, SOLUTION INTRAVENOUS PRN
Status: DISCONTINUED | OUTPATIENT
Start: 2025-05-15 | End: 2025-05-15 | Stop reason: HOSPADM

## 2025-05-15 RX ORDER — OXYCODONE AND ACETAMINOPHEN 10; 325 MG/1; MG/1
1 TABLET ORAL EVERY 4 HOURS PRN
Status: DISCONTINUED | OUTPATIENT
Start: 2025-05-15 | End: 2025-05-17 | Stop reason: HOSPADM

## 2025-05-15 RX ORDER — ONDANSETRON 2 MG/ML
4 INJECTION INTRAMUSCULAR; INTRAVENOUS
Status: DISCONTINUED | OUTPATIENT
Start: 2025-05-15 | End: 2025-05-15 | Stop reason: HOSPADM

## 2025-05-15 RX ORDER — FENTANYL CITRATE 50 UG/ML
25 INJECTION, SOLUTION INTRAMUSCULAR; INTRAVENOUS EVERY 5 MIN PRN
Status: DISCONTINUED | OUTPATIENT
Start: 2025-05-15 | End: 2025-05-15 | Stop reason: HOSPADM

## 2025-05-15 RX ORDER — HYDRALAZINE HYDROCHLORIDE 25 MG/1
25 TABLET, FILM COATED ORAL 2 TIMES DAILY
Status: DISCONTINUED | OUTPATIENT
Start: 2025-05-15 | End: 2025-05-17 | Stop reason: HOSPADM

## 2025-05-15 RX ORDER — FENTANYL CITRATE 50 UG/ML
INJECTION, SOLUTION INTRAMUSCULAR; INTRAVENOUS
Status: DISCONTINUED | OUTPATIENT
Start: 2025-05-15 | End: 2025-05-15 | Stop reason: SDUPTHER

## 2025-05-15 RX ORDER — HYDROMORPHONE HYDROCHLORIDE 1 MG/ML
1 INJECTION, SOLUTION INTRAMUSCULAR; INTRAVENOUS; SUBCUTANEOUS ONCE
Status: COMPLETED | OUTPATIENT
Start: 2025-05-15 | End: 2025-05-15

## 2025-05-15 RX ORDER — HYDROMORPHONE HYDROCHLORIDE 1 MG/ML
0.25 INJECTION, SOLUTION INTRAMUSCULAR; INTRAVENOUS; SUBCUTANEOUS
Status: DISCONTINUED | OUTPATIENT
Start: 2025-05-15 | End: 2025-05-17 | Stop reason: HOSPADM

## 2025-05-15 RX ORDER — ROCURONIUM BROMIDE 10 MG/ML
INJECTION, SOLUTION INTRAVENOUS
Status: DISCONTINUED | OUTPATIENT
Start: 2025-05-15 | End: 2025-05-15 | Stop reason: SDUPTHER

## 2025-05-15 RX ORDER — PROCHLORPERAZINE EDISYLATE 5 MG/ML
5 INJECTION INTRAMUSCULAR; INTRAVENOUS
Status: DISCONTINUED | OUTPATIENT
Start: 2025-05-15 | End: 2025-05-15 | Stop reason: HOSPADM

## 2025-05-15 RX ORDER — ONDANSETRON 2 MG/ML
INJECTION INTRAMUSCULAR; INTRAVENOUS
Status: DISCONTINUED | OUTPATIENT
Start: 2025-05-15 | End: 2025-05-15 | Stop reason: SDUPTHER

## 2025-05-15 RX ORDER — GUAIFENESIN 600 MG/1
600 TABLET, EXTENDED RELEASE ORAL 2 TIMES DAILY
Status: DISCONTINUED | OUTPATIENT
Start: 2025-05-15 | End: 2025-05-17 | Stop reason: HOSPADM

## 2025-05-15 RX ORDER — SODIUM CHLORIDE 0.9 % (FLUSH) 0.9 %
5-40 SYRINGE (ML) INJECTION PRN
Status: DISCONTINUED | OUTPATIENT
Start: 2025-05-15 | End: 2025-05-15 | Stop reason: HOSPADM

## 2025-05-15 RX ORDER — SODIUM CHLORIDE 9 MG/ML
INJECTION, SOLUTION INTRAVENOUS PRN
Status: DISCONTINUED | OUTPATIENT
Start: 2025-05-15 | End: 2025-05-17 | Stop reason: HOSPADM

## 2025-05-15 RX ORDER — LOSARTAN POTASSIUM 100 MG/1
100 TABLET ORAL DAILY
Status: DISCONTINUED | OUTPATIENT
Start: 2025-05-15 | End: 2025-05-17 | Stop reason: HOSPADM

## 2025-05-15 RX ORDER — DEXMEDETOMIDINE HYDROCHLORIDE 100 UG/ML
INJECTION, SOLUTION INTRAVENOUS
Status: DISCONTINUED | OUTPATIENT
Start: 2025-05-15 | End: 2025-05-15 | Stop reason: SDUPTHER

## 2025-05-15 RX ORDER — SODIUM CHLORIDE 0.9 % (FLUSH) 0.9 %
5-40 SYRINGE (ML) INJECTION EVERY 12 HOURS SCHEDULED
Status: DISCONTINUED | OUTPATIENT
Start: 2025-05-15 | End: 2025-05-16 | Stop reason: SDUPTHER

## 2025-05-15 RX ORDER — MIDAZOLAM HYDROCHLORIDE 1 MG/ML
INJECTION, SOLUTION INTRAMUSCULAR; INTRAVENOUS
Status: DISCONTINUED | OUTPATIENT
Start: 2025-05-15 | End: 2025-05-15 | Stop reason: SDUPTHER

## 2025-05-15 RX ORDER — SODIUM CHLORIDE 0.9 % (FLUSH) 0.9 %
5-40 SYRINGE (ML) INJECTION PRN
Status: DISCONTINUED | OUTPATIENT
Start: 2025-05-15 | End: 2025-05-17 | Stop reason: HOSPADM

## 2025-05-15 RX ORDER — SODIUM CHLORIDE 0.9 % (FLUSH) 0.9 %
5-40 SYRINGE (ML) INJECTION PRN
Status: DISCONTINUED | OUTPATIENT
Start: 2025-05-15 | End: 2025-05-16 | Stop reason: SDUPTHER

## 2025-05-15 RX ORDER — LIDOCAINE HYDROCHLORIDE 10 MG/ML
INJECTION, SOLUTION EPIDURAL; INFILTRATION; INTRACAUDAL; PERINEURAL
Status: DISCONTINUED | OUTPATIENT
Start: 2025-05-15 | End: 2025-05-15 | Stop reason: SDUPTHER

## 2025-05-15 RX ORDER — BUMETANIDE 1 MG/1
2 TABLET ORAL DAILY
Status: DISCONTINUED | OUTPATIENT
Start: 2025-05-15 | End: 2025-05-17 | Stop reason: HOSPADM

## 2025-05-15 RX ORDER — CLONAZEPAM 0.5 MG/1
0.5 TABLET ORAL 2 TIMES DAILY
Status: DISCONTINUED | OUTPATIENT
Start: 2025-05-15 | End: 2025-05-17 | Stop reason: HOSPADM

## 2025-05-15 RX ORDER — SODIUM CHLORIDE 0.9 % (FLUSH) 0.9 %
5-40 SYRINGE (ML) INJECTION EVERY 12 HOURS SCHEDULED
Status: DISCONTINUED | OUTPATIENT
Start: 2025-05-15 | End: 2025-05-15 | Stop reason: HOSPADM

## 2025-05-15 RX ORDER — METOPROLOL SUCCINATE 50 MG/1
100 TABLET, EXTENDED RELEASE ORAL DAILY
Status: DISCONTINUED | OUTPATIENT
Start: 2025-05-16 | End: 2025-05-17 | Stop reason: HOSPADM

## 2025-05-15 RX ORDER — HYDROMORPHONE HYDROCHLORIDE 1 MG/ML
0.5 INJECTION, SOLUTION INTRAMUSCULAR; INTRAVENOUS; SUBCUTANEOUS
Status: DISCONTINUED | OUTPATIENT
Start: 2025-05-15 | End: 2025-05-17 | Stop reason: HOSPADM

## 2025-05-15 RX ORDER — SODIUM CHLORIDE, SODIUM LACTATE, POTASSIUM CHLORIDE, CALCIUM CHLORIDE 600; 310; 30; 20 MG/100ML; MG/100ML; MG/100ML; MG/100ML
INJECTION, SOLUTION INTRAVENOUS CONTINUOUS
Status: DISCONTINUED | OUTPATIENT
Start: 2025-05-15 | End: 2025-05-15 | Stop reason: HOSPADM

## 2025-05-15 RX ORDER — SODIUM CHLORIDE 9 MG/ML
INJECTION, SOLUTION INTRAVENOUS PRN
Status: DISCONTINUED | OUTPATIENT
Start: 2025-05-15 | End: 2025-05-16 | Stop reason: SDUPTHER

## 2025-05-15 RX ORDER — NALOXONE HYDROCHLORIDE 0.4 MG/ML
INJECTION, SOLUTION INTRAMUSCULAR; INTRAVENOUS; SUBCUTANEOUS PRN
Status: DISCONTINUED | OUTPATIENT
Start: 2025-05-15 | End: 2025-05-17 | Stop reason: HOSPADM

## 2025-05-15 RX ORDER — FERROUS SULFATE 325(65) MG
325 TABLET ORAL
Status: DISCONTINUED | OUTPATIENT
Start: 2025-05-16 | End: 2025-05-17 | Stop reason: HOSPADM

## 2025-05-15 RX ORDER — SERTRALINE HYDROCHLORIDE 100 MG/1
100 TABLET, FILM COATED ORAL 2 TIMES DAILY
Status: DISCONTINUED | OUTPATIENT
Start: 2025-05-15 | End: 2025-05-17 | Stop reason: HOSPADM

## 2025-05-15 RX ADMIN — LOPERAMIDE HYDROCHLORIDE 12 MG: 2 CAPSULE ORAL at 21:29

## 2025-05-15 RX ADMIN — PROPOFOL 200 MG: 10 INJECTION, EMULSION INTRAVENOUS at 16:13

## 2025-05-15 RX ADMIN — METOPROLOL TARTRATE 5 MG: 5 INJECTION INTRAVENOUS at 18:15

## 2025-05-15 RX ADMIN — HYDRALAZINE HYDROCHLORIDE 25 MG: 25 TABLET ORAL at 21:29

## 2025-05-15 RX ADMIN — FENTANYL CITRATE 50 MCG: 0.05 INJECTION, SOLUTION INTRAMUSCULAR; INTRAVENOUS at 18:19

## 2025-05-15 RX ADMIN — LIDOCAINE HYDROCHLORIDE 50 MG: 10 INJECTION, SOLUTION EPIDURAL; INFILTRATION; INTRACAUDAL; PERINEURAL at 16:13

## 2025-05-15 RX ADMIN — SODIUM CHLORIDE, SODIUM LACTATE, POTASSIUM CHLORIDE, AND CALCIUM CHLORIDE: 600; 310; 30; 20 INJECTION, SOLUTION INTRAVENOUS at 17:03

## 2025-05-15 RX ADMIN — SODIUM CHLORIDE, SODIUM LACTATE, POTASSIUM CHLORIDE, AND CALCIUM CHLORIDE: 600; 310; 30; 20 INJECTION, SOLUTION INTRAVENOUS at 14:06

## 2025-05-15 RX ADMIN — FENTANYL CITRATE 50 MCG: 0.05 INJECTION, SOLUTION INTRAMUSCULAR; INTRAVENOUS at 18:11

## 2025-05-15 RX ADMIN — DEXAMETHASONE SODIUM PHOSPHATE 10 MG: 10 INJECTION, SOLUTION INTRAMUSCULAR; INTRAVENOUS at 16:55

## 2025-05-15 RX ADMIN — GUAIFENESIN 600 MG: 600 TABLET ORAL at 21:29

## 2025-05-15 RX ADMIN — FENTANYL CITRATE 50 MCG: 0.05 INJECTION, SOLUTION INTRAMUSCULAR; INTRAVENOUS at 17:35

## 2025-05-15 RX ADMIN — SUGAMMADEX 500 MG: 100 INJECTION, SOLUTION INTRAVENOUS at 17:42

## 2025-05-15 RX ADMIN — ROCURONIUM BROMIDE 70 MG: 10 INJECTION, SOLUTION INTRAVENOUS at 16:13

## 2025-05-15 RX ADMIN — ACETAMINOPHEN 650 MG: 325 TABLET ORAL at 21:29

## 2025-05-15 RX ADMIN — SERTRALINE HYDROCHLORIDE 100 MG: 100 TABLET ORAL at 21:29

## 2025-05-15 RX ADMIN — HYDROMORPHONE HYDROCHLORIDE 1 MG: 1 INJECTION, SOLUTION INTRAMUSCULAR; INTRAVENOUS; SUBCUTANEOUS at 18:37

## 2025-05-15 RX ADMIN — PHENYLEPHRINE HYDROCHLORIDE 100 MCG: 10 INJECTION INTRAVENOUS at 16:54

## 2025-05-15 RX ADMIN — TIZANIDINE 4 MG: 4 TABLET ORAL at 21:29

## 2025-05-15 RX ADMIN — CEFAZOLIN 3 G: 1 INJECTION, POWDER, FOR SOLUTION INTRAMUSCULAR; INTRAVENOUS at 17:34

## 2025-05-15 RX ADMIN — PROPOFOL 50 MG: 10 INJECTION, EMULSION INTRAVENOUS at 17:26

## 2025-05-15 RX ADMIN — CLONAZEPAM 0.5 MG: 0.5 TABLET ORAL at 21:29

## 2025-05-15 RX ADMIN — IBUPROFEN 800 MG: 400 TABLET, FILM COATED ORAL at 21:29

## 2025-05-15 RX ADMIN — BUDESONIDE AND FORMOTEROL FUMARATE DIHYDRATE 2 PUFF: 160; 4.5 AEROSOL RESPIRATORY (INHALATION) at 19:28

## 2025-05-15 RX ADMIN — MIDAZOLAM 2 MG: 1 INJECTION INTRAMUSCULAR; INTRAVENOUS at 16:09

## 2025-05-15 RX ADMIN — FENTANYL CITRATE 50 MCG: 0.05 INJECTION, SOLUTION INTRAMUSCULAR; INTRAVENOUS at 16:11

## 2025-05-15 RX ADMIN — ONDANSETRON 4 MG: 2 INJECTION INTRAMUSCULAR; INTRAVENOUS at 17:35

## 2025-05-15 RX ADMIN — DEXMEDETOMIDINE HYDROCHLORIDE 28 MCG: 100 INJECTION, SOLUTION, CONCENTRATE INTRAVENOUS at 16:41

## 2025-05-15 RX ADMIN — OXYCODONE AND ACETAMINOPHEN 1 TABLET: 10; 325 TABLET ORAL at 20:05

## 2025-05-15 RX ADMIN — DICYCLOMINE HYDROCHLORIDE 20 MG: 10 CAPSULE ORAL at 21:29

## 2025-05-15 RX ADMIN — PROPOFOL 50 MG: 10 INJECTION, EMULSION INTRAVENOUS at 17:23

## 2025-05-15 RX ADMIN — ROCURONIUM BROMIDE 30 MG: 10 INJECTION, SOLUTION INTRAVENOUS at 17:24

## 2025-05-15 RX ADMIN — TIOTROPIUM BROMIDE INHALATION SPRAY 2 PUFF: 3.12 SPRAY, METERED RESPIRATORY (INHALATION) at 19:28

## 2025-05-15 ASSESSMENT — PAIN DESCRIPTION - DESCRIPTORS
DESCRIPTORS: BURNING
DESCRIPTORS: BURNING

## 2025-05-15 ASSESSMENT — PAIN - FUNCTIONAL ASSESSMENT
PAIN_FUNCTIONAL_ASSESSMENT: 0-10
PAIN_FUNCTIONAL_ASSESSMENT: 0-10

## 2025-05-15 ASSESSMENT — PAIN SCALES - GENERAL
PAINLEVEL_OUTOF10: 6
PAINLEVEL_OUTOF10: 4
PAINLEVEL_OUTOF10: 4

## 2025-05-15 ASSESSMENT — PAIN DESCRIPTION - LOCATION: LOCATION: BACK

## 2025-05-15 ASSESSMENT — LIFESTYLE VARIABLES: SMOKING_STATUS: 0

## 2025-05-15 ASSESSMENT — ENCOUNTER SYMPTOMS: SHORTNESS OF BREATH: 1

## 2025-05-15 NOTE — OP NOTE
Brief Operative Note      Patient: Jose Sood  YOB: 1965  MRN: [id number]    Date of Procedure: 5/15/2025    Pre-Op Diagnosis Codes:     1) Breakdown of spinal cord stimulator generator incision with exposed hardware    2) Ineffective spinal cord stimulation    Post-Op Diagnosis: Same       Procedure(s):  EXPLANT OF NERVE STIMULATOR    Surgeon(s):  Kike Deleon MD    Assistant:   * No surgical staff found *    Anesthesia: General    Estimated Blood Loss (mL): less than 50     Complications: None    Specimens:   ID Type Source Tests Collected by Time Destination   1 : gluteal wound Swab Back CULTURE, Kike Vieira MD 5/15/2025 1702    2 : lumbar generator capsule Tissue Back CULTURE, Kike Vieira MD 5/15/2025 1729        Implants:  * No implants in log *      Drains: * No LDAs found *    Findings:  Infection Present At Time Of Surgery (PATOS) (choose all levels that have infection present):  - Superficial Infection (skin/subcutaneous) present as evidenced by fluid consistent with infection  Other Findings: Stimulator paddle lead, fascial anchors and generator removed in its entirety.  No evidence of infection of thoracic spinal wound.  Generator incision sharply debrided to remove non-viable skin, tissue culture and culture swab sent.  Generator pocket irrigated with 3L pulse lavage with polymixin-containing irrigation.  See operative dictation for details, ID#[id number].      Electronically signed by Kike Deleon MD on 5/15/2025 at 5:48 PM

## 2025-05-15 NOTE — ANESTHESIA POSTPROCEDURE EVALUATION
Department of Anesthesiology  Postprocedure Note    Patient: Jose Sood  MRN: [id number]  YOB: 1965  Date of evaluation: 5/15/2025    Procedure Summary       Date: 05/15/25 Room / Location: [address]    Anesthesia Start: 1609 Anesthesia Stop: 1803    Procedure: EXPLANT OF NERVE STIMULATOR Diagnosis:       Malfunction of spinal cord stimulator      (Malfunction of spinal cord stimulator [T85.192A])    Surgeons: Kike Deleon MD Responsible Provider: Ward Stevenson APRN - CRNA    Anesthesia Type: general ASA Status: 4            Anesthesia Type: No value filed.    Sean Phase I: Sean Score: 10    Sean Phase II:      Anesthesia Post Evaluation    Patient location during evaluation: PACU  Patient participation: complete - patient participated  Level of consciousness: sleepy but conscious  Pain score: 0  Airway patency: patent  Nausea & Vomiting: no nausea and no vomiting  Cardiovascular status: hemodynamically stable  Respiratory status: acceptable, spontaneous ventilation and room air  Hydration status: euvolemic  Pain management: adequate    No notable events documented.

## 2025-05-15 NOTE — ANESTHESIA PRE PROCEDURE
full  Mouth opening: < 3 FB   Dental:    (+) upper dentures      Pulmonary:   (+)  COPD:  shortness of breath: chronic,   sleep apnea: on CPAP,           (-) not a current smoker                          ROS comment: Oxygen 2L @HS    After oral surgery with ketamine pt aspirated and had a long course of pneumonia, no trach    Hx of multiple aspiration pna's, esophagus surgically repaired and pt is stable and no further events       Cardiovascular:  Exercise tolerance: poor (<4 METS)  (+) hypertension:, dysrhythmias (LBBB):    (-) pacemaker, past MI, CAD, CABG/stent and no hyperlipidemia    ECG reviewed      Echocardiogram reviewed         Beta Blocker:  Dose within 24 Hrs      ROS comment: Echo 2023:  Left Ventricle   The left ventricle is normal size. There is normal left ventricular myocardial thickness and mass. The left ventricular systolic function is normal. The LVEF is visually estimated at 55 - 60%. The left ventricular wall motion is normal.     Left Atrium   The left atrial size is normal. The interatrial septum is intact with no evidence for an atrial septal defect.     IVC/SVC   Based on the IVC size and respiratory variation, the estimated right atrial pressure is 3mmHg.     Mitral Valve   The mitral valve leaflets are normal in appearance with no evidence of mitral valve prolapse. There is no mitral regurgitation. There is no mitral stenosis.     Tricuspid Valve   The tricuspid valve is grossly normal in appearance. There is no tricuspid regurgitation. There is no tricuspid stenosis.     Aortic Valve   The aortic valve appears grossly normal. There is no valvular regurgitation. There is no hemodynamically significant valvular aortic stenosis.          Neuro/Psych:   (+) psychiatric history:   (-) seizures and CVA            ROS comment: Infected thoracic spinal cord stimulator--> currently in MRI mode. GI/Hepatic/Renal:   (+) GERD: well controlled          Endo/Other:    (+) : arthritis:., no

## 2025-05-15 NOTE — INTERVAL H&P NOTE
NEUROSURGERY    I have seen and evaluated the patient.  There has been no change from their previous H&P.  Will proceed with surgery as planned.

## 2025-05-16 LAB
ANION GAP SERPL CALCULATED.3IONS-SCNC: 11 MMOL/L (ref 8–16)
BUN SERPL-MCNC: 14 MG/DL (ref 6–20)
CALCIUM SERPL-MCNC: 8.8 MG/DL (ref 8.6–10)
CHLORIDE SERPL-SCNC: 103 MMOL/L (ref 98–107)
CO2 SERPL-SCNC: 23 MMOL/L (ref 22–29)
CREAT SERPL-MCNC: 0.8 MG/DL (ref 0.7–1.2)
ERYTHROCYTE [DISTWIDTH] IN BLOOD BY AUTOMATED COUNT: 15.7 % (ref 11.5–14.5)
GLUCOSE SERPL-MCNC: 173 MG/DL (ref 70–99)
HCT VFR BLD AUTO: 40.7 % (ref 42–52)
HGB BLD-MCNC: 13.1 G/DL (ref 14–18)
MCH RBC QN AUTO: 28.7 PG (ref 27–31)
MCHC RBC AUTO-ENTMCNC: 32.2 G/DL (ref 33–37)
MCV RBC AUTO: 89.3 FL (ref 80–94)
PLATELET # BLD AUTO: 145 K/UL (ref 130–400)
PMV BLD AUTO: 9.8 FL (ref 9.4–12.4)
POTASSIUM SERPL-SCNC: 4.3 MMOL/L (ref 3.5–5)
RBC # BLD AUTO: 4.56 M/UL (ref 4.7–6.1)
SODIUM SERPL-SCNC: 137 MMOL/L (ref 136–145)
WBC # BLD AUTO: 9.9 K/UL (ref 4.8–10.8)

## 2025-05-16 PROCEDURE — 6370000000 HC RX 637 (ALT 250 FOR IP): Performed by: NEUROLOGICAL SURGERY

## 2025-05-16 PROCEDURE — 6360000002 HC RX W HCPCS: Performed by: NEUROLOGICAL SURGERY

## 2025-05-16 PROCEDURE — 94150 VITAL CAPACITY TEST: CPT

## 2025-05-16 PROCEDURE — 80048 BASIC METABOLIC PNL TOTAL CA: CPT

## 2025-05-16 PROCEDURE — 2580000003 HC RX 258: Performed by: NEUROLOGICAL SURGERY

## 2025-05-16 PROCEDURE — 2500000003 HC RX 250 WO HCPCS: Performed by: NEUROLOGICAL SURGERY

## 2025-05-16 PROCEDURE — 36415 COLL VENOUS BLD VENIPUNCTURE: CPT

## 2025-05-16 PROCEDURE — 1200000000 HC SEMI PRIVATE

## 2025-05-16 PROCEDURE — 94640 AIRWAY INHALATION TREATMENT: CPT

## 2025-05-16 PROCEDURE — 94760 N-INVAS EAR/PLS OXIMETRY 1: CPT

## 2025-05-16 PROCEDURE — 99024 POSTOP FOLLOW-UP VISIT: CPT | Performed by: NEUROLOGICAL SURGERY

## 2025-05-16 PROCEDURE — 85027 COMPLETE CBC AUTOMATED: CPT

## 2025-05-16 PROCEDURE — 2700000000 HC OXYGEN THERAPY PER DAY

## 2025-05-16 RX ADMIN — SERTRALINE HYDROCHLORIDE 100 MG: 100 TABLET ORAL at 08:23

## 2025-05-16 RX ADMIN — PANTOPRAZOLE SODIUM 40 MG: 40 TABLET, DELAYED RELEASE ORAL at 15:49

## 2025-05-16 RX ADMIN — CEFEPIME 2000 MG: 2 INJECTION, POWDER, FOR SOLUTION INTRAVENOUS at 15:45

## 2025-05-16 RX ADMIN — DICYCLOMINE HYDROCHLORIDE 20 MG: 10 CAPSULE ORAL at 20:28

## 2025-05-16 RX ADMIN — CLONAZEPAM 0.5 MG: 0.5 TABLET ORAL at 08:23

## 2025-05-16 RX ADMIN — TIOTROPIUM BROMIDE INHALATION SPRAY 2 PUFF: 3.12 SPRAY, METERED RESPIRATORY (INHALATION) at 06:20

## 2025-05-16 RX ADMIN — CLONAZEPAM 0.5 MG: 0.5 TABLET ORAL at 20:28

## 2025-05-16 RX ADMIN — TIZANIDINE 4 MG: 4 TABLET ORAL at 20:28

## 2025-05-16 RX ADMIN — VANCOMYCIN HYDROCHLORIDE 2500 MG: 10 INJECTION, POWDER, LYOPHILIZED, FOR SOLUTION INTRAVENOUS at 09:21

## 2025-05-16 RX ADMIN — GUAIFENESIN 600 MG: 600 TABLET ORAL at 08:35

## 2025-05-16 RX ADMIN — OXYCODONE AND ACETAMINOPHEN 1 TABLET: 10; 325 TABLET ORAL at 20:28

## 2025-05-16 RX ADMIN — DICYCLOMINE HYDROCHLORIDE 20 MG: 10 CAPSULE ORAL at 08:23

## 2025-05-16 RX ADMIN — CEFEPIME 2000 MG: 2 INJECTION, POWDER, FOR SOLUTION INTRAVENOUS at 08:27

## 2025-05-16 RX ADMIN — OXYCODONE AND ACETAMINOPHEN 1 TABLET: 10; 325 TABLET ORAL at 05:38

## 2025-05-16 RX ADMIN — IBUPROFEN 800 MG: 400 TABLET, FILM COATED ORAL at 08:23

## 2025-05-16 RX ADMIN — AMLODIPINE BESYLATE 10 MG: 10 TABLET ORAL at 08:23

## 2025-05-16 RX ADMIN — CEFEPIME 2000 MG: 2 INJECTION, POWDER, FOR SOLUTION INTRAVENOUS at 22:07

## 2025-05-16 RX ADMIN — IBUPROFEN 800 MG: 400 TABLET, FILM COATED ORAL at 13:12

## 2025-05-16 RX ADMIN — HYDRALAZINE HYDROCHLORIDE 25 MG: 25 TABLET ORAL at 20:28

## 2025-05-16 RX ADMIN — BUDESONIDE AND FORMOTEROL FUMARATE DIHYDRATE 2 PUFF: 160; 4.5 AEROSOL RESPIRATORY (INHALATION) at 06:20

## 2025-05-16 RX ADMIN — HYDRALAZINE HYDROCHLORIDE 25 MG: 25 TABLET ORAL at 08:35

## 2025-05-16 RX ADMIN — VANCOMYCIN HYDROCHLORIDE 1500 MG: 10 INJECTION, POWDER, LYOPHILIZED, FOR SOLUTION INTRAVENOUS at 20:31

## 2025-05-16 RX ADMIN — GUAIFENESIN 600 MG: 600 TABLET ORAL at 20:28

## 2025-05-16 RX ADMIN — ACETAMINOPHEN 650 MG: 325 TABLET ORAL at 13:12

## 2025-05-16 RX ADMIN — PANTOPRAZOLE SODIUM 40 MG: 40 TABLET, DELAYED RELEASE ORAL at 05:38

## 2025-05-16 RX ADMIN — BUDESONIDE AND FORMOTEROL FUMARATE DIHYDRATE 2 PUFF: 160; 4.5 AEROSOL RESPIRATORY (INHALATION) at 18:54

## 2025-05-16 RX ADMIN — SODIUM CHLORIDE, PRESERVATIVE FREE 10 ML: 5 INJECTION INTRAVENOUS at 20:29

## 2025-05-16 RX ADMIN — OXYCODONE AND ACETAMINOPHEN 1 TABLET: 10; 325 TABLET ORAL at 00:07

## 2025-05-16 RX ADMIN — BUMETANIDE 2 MG: 1 TABLET ORAL at 08:23

## 2025-05-16 RX ADMIN — METOPROLOL SUCCINATE 100 MG: 50 TABLET, EXTENDED RELEASE ORAL at 08:23

## 2025-05-16 RX ADMIN — LOPERAMIDE HYDROCHLORIDE 12 MG: 2 CAPSULE ORAL at 08:23

## 2025-05-16 RX ADMIN — SODIUM CHLORIDE, PRESERVATIVE FREE 10 ML: 5 INJECTION INTRAVENOUS at 08:23

## 2025-05-16 RX ADMIN — LOSARTAN POTASSIUM 100 MG: 100 TABLET, FILM COATED ORAL at 08:23

## 2025-05-16 RX ADMIN — Medication 325 MG: at 08:23

## 2025-05-16 RX ADMIN — SERTRALINE HYDROCHLORIDE 100 MG: 100 TABLET ORAL at 20:28

## 2025-05-16 RX ADMIN — DICYCLOMINE HYDROCHLORIDE 20 MG: 10 CAPSULE ORAL at 13:12

## 2025-05-16 RX ADMIN — ACETAMINOPHEN 650 MG: 325 TABLET ORAL at 08:23

## 2025-05-16 ASSESSMENT — PAIN SCALES - WONG BAKER: WONGBAKER_NUMERICALRESPONSE: NO HURT

## 2025-05-16 ASSESSMENT — PAIN DESCRIPTION - LOCATION
LOCATION: BACK

## 2025-05-16 ASSESSMENT — PAIN DESCRIPTION - DESCRIPTORS
DESCRIPTORS: ACHING
DESCRIPTORS: ACHING;DISCOMFORT
DESCRIPTORS: ACHING

## 2025-05-16 ASSESSMENT — PAIN SCALES - GENERAL
PAINLEVEL_OUTOF10: 4

## 2025-05-16 ASSESSMENT — PAIN DESCRIPTION - ORIENTATION: ORIENTATION: MID

## 2025-05-16 ASSESSMENT — PAIN - FUNCTIONAL ASSESSMENT: PAIN_FUNCTIONAL_ASSESSMENT: PREVENTS OR INTERFERES SOME ACTIVE ACTIVITIES AND ADLS

## 2025-05-16 NOTE — PROGRESS NOTES
This  did a follow up visit with pt to assist them with completing a LW. Pt was identified with his MRN and his . This  provided the document and discussed the decisions. Pt named his wife Zoila Sood as primary decision maker and he named his daughter Naila Alvarez and his son John Sood as secondary decision makers. Pt also made advance care planning decisions. Pt signed the notary log, and he initialed and signed the document. This  witnessed the pt initial and sign and notarized the document. Originals were given to the pt, and a copy was emailed to Tia Hawthorne to be scanned to pt's EMR. Pt expressed gratitude for spiritual care.       Electronically signed by Mary Behrens on 2025 at 2:02 PM

## 2025-05-16 NOTE — PROGRESS NOTES
[facility]   Pharmacy Pharmacokinetic Monitoring Service - Vancomycin     Jose Sood is a 60 y.o. male starting on vancomycin therapy for surgical site infection. Pharmacy consulted by Dr. Deleon for monitoring and adjustment.    Target Concentration: Goal AUC/ALLIE 400-600 mg*hr/L    Additional Antimicrobials: Cefepime    Pertinent Laboratory Values:   Wt Readings from Last 1 Encounters:   05/15/25 (!) 145.2 kg (320 lb)     Temp Readings from Last 1 Encounters:   05/16/25 97 °F (36.1 °C) (Temporal)     Estimated Creatinine Clearance: 143 mL/min (based on SCr of 0.8 mg/dL).  Recent Labs     05/16/25  0423   CREATININE 0.8   BUN 14   WBC 9.9     Procalcitonin: N/A    Pertinent Cultures:  Culture Date Source Results   05/15/25 surgical collected   MRSA Nasal Swab: N/A. Non-respiratory infection.    Plan:  Dosing recommendations based on Bayesian software  Start vancomycin 2500 mg IV x 1 dose followed by 1500 mg IV Q 12 hours.  Anticipated AUC of 459 and trough concentration of 15.4 at steady state  Renal labs as indicated   Vancomycin concentration ordered for 05/17/25 @ 0800   Pharmacy will continue to monitor patient and adjust therapy as indicated    Thank you for the consult,  Geovanny Stephens [facility]  5/16/2025 7:35 AM

## 2025-05-16 NOTE — ACP (ADVANCE CARE PLANNING)
Advance Care Planning     Advance Care Planning Inpatient Note  [facility] Department    Today's Date: 5/16/2025  Unit: L 5 SURG SERVICES    Received request from Other: Palliative care .  Upon review of chart and communication with care team, patient's decision making abilities are not in question.. Patient was/were present in the room during visit.    Goals of ACP Conversation:  Discuss advance care planning documents    Health Care Decision Makers:       Primary Decision Maker (Active): MUSAAUBREE - [facility] - [phone] Secondary Decision   Summary:  Completed New Documents    Advance Care Planning Documents (Patient Wishes):  Living Will/Advance Directive     Assessment:  Pt is a 60 year old male and he says he has several health issues. Pt says he is on Long Term Disability. He says he had surgery a few years ago and he is having issues with the incision. Pt's goal is to return home and continue caring for himself.     Interventions:  Assisted in the completion of documents according to patient's wishes at this time    Care Preferences Communicated:     Hospitalization:  If the patient's health worsens and it becomes clear that the chance of recovery is unlikely,     the patient wants hospitalization.    Ventilation:   If the patient, in their present state of health, suddenly became very ill and unable to breathe on their own,     the patient would desire the use of a ventilator (breathing machine).    If their health worsens and it becomes clear that the change of recovery is unlikely,     the patient would desire the use of a ventilator (breathing machine).    Resuscitation:  In the event the patient's heart stopped as a result of an underlying serious health condition, the patient communicates a preference for      resuscitative attempts (CPR).    Outcomes/Plan:  ACP Discussion: Completed    Electronically signed by Mary Behrens, Chaplain on 5/16/2025 at 2:03 PM

## 2025-05-16 NOTE — PROGRESS NOTES
NEUROSURGERY POSTOPERATIVE FOLLOW UP NOTE      Chief Complaint: Exposed spinal cord stimulator generator due to skin breakdown      Date of Surgery: 5/15/2025    Procedure Performed:  Explant of Medtronic spinal cord stimulator paddle lead and generator      Interval Update:    Patient now POD#1.  He reports the expected postoperative soreness.  He denies any new radicular pain, numbness or extremity weakness.      HPI:     Patient presents for evaluation due to breakdown of the skin overlying his SCS generator with drainage and exposed hardware. He does not recall any fall, injury or event that cause this. The generator is visible. There is some turbid drainage. He denies any fevers, chills or new neurologic symptoms. He states he has stopped using the stimulator because it was not providing the expected relief.       Objective:    /74   Pulse 76   Temp 97 °F (36.1 °C) (Temporal)   Resp 20   Ht 1.753 m (5' 9\")   Wt (!) 145.2 kg (320 lb)   SpO2 93%   BMI 47.26 kg/m²         Physical Exam:    General: alert, cooperative, no distress  Cardiorespiratory: unlabored breathing  Wound: covered  by clean dry dressings      Neurologic Exam:    Mental Status: Alert, oriented, thought content appropriate  Cranial Nerves: PERRL, EOMI, symmetric facies, tongue midline  Motor: Motor exam is symmetrical 5 out of 5 all extremities bilaterally  Somatosensory: normal light touch sensation      Imaging:    No new imaging          Assessment and Plan:    60 y.o. M now POD#1 s/p explant of his SCS paddle lead and generator due to breakdown of the skin overlying the generator with exposed hardware.  He is doing well.  Intraoperative cultures are pending.    - Continue broad spectrum antibiotics pending culture results  - Mobilize ad teddy        Electronically signed by Kike Deleon MD on 5/16/2025 at 7:40 AM

## 2025-05-16 NOTE — PROGRESS NOTES
4 Eyes Skin Assessment     NAME:  Jose Sood  YOB: 1965  MEDICAL RECORD NUMBER:  [id number]    The patient is being assessed for  Admission    I agree that at least one RN has performed a thorough Head to Toe Skin Assessment on the patient. ALL assessment sites listed below have been assessed.      Areas assessed by both nurses:    Head, Face, Ears, Shoulders, Back, Chest, Arms, Elbows, Hands, Sacrum. Buttock, Coccyx, Ischium, Legs. Feet and Heels, and Under Medical Devices         Does the Patient have a Wound? No noted wound(s)       Remigio Prevention initiated by RN: No  Wound Care Orders initiated by RN: No    Pressure Injury (Stage 3,4, Unstageable, DTI, NWPT, and Complex wounds) if present, place Wound referral order by RN under : No    New Ostomies, if present place, Ostomy referral order under : No     Nurse 1 eSignature: Electronically signed by Mira Mello RN on 5/15/25 at 8:39 PM CDT    **SHARE this note so that the co-signing nurse can place an eSignature**    Nurse 2 eSignature: Electronically signed by Christelle Self RN on 5/15/25 at 8:39 PM CDT

## 2025-05-16 NOTE — OP NOTE
[address] [place] [address]                            OPERATIVE REPORT      PATIENT NAME: DEBBIE MUSA               : 1965  MED REC NO: [id number]                          ROOM: 05  ACCOUNT NO: [id number]                       ADMIT DATE: 05/15/2025  PROVIDER: Kike Deleon MD      DATE OF PROCEDURE:  05/15/2025    SURGEON:  Kike Deleon MD    PREOPERATIVE DIAGNOSES:    1. Breakdown of spinal cord stimulator generator incision with exposed hardware.  2. Ineffective spinal cord stimulation.    POSTOPERATIVE DIAGNOSES:    1. Breakdown of spinal cord stimulator generator incision with exposed hardware.  2. Ineffective spinal cord stimulation.    PROCEDURES PERFORMED:    1. Explant of Medtronic spinal cord stimulator lead and generator.  2.  Incision, drainage, and debridement of spinal cord stimulator generator incision.    ESTIMATED BLOOD LOSS:  Less than 50 mL.    INDICATIONS:  Please see the medical record for full details.  Briefly, the patient is a 60-year-old male who underwent thoracic laminectomy and implantation of a Medtronic spinal cord stimulator paddle lead and impulse generator in .  He initially did reasonably well after surgery with appropriate wound healing.  He was subsequently lost to followup, however. He was referred back by Pain Management due to breakdown of his generator incision with exposed hardware in May of 2025.  I evaluated him and confirmed the partial breakdown of his gluteal incision with the generator exposed.  He also reported at the time that his stimulation had become ineffective and he was not using the stimulator.  Given the exposed hardware and certainty of infection of the gluteal incision as well as his ineffective stimulation, he was indicated to undergo the above-mentioned surgery and he consented to proceed after a full PARQ discussion.    PROCEDURE IN DETAIL:  The patient was

## 2025-05-16 NOTE — PROGRESS NOTES
Set up pts home trilogy machine with o2 @ 2lpm as per home settings. Is not ready for bed, will place on himself.

## 2025-05-16 NOTE — PROGRESS NOTES
Palliative Care initiation: This  visited with pt to provide spiritual care and initiate palliative care. Pt says he does not currently have spiritual needs or distress. He says he is a Tenriism and his lsisa is important to him. Pt says he had a surgery several years ago and it has started to open up. Pt is known to palliative care.         Advance Directives: Pt is interested in completing a LW. This  will assist him this afternoon, after lunch. Pt's NOK is his spouse Zoila Sood. Pt is full code and wants CPR and Ventilator.         Pain/other symptoms: Pt is not having pain now but is receiving pain medication.       Spiritual: Pt says he is a Tenriism.         Pt/family discussion r/t goals: Pt lives at home with his wife. He says he is on long term disability. He says he ambulates without assistance short distances. He is able to perform daily living skills to care for himself at home. He is still driving. Pt's goal is to return home and continue to care for himself.         Spiritual Health History and Assessment/Progress Note  [facility]    Initial Encounter, Spiritual/Emotional Needs,  ,  ,      Name: Jose Sood MRN: [id number]    Age: 60 y.o.     Sex: male   Language: English   Christianity: Tenriism   Malfunction of spinal cord stimulator     Date: 5/16/2025            Total Time Calculated: 20 min              Spiritual Assessment began in [facility] SURG SERVICES        Referral/Consult From: Palliative Care   Encounter Overview/Reason: Initial Encounter, Spiritual/Emotional Needs  Service Provided For: Patient    Lissa, Belief, Meaning:   Patient identifies as spiritual and is connected with a lissa tradition or spiritual practice  Family/Friends No family/friends present      Importance and Influence:  Patient has spiritual/personal beliefs that influence decisions regarding their health  Family/Friends No family/friends present    Community:  Patient is connected with a

## 2025-05-16 NOTE — PROGRESS NOTES
Pharmacy Adjustment per [facility] protocol    Jose Sood is a 60 y.o. male. Pharmacy has adjusted medications per [facility] protocol.    Recent Labs     05/16/25  0423   BUN 14       Recent Labs     05/16/25  0423   CREATININE 0.8       Estimated Creatinine Clearance: 143 mL/min (based on SCr of 0.8 mg/dL).    Height:   Ht Readings from Last 1 Encounters:   05/15/25 1.753 m (5' 9\")     Weight:  Wt Readings from Last 1 Encounters:   05/15/25 (!) 145.2 kg (320 lb)    BMI:  BMI Readings from Last 1 Encounters:   05/15/25 47.26 kg/m²         Plan: Adjust the following medications based on [facility] protocol:           Cefepime to 2000 mg IV once over 30 minutes followed by 2000 mg IV every 8 hours extended infusion over 240 minutes     Electronically signed by Geovanny Stephens RPH on 5/16/2025 at 7:16 AM

## 2025-05-17 VITALS
HEART RATE: 69 BPM | HEIGHT: 69 IN | DIASTOLIC BLOOD PRESSURE: 67 MMHG | RESPIRATION RATE: 18 BRPM | TEMPERATURE: 98.1 F | BODY MASS INDEX: 46.65 KG/M2 | WEIGHT: 315 LBS | SYSTOLIC BLOOD PRESSURE: 144 MMHG | OXYGEN SATURATION: 93 %

## 2025-05-17 PROBLEM — T85.192A MALFUNCTION OF SPINAL CORD STIMULATOR: Status: RESOLVED | Noted: 2025-05-12 | Resolved: 2025-05-17

## 2025-05-17 LAB
EKG P AXIS: 55 DEGREES
EKG P-R INTERVAL: 184 MS
EKG Q-T INTERVAL: 508 MS
EKG QRS DURATION: 152 MS
EKG QTC CALCULATION (BAZETT): 496 MS
EKG T AXIS: 69 DEGREES
VANCOMYCIN TROUGH SERPL-MCNC: 9.2 UG/ML (ref 10–20)

## 2025-05-17 PROCEDURE — 6370000000 HC RX 637 (ALT 250 FOR IP): Performed by: NEUROLOGICAL SURGERY

## 2025-05-17 PROCEDURE — 94640 AIRWAY INHALATION TREATMENT: CPT

## 2025-05-17 PROCEDURE — 80202 ASSAY OF VANCOMYCIN: CPT

## 2025-05-17 PROCEDURE — 2580000003 HC RX 258: Performed by: NEUROLOGICAL SURGERY

## 2025-05-17 PROCEDURE — 94150 VITAL CAPACITY TEST: CPT

## 2025-05-17 PROCEDURE — 2500000003 HC RX 250 WO HCPCS: Performed by: NEUROLOGICAL SURGERY

## 2025-05-17 PROCEDURE — 99024 POSTOP FOLLOW-UP VISIT: CPT | Performed by: NEUROLOGICAL SURGERY

## 2025-05-17 PROCEDURE — 36415 COLL VENOUS BLD VENIPUNCTURE: CPT

## 2025-05-17 PROCEDURE — 6360000002 HC RX W HCPCS: Performed by: NEUROLOGICAL SURGERY

## 2025-05-17 PROCEDURE — 94760 N-INVAS EAR/PLS OXIMETRY 1: CPT

## 2025-05-17 RX ORDER — SULFAMETHOXAZOLE AND TRIMETHOPRIM 800; 160 MG/1; MG/1
1 TABLET ORAL 2 TIMES DAILY
Qty: 28 TABLET | Refills: 0 | Status: SHIPPED | OUTPATIENT
Start: 2025-05-17 | End: 2025-05-31

## 2025-05-17 RX ADMIN — OXYCODONE AND ACETAMINOPHEN 1 TABLET: 10; 325 TABLET ORAL at 10:30

## 2025-05-17 RX ADMIN — OXYCODONE AND ACETAMINOPHEN 1 TABLET: 10; 325 TABLET ORAL at 06:21

## 2025-05-17 RX ADMIN — HYDRALAZINE HYDROCHLORIDE 25 MG: 25 TABLET ORAL at 09:02

## 2025-05-17 RX ADMIN — SERTRALINE HYDROCHLORIDE 100 MG: 100 TABLET ORAL at 08:37

## 2025-05-17 RX ADMIN — AMLODIPINE BESYLATE 10 MG: 10 TABLET ORAL at 08:37

## 2025-05-17 RX ADMIN — ACETAMINOPHEN 650 MG: 325 TABLET ORAL at 08:37

## 2025-05-17 RX ADMIN — IBUPROFEN 800 MG: 400 TABLET, FILM COATED ORAL at 08:37

## 2025-05-17 RX ADMIN — BUDESONIDE AND FORMOTEROL FUMARATE DIHYDRATE 2 PUFF: 160; 4.5 AEROSOL RESPIRATORY (INHALATION) at 07:57

## 2025-05-17 RX ADMIN — LOSARTAN POTASSIUM 100 MG: 100 TABLET, FILM COATED ORAL at 08:37

## 2025-05-17 RX ADMIN — TIZANIDINE 4 MG: 4 TABLET ORAL at 09:02

## 2025-05-17 RX ADMIN — SODIUM CHLORIDE, PRESERVATIVE FREE 10 ML: 5 INJECTION INTRAVENOUS at 08:58

## 2025-05-17 RX ADMIN — TIOTROPIUM BROMIDE INHALATION SPRAY 2 PUFF: 3.12 SPRAY, METERED RESPIRATORY (INHALATION) at 10:27

## 2025-05-17 RX ADMIN — DICYCLOMINE HYDROCHLORIDE 20 MG: 10 CAPSULE ORAL at 08:36

## 2025-05-17 RX ADMIN — VANCOMYCIN HYDROCHLORIDE 1500 MG: 10 INJECTION, POWDER, LYOPHILIZED, FOR SOLUTION INTRAVENOUS at 08:56

## 2025-05-17 RX ADMIN — GUAIFENESIN 600 MG: 600 TABLET ORAL at 08:37

## 2025-05-17 RX ADMIN — METOPROLOL SUCCINATE 100 MG: 50 TABLET, EXTENDED RELEASE ORAL at 08:37

## 2025-05-17 RX ADMIN — CEFEPIME 2000 MG: 2 INJECTION, POWDER, FOR SOLUTION INTRAVENOUS at 06:20

## 2025-05-17 RX ADMIN — CLONAZEPAM 0.5 MG: 0.5 TABLET ORAL at 08:36

## 2025-05-17 RX ADMIN — BUMETANIDE 2 MG: 1 TABLET ORAL at 08:37

## 2025-05-17 RX ADMIN — PANTOPRAZOLE SODIUM 40 MG: 40 TABLET, DELAYED RELEASE ORAL at 06:21

## 2025-05-17 ASSESSMENT — PAIN DESCRIPTION - LOCATION
LOCATION: BACK
LOCATION: BACK

## 2025-05-17 ASSESSMENT — PAIN SCALES - WONG BAKER: WONGBAKER_NUMERICALRESPONSE: NO HURT

## 2025-05-17 ASSESSMENT — PAIN SCALES - GENERAL
PAINLEVEL_OUTOF10: 4
PAINLEVEL_OUTOF10: 0
PAINLEVEL_OUTOF10: 4

## 2025-05-17 ASSESSMENT — PAIN DESCRIPTION - DESCRIPTORS
DESCRIPTORS: ACHING;DISCOMFORT
DESCRIPTORS: ACHING;DISCOMFORT

## 2025-05-17 ASSESSMENT — PAIN DESCRIPTION - ORIENTATION
ORIENTATION: MID;UPPER
ORIENTATION: MID;UPPER

## 2025-05-17 NOTE — DISCHARGE SUMMARY
[facility]  Discharge Summary     Patient:   Jose Sood  MR#:    [id number]      YOB: 1965  Date of Evaluation:  5/17/2025  Time of Note:                          10:26 AM  Primary/Referring Physician:  Nicki Babb MD   Note Author:    Kike Deleon MD        Admission Date: 5/15/2025    Discharge Date: 5/17/2025      Final Diagnoses: Malfunction of spinal cord stimulator [T85.192A]  Infection of spinal cord stimulator, subsequent encounter [T85.733D]    Procedures: Explant of Medtronic Spinal Cord Stimulator, I&D of wound    Consultations: None    Reason for Admission: Postoperative care    Hospital Course:  Jose Sood was admitted with the above named diagnosis, surgery was performed and tolerated well.  At the time of discharge, the patient was afebrile, vitals stable, pain was controlled with oral medication, they were tolerating a by mouth diet, and voiding appropriately.  His intraoperative cultures grew MRSA and he will be discharged to complete a 2-week course of Bactrim    Patient Condition: Stable    Disposition: Home    Wound Instructions: Able to shower on 5/19.  DO NOT SOAK WOUND    Diet: regular diet    Discharge Medications:      Medication List        START taking these medications      sulfamethoxazole-trimethoprim 800-160 MG per tablet  Commonly known as: BACTRIM DS;SEPTRA DS  Take 1 tablet by mouth 2 times daily for 14 days            CONTINUE taking these medications      acetaminophen 325 MG tablet  Commonly known as: TYLENOL     ALPRAZolam 0.5 MG tablet  Commonly known as: XANAX     amLODIPine 10 MG tablet  Commonly known as: NORVASC     bumetanide 2 MG tablet  Commonly known as: BUMEX     clonazePAM 0.5 MG tablet  Commonly known as: KLONOPIN     dicyclomine 10 MG capsule  Commonly known as: BENTYL  Take 2 capsules by mouth 4 times daily     FeroSul 325 (65 Fe) MG tablet  Generic drug: ferrous sulfate     fluticasone 50 MCG/ACT nasal spray  Commonly known  as: FLONASE     Humira (2 Pen) 40 MG/0.8ML injection  Generic drug: adalimumab     hydrALAZINE 25 MG tablet  Commonly known as: APRESOLINE     loperamide 2 MG capsule  Commonly known as: IMODIUM     losartan 100 MG tablet  Commonly known as: COZAAR     metoprolol succinate 100 MG extended release tablet  Commonly known as: TOPROL XL     Mucinex 600 MG extended release tablet  Generic drug: guaiFENesin     ondansetron 8 MG tablet  Commonly known as: ZOFRAN     oxyCODONE-acetaminophen 7.5-325 MG per tablet  Commonly known as: PERCOCET     pantoprazole 40 MG tablet  Commonly known as: PROTONIX  Take 1 tablet by mouth 2 times daily (before meals)     sertraline 100 MG tablet  Commonly known as: ZOLOFT     sulindac 150 MG tablet  Commonly known as: CLINORIL     tiZANidine 4 MG tablet  Commonly known as: ZANAFLEX     Trelegy Ellipta 200-62.5-25 MCG/ACT Aepb inhaler  Generic drug: fluticasone-umeclidin-vilant     Ventolin  (90 Base) MCG/ACT inhaler  Generic drug: albuterol sulfate HFA            ASK your doctor about these medications      NONFORMULARY     vitamin D 48385 UNIT Caps  Commonly known as: CHOLECALCIFEROL     Voquezna 10 MG Tabs  Generic drug: Vonoprazan Fumarate               Where to Get Your Medications        These medications were sent to [id number] - [place], [place] - [address]  - P [phone] - F [phone]  [address] [facility] [address] [address] [facility] [address]      Phone: [phone]   sulfamethoxazole-trimethoprim 800-160 MG per tablet           Return to Clinic: 2 weeks

## 2025-05-17 NOTE — PROGRESS NOTES
[facility]   Pharmacy Pharmacokinetic Monitoring Service - Vancomycin    Consulting Provider: Dr. Deleon  Indication: surgical site infection  Therapeutic Target: AUC/ALLIE 400-600 mg*hr/L  Day of Therapy: 2  Additional Antimicrobials: cefepime    Pertinent Laboratory Values:   Wt Readings from Last 1 Encounters:   05/15/25 (!) 145.2 kg (320 lb)     Temp Readings from Last 1 Encounters:   05/17/25 98.1 °F (36.7 °C) (Temporal)     Estimated Creatinine Clearance: 143 mL/min (based on SCr of 0.8 mg/dL).  Recent Labs     05/16/25  0423   CREATININE 0.8   BUN 14   WBC 9.9       Pertinent Cultures:  Culture Date Source Results   5/15/25 Surgical swab from back MRSA   MRSA Nasal Swab: N/A. Non-respiratory infection.    Recent vancomycin administrations                     vancomycin (VANCOCIN) 1500 mg in sodium chloride 0.9 % 250 mL IVPB (mg) 1,500 mg New Bag 05/16/25 2031    vancomycin (VANCOCIN) 2,500 mg in sodium chloride 0.9 % 500 mL IVPB (mg) 2,500 mg New Bag 05/16/25 0921                    Assessment:  Date/Time Current Dose Concentration Timing of Concentration (h) AUC   5/17/25 0845 1500mg IV q12h 9.2  11 h 14 min 464   Note: Serum concentrations collected for AUC dosing may appear elevated if collected in close proximity to the dose administered, this is not necessarily an indication of toxicity    Plan:  Current dosing regimen is therapeutic  Continue current dose  No repeat vancomycin concentration ordered at this time  Pharmacy will continue to monitor patient and adjust therapy as indicated    Thank you for the consult,  Ruth Cruz RPH  5/17/2025 8:45 AM

## 2025-05-18 LAB
BACTERIA SPEC AEROBE CULT: ABNORMAL
BACTERIA SPEC AEROBE CULT: ABNORMAL
BACTERIA SPEC ANAEROBE CULT: ABNORMAL
BACTERIA SPEC ANAEROBE CULT: ABNORMAL
GRAM STN SPEC: ABNORMAL
GRAM STN SPEC: ABNORMAL
ORGANISM: ABNORMAL
ORGANISM: ABNORMAL

## 2025-05-28 ENCOUNTER — OFFICE VISIT (OUTPATIENT)
Dept: NEUROSURGERY | Age: 60
End: 2025-05-28

## 2025-05-28 VITALS
WEIGHT: 315 LBS | SYSTOLIC BLOOD PRESSURE: 118 MMHG | HEIGHT: 69 IN | BODY MASS INDEX: 46.65 KG/M2 | DIASTOLIC BLOOD PRESSURE: 78 MMHG | HEART RATE: 60 BPM

## 2025-05-28 DIAGNOSIS — T85.733D INFECTION OF SPINAL CORD STIMULATOR, SUBSEQUENT ENCOUNTER: Primary | ICD-10-CM

## 2025-05-28 PROCEDURE — 99024 POSTOP FOLLOW-UP VISIT: CPT | Performed by: NEUROLOGICAL SURGERY

## 2025-05-28 NOTE — PROGRESS NOTES
NEUROSURGERY POSTOPERATIVE PROGRESS NOTE      Chief Complaint: Exposed spinal cord stimulator generator due to skin breakdown      Date of Surgery: 5/15/2025    Procedure Performed:  Explant of Medtronic spinal cord stimulator paddle lead and generator      Interval Update:    Patient returns for his first postoperative visit after surgery.  He is doing well.  He is completing his course of Bactrim.  His postoperative pain is well-controlled.  He denies any new radicular pain, numbness or extremity weakness.  He had some serous drainage from his incision at discharge that has resolved.      HPI:     Patient presents for evaluation due to breakdown of the skin overlying his SCS generator with drainage and exposed hardware. He does not recall any fall, injury or event that cause this. The generator is visible. There is some turbid drainage. He denies any fevers, chills or new neurologic symptoms. He states he has stopped using the stimulator because it was not providing the expected relief.       Objective:    /78   Pulse 60   Ht 1.753 m (5' 9.02\")   Wt (!) 145.2 kg (320 lb 1.7 oz)   BMI 47.25 kg/m²         Physical Exam:    General: alert, cooperative, no distress  Cardiorespiratory: unlabored breathing  Wound: C/D/I with staples, staples removed      Neurologic Exam:    Mental Status: Alert, oriented, thought content appropriate  Cranial Nerves: PERRL, EOMI, symmetric facies, tongue midline  Motor: Motor exam is symmetrical 5 out of 5 all extremities bilaterally  Somatosensory: normal light touch sensation      Imaging:    No new imaging          Assessment and Plan:    60 y.o. M s/p explant of his SCS paddle lead and generator on 5/15/2025 due to breakdown of the skin overlying the generator with exposed hardware.  He is doing well.  His intraoperative cultures grew MRSA and he is completing a course of Bactrim.  His incisions are healing appropriately.  I recommended he complete his course of antibiotics

## 2025-06-18 ENCOUNTER — OFFICE VISIT (OUTPATIENT)
Dept: NEUROSURGERY | Age: 60
End: 2025-06-18

## 2025-06-18 VITALS
HEART RATE: 70 BPM | DIASTOLIC BLOOD PRESSURE: 70 MMHG | SYSTOLIC BLOOD PRESSURE: 122 MMHG | BODY MASS INDEX: 46.65 KG/M2 | WEIGHT: 315 LBS | HEIGHT: 69 IN

## 2025-06-18 DIAGNOSIS — T85.733D INFECTION OF SPINAL CORD STIMULATOR, SUBSEQUENT ENCOUNTER: Primary | ICD-10-CM

## 2025-06-18 PROCEDURE — 99024 POSTOP FOLLOW-UP VISIT: CPT | Performed by: NEUROLOGICAL SURGERY

## 2025-08-18 ENCOUNTER — OFFICE VISIT (OUTPATIENT)
Dept: PULMONOLOGY | Facility: CLINIC | Age: 60
End: 2025-08-18
Payer: COMMERCIAL

## 2025-08-18 VITALS
RESPIRATION RATE: 18 BRPM | BODY MASS INDEX: 46.65 KG/M2 | SYSTOLIC BLOOD PRESSURE: 130 MMHG | WEIGHT: 315 LBS | HEART RATE: 69 BPM | OXYGEN SATURATION: 96 % | HEIGHT: 69 IN | DIASTOLIC BLOOD PRESSURE: 76 MMHG

## 2025-08-18 DIAGNOSIS — J96.11 CHRONIC HYPOXIC RESPIRATORY FAILURE: Primary | ICD-10-CM

## 2025-08-18 DIAGNOSIS — J98.4 RESTRICTIVE LUNG DISEASE: ICD-10-CM

## 2025-08-18 DIAGNOSIS — Z87.891 PERSONAL HISTORY OF TOBACCO USE, PRESENTING HAZARDS TO HEALTH: ICD-10-CM

## 2025-08-18 DIAGNOSIS — J96.11 CHRONIC RESPIRATORY FAILURE WITH HYPOXIA: ICD-10-CM

## 2025-08-18 DIAGNOSIS — E66.01 MORBID (SEVERE) OBESITY DUE TO EXCESS CALORIES: ICD-10-CM

## 2025-08-18 DIAGNOSIS — R06.09 CHRONIC DYSPNEA: ICD-10-CM

## 2025-08-18 DIAGNOSIS — G47.33 OSA (OBSTRUCTIVE SLEEP APNEA): ICD-10-CM

## 2025-08-18 DIAGNOSIS — R91.1 PULMONARY NODULE: ICD-10-CM

## 2025-08-18 PROCEDURE — 99214 OFFICE O/P EST MOD 30 MIN: CPT | Performed by: INTERNAL MEDICINE

## 2025-08-18 RX ORDER — SEMAGLUTIDE 0.25 MG/.5ML
0.25 INJECTION, SOLUTION SUBCUTANEOUS WEEKLY
Qty: 2 ML | Refills: 0 | Status: SHIPPED | OUTPATIENT
Start: 2025-08-18 | End: 2025-08-18

## 2025-08-18 RX ORDER — TIRZEPATIDE 2.5 MG/.5ML
2.5 INJECTION, SOLUTION SUBCUTANEOUS WEEKLY
Qty: 0.5 ML | Refills: 3 | Status: SHIPPED | OUTPATIENT
Start: 2025-08-18

## 2025-08-20 ENCOUNTER — TELEPHONE (OUTPATIENT)
Dept: PULMONOLOGY | Facility: CLINIC | Age: 60
End: 2025-08-20
Payer: COMMERCIAL

## (undated) DEVICE — NEPTUNE E-SEP SMOKE EVACUATION PENCIL, COATED, 70MM BLADE, ROCKER SWITCH: Brand: NEPTUNE E-SEP

## (undated) DEVICE — BAND BAG 36" X 36": Brand: TIDI

## (undated) DEVICE — BITEBLOCK ENDO W/STRAP 60F A/ LF DISP

## (undated) DEVICE — FORCEPS BX L240CM JAW DIA2.8MM L CAP W/ NDL MIC MESH TOOTH

## (undated) DEVICE — Z DUPLICATE USE 2392649 LARYNGOSCOPE VID TI SPECTRM LOPRO S4 GLIDESCOPE

## (undated) DEVICE — TOOL MR8-14MH30 MR8 14CM MATCH 3MM: Brand: MIDAS REX MR8

## (undated) DEVICE — BLANKET WRM W40.2XL55.9IN IORT LO BODY + MISTRAL AIR

## (undated) DEVICE — BLANKET WRM W29.9XL79.1IN UP BODY FORC AIR MISTRAL-AIR

## (undated) DEVICE — E-Z CLEAN, NON-STICK, PTFE COATED, ELECTROSURGICAL BLADE ELECTRODE, MODIFIED EXTENDED INSULATION, 6.5 INCH (16.5 CM): Brand: MEGADYNE

## (undated) DEVICE — ADHESIVE SKIN CLSR 0.7ML TOP DERMBND ADV

## (undated) DEVICE — FOOT SWITCH: Brand: SONICFUSION

## (undated) DEVICE — TOWEL,OR,DSP,ST,BLUE,DLX,4/PK,20PK/CS: Brand: MEDLINE

## (undated) DEVICE — Device

## (undated) DEVICE — TOOL 14MH30 LEGEND 14CM 3MM: Brand: MIDAS REX ™

## (undated) DEVICE — SUTURE VICRYL + SZ 1 L18IN ABSRB UD L36MM CT-1 1/2 CIR VCP841D

## (undated) DEVICE — MASK VENTILATOR MED AD SUPERNOVA ET

## (undated) DEVICE — ELECTRODE ELECSURG L 10.2 CM PTFE COAT MONOPOLAR BLADE NSTK

## (undated) DEVICE — CATHETER 8591-38 PASSER,38CM

## (undated) DEVICE — ENDO KIT,LOURDES HOSPITAL: Brand: MEDLINE INDUSTRIES, INC.

## (undated) DEVICE — UNDERGLOVE SURG SZ 8 FNGR THK0.21MIL GRN LTX BEAD CUF

## (undated) DEVICE — FORCEP BPLR IRIS

## (undated) DEVICE — SUTURE VICRYL + SZ 2-0 L18IN ABSRB UD CT1 L36MM 1/2 CIR VCP839D

## (undated) DEVICE — CONTROLLER NEUROSTIMULATOR HND HELD PRGMR WIRELESS PTM FOR

## (undated) DEVICE — AGENT HEMSTAT 8ML FLX TIP MTRX + DISP SURGIFLO

## (undated) DEVICE — TUBE ET 7.5MM NSL ORAL BASIC CUF INTMED MURPHY EYE RADPQ

## (undated) DEVICE — GLOVE SURG SZ 8 CRM LTX FREE POLYISOPRENE POLYMER BEAD ANTI

## (undated) DEVICE — GARMENT,MEDLINE,DVT,INT,CALF,MED, GEN2: Brand: MEDLINE

## (undated) DEVICE — SUTURE VCRL SZ 2-0 L18IN ABSRB UD CT-1 L36MM 1/2 CIR J839D

## (undated) DEVICE — SUTURE VCRL SZ 0 L18IN ABSRB UD L36MM CT-1 1/2 CIR J840D

## (undated) DEVICE — FORCEPS BX L240CM JAW DIA2.4MM ORNG L CAP W/ NDL DISP RAD

## (undated) DEVICE — SINGLE-USE BIOPSY FORCEPS: Brand: RADIAL JAW 4

## (undated) DEVICE — Z DISCONTINUED USE 2272117 DRAPE SURG 3 QTR N INVASIVE 2 LAYR DISP

## (undated) DEVICE — SYSTEM VENT MED AD NASAL PAP SUPERNO2VA

## (undated) DEVICE — GLOVE SURG SZ 85 L12IN FNGR THK94MIL TRNSLUC YEL LTX

## (undated) DEVICE — NEURO CDS

## (undated) DEVICE — E-Z CLEAN, NON-STICK, PTFE COATED, ELECTROSURGICAL BLADE ELECTRODE, MODIFIED EXTENDED INSULATION, 2.5 INCH (6.35 CM): Brand: MEGADYNE

## (undated) DEVICE — HYPODERMIC SAFETY NEEDLE: Brand: MAGELLAN

## (undated) DEVICE — SPK10281 JACKSON TABLE KIT: Brand: SPK10281 JACKSON TABLE KIT

## (undated) DEVICE — AGENT HEMOSTATIC SURGIFLOW MATRIX KIT W/THROMBIN

## (undated) DEVICE — TOOL 14BA20 LEGEND 14CM 2MM BA: Brand: MIDAS REX ™

## (undated) DEVICE — SUTURE PERMAHAND SZ 4-0 L18IN NONABSORBABLE BLK L26MM SH C014D

## (undated) DEVICE — SUTURE PERMA-HAND 0 L18IN NONABSORBABLE BLK MO-7 L22MM 1/2 C041D

## (undated) DEVICE — ELECTRODE ES AD PED L2.5IN TEF INSUL MOD NONCORDED BLDE TIP

## (undated) DEVICE — SYSTEM RECHARGING NEUROSTIMULATOR RECHRG BTTRY PK PWR SUPL